# Patient Record
Sex: FEMALE | Race: WHITE | NOT HISPANIC OR LATINO | Employment: FULL TIME | URBAN - METROPOLITAN AREA
[De-identification: names, ages, dates, MRNs, and addresses within clinical notes are randomized per-mention and may not be internally consistent; named-entity substitution may affect disease eponyms.]

---

## 2017-01-06 ENCOUNTER — OFFICE VISIT (OUTPATIENT)
Dept: INTERNAL MEDICINE | Facility: CLINIC | Age: 32
End: 2017-01-06

## 2017-01-06 VITALS
BODY MASS INDEX: 49.79 KG/M2 | HEART RATE: 95 BPM | TEMPERATURE: 98.2 F | OXYGEN SATURATION: 96 % | DIASTOLIC BLOOD PRESSURE: 85 MMHG | HEIGHT: 63 IN | SYSTOLIC BLOOD PRESSURE: 142 MMHG | WEIGHT: 281 LBS

## 2017-01-06 DIAGNOSIS — I10 BENIGN ESSENTIAL HYPERTENSION: ICD-10-CM

## 2017-01-06 DIAGNOSIS — Z79.899 CONTROLLED SUBSTANCE AGREEMENT SIGNED: ICD-10-CM

## 2017-01-06 DIAGNOSIS — K50.00 CROHN'S DISEASE OF SMALL INTESTINE WITHOUT COMPLICATION (HCC): ICD-10-CM

## 2017-01-06 DIAGNOSIS — F51.01 PRIMARY INSOMNIA: ICD-10-CM

## 2017-01-06 DIAGNOSIS — F41.8 DEPRESSION WITH ANXIETY: Primary | ICD-10-CM

## 2017-01-06 DIAGNOSIS — E66.01 MORBID OBESITY, UNSPECIFIED OBESITY TYPE (HCC): ICD-10-CM

## 2017-01-06 DIAGNOSIS — H65.193: ICD-10-CM

## 2017-01-06 PROBLEM — H65.93 BILATERAL NON-SUPPURATIVE OTITIS MEDIA: Status: ACTIVE | Noted: 2017-01-06

## 2017-01-06 PROCEDURE — 99214 OFFICE O/P EST MOD 30 MIN: CPT | Performed by: INTERNAL MEDICINE

## 2017-01-06 RX ORDER — CLARITHROMYCIN 500 MG/1
500 TABLET, COATED ORAL 2 TIMES DAILY
Qty: 16 TABLET | Refills: 0 | Status: SHIPPED | OUTPATIENT
Start: 2017-01-06 | End: 2017-11-15

## 2017-01-06 RX ORDER — ZOLPIDEM TARTRATE 10 MG/1
10 TABLET ORAL NIGHTLY PRN
Qty: 30 TABLET | Refills: 0 | Status: SHIPPED | OUTPATIENT
Start: 2017-01-06 | End: 2017-02-06 | Stop reason: SDUPTHER

## 2017-01-06 RX ORDER — BUPROPION HYDROCHLORIDE 75 MG/1
75 TABLET ORAL 2 TIMES DAILY
Qty: 180 TABLET | Refills: 1 | Status: SHIPPED | OUTPATIENT
Start: 2017-01-06 | End: 2017-02-06

## 2017-01-06 NOTE — MR AVS SNAPSHOT
Liz BEACH Yudi   1/6/2017 10:45 AM   Office Visit    Dept Phone:  [phone]   Encounter #:  [id number]    Provider:  Isauro Maldonado MD   Department:  [facility] INTERNAL MEDICINE                Your Full Care Plan              Today's Medication Changes          These changes are accurate as of: 1/6/17 12:20 PM.  If you have any questions, ask your nurse or doctor.               New Medication(s)Ordered:     clarithromycin 500 MG tablet   Commonly known as:  BIAXIN   Take 1 tablet by mouth 2 (Two) Times a Day.   Started by:  Isauro Maldonado MD       zolpidem 10 MG tablet   Commonly known as:  AMBIEN   Take 1 tablet by mouth At Night As Needed for sleep.   Started by:  Isauro Maldonado MD         Stop taking medication(s)listed here:     amoxicillin-clavulanate 875-125 MG per tablet   Commonly known as:  AUGMENTIN   Stopped by:  Isauro Maldonado MD           Chlorcyclizine-Pseudoephed 25-60 MG tablet   Commonly known as:  STAHIST AD   Stopped by:  Shanika Patel MA                Where to Get Your Medications      These medications were sent to [address] AT [facility] - [phone]  - [phone] [place] [address]     Phone:  [phone]     clarithromycin 500 MG tablet         You can get these medications from any pharmacy     Bring a paper prescription for each of these medications     zolpidem 10 MG tablet                  Your Updated Medication List          This list is accurate as of: 1/6/17 12:20 PM.  Always use your most recent med list.                clarithromycin 500 MG tablet   Commonly known as:  BIAXIN   Take 1 tablet by mouth 2 (Two) Times a Day.       DULoxetine 60 MG capsule   Commonly known as:  CYMBALTA   TAKE ONE CAPSULE BY MOUTH DAILY       lisinopril-hydrochlorothiazide 20-25 MG per tablet   Commonly known as:  PRINZIDE,ZESTORETIC    TAKE ONE TABLET BY MOUTH EVERY MORNING       ondansetron ODT 4 MG disintegrating tablet   Commonly known as:  ZOFRAN-ODT   Take 1 tablet by mouth Every 8 (Eight) Hours As Needed for nausea or vomiting.       SPRINTEC 28 PO       zolpidem 10 MG tablet   Commonly known as:  AMBIEN   Take 1 tablet by mouth At Night As Needed for sleep.               You Were Diagnosed With        Codes Comments    Depression with anxiety    -  Primary ICD-10-CM: F41.8  ICD-9-CM: 300.4     Benign essential hypertension     ICD-10-CM: I10  ICD-9-CM: 401.1     Crohn's disease of small intestine without complication     ICD-10-CM: K50.00  ICD-9-CM: 555.0     Primary insomnia     ICD-10-CM: F51.01  ICD-9-CM: 307.42     Controlled substance agreement signed     ICD-10-CM: Z79.899  ICD-9-CM: V58.69     Morbid obesity, unspecified obesity type     ICD-10-CM: E66.01  ICD-9-CM: 278.01     Subacute nonsuppurative otitis media of both ears     ICD-10-CM: H65.193  ICD-9-CM: 381.00       Instructions     None    Patient Instructions History      Upcoming Appointments     Visit Type Date Time Department    OFFICE VISIT 2017 10:45 AM [facility]  JAYE 1 2524      [employer] Signup     [facility] [employer] allows you to send messages to your doctor, view your test results, renew your prescriptions, schedule appointments, and more. To sign up, go to [employer] and click on the Sign Up Now link in the New User? box. Enter your [employer] Activation Code exactly as it appears below along with the last four digits of your Social Security Number and your Date of Birth () to complete the sign-up process. If you do not sign up before the expiration date, you must request a new code.    [employer] Activation Code: [email or web address]  Expires: 2017 12:20 PM    If you have questions, you can email [email or web address] or call [phone] to talk to our [employer] staff. Remember, [employer] is NOT to be used for urgent needs. For medical  "emergencies, dial 911.               Other Info from Your Visit           Allergies     No Known Allergies      Vital Signs     Blood Pressure Pulse Temperature Height Weight Oxygen Saturation    142/85 (BP Location: Left arm, Patient Position: Sitting, Cuff Size: Adult) 95 98.2 °F (36.8 °C) (Tympanic) 63\" (160 cm) 281 lb (127 kg) 96%    Body Mass Index Smoking Status                49.78 kg/m2 Never Smoker          Problems and Diagnoses Noted     Benign essential hypertension    Middle ear infection    Controlled substance agreement signed    Inflammatory bowel disease (Crohn's disease)    Depression with anxiety    Severe obesity    Difficulty falling or staying asleep        "

## 2017-01-15 NOTE — PROGRESS NOTES
Subjective   Liz Bagley is a 31 y.o. female.   She is here today for depression with anxiety along with hypertension Crohn's disease insomnia control substance agreement signed morbid obesity and otitis media both ears  History of Present Illness   She is here today for depression with anxiety along with hypertension Crohn's disease insomnia control substance cream and signed morbid obesity and otitis media both ears  The following portions of the patient's history were reviewed and updated as appropriate: allergies, current medications, past family history, past medical history, past social history, past surgical history and problem list.    Review of Systems   HENT: Positive for ear pain.    Psychiatric/Behavioral: Positive for decreased concentration, dysphoric mood and sleep disturbance.   All other systems reviewed and are negative.      Objective   Physical Exam   Constitutional: She is oriented to person, place, and time. She appears well-developed and well-nourished. She is cooperative.   HENT:   Head: Normocephalic and atraumatic.   Right Ear: External ear normal. There is drainage. Tympanic membrane is injected.   Left Ear: External ear normal. Tympanic membrane is injected.   Nose: Nose normal.   Mouth/Throat: Oropharynx is clear and moist.   Eyes: Conjunctivae, EOM and lids are normal. Pupils are equal, round, and reactive to light.   Neck: Phonation normal. Neck supple. Carotid bruit is not present.   Cardiovascular: Normal rate, regular rhythm and normal heart sounds.  Exam reveals no gallop and no friction rub.    No murmur heard.  Pulmonary/Chest: Effort normal and breath sounds normal. No respiratory distress.   Abdominal: Soft. Bowel sounds are normal. She exhibits no distension and no mass. There is no hepatosplenomegaly. There is no tenderness. There is no rebound and no guarding. No hernia.   Musculoskeletal: She exhibits no edema.   Neurological: She is alert and oriented to person, place,  and time. Coordination and gait normal.   Skin: Skin is warm and dry.   Psychiatric: Her speech is normal and behavior is normal. Judgment and thought content normal. Her mood appears anxious. She exhibits a depressed mood.   Nursing note and vitals reviewed.      Assessment/Plan   Diagnoses and all orders for this visit:    Depression with anxiety    Benign essential hypertension    Crohn's disease of small intestine without complication    Primary insomnia    Controlled substance agreement signed    Morbid obesity, unspecified obesity type    Subacute nonsuppurative otitis media of both ears    Other orders  -     zolpidem (AMBIEN) 10 MG tablet; Take 1 tablet by mouth At Night As Needed for sleep.  -     clarithromycin (BIAXIN) 500 MG tablet; Take 1 tablet by mouth 2 (Two) Times a Day.  -     buPROPion (WELLBUTRIN) 75 MG tablet; Take 1 tablet by mouth 2 (Two) Times a Day.      Depression with anxiety supportive meds for this and therapy as needed  Hypertension well-controlled on current medication but weight loss would be beneficial  Crohn's disease as per GI  Insomnia supportive meds proper sleep hygiene  Control substance cream and signed today for Ambien  Morbid obesity weight loss with proper diet exercise medication  Otitis media both ears supportive meds for this as well

## 2017-02-06 ENCOUNTER — OFFICE VISIT (OUTPATIENT)
Dept: INTERNAL MEDICINE | Facility: CLINIC | Age: 32
End: 2017-02-06

## 2017-02-06 VITALS
OXYGEN SATURATION: 95 % | WEIGHT: 277 LBS | DIASTOLIC BLOOD PRESSURE: 90 MMHG | SYSTOLIC BLOOD PRESSURE: 138 MMHG | BODY MASS INDEX: 49.08 KG/M2 | HEART RATE: 101 BPM | HEIGHT: 63 IN | TEMPERATURE: 98.5 F

## 2017-02-06 DIAGNOSIS — I10 BENIGN ESSENTIAL HYPERTENSION: ICD-10-CM

## 2017-02-06 DIAGNOSIS — H91.92 HEARING LOSS OF LEFT EAR: ICD-10-CM

## 2017-02-06 DIAGNOSIS — F51.01 PRIMARY INSOMNIA: ICD-10-CM

## 2017-02-06 DIAGNOSIS — J30.1 NON-SEASONAL ALLERGIC RHINITIS DUE TO POLLEN: ICD-10-CM

## 2017-02-06 DIAGNOSIS — F41.8 DEPRESSION WITH ANXIETY: Primary | ICD-10-CM

## 2017-02-06 DIAGNOSIS — E66.01 MORBID OBESITY, UNSPECIFIED OBESITY TYPE (HCC): ICD-10-CM

## 2017-02-06 PROBLEM — J30.89 PERENNIAL ALLERGIC RHINITIS: Status: ACTIVE | Noted: 2017-02-06

## 2017-02-06 PROCEDURE — 99214 OFFICE O/P EST MOD 30 MIN: CPT | Performed by: INTERNAL MEDICINE

## 2017-02-06 RX ORDER — ZOLPIDEM TARTRATE 10 MG/1
10 TABLET ORAL NIGHTLY PRN
Qty: 30 TABLET | Refills: 2 | Status: SHIPPED | OUTPATIENT
Start: 2017-02-06 | End: 2017-07-06 | Stop reason: SDUPTHER

## 2017-02-06 RX ORDER — ZOLPIDEM TARTRATE 10 MG/1
10 TABLET ORAL NIGHTLY PRN
Qty: 30 TABLET | Refills: 2 | Status: SHIPPED | OUTPATIENT
Start: 2017-02-06 | End: 2017-02-06 | Stop reason: SDUPTHER

## 2017-02-06 RX ORDER — BUPROPION HYDROCHLORIDE 150 MG/1
150 TABLET ORAL DAILY
Qty: 90 TABLET | Refills: 1 | Status: SHIPPED | OUTPATIENT
Start: 2017-02-06 | End: 2017-08-02 | Stop reason: SDUPTHER

## 2017-02-14 NOTE — PROGRESS NOTES
Subjective   Liz Bagley is a 31 y.o. female.   She is here today for depression with anxiety which is not well controlled today along with insomnia hypertension obesity hearing loss of left ear and allergic rhinitis as well  History of Present Illness   She is here today for depression with anxiety which is not well controlled today along with insomnia hypertension obesity hearing loss of left ear and allergic rhinitis as well  The following portions of the patient's history were reviewed and updated as appropriate: allergies, current medications, past family history, past medical history, past social history, past surgical history and problem list.    Review of Systems   HENT: Positive for hearing loss (left ear).    Psychiatric/Behavioral: Positive for decreased concentration, dysphoric mood and sleep disturbance. The patient is nervous/anxious.    All other systems reviewed and are negative.      Objective   Physical Exam   Constitutional: She is oriented to person, place, and time. She appears well-developed and well-nourished. She is cooperative.   HENT:   Head: Normocephalic and atraumatic.   Right Ear: Hearing, tympanic membrane, external ear and ear canal normal.   Left Ear: Tympanic membrane, external ear and ear canal normal. Decreased hearing is noted.   Nose: Nose normal.   Mouth/Throat: Oropharynx is clear and moist.   Eyes: Conjunctivae, EOM and lids are normal. Pupils are equal, round, and reactive to light.   Neck: Phonation normal. Neck supple. Carotid bruit is not present.   Cardiovascular: Normal rate, regular rhythm and normal heart sounds.  Exam reveals no gallop and no friction rub.    No murmur heard.  Pulmonary/Chest: Effort normal and breath sounds normal. No respiratory distress.   Abdominal: Soft. Bowel sounds are normal. She exhibits no distension and no mass. There is no hepatosplenomegaly. There is no tenderness. There is no rebound and no guarding. No hernia.   Musculoskeletal: She  exhibits no edema.   Neurological: She is alert and oriented to person, place, and time. Coordination and gait normal.   Skin: Skin is warm and dry.   Psychiatric: Her speech is normal and behavior is normal. Judgment and thought content normal. Her mood appears anxious. She exhibits a depressed mood.   Nursing note and vitals reviewed.      Assessment/Plan   Diagnoses and all orders for this visit:    Depression with anxiety    Primary insomnia    Benign essential hypertension    Morbid obesity, unspecified obesity type    Hearing loss of left ear    Non-seasonal allergic rhinitis due to pollen    Other orders  -     buPROPion XL (WELLBUTRIN XL) 150 MG 24 hr tablet; Take 1 tablet by mouth Daily.  -     Discontinue: zolpidem (AMBIEN) 10 MG tablet; Take 1 tablet by mouth At Night As Needed for sleep.  -     zolpidem (AMBIEN) 10 MG tablet; Take 1 tablet by mouth At Night As Needed for sleep.     she is here today for depression with anxiety for which we will treat with bupropion  Insomnia supportive meds for this as well  Hypertension well-controlled on current medication normally  Hearing loss of left ear referral to ENT  Morbid obesity weight loss with proper diet exercise medication  Allergic rhinitis supportive meds for this as well

## 2017-04-10 ENCOUNTER — OFFICE (OUTPATIENT)
Dept: URBAN - METROPOLITAN AREA OTHER 6 | Facility: OTHER | Age: 32
End: 2017-04-10

## 2017-04-10 VITALS
HEART RATE: 122 BPM | HEIGHT: 63 IN | WEIGHT: 282 LBS | SYSTOLIC BLOOD PRESSURE: 126 MMHG | DIASTOLIC BLOOD PRESSURE: 80 MMHG

## 2017-04-10 DIAGNOSIS — K50.80 CROHN'S DISEASE OF BOTH SMALL AND LARGE INTESTINE WITHOUT CO: ICD-10-CM

## 2017-04-10 PROCEDURE — 99212 OFFICE O/P EST SF 10 MIN: CPT | Performed by: INTERNAL MEDICINE

## 2017-06-05 RX ORDER — DULOXETIN HYDROCHLORIDE 60 MG/1
CAPSULE, DELAYED RELEASE ORAL
Qty: 30 CAPSULE | Refills: 3 | Status: SHIPPED | OUTPATIENT
Start: 2017-06-05 | End: 2017-10-12 | Stop reason: SDUPTHER

## 2017-06-30 RX ORDER — LISINOPRIL AND HYDROCHLOROTHIAZIDE 25; 20 MG/1; MG/1
TABLET ORAL
Qty: 90 TABLET | Refills: 0 | Status: SHIPPED | OUTPATIENT
Start: 2017-06-30 | End: 2017-09-28 | Stop reason: SDUPTHER

## 2017-07-06 RX ORDER — ZOLPIDEM TARTRATE 10 MG/1
TABLET ORAL
Qty: 30 TABLET | Refills: 1 | Status: SHIPPED | OUTPATIENT
Start: 2017-07-06 | End: 2017-11-15 | Stop reason: SDUPTHER

## 2017-07-31 ENCOUNTER — OFFICE (OUTPATIENT)
Dept: URBAN - METROPOLITAN AREA OTHER 6 | Facility: OTHER | Age: 32
End: 2017-07-31

## 2017-07-31 VITALS
WEIGHT: 290 LBS | SYSTOLIC BLOOD PRESSURE: 120 MMHG | HEIGHT: 63 IN | DIASTOLIC BLOOD PRESSURE: 74 MMHG | HEART RATE: 92 BPM

## 2017-07-31 DIAGNOSIS — K50.80 CROHN'S DISEASE OF BOTH SMALL AND LARGE INTESTINE WITHOUT CO: ICD-10-CM

## 2017-07-31 PROCEDURE — 99214 OFFICE O/P EST MOD 30 MIN: CPT | Performed by: INTERNAL MEDICINE

## 2017-08-02 RX ORDER — BUPROPION HYDROCHLORIDE 150 MG/1
TABLET ORAL
Qty: 90 TABLET | Refills: 0 | Status: SHIPPED | OUTPATIENT
Start: 2017-08-02 | End: 2017-11-05 | Stop reason: SDUPTHER

## 2017-08-04 PROBLEM — R51.9 HEADACHE: Status: ACTIVE | Noted: 2017-08-04

## 2017-08-04 PROBLEM — R49.0 HOARSENESS: Status: ACTIVE | Noted: 2017-08-04

## 2017-08-04 PROBLEM — M25.549 HAND JOINT PAIN: Status: ACTIVE | Noted: 2017-08-04

## 2017-08-04 PROBLEM — M00.9: Status: ACTIVE | Noted: 2017-08-04

## 2017-08-04 PROBLEM — F32.A DEPRESSION: Status: ACTIVE | Noted: 2017-08-04

## 2017-08-04 PROBLEM — J32.1 FRONTAL SINUSITIS: Status: ACTIVE | Noted: 2017-08-04

## 2017-08-04 PROBLEM — J32.0 MAXILLARY SINUSITIS: Status: ACTIVE | Noted: 2017-08-04

## 2017-08-04 PROBLEM — M79.603 PAIN OF UPPER EXTREMITY: Status: ACTIVE | Noted: 2017-08-04

## 2017-08-04 PROBLEM — R06.02 SHORTNESS OF BREATH: Status: ACTIVE | Noted: 2017-08-04

## 2017-08-04 PROBLEM — I78.1 NON-NEOPLASTIC NEVUS: Status: ACTIVE | Noted: 2017-08-04

## 2017-08-04 PROBLEM — R53.83 FATIGUE: Status: ACTIVE | Noted: 2017-08-04

## 2017-08-07 RX ORDER — BUPROPION HYDROCHLORIDE 150 MG/1
TABLET ORAL
Qty: 90 TABLET | Refills: 0 | Status: SHIPPED | OUTPATIENT
Start: 2017-08-07 | End: 2017-10-10 | Stop reason: SDUPTHER

## 2017-09-15 ENCOUNTER — TRANSCRIBE ORDERS (OUTPATIENT)
Dept: ADMINISTRATIVE | Facility: HOSPITAL | Age: 32
End: 2017-09-15

## 2017-09-15 DIAGNOSIS — K50.80 CROHN'S DISEASE OF BOTH SMALL AND LARGE INTESTINE WITHOUT COMPLICATION (HCC): Primary | ICD-10-CM

## 2017-09-26 ENCOUNTER — OFFICE VISIT (OUTPATIENT)
Dept: SURGERY | Facility: CLINIC | Age: 32
End: 2017-09-26

## 2017-09-26 VITALS
OXYGEN SATURATION: 99 % | BODY MASS INDEX: 51.31 KG/M2 | TEMPERATURE: 97.9 F | HEART RATE: 108 BPM | SYSTOLIC BLOOD PRESSURE: 128 MMHG | HEIGHT: 63 IN | WEIGHT: 289.6 LBS | DIASTOLIC BLOOD PRESSURE: 90 MMHG

## 2017-09-26 DIAGNOSIS — K52.9 IBD (INFLAMMATORY BOWEL DISEASE): Primary | ICD-10-CM

## 2017-09-26 PROCEDURE — 99244 OFF/OP CNSLTJ NEW/EST MOD 40: CPT | Performed by: COLON & RECTAL SURGERY

## 2017-09-26 RX ORDER — ACETAMINOPHEN 325 MG/1
650 TABLET ORAL EVERY 6 HOURS PRN
COMMUNITY
End: 2019-03-11
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
## 2017-09-28 ENCOUNTER — OFFICE (OUTPATIENT)
Dept: URBAN - METROPOLITAN AREA OTHER 6 | Facility: OTHER | Age: 32
End: 2017-09-28

## 2017-09-28 VITALS
HEIGHT: 63 IN | WEIGHT: 268 LBS | HEART RATE: 108 BPM | SYSTOLIC BLOOD PRESSURE: 120 MMHG | DIASTOLIC BLOOD PRESSURE: 50 MMHG

## 2017-09-28 DIAGNOSIS — R19.7 DIARRHEA, UNSPECIFIED: ICD-10-CM

## 2017-09-28 DIAGNOSIS — K50.80 CROHN'S DISEASE OF BOTH SMALL AND LARGE INTESTINE WITHOUT CO: ICD-10-CM

## 2017-09-28 PROCEDURE — 99213 OFFICE O/P EST LOW 20 MIN: CPT | Performed by: INTERNAL MEDICINE

## 2017-09-28 RX ORDER — SODIUM CHLORIDE 9 MG/ML
250 INJECTION, SOLUTION INTRAVENOUS ONCE
Status: CANCELLED | OUTPATIENT
Start: 2017-11-10

## 2017-09-28 RX ORDER — LISINOPRIL AND HYDROCHLOROTHIAZIDE 25; 20 MG/1; MG/1
TABLET ORAL
Qty: 90 TABLET | Refills: 0 | Status: SHIPPED | OUTPATIENT
Start: 2017-09-28 | End: 2017-11-15 | Stop reason: SDUPTHER

## 2017-09-28 RX ORDER — PREDNISONE 10 MG/1
TABLET ORAL
Qty: 120 | Refills: 1 | Status: COMPLETED
Start: 2017-09-28 | End: 2018-01-24

## 2017-09-29 ENCOUNTER — HOSPITAL ENCOUNTER (OUTPATIENT)
Dept: INFUSION THERAPY | Facility: HOSPITAL | Age: 32
Discharge: HOME | End: 2017-09-29
Attending: INTERNAL MEDICINE

## 2017-10-03 ENCOUNTER — HOSPITAL ENCOUNTER (OUTPATIENT)
Dept: WOUND CARE | Facility: HOSPITAL | Age: 32
Discharge: HOME | End: 2017-10-03

## 2017-10-03 NOTE — NURSING NOTE
CWOCN outpatient visit with patient who has decided to have an ileostomy. Patient requesting information about pouching, ADLs, lifestyle, supplies, stoma care, etc. Discussed all and answered her questions. She is feeling confident that this is the route she wants to go related to being on multiple meds and steroids. We showed her a variety of pouches. Provided our info in the case additional questions come up, as well as a booklet and [facility] information. Will look forward to seeing her for stoma marking and post op.

## 2017-10-10 ENCOUNTER — OFFICE VISIT (OUTPATIENT)
Dept: INTERNAL MEDICINE | Facility: CLINIC | Age: 32
End: 2017-10-10

## 2017-10-10 VITALS
BODY MASS INDEX: 51.91 KG/M2 | SYSTOLIC BLOOD PRESSURE: 117 MMHG | TEMPERATURE: 98.6 F | RESPIRATION RATE: 16 BRPM | HEIGHT: 63 IN | DIASTOLIC BLOOD PRESSURE: 82 MMHG | WEIGHT: 293 LBS | OXYGEN SATURATION: 98 % | HEART RATE: 103 BPM

## 2017-10-10 DIAGNOSIS — F51.01 PRIMARY INSOMNIA: ICD-10-CM

## 2017-10-10 DIAGNOSIS — E55.9 HYPOVITAMINOSIS D: ICD-10-CM

## 2017-10-10 DIAGNOSIS — Z79.52 CURRENT CHRONIC USE OF SYSTEMIC STEROIDS: ICD-10-CM

## 2017-10-10 DIAGNOSIS — I10 BENIGN ESSENTIAL HYPERTENSION: ICD-10-CM

## 2017-10-10 DIAGNOSIS — Z00.00 HEALTH CARE MAINTENANCE: Primary | ICD-10-CM

## 2017-10-10 DIAGNOSIS — J30.89 PERENNIAL ALLERGIC RHINITIS: ICD-10-CM

## 2017-10-10 DIAGNOSIS — E66.01 MORBID OBESITY (HCC): ICD-10-CM

## 2017-10-10 DIAGNOSIS — K51.918 ULCERATIVE COLITIS, CHRONIC, OTHER COMPLICATION (HCC): ICD-10-CM

## 2017-10-10 DIAGNOSIS — F32.89 OTHER DEPRESSION: ICD-10-CM

## 2017-10-10 LAB — 25(OH)D3+25(OH)D2 SERPL-MCNC: 9.2 NG/ML (ref 30–100)

## 2017-10-10 PROCEDURE — 99395 PREV VISIT EST AGE 18-39: CPT | Performed by: INTERNAL MEDICINE

## 2017-10-10 PROCEDURE — 90471 IMMUNIZATION ADMIN: CPT | Performed by: INTERNAL MEDICINE

## 2017-10-10 PROCEDURE — 99214 OFFICE O/P EST MOD 30 MIN: CPT | Performed by: INTERNAL MEDICINE

## 2017-10-10 PROCEDURE — 90630 INFLUENZA VAC INTRADERMAL QUADRIVALENT: CPT | Performed by: INTERNAL MEDICINE

## 2017-10-10 RX ORDER — PREDNISONE 20 MG/1
30 TABLET ORAL DAILY
COMMUNITY
End: 2017-11-15 | Stop reason: SDUPTHER

## 2017-10-10 RX ORDER — ERGOCALCIFEROL 1.25 MG/1
50000 CAPSULE ORAL
Qty: 24 CAPSULE | Refills: 0 | Status: SHIPPED | OUTPATIENT
Start: 2017-10-10 | End: 2018-05-29 | Stop reason: SDUPTHER

## 2017-10-12 RX ORDER — DULOXETIN HYDROCHLORIDE 60 MG/1
CAPSULE, DELAYED RELEASE ORAL
Qty: 30 CAPSULE | Refills: 2 | Status: SHIPPED | OUTPATIENT
Start: 2017-10-12 | End: 2017-11-15 | Stop reason: SDUPTHER

## 2017-10-17 ENCOUNTER — HOSPITAL ENCOUNTER (OUTPATIENT)
Facility: HOSPITAL | Age: 32
Setting detail: SURGERY ADMIT
End: 2017-10-17
Attending: COLON & RECTAL SURGERY | Admitting: COLON & RECTAL SURGERY

## 2017-10-17 ENCOUNTER — OFFICE VISIT (OUTPATIENT)
Dept: SURGERY | Facility: CLINIC | Age: 32
End: 2017-10-17

## 2017-10-17 VITALS
BODY MASS INDEX: 51.91 KG/M2 | DIASTOLIC BLOOD PRESSURE: 80 MMHG | HEART RATE: 110 BPM | SYSTOLIC BLOOD PRESSURE: 122 MMHG | WEIGHT: 293 LBS | TEMPERATURE: 97.8 F | HEIGHT: 63 IN | OXYGEN SATURATION: 98 %

## 2017-10-17 DIAGNOSIS — K52.9 IBD (INFLAMMATORY BOWEL DISEASE): Primary | ICD-10-CM

## 2017-10-17 PROCEDURE — 99213 OFFICE O/P EST LOW 20 MIN: CPT | Performed by: COLON & RECTAL SURGERY

## 2017-10-17 RX ORDER — METRONIDAZOLE 500 MG/1
TABLET ORAL
Qty: 3 TABLET | Refills: 0 | Status: ON HOLD | OUTPATIENT
Start: 2017-10-17 | End: 2017-11-20

## 2017-10-17 RX ORDER — NEOMYCIN SULFATE 500 MG/1
TABLET ORAL
Qty: 6 TABLET | Refills: 0 | Status: ON HOLD | OUTPATIENT
Start: 2017-10-17 | End: 2017-11-20
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
## 2017-10-27 NOTE — PROGRESS NOTES
Subjective   Liz Bagley is a 32 y.o. female.   She is here today for complete physical exam except for gynecological part along with hypertension ulcerative colitis allergic rhinitis depression insomnia and current chronic use of systemic steroids for ulcerative colitis along with morbid obesity and she actually has no new complaints  History of Present Illness   She is here today for complete physical exam except for large part along with hypertension ulcerative colitis for which she is on chronic systemic steroids as well as depression insomnia and current chronic use of systemic steroids along with morbid obesity and she has no new complaints  The following portions of the patient's history were reviewed and updated as appropriate: allergies, current medications, past family history, past medical history, past social history, past surgical history and problem list.    Review of Systems   Psychiatric/Behavioral: Positive for sleep disturbance.   All other systems reviewed and are negative.      Objective   Physical Exam   Constitutional: She is oriented to person, place, and time. Vital signs are normal. She appears well-developed and well-nourished. She is active.   HENT:   Head: Normocephalic and atraumatic.   Right Ear: Hearing, tympanic membrane, external ear and ear canal normal.   Left Ear: Hearing, tympanic membrane, external ear and ear canal normal.   Nose: Nose normal.   Mouth/Throat: Uvula is midline, oropharynx is clear and moist and mucous membranes are normal.   Eyes: Conjunctivae, EOM and lids are normal. Pupils are equal, round, and reactive to light. Right eye exhibits no discharge. Left eye exhibits no discharge.   Neck: Trachea normal, normal range of motion, full passive range of motion without pain and phonation normal. Neck supple. Carotid bruit is not present. No edema present. No thyroid mass and no thyromegaly present.   Cardiovascular: Normal rate, regular rhythm, normal heart sounds,  intact distal pulses and normal pulses.  Exam reveals no gallop and no friction rub.    No murmur heard.  Pulmonary/Chest: Effort normal and breath sounds normal. No respiratory distress. She has no wheezes. She has no rales.   Abdominal: Soft. Normal appearance, normal aorta and bowel sounds are normal. She exhibits no distension, no abdominal bruit and no mass. There is no hepatosplenomegaly. There is no tenderness. There is no rebound, no guarding and no CVA tenderness. No hernia. Hernia confirmed negative in the right inguinal area and confirmed negative in the left inguinal area.   Musculoskeletal: Normal range of motion. She exhibits no edema or tenderness.       Vascular Status -  Her exam exhibits right foot vasculature normal. Her exam exhibits no right foot edema. Her exam exhibits left foot vasculature normal. Her exam exhibits no left foot edema.   Skin Integrity  -  Her right foot skin is intact.     Liz 's left foot skin is intact. .  Lymphadenopathy:     She has no cervical adenopathy.     She has no axillary adenopathy.        Right: No inguinal and no supraclavicular adenopathy present.        Left: No inguinal and no supraclavicular adenopathy present.   Neurological: She is alert and oriented to person, place, and time. She has normal strength. No cranial nerve deficit or sensory deficit. She exhibits normal muscle tone. She displays a negative Romberg sign. Coordination normal.   Skin: Skin is warm, dry and intact. No cyanosis. Nails show no clubbing.   Psychiatric: She has a normal mood and affect. Her speech is normal and behavior is normal. Judgment and thought content normal. Cognition and memory are normal.   Nursing note and vitals reviewed.      Assessment/Plan   Diagnoses and all orders for this visit:    Health care maintenance    Benign essential hypertension    Ulcerative colitis, chronic, other complication    Perennial allergic rhinitis    Other depression    Primary  insomnia    Current chronic use of systemic steroids    Morbid obesity    Hypovitaminosis D  -     Vitamin D 25 Hydroxy    Other orders  -     Flu Vaccine Intradermal Quad 18-64YR  -     vitamin D (ERGOCALCIFEROL) 94890 units capsule capsule; Take 1 capsule by mouth Every 7 (Seven) Days.      Healthcare maintenance fasting labs vaccines pelvic exams on a regular basis  Hypertension well-controlled on current medication which includes Zestoretic and if she does get pregnant and she needs to get off the ACE inhibitor immediately  Ulcerative colitis continue systemic steroids for this  Allergic rhinitis supportive meds for this as well including nasal sprays and antihistamines and Singulair  Depression stable on current medication including Cymbalta  Primary insomnia supportive meds including Ambien  Chronic current use of systemic steroids noted  Morbid obesity weight loss with proper diet exercise medication  Hypovitaminosis D vitamin D levels and we will provide vitamin D 50,000 units weekly

## 2017-11-06 RX ORDER — BUPROPION HYDROCHLORIDE 150 MG/1
TABLET ORAL
Qty: 90 TABLET | Refills: 2 | Status: SHIPPED | OUTPATIENT
Start: 2017-11-06 | End: 2017-11-15 | Stop reason: SDUPTHER

## 2017-11-13 ENCOUNTER — PREP FOR SURGERY (OUTPATIENT)
Dept: OTHER | Facility: HOSPITAL | Age: 32
End: 2017-11-13

## 2017-11-13 DIAGNOSIS — K51.011 ULCERATIVE PANCOLITIS WITH RECTAL BLEEDING (HCC): Primary | ICD-10-CM

## 2017-11-13 RX ORDER — GABAPENTIN 300 MG/1
600 CAPSULE ORAL ONCE
Status: CANCELLED | OUTPATIENT
Start: 2017-11-20 | End: 2017-11-13

## 2017-11-13 RX ORDER — CELECOXIB 200 MG/1
200 CAPSULE ORAL ONCE
Status: CANCELLED | OUTPATIENT
Start: 2017-11-20 | End: 2017-11-13

## 2017-11-13 RX ORDER — ALVIMOPAN 12 MG/1
12 CAPSULE ORAL ONCE
Status: CANCELLED | OUTPATIENT
Start: 2017-11-20 | End: 2017-11-13

## 2017-11-13 RX ORDER — SODIUM CHLORIDE 0.9 % (FLUSH) 0.9 %
1-10 SYRINGE (ML) INJECTION AS NEEDED
Status: CANCELLED | OUTPATIENT
Start: 2017-11-20

## 2017-11-13 RX ORDER — ACETAMINOPHEN 500 MG
1000 TABLET ORAL ONCE
Status: CANCELLED | OUTPATIENT
Start: 2017-11-20 | End: 2017-11-13

## 2017-11-15 ENCOUNTER — APPOINTMENT (OUTPATIENT)
Dept: PREADMISSION TESTING | Facility: HOSPITAL | Age: 32
End: 2017-11-15

## 2017-11-15 ENCOUNTER — HOSPITAL ENCOUNTER (OUTPATIENT)
Dept: WOUND CARE | Facility: HOSPITAL | Age: 32
Discharge: HOME | End: 2017-11-15
Admitting: PHYSICIAN ASSISTANT

## 2017-11-15 VITALS
HEART RATE: 100 BPM | HEIGHT: 63 IN | DIASTOLIC BLOOD PRESSURE: 82 MMHG | BODY MASS INDEX: 51.91 KG/M2 | SYSTOLIC BLOOD PRESSURE: 135 MMHG | WEIGHT: 293 LBS | RESPIRATION RATE: 20 BRPM | TEMPERATURE: 98.5 F | OXYGEN SATURATION: 96 %

## 2017-11-15 LAB
ANION GAP SERPL CALCULATED.3IONS-SCNC: 11.3 MMOL/L
BUN BLD-MCNC: 13 MG/DL (ref 6–20)
BUN/CREAT SERPL: 21 (ref 7–25)
CALCIUM SPEC-SCNC: 9.4 MG/DL (ref 8.6–10.5)
CHLORIDE SERPL-SCNC: 98 MMOL/L (ref 98–107)
CO2 SERPL-SCNC: 27.7 MMOL/L (ref 22–29)
CREAT BLD-MCNC: 0.62 MG/DL (ref 0.57–1)
DEPRECATED RDW RBC AUTO: 42.8 FL (ref 37–54)
ERYTHROCYTE [DISTWIDTH] IN BLOOD BY AUTOMATED COUNT: 15 % (ref 11.7–13)
GFR SERPL CREATININE-BSD FRML MDRD: 110 ML/MIN/1.73
GLUCOSE BLD-MCNC: 101 MG/DL (ref 65–99)
HCG SERPL QL: NEGATIVE
HCT VFR BLD AUTO: 37.4 % (ref 35.6–45.5)
HGB BLD-MCNC: 11.3 G/DL (ref 11.9–15.5)
MCH RBC QN AUTO: 23.7 PG (ref 26.9–32)
MCHC RBC AUTO-ENTMCNC: 30.2 G/DL (ref 32.4–36.3)
MCV RBC AUTO: 78.6 FL (ref 80.5–98.2)
PLATELET # BLD AUTO: 669 10*3/MM3 (ref 140–500)
PMV BLD AUTO: 9.9 FL (ref 6–12)
POTASSIUM BLD-SCNC: 4 MMOL/L (ref 3.5–5.2)
RBC # BLD AUTO: 4.76 10*6/MM3 (ref 3.9–5.2)
SODIUM BLD-SCNC: 137 MMOL/L (ref 136–145)
WBC NRBC COR # BLD: 13.97 10*3/MM3 (ref 4.5–10.7)

## 2017-11-15 PROCEDURE — 84703 CHORIONIC GONADOTROPIN ASSAY: CPT | Performed by: COLON & RECTAL SURGERY

## 2017-11-15 PROCEDURE — 80048 BASIC METABOLIC PNL TOTAL CA: CPT | Performed by: COLON & RECTAL SURGERY

## 2017-11-15 PROCEDURE — 93005 ELECTROCARDIOGRAM TRACING: CPT

## 2017-11-15 PROCEDURE — 36415 COLL VENOUS BLD VENIPUNCTURE: CPT

## 2017-11-15 PROCEDURE — 85027 COMPLETE CBC AUTOMATED: CPT | Performed by: COLON & RECTAL SURGERY

## 2017-11-15 PROCEDURE — 93010 ELECTROCARDIOGRAM REPORT: CPT | Performed by: INTERNAL MEDICINE

## 2017-11-15 RX ORDER — LISINOPRIL AND HYDROCHLOROTHIAZIDE 25; 20 MG/1; MG/1
1 TABLET ORAL NIGHTLY
COMMUNITY
End: 2018-05-21

## 2017-11-15 RX ORDER — BUPROPION HYDROCHLORIDE 150 MG/1
150 TABLET ORAL NIGHTLY
COMMUNITY
End: 2018-07-24 | Stop reason: SDUPTHER

## 2017-11-15 RX ORDER — ZOLPIDEM TARTRATE 10 MG/1
10 TABLET ORAL NIGHTLY PRN
COMMUNITY
End: 2018-01-16 | Stop reason: SDUPTHER

## 2017-11-15 RX ORDER — PREDNISONE 10 MG/1
5 TABLET ORAL DAILY
Status: ON HOLD | COMMUNITY
End: 2017-11-20

## 2017-11-15 RX ORDER — DULOXETIN HYDROCHLORIDE 60 MG/1
60 CAPSULE, DELAYED RELEASE ORAL NIGHTLY
COMMUNITY
End: 2018-01-26 | Stop reason: SDUPTHER

## 2017-11-15 NOTE — DISCHARGE INSTRUCTIONS
Take the following medications the morning of surgery with a small sip of water:    none    General Instructions:  • Do not eat or drink anything after midnight the night before surgery.(pt has Dr Evans's instructions to follow due to prep day before)  • Infants may have breast milk up to four hours before surgery.  • Infants drinking formula may drink formula up to six hours before surgery.   • Patients who avoid smoking, chewing tobacco and alcohol for 4 weeks prior to surgery have a reduced risk of post-operative complications.  Quit smoking as many days before surgery as you can.  • Do not smoke, use chewing tobacco or drink alcohol the day of surgery.   • If applicable bring your C-PAP/ BI-PAP machine.  • Bring any papers given to you in the doctor’s office.  • Wear clean comfortable clothes and socks.  • Do not wear contact lenses or make-up.  Bring a case for your glasses.   • Bring crutches or walker if applicable.  • Remove all piercings.  Leave jewelry and any other valuables at home.  • The Pre-Admission Testing nurse will instruct you to bring medications if unable to obtain an accurate list in Pre-Admission Testing.        If you were given a blood bank ID arm band remember to bring it with you the day of surgery.    Preventing a Surgical Site Infection:  • For 2 to 3 days before surgery, avoid shaving with a razor because the razor can irritate skin and make it easier to develop an infection.  • The night prior to surgery sleep in a clean bed with clean clothing.  Do not allow pets to sleep with you.  • Shower on the morning of surgery using a fresh bar of anti-bacterial soap (such as Dial) and clean washcloth.  Dry with a clean towel and dress in clean clothing.  • Ask your surgeon if you will be receiving antibiotics prior to surgery.  • Make sure you, your family, and all healthcare providers clean their hands with soap and water or an alcohol based hand  before caring for you or your  wound.    Day of surgery:11/20/17  0630  Upon arrival, a Pre-op nurse and Anesthesiologist will review your health history, obtain vital signs, and answer questions you may have.  The only belongings needed at this time will be your home medications and if applicable your C-PAP/BI-PAP machine.  If you are staying overnight your family can leave the rest of your belongings in the car and bring them to your room later.  A Pre-op nurse will start an IV and you may receive medication in preparation for surgery, including something to help you relax.  Your family will be able to see you in the Pre-op area.  While you are in surgery your family should notify the waiting room  if they leave the waiting room area and provide a contact phone number.    Please be aware that surgery does come with discomfort.  We want to make every effort to control your discomfort so please discuss any uncontrolled symptoms with your nurse.   Your doctor will most likely have prescribed pain medications.      If you are going home after surgery you will receive individualized written care instructions before being discharged.  A responsible adult must drive you to and from the hospital on the day of your surgery and stay with you for 24 hours.    If you are staying overnight following surgery, you will be transported to your hospital room following the recovery period.  [facility] has all private rooms.    If you have any questions please call Pre-Admission Testing at [phone].  Deductibles and co-payments are collected on the day of service. Please be prepared to pay the required co-pay, deductible or deposit on the day of service as defined by your plan.

## 2017-11-15 NOTE — NURSING NOTE
11/15/17 1226   Stoma Site Marking   Procedure Marking For ileostomy   Site Marked RLQ: right lower quadrant   Patient Assessment Screen rectus muscle identified;marked within patient's visual field;bony prominences avoided;waistband avoided;creases/scars avoided   How Was Patient Marked? surgical marker;transparent dressing   Stoma Marking Comments Marked patient close to level with umbilicus. Patient has round abdomen. Wears her pants low. Has a fold above umbilicus. Answered her questions about supplies and surgery/post op with ostomy. She verbalized understanding.    Patient Position During Marking sitting;standing;lying

## 2017-11-20 ENCOUNTER — HOSPITAL ENCOUNTER (INPATIENT)
Facility: HOSPITAL | Age: 32
LOS: 5 days | Discharge: HOME HEALTH | End: 2017-11-25
Attending: COLON & RECTAL SURGERY | Admitting: COLON & RECTAL SURGERY

## 2017-11-20 ENCOUNTER — ANESTHESIA (OUTPATIENT)
Dept: PERIOP | Facility: HOSPITAL | Age: 32
End: 2017-11-20

## 2017-11-20 ENCOUNTER — ANESTHESIA EVENT (OUTPATIENT)
Dept: PERIOP | Facility: HOSPITAL | Age: 32
End: 2017-11-20

## 2017-11-20 DIAGNOSIS — K51.011 ULCERATIVE PANCOLITIS WITH RECTAL BLEEDING (HCC): ICD-10-CM

## 2017-11-20 PROCEDURE — P9041 ALBUMIN (HUMAN),5%, 50ML: HCPCS | Performed by: NURSE ANESTHETIST, CERTIFIED REGISTERED

## 2017-11-20 PROCEDURE — 25010000002 DEXAMETHASONE PER 1 MG: Performed by: NURSE ANESTHETIST, CERTIFIED REGISTERED

## 2017-11-20 PROCEDURE — 44212 LAPARO TOTAL PROCTOCOLECTOMY: CPT | Performed by: PHYSICIAN ASSISTANT

## 2017-11-20 PROCEDURE — 25010000002 HYDROMORPHONE PER 4 MG: Performed by: SURGERY

## 2017-11-20 PROCEDURE — 0DTE4ZZ RESECTION OF LARGE INTESTINE, PERCUTANEOUS ENDOSCOPIC APPROACH: ICD-10-PCS | Performed by: COLON & RECTAL SURGERY

## 2017-11-20 PROCEDURE — 0D1B4Z4 BYPASS ILEUM TO CUTANEOUS, PERCUTANEOUS ENDOSCOPIC APPROACH: ICD-10-PCS | Performed by: COLON & RECTAL SURGERY

## 2017-11-20 PROCEDURE — 25010000002 ALBUMIN HUMAN 5% PER 50 ML: Performed by: NURSE ANESTHETIST, CERTIFIED REGISTERED

## 2017-11-20 PROCEDURE — 25010000002 LIDOCAINE PER 10 MG: Performed by: NURSE ANESTHETIST, CERTIFIED REGISTERED

## 2017-11-20 PROCEDURE — 25010000002 ONDANSETRON PER 1 MG: Performed by: ANESTHESIOLOGY

## 2017-11-20 PROCEDURE — 44212 LAPARO TOTAL PROCTOCOLECTOMY: CPT | Performed by: COLON & RECTAL SURGERY

## 2017-11-20 PROCEDURE — 25010000002 MAGNESIUM SULFATE PER 500 MG OF MAGNESIUM: Performed by: NURSE ANESTHETIST, CERTIFIED REGISTERED

## 2017-11-20 PROCEDURE — 25010000002 HYDROMORPHONE PER 4 MG: Performed by: COLON & RECTAL SURGERY

## 2017-11-20 PROCEDURE — 25010000002 MIDAZOLAM PER 1 MG: Performed by: ANESTHESIOLOGY

## 2017-11-20 PROCEDURE — 25010000002 HYDROMORPHONE PER 4 MG: Performed by: NURSE ANESTHETIST, CERTIFIED REGISTERED

## 2017-11-20 PROCEDURE — 25010000002 FENTANYL CITRATE (PF) 100 MCG/2ML SOLUTION: Performed by: NURSE ANESTHETIST, CERTIFIED REGISTERED

## 2017-11-20 PROCEDURE — 88307 TISSUE EXAM BY PATHOLOGIST: CPT | Performed by: COLON & RECTAL SURGERY

## 2017-11-20 PROCEDURE — 25010000002 ERTAPENEM PER 500 MG: Performed by: COLON & RECTAL SURGERY

## 2017-11-20 PROCEDURE — 25010000002 PROPOFOL 10 MG/ML EMULSION: Performed by: NURSE ANESTHETIST, CERTIFIED REGISTERED

## 2017-11-20 PROCEDURE — 0DTP4ZZ RESECTION OF RECTUM, PERCUTANEOUS ENDOSCOPIC APPROACH: ICD-10-PCS | Performed by: COLON & RECTAL SURGERY

## 2017-11-20 RX ORDER — PROMETHAZINE HYDROCHLORIDE 25 MG/1
25 TABLET ORAL ONCE AS NEEDED
Status: DISCONTINUED | OUTPATIENT
Start: 2017-11-20 | End: 2017-11-20 | Stop reason: HOSPADM

## 2017-11-20 RX ORDER — FENTANYL CITRATE 50 UG/ML
INJECTION, SOLUTION INTRAMUSCULAR; INTRAVENOUS AS NEEDED
Status: DISCONTINUED | OUTPATIENT
Start: 2017-11-20 | End: 2017-11-20 | Stop reason: SURG

## 2017-11-20 RX ORDER — PROMETHAZINE HYDROCHLORIDE 25 MG/ML
12.5 INJECTION, SOLUTION INTRAMUSCULAR; INTRAVENOUS ONCE AS NEEDED
Status: DISCONTINUED | OUTPATIENT
Start: 2017-11-20 | End: 2017-11-20 | Stop reason: HOSPADM

## 2017-11-20 RX ORDER — ALBUMIN, HUMAN INJ 5% 5 %
SOLUTION INTRAVENOUS CONTINUOUS PRN
Status: DISCONTINUED | OUTPATIENT
Start: 2017-11-20 | End: 2017-11-20 | Stop reason: SURG

## 2017-11-20 RX ORDER — ACETAMINOPHEN 500 MG
1000 TABLET ORAL EVERY 6 HOURS
Status: DISCONTINUED | OUTPATIENT
Start: 2017-11-20 | End: 2017-11-24

## 2017-11-20 RX ORDER — HYDROMORPHONE HYDROCHLORIDE 1 MG/ML
0.5 INJECTION, SOLUTION INTRAMUSCULAR; INTRAVENOUS; SUBCUTANEOUS
Status: DISCONTINUED | OUTPATIENT
Start: 2017-11-20 | End: 2017-11-25 | Stop reason: HOSPADM

## 2017-11-20 RX ORDER — GABAPENTIN 300 MG/1
600 CAPSULE ORAL 2 TIMES DAILY
Status: COMPLETED | OUTPATIENT
Start: 2017-11-20 | End: 2017-11-23

## 2017-11-20 RX ORDER — PROPOFOL 10 MG/ML
VIAL (ML) INTRAVENOUS AS NEEDED
Status: DISCONTINUED | OUTPATIENT
Start: 2017-11-20 | End: 2017-11-20 | Stop reason: SURG

## 2017-11-20 RX ORDER — CELECOXIB 200 MG/1
200 CAPSULE ORAL EVERY 12 HOURS SCHEDULED
Status: DISCONTINUED | OUTPATIENT
Start: 2017-11-20 | End: 2017-11-25 | Stop reason: HOSPADM

## 2017-11-20 RX ORDER — DEXAMETHASONE SODIUM PHOSPHATE 4 MG/ML
INJECTION, SOLUTION INTRA-ARTICULAR; INTRALESIONAL; INTRAMUSCULAR; INTRAVENOUS; SOFT TISSUE AS NEEDED
Status: DISCONTINUED | OUTPATIENT
Start: 2017-11-20 | End: 2017-11-20 | Stop reason: SURG

## 2017-11-20 RX ORDER — MAGNESIUM HYDROXIDE 1200 MG/15ML
LIQUID ORAL AS NEEDED
Status: DISCONTINUED | OUTPATIENT
Start: 2017-11-20 | End: 2017-11-20 | Stop reason: HOSPADM

## 2017-11-20 RX ORDER — LIDOCAINE HYDROCHLORIDE ANHYDROUS AND DEXTROSE MONOHYDRATE 5; 400 G/100ML; MG/100ML
INJECTION, SOLUTION INTRAVENOUS CONTINUOUS PRN
Status: DISCONTINUED | OUTPATIENT
Start: 2017-11-20 | End: 2017-11-20 | Stop reason: SURG

## 2017-11-20 RX ORDER — MIDAZOLAM HYDROCHLORIDE 1 MG/ML
2 INJECTION INTRAMUSCULAR; INTRAVENOUS
Status: DISCONTINUED | OUTPATIENT
Start: 2017-11-20 | End: 2017-11-20 | Stop reason: HOSPADM

## 2017-11-20 RX ORDER — HYDROMORPHONE HYDROCHLORIDE 1 MG/ML
0.5 INJECTION, SOLUTION INTRAMUSCULAR; INTRAVENOUS; SUBCUTANEOUS
Status: DISCONTINUED | OUTPATIENT
Start: 2017-11-20 | End: 2017-11-20

## 2017-11-20 RX ORDER — PROMETHAZINE HYDROCHLORIDE 25 MG/1
12.5 TABLET ORAL ONCE AS NEEDED
Status: DISCONTINUED | OUTPATIENT
Start: 2017-11-20 | End: 2017-11-20 | Stop reason: HOSPADM

## 2017-11-20 RX ORDER — ENALAPRILAT 2.5 MG/2ML
0.62 INJECTION INTRAVENOUS EVERY 6 HOURS PRN
Status: DISCONTINUED | OUTPATIENT
Start: 2017-11-20 | End: 2017-11-25 | Stop reason: HOSPADM

## 2017-11-20 RX ORDER — GABAPENTIN 300 MG/1
600 CAPSULE ORAL ONCE
Status: COMPLETED | OUTPATIENT
Start: 2017-11-20 | End: 2017-11-20

## 2017-11-20 RX ORDER — ALVIMOPAN 12 MG/1
12 CAPSULE ORAL ONCE
Status: COMPLETED | OUTPATIENT
Start: 2017-11-20 | End: 2017-11-20

## 2017-11-20 RX ORDER — SODIUM CHLORIDE, SODIUM LACTATE, POTASSIUM CHLORIDE, CALCIUM CHLORIDE 600; 310; 30; 20 MG/100ML; MG/100ML; MG/100ML; MG/100ML
INJECTION, SOLUTION INTRAVENOUS CONTINUOUS PRN
Status: DISCONTINUED | OUTPATIENT
Start: 2017-11-20 | End: 2017-11-20 | Stop reason: SURG

## 2017-11-20 RX ORDER — PROMETHAZINE HYDROCHLORIDE 25 MG/1
25 SUPPOSITORY RECTAL ONCE AS NEEDED
Status: DISCONTINUED | OUTPATIENT
Start: 2017-11-20 | End: 2017-11-20 | Stop reason: HOSPADM

## 2017-11-20 RX ORDER — NALOXONE HCL 0.4 MG/ML
0.2 VIAL (ML) INJECTION AS NEEDED
Status: DISCONTINUED | OUTPATIENT
Start: 2017-11-20 | End: 2017-11-20 | Stop reason: HOSPADM

## 2017-11-20 RX ORDER — SODIUM CHLORIDE, SODIUM LACTATE, POTASSIUM CHLORIDE, CALCIUM CHLORIDE 600; 310; 30; 20 MG/100ML; MG/100ML; MG/100ML; MG/100ML
50 INJECTION, SOLUTION INTRAVENOUS CONTINUOUS
Status: DISCONTINUED | OUTPATIENT
Start: 2017-11-20 | End: 2017-11-22

## 2017-11-20 RX ORDER — ALVIMOPAN 12 MG/1
12 CAPSULE ORAL 2 TIMES DAILY
Status: DISCONTINUED | OUTPATIENT
Start: 2017-11-20 | End: 2017-11-24

## 2017-11-20 RX ORDER — KETAMINE HYDROCHLORIDE 50 MG/ML
INJECTION, SOLUTION, CONCENTRATE INTRAMUSCULAR; INTRAVENOUS AS NEEDED
Status: DISCONTINUED | OUTPATIENT
Start: 2017-11-20 | End: 2017-11-20 | Stop reason: SURG

## 2017-11-20 RX ORDER — CELECOXIB 200 MG/1
200 CAPSULE ORAL ONCE
Status: COMPLETED | OUTPATIENT
Start: 2017-11-20 | End: 2017-11-20

## 2017-11-20 RX ORDER — NITROGLYCERIN 0.4 MG/1
0.4 TABLET SUBLINGUAL
Status: DISCONTINUED | OUTPATIENT
Start: 2017-11-20 | End: 2017-11-25 | Stop reason: HOSPADM

## 2017-11-20 RX ORDER — SODIUM CHLORIDE 0.9 % (FLUSH) 0.9 %
1-10 SYRINGE (ML) INJECTION AS NEEDED
Status: DISCONTINUED | OUTPATIENT
Start: 2017-11-20 | End: 2017-11-20 | Stop reason: HOSPADM

## 2017-11-20 RX ORDER — SODIUM CHLORIDE, SODIUM LACTATE, POTASSIUM CHLORIDE, CALCIUM CHLORIDE 600; 310; 30; 20 MG/100ML; MG/100ML; MG/100ML; MG/100ML
9 INJECTION, SOLUTION INTRAVENOUS CONTINUOUS
Status: DISCONTINUED | OUTPATIENT
Start: 2017-11-20 | End: 2017-11-20

## 2017-11-20 RX ORDER — HYDROCODONE BITARTRATE AND ACETAMINOPHEN 7.5; 325 MG/1; MG/1
1 TABLET ORAL ONCE AS NEEDED
Status: DISCONTINUED | OUTPATIENT
Start: 2017-11-20 | End: 2017-11-20 | Stop reason: HOSPADM

## 2017-11-20 RX ORDER — DIPHENHYDRAMINE HYDROCHLORIDE 50 MG/ML
12.5 INJECTION INTRAMUSCULAR; INTRAVENOUS
Status: DISCONTINUED | OUTPATIENT
Start: 2017-11-20 | End: 2017-11-20 | Stop reason: HOSPADM

## 2017-11-20 RX ORDER — LIDOCAINE HYDROCHLORIDE 20 MG/ML
INJECTION, SOLUTION INFILTRATION; PERINEURAL AS NEEDED
Status: DISCONTINUED | OUTPATIENT
Start: 2017-11-20 | End: 2017-11-20 | Stop reason: SURG

## 2017-11-20 RX ORDER — OXYCODONE AND ACETAMINOPHEN 7.5; 325 MG/1; MG/1
1 TABLET ORAL ONCE AS NEEDED
Status: DISCONTINUED | OUTPATIENT
Start: 2017-11-20 | End: 2017-11-20 | Stop reason: HOSPADM

## 2017-11-20 RX ORDER — MIDAZOLAM HYDROCHLORIDE 1 MG/ML
1 INJECTION INTRAMUSCULAR; INTRAVENOUS
Status: DISCONTINUED | OUTPATIENT
Start: 2017-11-20 | End: 2017-11-20 | Stop reason: HOSPADM

## 2017-11-20 RX ORDER — HYDRALAZINE HYDROCHLORIDE 20 MG/ML
5 INJECTION INTRAMUSCULAR; INTRAVENOUS
Status: DISCONTINUED | OUTPATIENT
Start: 2017-11-20 | End: 2017-11-20 | Stop reason: HOSPADM

## 2017-11-20 RX ORDER — ONDANSETRON 2 MG/ML
4 INJECTION INTRAMUSCULAR; INTRAVENOUS EVERY 6 HOURS PRN
Status: DISCONTINUED | OUTPATIENT
Start: 2017-11-20 | End: 2017-11-25 | Stop reason: HOSPADM

## 2017-11-20 RX ORDER — FAMOTIDINE 10 MG/ML
20 INJECTION, SOLUTION INTRAVENOUS EVERY 12 HOURS SCHEDULED
Status: DISCONTINUED | OUTPATIENT
Start: 2017-11-20 | End: 2017-11-24 | Stop reason: CLARIF

## 2017-11-20 RX ORDER — ACETAMINOPHEN 500 MG
1000 TABLET ORAL ONCE
Status: COMPLETED | OUTPATIENT
Start: 2017-11-20 | End: 2017-11-20

## 2017-11-20 RX ORDER — ONDANSETRON 2 MG/ML
INJECTION INTRAMUSCULAR; INTRAVENOUS AS NEEDED
Status: DISCONTINUED | OUTPATIENT
Start: 2017-11-20 | End: 2017-11-20 | Stop reason: SURG

## 2017-11-20 RX ORDER — MAGNESIUM SULFATE HEPTAHYDRATE 500 MG/ML
INJECTION, SOLUTION INTRAMUSCULAR; INTRAVENOUS AS NEEDED
Status: DISCONTINUED | OUTPATIENT
Start: 2017-11-20 | End: 2017-11-20 | Stop reason: SURG

## 2017-11-20 RX ORDER — HYDROMORPHONE HYDROCHLORIDE 1 MG/ML
0.5 INJECTION, SOLUTION INTRAMUSCULAR; INTRAVENOUS; SUBCUTANEOUS
Status: DISCONTINUED | OUTPATIENT
Start: 2017-11-20 | End: 2017-11-20 | Stop reason: HOSPADM

## 2017-11-20 RX ORDER — EPHEDRINE SULFATE 50 MG/ML
5 INJECTION, SOLUTION INTRAVENOUS ONCE AS NEEDED
Status: DISCONTINUED | OUTPATIENT
Start: 2017-11-20 | End: 2017-11-20 | Stop reason: HOSPADM

## 2017-11-20 RX ORDER — ROCURONIUM BROMIDE 10 MG/ML
INJECTION, SOLUTION INTRAVENOUS AS NEEDED
Status: DISCONTINUED | OUTPATIENT
Start: 2017-11-20 | End: 2017-11-20 | Stop reason: SURG

## 2017-11-20 RX ORDER — LABETALOL HYDROCHLORIDE 5 MG/ML
5 INJECTION, SOLUTION INTRAVENOUS
Status: DISCONTINUED | OUTPATIENT
Start: 2017-11-20 | End: 2017-11-20 | Stop reason: HOSPADM

## 2017-11-20 RX ORDER — NALOXONE HCL 0.4 MG/ML
0.4 VIAL (ML) INJECTION
Status: DISCONTINUED | OUTPATIENT
Start: 2017-11-20 | End: 2017-11-25 | Stop reason: HOSPADM

## 2017-11-20 RX ORDER — FAMOTIDINE 10 MG/ML
20 INJECTION, SOLUTION INTRAVENOUS ONCE
Status: COMPLETED | OUTPATIENT
Start: 2017-11-20 | End: 2017-11-20

## 2017-11-20 RX ORDER — FLUMAZENIL 0.1 MG/ML
0.2 INJECTION INTRAVENOUS AS NEEDED
Status: DISCONTINUED | OUTPATIENT
Start: 2017-11-20 | End: 2017-11-20 | Stop reason: HOSPADM

## 2017-11-20 RX ORDER — FENTANYL CITRATE 50 UG/ML
50 INJECTION, SOLUTION INTRAMUSCULAR; INTRAVENOUS
Status: DISCONTINUED | OUTPATIENT
Start: 2017-11-20 | End: 2017-11-20 | Stop reason: HOSPADM

## 2017-11-20 RX ORDER — ONDANSETRON 2 MG/ML
4 INJECTION INTRAMUSCULAR; INTRAVENOUS ONCE AS NEEDED
Status: DISCONTINUED | OUTPATIENT
Start: 2017-11-20 | End: 2017-11-20 | Stop reason: HOSPADM

## 2017-11-20 RX ADMIN — SODIUM CHLORIDE, POTASSIUM CHLORIDE, SODIUM LACTATE AND CALCIUM CHLORIDE 9 ML/HR: 600; 310; 30; 20 INJECTION, SOLUTION INTRAVENOUS at 07:01

## 2017-11-20 RX ADMIN — ONDANSETRON 4 MG: 2 INJECTION INTRAMUSCULAR; INTRAVENOUS at 15:35

## 2017-11-20 RX ADMIN — FENTANYL CITRATE 50 MCG: 50 INJECTION INTRAMUSCULAR; INTRAVENOUS at 16:24

## 2017-11-20 RX ADMIN — ROCURONIUM BROMIDE 20 MG: 10 INJECTION INTRAVENOUS at 10:40

## 2017-11-20 RX ADMIN — ALBUMIN (HUMAN): 0.05 SOLUTION INTRAVENOUS at 14:01

## 2017-11-20 RX ADMIN — FENTANYL CITRATE 100 MCG: 50 INJECTION, SOLUTION INTRAMUSCULAR; INTRAVENOUS at 11:27

## 2017-11-20 RX ADMIN — GABAPENTIN 600 MG: 300 CAPSULE ORAL at 07:09

## 2017-11-20 RX ADMIN — HYDROMORPHONE HYDROCHLORIDE 0.5 MG: 1 INJECTION, SOLUTION INTRAMUSCULAR; INTRAVENOUS; SUBCUTANEOUS at 19:17

## 2017-11-20 RX ADMIN — SUGAMMADEX 250 MG: 100 INJECTION, SOLUTION INTRAVENOUS at 15:36

## 2017-11-20 RX ADMIN — ROCURONIUM BROMIDE 50 MG: 10 INJECTION INTRAVENOUS at 09:19

## 2017-11-20 RX ADMIN — KETAMINE HYDROCHLORIDE 10 MG: 50 INJECTION, SOLUTION INTRAMUSCULAR; INTRAVENOUS at 13:10

## 2017-11-20 RX ADMIN — CELECOXIB 200 MG: 200 CAPSULE ORAL at 20:58

## 2017-11-20 RX ADMIN — KETAMINE HYDROCHLORIDE 10 MG: 50 INJECTION, SOLUTION INTRAMUSCULAR; INTRAVENOUS at 12:14

## 2017-11-20 RX ADMIN — SODIUM CHLORIDE, POTASSIUM CHLORIDE, SODIUM LACTATE AND CALCIUM CHLORIDE 50 ML/HR: 600; 310; 30; 20 INJECTION, SOLUTION INTRAVENOUS at 18:52

## 2017-11-20 RX ADMIN — KETAMINE HYDROCHLORIDE 10 MG: 50 INJECTION, SOLUTION INTRAMUSCULAR; INTRAVENOUS at 14:12

## 2017-11-20 RX ADMIN — LIDOCAINE HYDROCHLORIDE ANHYDROUS AND DEXTROSE MONOHYDRATE 2 MG/MIN: .4; 5 INJECTION, SOLUTION INTRAVENOUS at 09:37

## 2017-11-20 RX ADMIN — HYDROMORPHONE HYDROCHLORIDE 1 MG: 1 INJECTION, SOLUTION INTRAMUSCULAR; INTRAVENOUS; SUBCUTANEOUS at 21:40

## 2017-11-20 RX ADMIN — ROCURONIUM BROMIDE 20 MG: 10 INJECTION INTRAVENOUS at 12:04

## 2017-11-20 RX ADMIN — ROCURONIUM BROMIDE 20 MG: 10 INJECTION INTRAVENOUS at 13:04

## 2017-11-20 RX ADMIN — ROCURONIUM BROMIDE 20 MG: 10 INJECTION INTRAVENOUS at 10:12

## 2017-11-20 RX ADMIN — HYDROMORPHONE HYDROCHLORIDE 0.5 MG: 2 INJECTION INTRAMUSCULAR; INTRAVENOUS; SUBCUTANEOUS at 16:57

## 2017-11-20 RX ADMIN — ROCURONIUM BROMIDE 20 MG: 10 INJECTION INTRAVENOUS at 13:57

## 2017-11-20 RX ADMIN — WATER 1 G: 1 INJECTION INTRAMUSCULAR; INTRAVENOUS; SUBCUTANEOUS at 09:46

## 2017-11-20 RX ADMIN — ACETAMINOPHEN 1000 MG: 500 TABLET ORAL at 07:09

## 2017-11-20 RX ADMIN — FENTANYL CITRATE 50 MCG: 50 INJECTION INTRAMUSCULAR; INTRAVENOUS at 16:45

## 2017-11-20 RX ADMIN — LIDOCAINE HYDROCHLORIDE 100 MG: 20 INJECTION, SOLUTION INFILTRATION; PERINEURAL at 09:19

## 2017-11-20 RX ADMIN — PROPOFOL 200 MG: 10 INJECTION, EMULSION INTRAVENOUS at 09:19

## 2017-11-20 RX ADMIN — MAGNESIUM SULFATE HEPTAHYDRATE 2 G: 500 INJECTION, SOLUTION INTRAMUSCULAR; INTRAVENOUS at 10:08

## 2017-11-20 RX ADMIN — SODIUM CHLORIDE, POTASSIUM CHLORIDE, SODIUM LACTATE AND CALCIUM CHLORIDE: 600; 310; 30; 20 INJECTION, SOLUTION INTRAVENOUS at 12:57

## 2017-11-20 RX ADMIN — ROCURONIUM BROMIDE 10 MG: 10 INJECTION INTRAVENOUS at 11:23

## 2017-11-20 RX ADMIN — FAMOTIDINE 20 MG: 10 INJECTION, SOLUTION INTRAVENOUS at 20:57

## 2017-11-20 RX ADMIN — MIDAZOLAM 2 MG: 1 INJECTION INTRAMUSCULAR; INTRAVENOUS at 07:43

## 2017-11-20 RX ADMIN — ROCURONIUM BROMIDE 10 MG: 10 INJECTION INTRAVENOUS at 14:12

## 2017-11-20 RX ADMIN — KETAMINE HYDROCHLORIDE 10 MG: 50 INJECTION, SOLUTION INTRAMUSCULAR; INTRAVENOUS at 11:10

## 2017-11-20 RX ADMIN — FAMOTIDINE 20 MG: 10 INJECTION, SOLUTION INTRAVENOUS at 07:43

## 2017-11-20 RX ADMIN — SODIUM CHLORIDE, POTASSIUM CHLORIDE, SODIUM LACTATE AND CALCIUM CHLORIDE: 600; 310; 30; 20 INJECTION, SOLUTION INTRAVENOUS at 09:14

## 2017-11-20 RX ADMIN — GABAPENTIN 600 MG: 300 CAPSULE ORAL at 20:57

## 2017-11-20 RX ADMIN — CELECOXIB 200 MG: 200 CAPSULE ORAL at 07:09

## 2017-11-20 RX ADMIN — ROCURONIUM BROMIDE 10 MG: 10 INJECTION INTRAVENOUS at 11:26

## 2017-11-20 RX ADMIN — ALVIMOPAN 12 MG: 12 CAPSULE ORAL at 07:09

## 2017-11-20 RX ADMIN — KETAMINE HYDROCHLORIDE 50 MG: 50 INJECTION, SOLUTION INTRAMUSCULAR; INTRAVENOUS at 09:25

## 2017-11-20 RX ADMIN — DEXAMETHASONE SODIUM PHOSPHATE 8 MG: 4 INJECTION INTRA-ARTICULAR; INTRALESIONAL; INTRAMUSCULAR; INTRAVENOUS; SOFT TISSUE at 10:26

## 2017-11-20 RX ADMIN — ACETAMINOPHEN 1000 MG: 500 TABLET ORAL at 16:36

## 2017-11-20 RX ADMIN — ALBUMIN (HUMAN): 0.05 SOLUTION INTRAVENOUS at 15:05

## 2017-11-20 RX ADMIN — KETAMINE HYDROCHLORIDE 10 MG: 50 INJECTION, SOLUTION INTRAMUSCULAR; INTRAVENOUS at 10:27

## 2017-11-20 NOTE — ANESTHESIA POSTPROCEDURE EVALUATION
Patient: Liz Bagley    Procedure Summary     Date Anesthesia Start Anesthesia Stop Room / Location    11/20/17 0914 1603  [facility] /  [facility]       Procedure Diagnosis Surgeon Provider    LAPAROSCOPIC TOTAL PROCTOCOLECTOMY WITH END ILEOSTOMY (N/A Abdomen) Ulcerative pancolitis with rectal bleeding  (Ulcerative pancolitis with rectal bleeding [K51.011]) MD Pat Valdivia MD          Anesthesia Type: general  Last vitals  BP   147/92 (11/20/17 1750)   Temp   36.4 °C (97.5 °F) (11/20/17 1750)   Pulse   94 (11/20/17 1755)   Resp   20 (11/20/17 1720)     SpO2   93 % (11/20/17 1755)     Post Anesthesia Care and Evaluation    Patient location during evaluation: PACU  Patient participation: complete - patient participated  Level of consciousness: awake and alert  Pain management: adequate  Airway patency: patent  Anesthetic complications: No anesthetic complications    Cardiovascular status: acceptable  Respiratory status: acceptable  Hydration status: acceptable    Comments: --------------------            11/20/17 1755     --------------------   BP:                  Pulse:      94       Resp:                Temp:                SpO2:      93%      --------------------

## 2017-11-20 NOTE — ANESTHESIA PREPROCEDURE EVALUATION
Anesthesia Evaluation     Patient summary reviewed and Nursing notes reviewed   history of anesthetic complications:  NPO Solid Status: > 8 hours  NPO Liquid Status: > 4 hours     Airway   Mallampati: II  TM distance: >3 FB  Neck ROM: full  no difficulty expected  Dental - normal exam     Pulmonary - normal exam   (+) shortness of breath,   Cardiovascular - normal exam    (+) hypertension,       Neuro/Psych  (+) headaches, psychiatric history Anxiety and Depression,    GI/Hepatic/Renal/Endo    (+) obesity, morbid obesity,     Musculoskeletal     Abdominal  - normal exam   Substance History      OB/GYN          Other   (+) arthritis                                   Anesthesia Plan    ASA 3     general     intravenous induction   Anesthetic plan and risks discussed with patient.

## 2017-11-20 NOTE — ANESTHESIA POSTPROCEDURE EVALUATION
"Patient: Liz Bagley    Procedure Summary     Date Anesthesia Start Anesthesia Stop Room / Location    11/20/17 0914 1603  [facility] / [facility]       Procedure Diagnosis Surgeon Provider    LAPAROSCOPIC TOTAL PROCTOCOLECTOMY WITH END ILEOSTOMY (N/A Abdomen) Ulcerative pancolitis with rectal bleeding  (Ulcerative pancolitis with rectal bleeding [K51.011]) MD Pat Valdivia MD          Anesthesia Type: general  Last vitals  BP   155/91 (11/20/17 1620)   Temp   36.7 °C (98.1 °F) (11/20/17 1559)   Pulse   90 (11/20/17 1620)   Resp   20 (11/20/17 1610)     SpO2   95 % (11/20/17 1620)     Post Anesthesia Care and Evaluation    Patient location during evaluation: bedside  Patient participation: complete - patient participated  Level of consciousness: awake  Pain management: adequate  Airway patency: patent  Anesthetic complications: No anesthetic complications    Cardiovascular status: acceptable  Respiratory status: acceptable  Hydration status: acceptable    Comments: /91  Pulse 90  Temp 36.7 °C (98.1 °F) (Oral)   Resp 20  Ht 63\" (160 cm)  Wt 291 lb 14.4 oz (132 kg)  LMP 11/01/2017  SpO2 95%  BMI 51.71 kg/m2        "
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
## 2017-11-20 NOTE — ANESTHESIA PROCEDURE NOTES
Airway  Urgency: elective    Date/Time: 11/20/2017 9:15 AM  Airway not difficult    General Information and Staff    Patient location during procedure: OR  Anesthesiologist: CHELITA TEMPLE  CRNA: KAELYN TINEO    Indications and Patient Condition  Indications for airway management: airway protection    Preoxygenated: yes  Mask difficulty assessment: 1 - vent by mask    Final Airway Details  Final airway type: endotracheal airway      Successful airway: ETT  Cuffed: yes   Successful intubation technique: direct laryngoscopy  Facilitating devices/methods: intubating stylet  Endotracheal tube insertion site: oral  Blade: Souza  Blade size: #2  ETT size: 7.5 mm  Cormack-Lehane Classification: grade I - full view of glottis  Placement verified by: chest auscultation and capnometry   Cuff volume (mL): 6  Measured from: teeth  ETT to teeth (cm): 20  Number of attempts at approach: 1

## 2017-11-20 NOTE — PERIOPERATIVE NURSING NOTE
Dr. Evans at bedside, notified pt unable to drink 20 oz Gatorade r/t nausea, notified of Hgb and WBC.  No new orders noted.

## 2017-11-20 NOTE — OP NOTE
11/20/17    Surgeon: Colin Evans MD    Surgical  Assistant: Marisol Buitrago PA-C     Preoperative diagnosis: Ulcerative pancolitis with rectal bleeding [K51.011]    Post-Op Diagnosis Codes:     * Ulcerative pancolitis with rectal bleeding [K51.011]    Procedure: LAPAROSCOPIC TOTAL PROCTOCOLECTOMY WITH END ILEOSTOMY,    Estimated Blood Loss: 200 mL    Specimens:   Order Name Source Comment Collection Info Order Time   TISSUE PATHOLOGY EXAM Large Intestine, Sigmoid Colon  Collected By: Colin Evans MD 11/20/2017  2:35 PM       INDICATIONS: This is a 32-year-old female who has had a long-time history of ulcerative colitis that has been refractory to medical therapy, has been on steroids and does not want to change to another medication and wishes to have all of her colon and rectum taken out. She is not a candidate for a J-pouch. She understands this.     INFORMED CONSENT: She understands risks, benefits, and alternatives and wishes to proceed with surgery.       DESCRIPTION OF PROCEDURE: The patient was brought into the operating room, SCDs in place, antibiotics having been infused. After general anesthesia was achieved the patient was placed in Yellofin lithotomy. Because of her BMI of 52 we had to take special precautions in strapping her down to the bed in order for her not to shift during surgery. Once this was done she was prepped and draped in sterile fashion. Then 1% lidocaine with epinephrine and 0.25% Marcaine without was used as a local infiltration. First we made a periumbilical incision and carried it down to the fascia. Incised the fascia and used a Veress needle to inflate up to 15 mmHg. We used a 12 Visiport trocar and put it into the abdomen under direct visualization. We then placed two 5s in the right lower quadrant, put the patient in steep Trendelenburg, and started the dissection of the sigmoid colon and down to the rectum. Visualized both ureters at all times. Used the Enseal to take the  mesentery down to the level of the levators on both sides and in the retrorectal space. Got anteriorly around the rectum and at that point isolated out the CELESTINO and IMV and used a white load of the [place] stapler after trading one of the 5 trocars to a 12. This took 1 load and then started the dissection along the white line of Toldt on the left side, carried this around to the splenic flexure, got the omentum off the splenic flexure. This was difficult secondary to her girth and several other trocars were placed, 2 more in the left lower quadrant and 1 subxiphoid. Continued getting the omentum off the transverse colon, identifying the duodenum, and then started taking the mesenteric vessels in between with white loads of the Contour stapler. I then isolated out the right colon. The patient had already had an appendectomy. I took off Gilmar's veil and then got around the terminal ileum and took the terminal ileum off of the colon with a blue load of the stapler. Then started taking the mesentery with white loads of the echelon stapler. Once the colon was completely mobilized I put it down into the pelvis and went below. I brought the legs up and did an intersphincteric proctectomy. At the intersphincteric groove I used the Bovie to get into that space and then used the Metzenbaums to get circumferentially around the distal rectum. Then got into the plane that I dissected from abdominally and was not able to quite get into to see posteriorly. I could feel there were still more attachment to the rectum, so I packed the rectum with lap and then went back above laparoscopically and with a lot of effort was able to get the rest of the rectum dissected out using the Enseal circumferentially around. I got into the same plane as the distal dissection. Once that was done I went below. Of note, I did change gown and gloves when I went from below to above. Then I was able to bring the rest of all the colon out and get the  rest of the adhesions off the posterior vagina and the specimen came out en bloc. I irrigated out the distal end and started closing the levators with 0 Vicryl in interrupted fashion and then closed 2 more deep layers and then skin with a 2-0 Vicryl and a horizontal mattress. I rescrubbed and went above and brought out the ileostomy. I made sure that the mesentery was straight and in a premarked area I made an elliptical incision around the skin, carried it down to the fascia, incised the fascia, and swept the rectus muscle out of the way, and got into the peritoneum. I brought out the terminal ileum. It was nice and viable. At this point I closed the 12 trocar sites with 2-0 Vicryl on a UR-6 and closed skin with 4-0 Monocryl. I Brooked the ileostomy with 3-0 Vicryl. All instrument, lap counts, and needle counts were correct. The patient was stable throughout the entire case. Dermabond was used as dressing. Exparel was used, 15 mL, in the perineum and 5 mL around the ostomy.

## 2017-11-20 NOTE — PLAN OF CARE
Problem: Patient Care Overview (Adult)  Goal: Plan of Care Review  Outcome: Ongoing (interventions implemented as appropriate)    11/20/17 0640   Coping/Psychosocial Response Interventions   Plan Of Care Reviewed With patient   Patient Care Overview   Progress no change       Goal: Adult Individualization and Mutuality  Outcome: Ongoing (interventions implemented as appropriate)    11/20/17 0640   Individualization   Patient Specific Preferences none   Patient Specific Interventions ERAS        Goal: Discharge Needs Assessment  Outcome: Ongoing (interventions implemented as appropriate)    11/20/17 0640   Discharge Needs Assessment   Concerns To Be Addressed no discharge needs identified         Problem: Perioperative Period (Adult)  Goal: Signs and Symptoms of Listed Potential Problems Will be Absent or Manageable (Perioperative Period)  Outcome: Ongoing (interventions implemented as appropriate)    11/20/17 0640   Perioperative Period   Problems Assessed (Perioperative Period) all   Problems Present (Perioperative Period) pain;physiologic stress response

## 2017-11-20 NOTE — BRIEF OP NOTE
COLON RESECTION LOW ANTERIOR LAPAROSCOPIC WITH COLOANAL ANASTAMOSIS AND DIVERTING LOOP ILEOSTOMY  Progress Note    Liz Bagley  11/20/2017    Pre-op Diagnosis:   Ulcerative pancolitis with rectal bleeding [K51.011]       Post-Op Diagnosis Codes:     * Ulcerative pancolitis with rectal bleeding [K51.011]    Procedure/CPT® Codes:      Procedure(s):  LAPAROSCOPIC TOTAL PROCTOCOLECTOMY WITH END ILEOSTOMY    Surgeon(s):  Colin Evans MD    Anesthesia: General    Staff:   Circulator: Marie Daniel RN; Muna Gonzáles RN  Scrub Person: Diane Caba; Katheryn Lopez CSA  Assistant: Marisol Buitrago PA-C    Estimated Blood Loss: 200 mL    Urine Voided: 410 mL    Specimens:                  ID Type Source Tests Collected by Time Destination   A : abdominal colon and rectum Tissue Large Intestine, Sigmoid Colon TISSUE EXAM Colin Evans MD 11/20/2017 1429          Drains:   Naso/Oral/Gastric Tube 11/20/17 0943 [place] sump 18 center mouth (Active)       Ileostomy 11/20/17 1519 (Active)       Urethral Catheter 11/20/17 0941 100% silicone 16 (Active)           Findings:     Complications:      Colin Evans MD     Date: 11/20/2017  Time: 3:47 PM

## 2017-11-21 LAB
ANION GAP SERPL CALCULATED.3IONS-SCNC: 8.6 MMOL/L
APTT PPP: 25.1 SECONDS (ref 22.7–35.4)
BASOPHILS # BLD AUTO: 0.01 10*3/MM3 (ref 0–0.2)
BASOPHILS NFR BLD AUTO: 0.1 % (ref 0–1.5)
BUN BLD-MCNC: 7 MG/DL (ref 6–20)
BUN/CREAT SERPL: 17.5 (ref 7–25)
CALCIUM SPEC-SCNC: 8.8 MG/DL (ref 8.6–10.5)
CHLORIDE SERPL-SCNC: 99 MMOL/L (ref 98–107)
CO2 SERPL-SCNC: 30.4 MMOL/L (ref 22–29)
CREAT BLD-MCNC: 0.4 MG/DL (ref 0.57–1)
DEPRECATED RDW RBC AUTO: 43.1 FL (ref 37–54)
EOSINOPHIL # BLD AUTO: 0 10*3/MM3 (ref 0–0.7)
EOSINOPHIL NFR BLD AUTO: 0 % (ref 0.3–6.2)
ERYTHROCYTE [DISTWIDTH] IN BLOOD BY AUTOMATED COUNT: 14.8 % (ref 11.7–13)
GFR SERPL CREATININE-BSD FRML MDRD: >150 ML/MIN/1.73
GLUCOSE BLD-MCNC: 139 MG/DL (ref 65–99)
HCT VFR BLD AUTO: 30 % (ref 35.6–45.5)
HGB BLD-MCNC: 8.8 G/DL (ref 11.9–15.5)
IMM GRANULOCYTES # BLD: 0.04 10*3/MM3 (ref 0–0.03)
IMM GRANULOCYTES NFR BLD: 0.3 % (ref 0–0.5)
INR PPP: 1.17 (ref 0.9–1.1)
LYMPHOCYTES # BLD AUTO: 1.91 10*3/MM3 (ref 0.9–4.8)
LYMPHOCYTES NFR BLD AUTO: 13.3 % (ref 19.6–45.3)
MAGNESIUM SERPL-MCNC: 2.4 MG/DL (ref 1.6–2.6)
MCH RBC QN AUTO: 23.7 PG (ref 26.9–32)
MCHC RBC AUTO-ENTMCNC: 29.3 G/DL (ref 32.4–36.3)
MCV RBC AUTO: 80.6 FL (ref 80.5–98.2)
MONOCYTES # BLD AUTO: 1.09 10*3/MM3 (ref 0.2–1.2)
MONOCYTES NFR BLD AUTO: 7.6 % (ref 5–12)
NEUTROPHILS # BLD AUTO: 11.35 10*3/MM3 (ref 1.9–8.1)
NEUTROPHILS NFR BLD AUTO: 78.7 % (ref 42.7–76)
PLATELET # BLD AUTO: 525 10*3/MM3 (ref 140–500)
PMV BLD AUTO: 10 FL (ref 6–12)
POTASSIUM BLD-SCNC: 4.3 MMOL/L (ref 3.5–5.2)
PROTHROMBIN TIME: 14.4 SECONDS (ref 11.7–14.2)
RBC # BLD AUTO: 3.72 10*6/MM3 (ref 3.9–5.2)
SODIUM BLD-SCNC: 138 MMOL/L (ref 136–145)
WBC NRBC COR # BLD: 14.4 10*3/MM3 (ref 4.5–10.7)

## 2017-11-21 PROCEDURE — 85730 THROMBOPLASTIN TIME PARTIAL: CPT | Performed by: COLON & RECTAL SURGERY

## 2017-11-21 PROCEDURE — 25010000002 ENOXAPARIN PER 10 MG: Performed by: COLON & RECTAL SURGERY

## 2017-11-21 PROCEDURE — 83735 ASSAY OF MAGNESIUM: CPT | Performed by: COLON & RECTAL SURGERY

## 2017-11-21 PROCEDURE — 80048 BASIC METABOLIC PNL TOTAL CA: CPT | Performed by: COLON & RECTAL SURGERY

## 2017-11-21 PROCEDURE — 85610 PROTHROMBIN TIME: CPT | Performed by: COLON & RECTAL SURGERY

## 2017-11-21 PROCEDURE — 99024 POSTOP FOLLOW-UP VISIT: CPT | Performed by: PHYSICIAN ASSISTANT

## 2017-11-21 PROCEDURE — 25010000002 HYDROMORPHONE PER 4 MG: Performed by: SURGERY

## 2017-11-21 PROCEDURE — 85025 COMPLETE CBC W/AUTO DIFF WBC: CPT | Performed by: COLON & RECTAL SURGERY

## 2017-11-21 RX ORDER — DULOXETIN HYDROCHLORIDE 60 MG/1
60 CAPSULE, DELAYED RELEASE ORAL NIGHTLY
Status: DISCONTINUED | OUTPATIENT
Start: 2017-11-21 | End: 2017-11-25 | Stop reason: HOSPADM

## 2017-11-21 RX ORDER — BUPROPION HYDROCHLORIDE 150 MG/1
150 TABLET ORAL NIGHTLY
Status: DISCONTINUED | OUTPATIENT
Start: 2017-11-21 | End: 2017-11-25 | Stop reason: HOSPADM

## 2017-11-21 RX ORDER — LIDOCAINE 50 MG/G
OINTMENT TOPICAL AS NEEDED
Status: DISCONTINUED | OUTPATIENT
Start: 2017-11-21 | End: 2017-11-25 | Stop reason: HOSPADM

## 2017-11-21 RX ORDER — BUPROPION HYDROCHLORIDE 150 MG/1
150 TABLET ORAL DAILY
Status: DISCONTINUED | OUTPATIENT
Start: 2017-11-21 | End: 2017-11-21

## 2017-11-21 RX ORDER — DULOXETIN HYDROCHLORIDE 60 MG/1
60 CAPSULE, DELAYED RELEASE ORAL DAILY
Status: DISCONTINUED | OUTPATIENT
Start: 2017-11-21 | End: 2017-11-21

## 2017-11-21 RX ADMIN — CELECOXIB 200 MG: 200 CAPSULE ORAL at 20:37

## 2017-11-21 RX ADMIN — ACETAMINOPHEN 1000 MG: 500 TABLET ORAL at 12:15

## 2017-11-21 RX ADMIN — ALVIMOPAN 12 MG: 12 CAPSULE ORAL at 09:02

## 2017-11-21 RX ADMIN — GABAPENTIN 600 MG: 300 CAPSULE ORAL at 18:45

## 2017-11-21 RX ADMIN — FAMOTIDINE 20 MG: 10 INJECTION, SOLUTION INTRAVENOUS at 09:02

## 2017-11-21 RX ADMIN — ACETAMINOPHEN 1000 MG: 500 TABLET ORAL at 00:40

## 2017-11-21 RX ADMIN — SODIUM CHLORIDE, POTASSIUM CHLORIDE, SODIUM LACTATE AND CALCIUM CHLORIDE 50 ML/HR: 600; 310; 30; 20 INJECTION, SOLUTION INTRAVENOUS at 14:32

## 2017-11-21 RX ADMIN — HYDROMORPHONE HYDROCHLORIDE 0.5 MG: 1 INJECTION, SOLUTION INTRAMUSCULAR; INTRAVENOUS; SUBCUTANEOUS at 01:11

## 2017-11-21 RX ADMIN — BUPROPION HYDROCHLORIDE 150 MG: 150 TABLET, EXTENDED RELEASE ORAL at 20:36

## 2017-11-21 RX ADMIN — CELECOXIB 200 MG: 200 CAPSULE ORAL at 09:02

## 2017-11-21 RX ADMIN — ENOXAPARIN SODIUM 40 MG: 40 INJECTION SUBCUTANEOUS at 09:03

## 2017-11-21 RX ADMIN — ACETAMINOPHEN 1000 MG: 500 TABLET ORAL at 18:45

## 2017-11-21 RX ADMIN — FAMOTIDINE 20 MG: 10 INJECTION, SOLUTION INTRAVENOUS at 20:37

## 2017-11-21 RX ADMIN — DULOXETINE HYDROCHLORIDE 60 MG: 60 CAPSULE, DELAYED RELEASE ORAL at 20:36

## 2017-11-21 RX ADMIN — GABAPENTIN 600 MG: 300 CAPSULE ORAL at 09:02

## 2017-11-21 RX ADMIN — BUPROPION HYDROCHLORIDE 150 MG: 150 TABLET, EXTENDED RELEASE ORAL at 03:08

## 2017-11-21 RX ADMIN — ACETAMINOPHEN 1000 MG: 500 TABLET ORAL at 05:29

## 2017-11-21 RX ADMIN — DULOXETINE HYDROCHLORIDE 60 MG: 60 CAPSULE, DELAYED RELEASE ORAL at 03:07

## 2017-11-21 RX ADMIN — ALVIMOPAN 12 MG: 12 CAPSULE ORAL at 18:45

## 2017-11-21 NOTE — PLAN OF CARE
Problem: Patient Care Overview (Adult)  Goal: Plan of Care Review  Outcome: Ongoing (interventions implemented as appropriate)    11/21/17 0520   Coping/Psychosocial Response Interventions   Plan Of Care Reviewed With patient   Patient Care Overview   Progress improving   Outcome Evaluation   Outcome Summary/Follow up Plan VSS. ERAS. No c/o nausea. PRN dilaudid given.. Pt dangled on side of bed and walked 90 feet within room. Pt was able to rest most of the night. Will continue to monitor.        Goal: Adult Individualization and Mutuality  Outcome: Ongoing (interventions implemented as appropriate)  Goal: Discharge Needs Assessment  Outcome: Ongoing (interventions implemented as appropriate)    Problem: Perioperative Period (Adult)  Goal: Signs and Symptoms of Listed Potential Problems Will be Absent or Manageable (Perioperative Period)  Outcome: Ongoing (interventions implemented as appropriate)

## 2017-11-21 NOTE — PROGRESS NOTES
Progress Note    POD1 s/p total proctocolectomy with end ileostomy    S: Small amount dark brown liquid effluent in bag. positive ambulating. Pain controlled on scheduled tylenol, celebrex, gabapentin, + prn IV dilaudid given x2 overnight for breakthrough    No n/v with sips ice chips and water overnight    O:  Temp:  [97.5 °F (36.4 °C)-98.3 °F (36.8 °C)] 97.6 °F (36.4 °C)  Heart Rate:  [] 90  Resp:  [18-20] 18  BP: (117-163)/() 117/74      Intake/Output Summary (Last 24 hours) at 11/21/17 0706  Last data filed at 11/21/17 0600   Gross per 24 hour   Intake             3643 ml   Output             1860 ml   Net             1783 ml   Guevara: 1660  Ileostomy: small amount dark brown liquid effluent    Abd:  soft, non-distended  Lap incisions: clean, dry, intact  Stoma: pink  Perineum: clean, dry, intact      Results from last 7 days  Lab Units 11/21/17  0403   WBC 10*3/mm3 14.40*   HEMOGLOBIN g/dL 8.8*   HEMATOCRIT % 30.0*   PLATELETS 10*3/mm3 525*       Results from last 7 days  Lab Units 11/21/17  0403   SODIUM mmol/L 138   POTASSIUM mmol/L 4.3   CHLORIDE mmol/L 99   CO2 mmol/L 30.4*   BUN mg/dL 7   CREATININE mg/dL 0.40*   GLUCOSE mg/dL 139*   CALCIUM mg/dL 8.8         Results from last 7 days  Lab Units 11/21/17  0403   MAGNESIUM mg/dL 2.4         A/P: 32 y.o. female POD1 s/p total proctocolectomy with end ileostomy    -afebrile, VSS  -clears for breakfast; advance to fulls for lunch if pt tolerates  -continue ERAS scheduled tylenol, celebrex, gabapentin +prn dilaudid  -lidocaine topical for perineum  -keep guevara  -discussed importance of ambulation.  Need roho cushion for sitting up in chair      Marisol Buitrago PA-C. 11/21/2017  7:07 AM

## 2017-11-21 NOTE — PLAN OF CARE
Problem: Patient Care Overview (Adult)  Goal: Plan of Care Review  Outcome: Ongoing (interventions implemented as appropriate)    11/21/17 1827   Coping/Psychosocial Response Interventions   Plan Of Care Reviewed With patient   Patient Care Overview   Progress improving   Outcome Evaluation   Outcome Summary/Follow up Plan VSS. pain managed on scheduled meds. continue to monitor         Problem: Perioperative Period (Adult)  Goal: Signs and Symptoms of Listed Potential Problems Will be Absent or Manageable (Perioperative Period)  Outcome: Ongoing (interventions implemented as appropriate)    Problem: Ileostomy (Adult)  Goal: Signs and Symptoms of Listed Potential Problems Will be Absent or Manageable (Ileostomy)  Outcome: Ongoing (interventions implemented as appropriate)

## 2017-11-21 NOTE — PLAN OF CARE
Problem: Patient Care Overview (Adult)  Goal: Plan of Care Review  Outcome: Ongoing (interventions implemented as appropriate)    11/21/17 1157   Coping/Psychosocial Response Interventions   Plan Of Care Reviewed With patient;father;mother   Patient Care Overview   Progress improving   Outcome Evaluation   Outcome Summary/Follow up Plan Patient responsive to ileostomy teaching. She and her parents watched and asked questions. She has a really great attitude about it all. Replaced appliance using a flat barrier ring. May need a belt or convexity related to small dip at 3oclock.- will continue to reassess pouching. She will need to change it prior to discharge home. Also instructed on crusting. Will continue to teach and review ileostomy care. She gave verbal consent for samples to be sent to the house.         Problem: Ileostomy (Adult)  Goal: Signs and Symptoms of Listed Potential Problems Will be Absent or Manageable (Ileostomy)  Outcome: Ongoing (interventions implemented as appropriate)    11/21/17 1157   Ileostomy   Problems Assessed (Ileostomy) mucocutaneous separation;peristomal skin breakdown;situational response;stomal complications;intestinal obstruction   Problems Present (Ileostomy) none

## 2017-11-22 LAB
ABO GROUP BLD: NORMAL
ALBUMIN SERPL-MCNC: 3 G/DL (ref 3.5–5.2)
ANION GAP SERPL CALCULATED.3IONS-SCNC: 9.3 MMOL/L
BASOPHILS # BLD AUTO: 0.04 10*3/MM3 (ref 0–0.2)
BASOPHILS NFR BLD AUTO: 0.3 % (ref 0–1.5)
BLD GP AB SCN SERPL QL: NEGATIVE
BUN BLD-MCNC: 6 MG/DL (ref 6–20)
BUN/CREAT SERPL: 16.2 (ref 7–25)
CALCIUM SPEC-SCNC: 8.6 MG/DL (ref 8.6–10.5)
CHLORIDE SERPL-SCNC: 102 MMOL/L (ref 98–107)
CO2 SERPL-SCNC: 27.7 MMOL/L (ref 22–29)
CREAT BLD-MCNC: 0.37 MG/DL (ref 0.57–1)
CYTO UR: NORMAL
DEPRECATED RDW RBC AUTO: 43.6 FL (ref 37–54)
EOSINOPHIL # BLD AUTO: 0.14 10*3/MM3 (ref 0–0.7)
EOSINOPHIL NFR BLD AUTO: 1 % (ref 0.3–6.2)
ERYTHROCYTE [DISTWIDTH] IN BLOOD BY AUTOMATED COUNT: 14.9 % (ref 11.7–13)
GFR SERPL CREATININE-BSD FRML MDRD: >150 ML/MIN/1.73
GLUCOSE BLD-MCNC: 103 MG/DL (ref 65–99)
HCT VFR BLD AUTO: 24 % (ref 35.6–45.5)
HGB BLD-MCNC: 7.1 G/DL (ref 11.9–15.5)
IMM GRANULOCYTES # BLD: 0.04 10*3/MM3 (ref 0–0.03)
IMM GRANULOCYTES NFR BLD: 0.3 % (ref 0–0.5)
LAB AP CASE REPORT: NORMAL
LYMPHOCYTES # BLD AUTO: 3.89 10*3/MM3 (ref 0.9–4.8)
LYMPHOCYTES NFR BLD AUTO: 28.2 % (ref 19.6–45.3)
Lab: NORMAL
MAGNESIUM SERPL-MCNC: 2.2 MG/DL (ref 1.6–2.6)
MCH RBC QN AUTO: 23.8 PG (ref 26.9–32)
MCHC RBC AUTO-ENTMCNC: 29.6 G/DL (ref 32.4–36.3)
MCV RBC AUTO: 80.5 FL (ref 80.5–98.2)
MONOCYTES # BLD AUTO: 1.02 10*3/MM3 (ref 0.2–1.2)
MONOCYTES NFR BLD AUTO: 7.4 % (ref 5–12)
NEUTROPHILS # BLD AUTO: 8.67 10*3/MM3 (ref 1.9–8.1)
NEUTROPHILS NFR BLD AUTO: 62.8 % (ref 42.7–76)
PATH REPORT.FINAL DX SPEC: NORMAL
PATH REPORT.GROSS SPEC: NORMAL
PHOSPHATE SERPL-MCNC: 1.1 MG/DL (ref 2.5–4.5)
PLATELET # BLD AUTO: 416 10*3/MM3 (ref 140–500)
PMV BLD AUTO: 10.1 FL (ref 6–12)
POTASSIUM BLD-SCNC: 3 MMOL/L (ref 3.5–5.2)
RBC # BLD AUTO: 2.98 10*6/MM3 (ref 3.9–5.2)
RH BLD: POSITIVE
SODIUM BLD-SCNC: 139 MMOL/L (ref 136–145)
WBC NRBC COR # BLD: 13.8 10*3/MM3 (ref 4.5–10.7)

## 2017-11-22 PROCEDURE — P9016 RBC LEUKOCYTES REDUCED: HCPCS

## 2017-11-22 PROCEDURE — 86923 COMPATIBILITY TEST ELECTRIC: CPT

## 2017-11-22 PROCEDURE — 86901 BLOOD TYPING SEROLOGIC RH(D): CPT

## 2017-11-22 PROCEDURE — 63710000001 DIPHENHYDRAMINE PER 50 MG: Performed by: COLON & RECTAL SURGERY

## 2017-11-22 PROCEDURE — 86850 RBC ANTIBODY SCREEN: CPT | Performed by: COLON & RECTAL SURGERY

## 2017-11-22 PROCEDURE — 86900 BLOOD TYPING SEROLOGIC ABO: CPT

## 2017-11-22 PROCEDURE — 36430 TRANSFUSION BLD/BLD COMPNT: CPT

## 2017-11-22 PROCEDURE — 83735 ASSAY OF MAGNESIUM: CPT | Performed by: PHYSICIAN ASSISTANT

## 2017-11-22 PROCEDURE — 80069 RENAL FUNCTION PANEL: CPT | Performed by: PHYSICIAN ASSISTANT

## 2017-11-22 PROCEDURE — 86901 BLOOD TYPING SEROLOGIC RH(D): CPT | Performed by: COLON & RECTAL SURGERY

## 2017-11-22 PROCEDURE — 85025 COMPLETE CBC W/AUTO DIFF WBC: CPT | Performed by: PHYSICIAN ASSISTANT

## 2017-11-22 PROCEDURE — 86900 BLOOD TYPING SEROLOGIC ABO: CPT | Performed by: COLON & RECTAL SURGERY

## 2017-11-22 RX ORDER — POTASSIUM CHLORIDE 750 MG/1
40 CAPSULE, EXTENDED RELEASE ORAL ONCE
Status: COMPLETED | OUTPATIENT
Start: 2017-11-22 | End: 2017-11-22

## 2017-11-22 RX ORDER — DIPHENHYDRAMINE HCL 25 MG
25 CAPSULE ORAL NIGHTLY PRN
Status: DISCONTINUED | OUTPATIENT
Start: 2017-11-22 | End: 2017-11-25 | Stop reason: HOSPADM

## 2017-11-22 RX ORDER — POTASSIUM CHLORIDE 7.45 MG/ML
10 INJECTION INTRAVENOUS
Status: DISCONTINUED | OUTPATIENT
Start: 2017-11-22 | End: 2017-11-23

## 2017-11-22 RX ADMIN — FAMOTIDINE 20 MG: 10 INJECTION, SOLUTION INTRAVENOUS at 08:35

## 2017-11-22 RX ADMIN — BUPROPION HYDROCHLORIDE 150 MG: 150 TABLET, EXTENDED RELEASE ORAL at 20:24

## 2017-11-22 RX ADMIN — GABAPENTIN 600 MG: 300 CAPSULE ORAL at 18:26

## 2017-11-22 RX ADMIN — FAMOTIDINE 20 MG: 10 INJECTION, SOLUTION INTRAVENOUS at 20:24

## 2017-11-22 RX ADMIN — ACETAMINOPHEN 1000 MG: 500 TABLET ORAL at 18:25

## 2017-11-22 RX ADMIN — ALVIMOPAN 12 MG: 12 CAPSULE ORAL at 18:26

## 2017-11-22 RX ADMIN — ACETAMINOPHEN 1000 MG: 500 TABLET ORAL at 11:32

## 2017-11-22 RX ADMIN — CELECOXIB 200 MG: 200 CAPSULE ORAL at 20:24

## 2017-11-22 RX ADMIN — CELECOXIB 200 MG: 200 CAPSULE ORAL at 08:35

## 2017-11-22 RX ADMIN — DULOXETINE HYDROCHLORIDE 60 MG: 60 CAPSULE, DELAYED RELEASE ORAL at 20:24

## 2017-11-22 RX ADMIN — POTASSIUM CHLORIDE 40 MEQ: 750 CAPSULE, EXTENDED RELEASE ORAL at 08:35

## 2017-11-22 RX ADMIN — LIDOCAINE: 50 OINTMENT TOPICAL at 00:09

## 2017-11-22 RX ADMIN — GABAPENTIN 600 MG: 300 CAPSULE ORAL at 08:35

## 2017-11-22 RX ADMIN — DIPHENHYDRAMINE HYDROCHLORIDE 25 MG: 25 CAPSULE ORAL at 22:43

## 2017-11-22 RX ADMIN — ALVIMOPAN 12 MG: 12 CAPSULE ORAL at 08:35

## 2017-11-22 RX ADMIN — ACETAMINOPHEN 1000 MG: 500 TABLET ORAL at 06:55

## 2017-11-22 RX ADMIN — ACETAMINOPHEN 1000 MG: 500 TABLET ORAL at 00:44

## 2017-11-22 NOTE — PROGRESS NOTES
Discharge Planning Assessment  [facility]     Patient Name: Liz Bagley  MRN: [id number]  Today's Date: 11/22/2017    Admit Date: 11/20/2017          Discharge Needs Assessment       11/22/17 1219    Living Environment    Lives With other (see comments)   Roomate Mor    Living Arrangements house    Home Accessibility no concerns    Type of Financial/Environmental Concern none    Transportation Available car    Living Environment    Provides Primary Care For no one    Quality Of Family Relationships supportive    Able to Return to Prior Living Arrangements yes    Discharge Needs Assessment    Concerns To Be Addressed discharge planning concerns    Readmission Within The Last 30 Days no previous admission in last 30 days    Anticipated Changes Related to Illness other (see comments)    Equipment Currently Used at Home none    Equipment Needed After Discharge colostomy/ostomy supplies            Discharge Plan       11/22/17 1221    Case Management/Social Work Plan    Plan Home with [facility] [place]    Patient/Family In Agreement With Plan unable to assess    Additional Comments Met at bedside with pt who lives with a roommate. Pt has been independent in all care. Discussed with pt need for home health, she would like to use [facility]. Call to [facility] with referral. Per pt her roommate and her Aunt will be available to help at [place]. Her Aunt will be staying with her for awhile. She uses no equipment and has never been to rehabl. Her PCP is Isauro Maldonado and her Rx is currently [facility] but would like changed to [facility] on [address]. Changed in Epic. Demographics verified. Plan at [place] is home with [facility]......[facility]        Discharge Placement     Facility/Agency Request Status Selected? Address Phone Number Fax Number    [facility] [place] Accepted    Yes [address] [address] [phone]                 Demographic Summary       11/22/17 1218    Referral Information    Admission Type inpatient    Arrived From admitted as an inpatient    Referral Source admission list    Reason For Consult discharge planning    Record Reviewed clinical discipline documentation;history and physical;medical record    Primary Care Physician Information    Name Isauro Maldonado            Functional Status       11/22/17 1219    Functional Status Current    Ambulation 0-->independent    Transferring 0-->independent    Toileting 0-->independent    Bathing 0-->independent    Dressing 0-->independent    Eating 0-->independent    Communication 0-->understands/communicates without difficulty    Swallowing (if score 2 or more for any item, consult Rehab Services) 0-->swallows foods/liquids without difficulty    Functional Status Prior    Ambulation 0-->independent    Transferring 0-->independent    Toileting 0-->independent    Bathing 0-->independent    Dressing 0-->independent    Eating 0-->independent    Communication 0-->understands/communicates without difficulty    Swallowing 0-->swallows foods/liquids without difficulty            Psychosocial     None            Abuse/Neglect     None            Legal     None            Substance Abuse     None            Patient Forms     None          Esther Wallace, RN

## 2017-11-22 NOTE — PLAN OF CARE
Problem: Ileostomy (Adult)  Goal: Signs and Symptoms of Listed Potential Problems Will be Absent or Manageable (Ileostomy)  Outcome: Ongoing (interventions implemented as appropriate)  Patient glad to move up on diet and have mashed potatoes!  Denies any nausea.  Pain is manageable but worse when getting up or down.  Green liquid stool in pouch.  Emptied and measured.  Reminded patient and family at [facility] that at home it would be emptied directly into the toilet. Demonstrated cleaning tip of bag and closing.  Denies any questions at this time.  Will continue to follow.

## 2017-11-22 NOTE — PLAN OF CARE
Problem: Patient Care Overview (Adult)  Goal: Plan of Care Review  Outcome: Ongoing (interventions implemented as appropriate)    11/22/17 1424   Coping/Psychosocial Response Interventions   Plan Of Care Reviewed With patient   Patient Care Overview   Progress improving   Outcome Evaluation   Outcome Summary/Follow up Plan 2 units PRBC given today. K+ replaced. guevara d/c-urinating well. VSS. continue to monitor         Problem: Perioperative Period (Adult)  Goal: Signs and Symptoms of Listed Potential Problems Will be Absent or Manageable (Perioperative Period)  Outcome: Ongoing (interventions implemented as appropriate)    Problem: Ileostomy (Adult)  Goal: Signs and Symptoms of Listed Potential Problems Will be Absent or Manageable (Ileostomy)  Outcome: Ongoing (interventions implemented as appropriate)

## 2017-11-22 NOTE — PLAN OF CARE
Problem: Patient Care Overview (Adult)  Goal: Plan of Care Review  Outcome: Ongoing (interventions implemented as appropriate)    11/22/17 0302   Coping/Psychosocial Response Interventions   Plan Of Care Reviewed With patient   Patient Care Overview   Progress improving   Outcome Evaluation   Outcome Summary/Follow up Plan VSS. Scheduled meds controlling pain. Pt c/o just feeling uncomfortable tonight. Lidocaine cream applied to rectum 1x. FC in place. Pt ambulating well. 1L NC at night. Continue to monitor.        Goal: Discharge Needs Assessment  Outcome: Ongoing (interventions implemented as appropriate)    Problem: Perioperative Period (Adult)  Goal: Signs and Symptoms of Listed Potential Problems Will be Absent or Manageable (Perioperative Period)  Outcome: Ongoing (interventions implemented as appropriate)    Problem: Ileostomy (Adult)  Goal: Signs and Symptoms of Listed Potential Problems Will be Absent or Manageable (Ileostomy)  Outcome: Ongoing (interventions implemented as appropriate)

## 2017-11-22 NOTE — PROGRESS NOTES
Progress Note    Pod 2      S: positive flatus,  positive BM. positive ambulating. Pain controled on po + intermittant iv    O:  Temp:  [97.6 °F (36.4 °C)-98.4 °F (36.9 °C)] 98.2 °F (36.8 °C)  Heart Rate:  [] 94  Resp:  [18] 18  BP: (114-127)/(62-71) 121/69      Intake/Output Summary (Last 24 hours) at 11/22/17 0914  Last data filed at 11/22/17 0815   Gross per 24 hour   Intake              494 ml   Output             1580 ml   Net            -1086 ml       Abd:   soft, non distended  Incision: clean, dry  Ost edematous      Results from last 7 days  Lab Units 11/22/17  0357   WBC 10*3/mm3 13.80*   HEMOGLOBIN g/dL 7.1*   HEMATOCRIT % 24.0*   PLATELETS 10*3/mm3 416       Results from last 7 days  Lab Units 11/22/17  0357   SODIUM mmol/L 139   POTASSIUM mmol/L 3.0*   CHLORIDE mmol/L 102   CO2 mmol/L 27.7   BUN mg/dL 6   CREATININE mg/dL 0.37*   GLUCOSE mg/dL 103*   CALCIUM mg/dL 8.6   PHOSPHORUS mg/dL 1.1*         Results from last 7 days  Lab Units 11/22/17  0357   MAGNESIUM mg/dL 2.2         A/P: 32 y.o. female with s/p LAPAROSCOPIC TOTAL PROCTOCOLECTOMY WITH END ILEOSTOMY, * Panel 2 does not exist *    Transfuse 2 unit prbc for abl anemia after surgery  D/c lovenox  Advance diet   Ambulate  D/c guevara

## 2017-11-23 LAB
ABO + RH BLD: NORMAL
ABO + RH BLD: NORMAL
ALBUMIN SERPL-MCNC: 3.2 G/DL (ref 3.5–5.2)
ANION GAP SERPL CALCULATED.3IONS-SCNC: 7.9 MMOL/L
BASOPHILS # BLD AUTO: 0.06 10*3/MM3 (ref 0–0.2)
BASOPHILS NFR BLD AUTO: 0.4 % (ref 0–1.5)
BH BB BLOOD EXPIRATION DATE: NORMAL
BH BB BLOOD EXPIRATION DATE: NORMAL
BH BB BLOOD TYPE BARCODE: 6200
BH BB BLOOD TYPE BARCODE: 6200
BH BB DISPENSE STATUS: NORMAL
BH BB DISPENSE STATUS: NORMAL
BH BB PRODUCT CODE: NORMAL
BH BB PRODUCT CODE: NORMAL
BH BB UNIT NUMBER: NORMAL
BH BB UNIT NUMBER: NORMAL
BUN BLD-MCNC: 8 MG/DL (ref 6–20)
BUN/CREAT SERPL: 17.8 (ref 7–25)
CALCIUM SPEC-SCNC: 8.8 MG/DL (ref 8.6–10.5)
CHLORIDE SERPL-SCNC: 104 MMOL/L (ref 98–107)
CO2 SERPL-SCNC: 29.1 MMOL/L (ref 22–29)
CREAT BLD-MCNC: 0.45 MG/DL (ref 0.57–1)
DEPRECATED RDW RBC AUTO: 45.5 FL (ref 37–54)
EOSINOPHIL # BLD AUTO: 0.54 10*3/MM3 (ref 0–0.7)
EOSINOPHIL NFR BLD AUTO: 3.7 % (ref 0.3–6.2)
ERYTHROCYTE [DISTWIDTH] IN BLOOD BY AUTOMATED COUNT: 15.7 % (ref 11.7–13)
GFR SERPL CREATININE-BSD FRML MDRD: >150 ML/MIN/1.73
GLUCOSE BLD-MCNC: 89 MG/DL (ref 65–99)
HCT VFR BLD AUTO: 27.5 % (ref 35.6–45.5)
HGB BLD-MCNC: 8.3 G/DL (ref 11.9–15.5)
IMM GRANULOCYTES # BLD: 0.04 10*3/MM3 (ref 0–0.03)
IMM GRANULOCYTES NFR BLD: 0.3 % (ref 0–0.5)
LYMPHOCYTES # BLD AUTO: 4.08 10*3/MM3 (ref 0.9–4.8)
LYMPHOCYTES NFR BLD AUTO: 27.7 % (ref 19.6–45.3)
MAGNESIUM SERPL-MCNC: 2.1 MG/DL (ref 1.6–2.6)
MCH RBC QN AUTO: 24.7 PG (ref 26.9–32)
MCHC RBC AUTO-ENTMCNC: 30.2 G/DL (ref 32.4–36.3)
MCV RBC AUTO: 81.8 FL (ref 80.5–98.2)
MONOCYTES # BLD AUTO: 0.79 10*3/MM3 (ref 0.2–1.2)
MONOCYTES NFR BLD AUTO: 5.4 % (ref 5–12)
NEUTROPHILS # BLD AUTO: 9.24 10*3/MM3 (ref 1.9–8.1)
NEUTROPHILS NFR BLD AUTO: 62.5 % (ref 42.7–76)
PHOSPHATE SERPL-MCNC: 2.3 MG/DL (ref 2.5–4.5)
PLATELET # BLD AUTO: 414 10*3/MM3 (ref 140–500)
PMV BLD AUTO: 9.8 FL (ref 6–12)
POTASSIUM BLD-SCNC: 3.6 MMOL/L (ref 3.5–5.2)
RBC # BLD AUTO: 3.36 10*6/MM3 (ref 3.9–5.2)
SODIUM BLD-SCNC: 141 MMOL/L (ref 136–145)
UNIT  ABO: NORMAL
UNIT  ABO: NORMAL
UNIT  RH: NORMAL
UNIT  RH: NORMAL
WBC NRBC COR # BLD: 14.75 10*3/MM3 (ref 4.5–10.7)

## 2017-11-23 PROCEDURE — 80069 RENAL FUNCTION PANEL: CPT | Performed by: COLON & RECTAL SURGERY

## 2017-11-23 PROCEDURE — 85025 COMPLETE CBC W/AUTO DIFF WBC: CPT | Performed by: COLON & RECTAL SURGERY

## 2017-11-23 PROCEDURE — 83735 ASSAY OF MAGNESIUM: CPT | Performed by: COLON & RECTAL SURGERY

## 2017-11-23 RX ORDER — GABAPENTIN 300 MG/1
600 CAPSULE ORAL 2 TIMES DAILY
Status: DISCONTINUED | OUTPATIENT
Start: 2017-11-23 | End: 2017-11-24

## 2017-11-23 RX ADMIN — ACETAMINOPHEN 1000 MG: 500 TABLET ORAL at 17:04

## 2017-11-23 RX ADMIN — POTASSIUM PHOSPHATE, MONOBASIC AND POTASSIUM PHOSPHATE, DIBASIC 30 MMOL: 224; 236 INJECTION, SOLUTION INTRAVENOUS at 11:46

## 2017-11-23 RX ADMIN — CELECOXIB 200 MG: 200 CAPSULE ORAL at 20:14

## 2017-11-23 RX ADMIN — FAMOTIDINE 20 MG: 10 INJECTION, SOLUTION INTRAVENOUS at 20:14

## 2017-11-23 RX ADMIN — ALVIMOPAN 12 MG: 12 CAPSULE ORAL at 08:05

## 2017-11-23 RX ADMIN — ACETAMINOPHEN 1000 MG: 500 TABLET ORAL at 11:46

## 2017-11-23 RX ADMIN — GABAPENTIN 600 MG: 300 CAPSULE ORAL at 08:05

## 2017-11-23 RX ADMIN — FAMOTIDINE 20 MG: 10 INJECTION, SOLUTION INTRAVENOUS at 08:05

## 2017-11-23 RX ADMIN — ACETAMINOPHEN 1000 MG: 500 TABLET ORAL at 06:05

## 2017-11-23 RX ADMIN — CELECOXIB 200 MG: 200 CAPSULE ORAL at 08:05

## 2017-11-23 RX ADMIN — ACETAMINOPHEN 1000 MG: 500 TABLET ORAL at 00:04

## 2017-11-23 RX ADMIN — BUPROPION HYDROCHLORIDE 150 MG: 150 TABLET, EXTENDED RELEASE ORAL at 20:14

## 2017-11-23 RX ADMIN — DULOXETINE HYDROCHLORIDE 60 MG: 60 CAPSULE, DELAYED RELEASE ORAL at 20:14

## 2017-11-23 RX ADMIN — ALVIMOPAN 12 MG: 12 CAPSULE ORAL at 17:05

## 2017-11-23 RX ADMIN — GABAPENTIN 600 MG: 300 CAPSULE ORAL at 17:05

## 2017-11-23 NOTE — PROGRESS NOTES
Progress Note    Pod 3      S: positive flatus,  positive BM. positive ambulating. Pain controled on po    O:  Temp:  [97.5 °F (36.4 °C)-98.9 °F (37.2 °C)] 98.4 °F (36.9 °C)  Heart Rate:  [] 100  Resp:  [16-20] 20  BP: (122-154)/(69-94) 154/94      Intake/Output Summary (Last 24 hours) at 11/23/17 1046  Last data filed at 11/23/17 0613   Gross per 24 hour   Intake              760 ml   Output             1325 ml   Net             -565 ml       Abd:   soft, non distended  Incision: clean, dry    Ostomy edematous    Results from last 7 days  Lab Units 11/23/17  0538   WBC 10*3/mm3 14.75*   HEMOGLOBIN g/dL 8.3*   HEMATOCRIT % 27.5*   PLATELETS 10*3/mm3 414       Results from last 7 days  Lab Units 11/23/17  0538   SODIUM mmol/L 141   POTASSIUM mmol/L 3.6   CHLORIDE mmol/L 104   CO2 mmol/L 29.1*   BUN mg/dL 8   CREATININE mg/dL 0.45*   GLUCOSE mg/dL 89   CALCIUM mg/dL 8.8   PHOSPHORUS mg/dL 2.3*         Results from last 7 days  Lab Units 11/23/17  0538   MAGNESIUM mg/dL 2.1         A/P: 32 y.o. female with s/p LAPAROSCOPIC TOTAL PROCTOCOLECTOMY WITH END ILEOSTOMY, * Panel 2 does not exist *    Hypo phos-Replace phos  Ambulation  Advance diet

## 2017-11-23 NOTE — PLAN OF CARE
Problem: Patient Care Overview (Adult)  Goal: Plan of Care Review  Outcome: Ongoing (interventions implemented as appropriate)    11/23/17 0700   Coping/Psychosocial Response Interventions   Plan Of Care Reviewed With patient   Patient Care Overview   Progress improving   Outcome Evaluation   Outcome Summary/Follow up Plan Pt has minimal pain well controlled with pain regimen. Pt tolerating bariatric stage III diet. Potassium recheck came back WNL and Hgb increased to 8.3. VSS, will continue to monitor.        Goal: Adult Individualization and Mutuality  Outcome: Ongoing (interventions implemented as appropriate)  Goal: Discharge Needs Assessment  Outcome: Ongoing (interventions implemented as appropriate)    Problem: Ileostomy (Adult)  Goal: Signs and Symptoms of Listed Potential Problems Will be Absent or Manageable (Ileostomy)  Outcome: Ongoing (interventions implemented as appropriate)

## 2017-11-23 NOTE — PLAN OF CARE
Problem: Patient Care Overview (Adult)  Goal: Plan of Care Review  Outcome: Ongoing (interventions implemented as appropriate)    11/23/17 1541   Coping/Psychosocial Response Interventions   Plan Of Care Reviewed With patient   Patient Care Overview   Progress improving   Outcome Evaluation   Outcome Summary/Follow up Plan minimial pain controlled with ERAS protocol meds, phosphate replaced, up ambulating, stage 4 diet, hgb stable, vss, will continue to monitor        Goal: Adult Individualization and Mutuality  Outcome: Ongoing (interventions implemented as appropriate)  Goal: Discharge Needs Assessment  Outcome: Ongoing (interventions implemented as appropriate)    Problem: Ileostomy (Adult)  Goal: Signs and Symptoms of Listed Potential Problems Will be Absent or Manageable (Ileostomy)  Outcome: Ongoing (interventions implemented as appropriate)

## 2017-11-24 PROBLEM — K51.011 ULCERATIVE PANCOLITIS WITH RECTAL BLEEDING: Status: ACTIVE | Noted: 2017-11-13

## 2017-11-24 LAB
ALBUMIN SERPL-MCNC: 3.1 G/DL (ref 3.5–5.2)
ANION GAP SERPL CALCULATED.3IONS-SCNC: 13 MMOL/L
BASOPHILS # BLD AUTO: 0.06 10*3/MM3 (ref 0–0.2)
BASOPHILS NFR BLD AUTO: 0.4 % (ref 0–1.5)
BUN BLD-MCNC: 8 MG/DL (ref 6–20)
BUN/CREAT SERPL: 19.5 (ref 7–25)
CALCIUM SPEC-SCNC: 8.8 MG/DL (ref 8.6–10.5)
CHLORIDE SERPL-SCNC: 107 MMOL/L (ref 98–107)
CO2 SERPL-SCNC: 23 MMOL/L (ref 22–29)
CREAT BLD-MCNC: 0.41 MG/DL (ref 0.57–1)
DEPRECATED RDW RBC AUTO: 49.1 FL (ref 37–54)
EOSINOPHIL # BLD AUTO: 0.88 10*3/MM3 (ref 0–0.7)
EOSINOPHIL NFR BLD AUTO: 5.9 % (ref 0.3–6.2)
ERYTHROCYTE [DISTWIDTH] IN BLOOD BY AUTOMATED COUNT: 16.6 % (ref 11.7–13)
GFR SERPL CREATININE-BSD FRML MDRD: >150 ML/MIN/1.73
GLUCOSE BLD-MCNC: 102 MG/DL (ref 65–99)
HCT VFR BLD AUTO: 28 % (ref 35.6–45.5)
HGB BLD-MCNC: 8.3 G/DL (ref 11.9–15.5)
IMM GRANULOCYTES # BLD: 0.04 10*3/MM3 (ref 0–0.03)
IMM GRANULOCYTES NFR BLD: 0.3 % (ref 0–0.5)
LYMPHOCYTES # BLD AUTO: 3.45 10*3/MM3 (ref 0.9–4.8)
LYMPHOCYTES NFR BLD AUTO: 23 % (ref 19.6–45.3)
MAGNESIUM SERPL-MCNC: 1.9 MG/DL (ref 1.6–2.6)
MCH RBC QN AUTO: 24.3 PG (ref 26.9–32)
MCHC RBC AUTO-ENTMCNC: 29.6 G/DL (ref 32.4–36.3)
MCV RBC AUTO: 81.9 FL (ref 80.5–98.2)
MONOCYTES # BLD AUTO: 0.87 10*3/MM3 (ref 0.2–1.2)
MONOCYTES NFR BLD AUTO: 5.8 % (ref 5–12)
NEUTROPHILS # BLD AUTO: 9.7 10*3/MM3 (ref 1.9–8.1)
NEUTROPHILS NFR BLD AUTO: 64.6 % (ref 42.7–76)
NRBC BLD MANUAL-RTO: 0.2 /100 WBC (ref 0–0)
PHOSPHATE SERPL-MCNC: 4.1 MG/DL (ref 2.5–4.5)
PLATELET # BLD AUTO: 482 10*3/MM3 (ref 140–500)
PMV BLD AUTO: 10.4 FL (ref 6–12)
POTASSIUM BLD-SCNC: 3.8 MMOL/L (ref 3.5–5.2)
RBC # BLD AUTO: 3.42 10*6/MM3 (ref 3.9–5.2)
SODIUM BLD-SCNC: 143 MMOL/L (ref 136–145)
WBC NRBC COR # BLD: 15 10*3/MM3 (ref 4.5–10.7)

## 2017-11-24 PROCEDURE — 83735 ASSAY OF MAGNESIUM: CPT | Performed by: COLON & RECTAL SURGERY

## 2017-11-24 PROCEDURE — 80069 RENAL FUNCTION PANEL: CPT | Performed by: COLON & RECTAL SURGERY

## 2017-11-24 PROCEDURE — 85025 COMPLETE CBC W/AUTO DIFF WBC: CPT | Performed by: COLON & RECTAL SURGERY

## 2017-11-24 PROCEDURE — 63710000001 DIPHENHYDRAMINE PER 50 MG: Performed by: COLON & RECTAL SURGERY

## 2017-11-24 RX ORDER — LISINOPRIL 20 MG/1
20 TABLET ORAL NIGHTLY
Status: DISCONTINUED | OUTPATIENT
Start: 2017-11-24 | End: 2017-11-25 | Stop reason: HOSPADM

## 2017-11-24 RX ORDER — LISINOPRIL AND HYDROCHLOROTHIAZIDE 25; 20 MG/1; MG/1
1 TABLET ORAL NIGHTLY
Status: DISCONTINUED | OUTPATIENT
Start: 2017-11-24 | End: 2017-11-24 | Stop reason: CLARIF

## 2017-11-24 RX ORDER — DULOXETIN HYDROCHLORIDE 60 MG/1
60 CAPSULE, DELAYED RELEASE ORAL NIGHTLY
Status: DISCONTINUED | OUTPATIENT
Start: 2017-11-24 | End: 2017-11-24

## 2017-11-24 RX ORDER — FAMOTIDINE 20 MG/1
20 TABLET, FILM COATED ORAL 2 TIMES DAILY
Status: DISCONTINUED | OUTPATIENT
Start: 2017-11-24 | End: 2017-11-25 | Stop reason: HOSPADM

## 2017-11-24 RX ORDER — HYDROCODONE BITARTRATE AND ACETAMINOPHEN 5; 325 MG/1; MG/1
2 TABLET ORAL EVERY 4 HOURS PRN
Status: DISCONTINUED | OUTPATIENT
Start: 2017-11-24 | End: 2017-11-25 | Stop reason: HOSPADM

## 2017-11-24 RX ORDER — BUPROPION HYDROCHLORIDE 150 MG/1
150 TABLET ORAL NIGHTLY
Status: DISCONTINUED | OUTPATIENT
Start: 2017-11-24 | End: 2017-11-24

## 2017-11-24 RX ORDER — HYDROCODONE BITARTRATE AND ACETAMINOPHEN 5; 325 MG/1; MG/1
TABLET ORAL
Qty: 32 TABLET | Refills: 0 | Status: SHIPPED | OUTPATIENT
Start: 2017-11-24 | End: 2018-08-21

## 2017-11-24 RX ORDER — HYDROCHLOROTHIAZIDE 25 MG/1
25 TABLET ORAL NIGHTLY
Status: DISCONTINUED | OUTPATIENT
Start: 2017-11-24 | End: 2017-11-25 | Stop reason: HOSPADM

## 2017-11-24 RX ORDER — ACETAMINOPHEN 325 MG/1
650 TABLET ORAL EVERY 4 HOURS PRN
Status: DISCONTINUED | OUTPATIENT
Start: 2017-11-24 | End: 2017-11-25 | Stop reason: HOSPADM

## 2017-11-24 RX ORDER — HYDROCODONE BITARTRATE AND ACETAMINOPHEN 5; 325 MG/1; MG/1
1 TABLET ORAL EVERY 4 HOURS PRN
Status: DISCONTINUED | OUTPATIENT
Start: 2017-11-24 | End: 2017-11-25 | Stop reason: HOSPADM

## 2017-11-24 RX ADMIN — CELECOXIB 200 MG: 200 CAPSULE ORAL at 09:31

## 2017-11-24 RX ADMIN — LISINOPRIL 20 MG: 20 TABLET ORAL at 21:28

## 2017-11-24 RX ADMIN — DIPHENHYDRAMINE HYDROCHLORIDE 25 MG: 25 CAPSULE ORAL at 00:12

## 2017-11-24 RX ADMIN — FAMOTIDINE 20 MG: 20 TABLET, FILM COATED ORAL at 17:05

## 2017-11-24 RX ADMIN — ACETAMINOPHEN 1000 MG: 500 TABLET ORAL at 06:02

## 2017-11-24 RX ADMIN — FAMOTIDINE 20 MG: 10 INJECTION, SOLUTION INTRAVENOUS at 09:30

## 2017-11-24 RX ADMIN — ACETAMINOPHEN 1000 MG: 500 TABLET ORAL at 00:12

## 2017-11-24 RX ADMIN — BUPROPION HYDROCHLORIDE 150 MG: 150 TABLET, EXTENDED RELEASE ORAL at 21:28

## 2017-11-24 RX ADMIN — DULOXETINE HYDROCHLORIDE 60 MG: 60 CAPSULE, DELAYED RELEASE ORAL at 21:28

## 2017-11-24 RX ADMIN — CELECOXIB 200 MG: 200 CAPSULE ORAL at 21:31

## 2017-11-24 NOTE — PLAN OF CARE
Problem: Patient Care Overview (Adult)  Goal: Plan of Care Review  Outcome: Ongoing (interventions implemented as appropriate)    11/24/17 0500   Coping/Psychosocial Response Interventions   Plan Of Care Reviewed With patient   Patient Care Overview   Progress improving   Outcome Evaluation   Outcome Summary/Follow up Plan Pt tolerating stage IV diet. No acute changes overnight. Plan for wound/ostomy RN to see during the day for further teaching needs. VSS, will continue to monitor.        Goal: Adult Individualization and Mutuality  Outcome: Ongoing (interventions implemented as appropriate)  Goal: Discharge Needs Assessment  Outcome: Ongoing (interventions implemented as appropriate)    Problem: Ileostomy (Adult)  Goal: Signs and Symptoms of Listed Potential Problems Will be Absent or Manageable (Ileostomy)  Outcome: Ongoing (interventions implemented as appropriate)

## 2017-11-24 NOTE — PLAN OF CARE
Problem: Patient Care Overview (Adult)  Goal: Plan of Care Review  Outcome: Ongoing (interventions implemented as appropriate)    11/24/17 1202   Coping/Psychosocial Response Interventions   Plan Of Care Reviewed With patient   Patient Care Overview   Progress improving   Outcome Evaluation   Outcome Summary/Follow up Plan CWOCN assessed patient. MD present. Stoma color is improving and turning bright red. Patient appears to be doing very well and states she feels good except pain at anus. She is comfortable emptying the appliance. She changed it with me and did most of it herself. She will have home health. She can see us outpatient if needed. Sample supplies have been requested. All of her questions answered.         Problem: Ileostomy (Adult)  Goal: Signs and Symptoms of Listed Potential Problems Will be Absent or Manageable (Ileostomy)  Outcome: Ongoing (interventions implemented as appropriate)    11/24/17 0900   Ileostomy   Problems Assessed (Ileostomy) mucocutaneous separation;peristomal skin breakdown;situational response;stomal complications   Problems Present (Ileostomy) none

## 2017-11-24 NOTE — PLAN OF CARE
Problem: Patient Care Overview (Adult)  Goal: Plan of Care Review  Outcome: Ongoing (interventions implemented as appropriate)    11/24/17 8211   Coping/Psychosocial Response Interventions   Plan Of Care Reviewed With patient   Patient Care Overview   Progress improving   Outcome Evaluation   Outcome Summary/Follow up Plan Ambulating well, excited about d/c, open teaching, reviewed BP and MD restarted home meds. DC tomorrow.        Goal: Adult Individualization and Mutuality  Outcome: Ongoing (interventions implemented as appropriate)  Goal: Discharge Needs Assessment  Outcome: Ongoing (interventions implemented as appropriate)    Problem: Ileostomy (Adult)  Goal: Signs and Symptoms of Listed Potential Problems Will be Absent or Manageable (Ileostomy)  Outcome: Ongoing (interventions implemented as appropriate)

## 2017-11-24 NOTE — PROGRESS NOTES
Progress Note    Pod 4      S: positive flatus,  positive BM. positive ambulating. Pain controled on po    O:  Temp:  [97.7 °F (36.5 °C)-99.1 °F (37.3 °C)] 98 °F (36.7 °C)  Heart Rate:  [86-94] 86  Resp:  [18-20] 18  BP: (142-155)/() 155/102      Intake/Output Summary (Last 24 hours) at 11/24/17 0859  Last data filed at 11/24/17 0610   Gross per 24 hour   Intake              420 ml   Output             1300 ml   Net             -880 ml       Abd:   soft, non distended  Incision: clean, dry    Ostomy ppp    Results from last 7 days  Lab Units 11/24/17  0530   WBC 10*3/mm3 15.00*   HEMOGLOBIN g/dL 8.3*   HEMATOCRIT % 28.0*   PLATELETS 10*3/mm3 482       Results from last 7 days  Lab Units 11/24/17  0530   SODIUM mmol/L 143   POTASSIUM mmol/L 3.8   CHLORIDE mmol/L 107   CO2 mmol/L 23.0   BUN mg/dL 8   CREATININE mg/dL 0.41*   GLUCOSE mg/dL 102*   CALCIUM mg/dL 8.8   PHOSPHORUS mg/dL 4.1         Results from last 7 days  Lab Units 11/24/17  0530   MAGNESIUM mg/dL 1.9         A/P: 32 y.o. female with s/p LAPAROSCOPIC TOTAL PROCTOCOLECTOMY WITH END ILEOSTOMY, * Panel 2 does not exist *    Wbc - reactive and 13 preop  hgb stable  Advance diet  HH set up  Restart home meds  Ostomy teaching today  D/c likely tomorrow

## 2017-11-25 VITALS
HEIGHT: 63 IN | SYSTOLIC BLOOD PRESSURE: 144 MMHG | BODY MASS INDEX: 51.71 KG/M2 | WEIGHT: 291.88 LBS | DIASTOLIC BLOOD PRESSURE: 81 MMHG | OXYGEN SATURATION: 90 % | TEMPERATURE: 97.8 F | HEART RATE: 90 BPM | RESPIRATION RATE: 18 BRPM

## 2017-11-25 LAB
BASOPHILS # BLD AUTO: 0.04 10*3/MM3 (ref 0–0.2)
BASOPHILS NFR BLD AUTO: 0.2 % (ref 0–1.5)
DEPRECATED RDW RBC AUTO: 48.8 FL (ref 37–54)
EOSINOPHIL # BLD AUTO: 0.77 10*3/MM3 (ref 0–0.7)
EOSINOPHIL NFR BLD AUTO: 4.8 % (ref 0.3–6.2)
ERYTHROCYTE [DISTWIDTH] IN BLOOD BY AUTOMATED COUNT: 17.1 % (ref 11.7–13)
HCT VFR BLD AUTO: 28 % (ref 35.6–45.5)
HGB BLD-MCNC: 8.7 G/DL (ref 11.9–15.5)
IMM GRANULOCYTES # BLD: 0.05 10*3/MM3 (ref 0–0.03)
IMM GRANULOCYTES NFR BLD: 0.3 % (ref 0–0.5)
LYMPHOCYTES # BLD AUTO: 2.28 10*3/MM3 (ref 0.9–4.8)
LYMPHOCYTES NFR BLD AUTO: 14.2 % (ref 19.6–45.3)
MCH RBC QN AUTO: 25 PG (ref 26.9–32)
MCHC RBC AUTO-ENTMCNC: 31.1 G/DL (ref 32.4–36.3)
MCV RBC AUTO: 80.5 FL (ref 80.5–98.2)
MONOCYTES # BLD AUTO: 0.9 10*3/MM3 (ref 0.2–1.2)
MONOCYTES NFR BLD AUTO: 5.6 % (ref 5–12)
NEUTROPHILS # BLD AUTO: 12.03 10*3/MM3 (ref 1.9–8.1)
NEUTROPHILS NFR BLD AUTO: 74.9 % (ref 42.7–76)
PLATELET # BLD AUTO: 556 10*3/MM3 (ref 140–500)
PMV BLD AUTO: 9.4 FL (ref 6–12)
RBC # BLD AUTO: 3.48 10*6/MM3 (ref 3.9–5.2)
WBC NRBC COR # BLD: 16.07 10*3/MM3 (ref 4.5–10.7)

## 2017-11-25 PROCEDURE — 85025 COMPLETE CBC W/AUTO DIFF WBC: CPT | Performed by: SURGERY

## 2017-11-25 PROCEDURE — 99024 POSTOP FOLLOW-UP VISIT: CPT | Performed by: SURGERY

## 2017-11-25 PROCEDURE — 63710000001 DIPHENHYDRAMINE PER 50 MG: Performed by: COLON & RECTAL SURGERY

## 2017-11-25 RX ADMIN — HYDROCHLOROTHIAZIDE 25 MG: 25 TABLET ORAL at 08:06

## 2017-11-25 RX ADMIN — DIPHENHYDRAMINE HYDROCHLORIDE 25 MG: 25 CAPSULE ORAL at 00:06

## 2017-11-25 RX ADMIN — CELECOXIB 200 MG: 200 CAPSULE ORAL at 08:06

## 2017-11-25 RX ADMIN — FAMOTIDINE 20 MG: 20 TABLET, FILM COATED ORAL at 08:06

## 2017-11-25 NOTE — PLAN OF CARE
Problem: Patient Care Overview (Adult)  Goal: Plan of Care Review  Outcome: Outcome(s) achieved Date Met:  11/25/17 11/25/17 1442   Coping/Psychosocial Response Interventions   Plan Of Care Reviewed With patient   Patient Care Overview   Progress improving   Outcome Evaluation   Outcome Summary/Follow up Plan No c/o pain or soa. Pt up ad annabel. WBC 16.07, pt wants to go home. Pt being discharged home.       Goal: Adult Individualization and Mutuality  Outcome: Outcome(s) achieved Date Met:  11/25/17  Goal: Discharge Needs Assessment  Outcome: Outcome(s) achieved Date Met:  11/25/17    Problem: Ileostomy (Adult)  Goal: Signs and Symptoms of Listed Potential Problems Will be Absent or Manageable (Ileostomy)  Outcome: Outcome(s) achieved Date Met:  11/25/17    Problem: Pain, Acute (Adult)  Goal: Identify Related Risk Factors and Signs and Symptoms  Outcome: Outcome(s) achieved Date Met:  11/25/17  Goal: Acceptable Pain Control/Comfort Level  Outcome: Outcome(s) achieved Date Met:  11/25/17

## 2017-11-25 NOTE — PROGRESS NOTES
POD# 5 s/p LAPAROSCOPIC TOTAL PROCTOCOLECTOMY WITH END ILEOSTOMY    S: No issues, afebrile, tolerating diet    O:   Vitals:    11/24/17 1900 11/24/17 2128 11/25/17 0000 11/25/17 0704   BP: 151/91 160/93 161/95 144/81   BP Location: Right arm  Right arm Right arm   Patient Position: Sitting  Lying Lying   Pulse: 94 101 90    Resp: 18  18 18   Temp: 98.4 °F (36.9 °C)  98.6 °F (37 °C) 97.8 °F (36.6 °C)   TempSrc: Oral  Oral Oral   SpO2:       Weight:       Height:         [id number]  AOx3, NAD  Abdomen soft and not tender, incision c/d/i, Ostomy pink and viable with liquid brown stool.   Perineal incision c/d/i  No edemas    Labs pending    POD# 5 s/p LAPAROSCOPIC TOTAL PROCTOCOLECTOMY WITH END ILEOSTOMY, looks great, labs not done today, wbc 15k.     - CBC today, if trending down can go home  - Loperamide as needed for ostomy output greater than 1l  - Fiber daily    Addendum  WBC 16 K, I discussed with the patient about the need for observation 24 more hors and recheck WBC on am. I do not feel she has an infection going on but cannot explain her WBC. She does not want to stay and prefer to go home and will call back or come back to the ED if having any fevers, chills or any signs of infection.     Pt to be dc home today.       Julio Cantu MD  General, Minimally Invasive and Endoscopic Surgery  [facility]    [address], [address]  [place], [place], [address]  P: [phone]  F: [phone]

## 2017-11-25 NOTE — PROGRESS NOTES
Continued Stay Note  [facility]     Patient Name: Liz Bagley  MRN: [id number]  Today's Date: 11/25/2017    Admit Date: 11/20/2017          Discharge Plan       11/25/17 1329    Case Management/Social Work Plan    Additional Comments Discharge orders noted. Called and s/w Shantel with [facility] informing of discharge home today.............YUNI Phillips, CCP              Discharge Codes     None        Expected Discharge Date and Time     Expected Discharge Date Expected Discharge Time    Nov 25, 2017             Tyrel Evans RN

## 2017-11-25 NOTE — PLAN OF CARE
Problem: Patient Care Overview (Adult)  Goal: Plan of Care Review  Outcome: Ongoing (interventions implemented as appropriate)    11/25/17 0543   Coping/Psychosocial Response Interventions   Plan Of Care Reviewed With patient   Patient Care Overview   Progress improving   Outcome Evaluation   Outcome Summary/Follow up Plan refused hydralazine for night time and would take it this am; ileostomy stoma beefy red, stool dark brown; VSS besides BP is little high, no s/s noted; continue to monitor.       Goal: Adult Individualization and Mutuality  Outcome: Ongoing (interventions implemented as appropriate)  Goal: Discharge Needs Assessment  Outcome: Ongoing (interventions implemented as appropriate)    Problem: Ileostomy (Adult)  Goal: Signs and Symptoms of Listed Potential Problems Will be Absent or Manageable (Ileostomy)  Outcome: Ongoing (interventions implemented as appropriate)    Problem: Pain, Acute (Adult)  Goal: Identify Related Risk Factors and Signs and Symptoms  Outcome: Ongoing (interventions implemented as appropriate)  Goal: Acceptable Pain Control/Comfort Level  Outcome: Ongoing (interventions implemented as appropriate)

## 2017-11-27 NOTE — PROGRESS NOTES
Case Management Discharge Note    Final Note: Pt DC'd home with [facility]    Discharge Placement     Facility/Agency Request Status Selected? Address Phone Number Fax Number    [facility] [place] Accepted    Yes [address] [place] [address] [phone]    [facility] [place] Accepted     [address] [address] [phone]        Other:  (car)    Discharge Codes: 06  Discharged/transferred to home under care of organized home health service organization in anticipation of skilled care

## 2017-11-28 ENCOUNTER — OFFICE VISIT (OUTPATIENT)
Dept: SURGERY | Facility: CLINIC | Age: 32
End: 2017-11-28

## 2017-11-28 VITALS
WEIGHT: 286.6 LBS | OXYGEN SATURATION: 98 % | DIASTOLIC BLOOD PRESSURE: 80 MMHG | SYSTOLIC BLOOD PRESSURE: 110 MMHG | TEMPERATURE: 97.7 F | HEART RATE: 114 BPM | BODY MASS INDEX: 50.78 KG/M2 | HEIGHT: 63 IN

## 2017-11-28 DIAGNOSIS — K52.9 IBD (INFLAMMATORY BOWEL DISEASE): Primary | ICD-10-CM

## 2017-11-28 PROCEDURE — 99024 POSTOP FOLLOW-UP VISIT: CPT | Performed by: COLON & RECTAL SURGERY

## 2017-11-28 RX ORDER — HYDROXYZINE HYDROCHLORIDE 25 MG/1
25 TABLET, FILM COATED ORAL 3 TIMES DAILY PRN
Qty: 30 TABLET | Refills: 0 | Status: SHIPPED | OUTPATIENT
Start: 2017-11-28 | End: 2018-08-21

## 2017-11-28 NOTE — PROGRESS NOTES
"Liz Bagley is a 32 y.o. female in for follow up of IBD (inflammatory bowel disease)  s/p total proctocolectomy with end ileostomy 11/20/2017    Pt states she is feeling good    No issues with ostomy    Output thick    She has not needed to take imodium    Her bottom stings, but not actually painful    Lap sites itchy    /80 (BP Location: Right arm, Patient Position: Sitting)  Pulse 114  Temp 97.7 °F (36.5 °C)  Ht 63\" (160 cm)  Wt 286 lb 9.6 oz (130 kg)  LMP 11/22/2017  SpO2 98%  BMI 50.77 kg/m2  Body mass index is 50.77 kg/(m^2).      PE:  Physical Exam   Constitutional: She appears well-developed. No distress.   HENT:   Head: Normocephalic and atraumatic.   Abdominal:   -s/nt/nd  -lap sites with mild erythema c/w irritation; no appearance infection  -ostomy with thick brown effluent in bag   Genitourinary:   Genitourinary Comments: Perineum: incision clean, intact.  No blood, no purulence.  No erythema; mild edema   Musculoskeletal: Normal range of motion.   Neurological: She is alert.   Psychiatric: Thought content normal.         Assessment:   1. IBD (inflammatory bowel disease)     s/p total proctocolectomy with end ileostomy 11/20/2017    Plan:    -doing well postop  -lap site irritation, likely from adhesive: rx atarax & otc Benadryl cream  -gave samples recticare and calmoseptine for perineum with instructions  -can take imodium 1-2 tabs qhs  -she can drive beginning next week.  No driving while on pain meds  -call or come in if any questions or concerns      RTC 2 weeks      Scribed for Colin Evans MD by Marisol Buitrago PA-C 11/28/2017  This patient was evaluated by me, recommendations made, documentation reviewed, edited, and revised by me, Colin Evans MD        "

## 2017-12-05 ENCOUNTER — OFFICE VISIT (OUTPATIENT)
Dept: SURGERY | Facility: CLINIC | Age: 32
End: 2017-12-05

## 2017-12-05 VITALS
HEART RATE: 116 BPM | TEMPERATURE: 97.8 F | SYSTOLIC BLOOD PRESSURE: 118 MMHG | DIASTOLIC BLOOD PRESSURE: 88 MMHG | OXYGEN SATURATION: 96 % | HEIGHT: 63 IN | WEIGHT: 285.2 LBS | BODY MASS INDEX: 50.53 KG/M2

## 2017-12-05 DIAGNOSIS — K52.9 IBD (INFLAMMATORY BOWEL DISEASE): Primary | ICD-10-CM

## 2017-12-05 DIAGNOSIS — IMO0002 COMPLICATION OF EXTERNAL STOMA OF GASTROINTESTINAL TRACT: ICD-10-CM

## 2017-12-05 PROCEDURE — 99024 POSTOP FOLLOW-UP VISIT: CPT | Performed by: COLON & RECTAL SURGERY

## 2017-12-05 NOTE — PROGRESS NOTES
"Liz Bagley is a 32 y.o. female in for follow up of IBD (inflammatory bowel disease)    Complication of external stoma of gastrointestinal tract  s/p total proctocolectomy with end ileostomy 11/20/2017    Patient states she is having difficulty pouching    No pain    No change in output consistency    /88 (BP Location: Right arm, Patient Position: Sitting)  Pulse 116  Temp 97.8 °F (36.6 °C)  Ht 160 cm (63\")  Wt 129 kg (285 lb 3.2 oz)  LMP 11/22/2017  SpO2 96%  Breastfeeding? No  BMI 50.52 kg/m2  Body mass index is 50.52 kg/(m^2).      PE:  Physical Exam   Constitutional: She appears well-developed. No distress.   HENT:   Head: Normocephalic and atraumatic.   Abdominal:   -s/nt/nd  -ostomy pink, patent, productive. +inferior mucocutaneous separation.    Musculoskeletal: Normal range of motion.   Neurological: She is alert.   Psychiatric: Thought content normal.         Assessment:   1. IBD (inflammatory bowel disease)    2. Complication of external stoma of gastrointestinal tract     s/p total proctocolectomy with end ileostomy 11/20/2017    Plan:    Discussion with patient that mucocutaneous separation of ostomy will heal in.  fibrinous tissue sharply debrided in office today.    To [facility] from office    Call or come in if any pain, bleeding, issues with ostomy function, or other questions or concerning symptoms    RTC 1 week      Scribed for Colin Evans MD by Marisol Buitrago PA-C 12/5/2017  This patient was evaluated by me, recommendations made, documentation reviewed, edited, and revised by me, Colin Evans MD        "

## 2017-12-15 ENCOUNTER — TRANSCRIBE ORDERS (OUTPATIENT)
Dept: WOUND CARE | Facility: HOSPITAL | Age: 32
End: 2017-12-15

## 2017-12-15 ENCOUNTER — OFFICE VISIT (OUTPATIENT)
Dept: SURGERY | Facility: CLINIC | Age: 32
End: 2017-12-15

## 2017-12-15 ENCOUNTER — HOSPITAL ENCOUNTER (OUTPATIENT)
Dept: WOUND CARE | Facility: HOSPITAL | Age: 32
Discharge: HOME | End: 2017-12-15
Attending: COLON & RECTAL SURGERY

## 2017-12-15 VITALS
OXYGEN SATURATION: 97 % | WEIGHT: 283.8 LBS | HEIGHT: 63 IN | TEMPERATURE: 98.1 F | DIASTOLIC BLOOD PRESSURE: 76 MMHG | SYSTOLIC BLOOD PRESSURE: 118 MMHG | HEART RATE: 117 BPM | BODY MASS INDEX: 50.29 KG/M2

## 2017-12-15 DIAGNOSIS — IMO0002 COMPLICATION OF EXTERNAL STOMA OF GASTROINTESTINAL TRACT: ICD-10-CM

## 2017-12-15 DIAGNOSIS — K52.9 IBD (INFLAMMATORY BOWEL DISEASE): Primary | ICD-10-CM

## 2017-12-15 DIAGNOSIS — Z71.89 OSTOMY NURSE CONSULTATION: Primary | ICD-10-CM

## 2017-12-15 PROCEDURE — 99024 POSTOP FOLLOW-UP VISIT: CPT | Performed by: COLON & RECTAL SURGERY

## 2017-12-15 NOTE — NURSING NOTE
[facility] outpatient consult. Patient has had mucocutaneous separation which is healing well. We had recommended Hydrofera blue dressing. At this time, patient is only cutting a thin piece to place around the stoma.   She did say that she had leaked last night and this am. Recommend to add a belt and we also gave her a few convex samples to try for the wafer. Explained use of both. All of her questions were answered. She can come back if needed. I advised that she definitely should return if she continues to leak.   She is seeing Dr Evans this afternoon. One of her lap sites at the umbilicus has opened up.

## 2017-12-15 NOTE — PROGRESS NOTES
"Liz Bagley is a 32 y.o. female in for follow up of IBD (inflammatory bowel disease)    Complication of external stoma of gastrointestinal tract  s/p total proctocolectomy with end ileostomy 11/20/2017    Some incisional pain, and pain when changing position    Ostomy healing in  Saw [facility] earlier today    Emptying 6-7 times per day  Consistency like applesauce    Has not taken any imodium    No f/c    No n/v    /76 (BP Location: Right arm, Patient Position: Sitting)  Pulse 117  Temp 98.1 °F (36.7 °C)  Ht 160 cm (63\")  Wt 129 kg (283 lb 12.8 oz)  LMP 11/22/2017  SpO2 97%  BMI 50.27 kg/m2  Body mass index is 50.27 kg/(m^2).      PE:  Physical Exam   Constitutional: She appears well-developed. No distress.   HENT:   Head: Normocephalic and atraumatic.   Abdominal:   -s/nt/nd  -ostomy pink with green-brown thick effluent in bag.  Mucocutaneous separation healed in  -umbilical lap site with superficial separation; no appearance infection   Musculoskeletal: Normal range of motion.   Neurological: She is alert.   Psychiatric: Thought content normal.         Assessment:   1. IBD (inflammatory bowel disease)    2. Complication of external stoma of gastrointestinal tract     s/p total proctocolectomy with end ileostomy 11/20/2017    Plan:    -doing well post-op  -can cover umbilical lap site with bandaid  -imodium prn loose output  -call or come in if any questions or concerns      RTC 2-3 weeks      Scribed for Colin Evans MD by Marisol Buitrago PA-C 12/15/2017  This patient was evaluated by me, recommendations made, documentation reviewed, edited, and revised by me, Colin Evans MD        "

## 2018-01-03 ENCOUNTER — OFFICE VISIT (OUTPATIENT)
Dept: SURGERY | Facility: CLINIC | Age: 33
End: 2018-01-03

## 2018-01-03 VITALS
DIASTOLIC BLOOD PRESSURE: 86 MMHG | TEMPERATURE: 98 F | BODY MASS INDEX: 51.29 KG/M2 | OXYGEN SATURATION: 95 % | WEIGHT: 289.5 LBS | HEART RATE: 111 BPM | SYSTOLIC BLOOD PRESSURE: 118 MMHG | HEIGHT: 63 IN

## 2018-01-03 DIAGNOSIS — K52.9 IBD (INFLAMMATORY BOWEL DISEASE): Primary | ICD-10-CM

## 2018-01-03 PROCEDURE — 99024 POSTOP FOLLOW-UP VISIT: CPT | Performed by: COLON & RECTAL SURGERY

## 2018-01-03 RX ORDER — NYSTATIN 100000 [USP'U]/G
POWDER TOPICAL
Qty: 45 G | Refills: 1 | Status: SHIPPED | OUTPATIENT
Start: 2018-01-03 | End: 2019-01-03

## 2018-01-03 NOTE — PROGRESS NOTES
"Liz Bagley is a 32 y.o. female in for follow up of IBD (inflammatory bowel disease)  s/p total proctocolectomy with end ileostomy 11/20/2017    Patient states she is doing well    Appetite and energy good    No issues with ostomy function  She is having some leaking  Going to [facility] straight from this appt    Taking imodium as needed, and output has thickened up    She has not gone back to work yet; thinks she will be ready next week    /86 (BP Location: Right arm, Patient Position: Sitting)  Pulse 111  Temp 98 °F (36.7 °C)  Ht 160 cm (63\")  Wt 131 kg (289 lb 8 oz)  SpO2 95%  BMI 51.28 kg/m2  Body mass index is 51.28 kg/(m^2).      PE:  Physical Exam   Constitutional: She appears well-developed. No distress.   HENT:   Head: Normocephalic and atraumatic.   Abdominal:   -s/nt/nd  -R midline ostomy ppp with thick brown effluent.  Mild peristomal skin irritation  -umbilical lap site still open with yellow fibrinous tissue at base   Musculoskeletal: Normal range of motion.   Neurological: She is alert.   Psychiatric: Thought content normal.         Assessment:   1. IBD (inflammatory bowel disease)     s/p total proctocolectomy with end ileostomy 11/20/2017    Plan:    -doing well postop  -rx Nystatin powder for peristomal skin yeast  -umbilical lap site does not appear infected. Continue local wound care  -continue prn imodium for output consistency  -RTW note for next Wednesday  -call or come in if any questions or concerns      RTC 4 weeks      Scribed for Colin Evans MD by Marisol Buitrago PA-C 1/3/2018  This patient was evaluated by me, recommendations made, documentation reviewed, edited, and revised by me, Colin Evans MD        "

## 2018-01-17 RX ORDER — ZOLPIDEM TARTRATE 10 MG/1
TABLET ORAL
Qty: 30 TABLET | Refills: 0 | Status: SHIPPED | OUTPATIENT
Start: 2018-01-17 | End: 2018-01-17 | Stop reason: SDUPTHER

## 2018-01-17 RX ORDER — ZOLPIDEM TARTRATE 10 MG/1
TABLET ORAL
Qty: 30 TABLET | Refills: 0 | Status: SHIPPED | OUTPATIENT
Start: 2018-01-17 | End: 2018-05-01 | Stop reason: SDUPTHER

## 2018-01-17 RX ORDER — LISINOPRIL AND HYDROCHLOROTHIAZIDE 25; 20 MG/1; MG/1
TABLET ORAL
Qty: 90 TABLET | Refills: 0 | Status: SHIPPED | OUTPATIENT
Start: 2018-01-17 | End: 2018-05-01 | Stop reason: SDUPTHER

## 2018-01-24 ENCOUNTER — OFFICE (OUTPATIENT)
Dept: URBAN - METROPOLITAN AREA OTHER 6 | Facility: OTHER | Age: 33
End: 2018-01-24

## 2018-01-24 VITALS
SYSTOLIC BLOOD PRESSURE: 114 MMHG | HEART RATE: 98 BPM | DIASTOLIC BLOOD PRESSURE: 58 MMHG | HEIGHT: 63 IN | WEIGHT: 268 LBS

## 2018-01-24 DIAGNOSIS — K50.90 CROHN'S DISEASE, UNSPECIFIED, WITHOUT COMPLICATIONS: ICD-10-CM

## 2018-01-24 DIAGNOSIS — Z93.2 ILEOSTOMY STATUS: ICD-10-CM

## 2018-01-24 PROCEDURE — 99212 OFFICE O/P EST SF 10 MIN: CPT | Performed by: INTERNAL MEDICINE

## 2018-01-26 RX ORDER — DULOXETIN HYDROCHLORIDE 60 MG/1
CAPSULE, DELAYED RELEASE ORAL
Qty: 30 CAPSULE | Refills: 0 | Status: SHIPPED | OUTPATIENT
Start: 2018-01-26 | End: 2018-02-28 | Stop reason: SDUPTHER

## 2018-01-30 ENCOUNTER — OFFICE VISIT (OUTPATIENT)
Dept: SURGERY | Facility: CLINIC | Age: 33
End: 2018-01-30

## 2018-01-30 VITALS
HEART RATE: 97 BPM | DIASTOLIC BLOOD PRESSURE: 78 MMHG | OXYGEN SATURATION: 98 % | HEIGHT: 63 IN | BODY MASS INDEX: 51.51 KG/M2 | TEMPERATURE: 98.5 F | WEIGHT: 290.7 LBS | SYSTOLIC BLOOD PRESSURE: 118 MMHG

## 2018-01-30 DIAGNOSIS — K52.9 IBD (INFLAMMATORY BOWEL DISEASE): Primary | ICD-10-CM

## 2018-01-30 PROCEDURE — 99024 POSTOP FOLLOW-UP VISIT: CPT | Performed by: COLON & RECTAL SURGERY

## 2018-01-30 NOTE — PROGRESS NOTES
"Liz Bagley is a 32 y.o. female in for follow up of IBD (inflammatory bowel disease)  s/p total proctocolectomy with end ileostomy 11/20/2017    She is doing well with ostomy  Just started using the Marl[place], which she likes    She takes a couple tabs of imodium per day    Output consistency is usually thick    She has started back to work  She feels a little tired    \"I feel great.  Way better than before surgery.\"    She is planning for a cruise to the [place] next year    /78 (BP Location: Right arm, Patient Position: Sitting)  Pulse 97  Temp 98.5 °F (36.9 °C)  Ht 160 cm (63\")  Wt 132 kg (290 lb 11.2 oz)  LMP 01/08/2018  SpO2 98%  Breastfeeding? No  BMI 51.5 kg/m2  Body mass index is 51.5 kg/(m^2).      PE:  Physical Exam   Constitutional: She appears well-developed. No distress.   HENT:   Head: Normocephalic and atraumatic.   Abdominal:   -s/nt/nd  -umbilical lap site healed in  -RUQ ostomy pink with thick brown output in bag   Musculoskeletal: Normal range of motion.   Neurological: She is alert.   Psychiatric: Thought content normal.         Assessment:   1. IBD (inflammatory bowel disease)     s/p total proctocolectomy with end ileostomy 11/20/2017    Plan:    Doing well postop; ostomy functioning properly.    Call or come in if any issues with ostomy      RTC 6 months      Scribed for Colin Evans MD by Marisol Buitrago PA-C 1/30/2018  This patient was evaluated by me, recommendations made, documentation reviewed, edited, and revised by me, Colin Evans MD        "

## 2018-02-28 RX ORDER — DULOXETIN HYDROCHLORIDE 60 MG/1
CAPSULE, DELAYED RELEASE ORAL
Qty: 30 CAPSULE | Refills: 5 | Status: SHIPPED | OUTPATIENT
Start: 2018-02-28 | End: 2018-06-27 | Stop reason: SDUPTHER

## 2018-04-02 ENCOUNTER — OFFICE VISIT (OUTPATIENT)
Dept: INTERNAL MEDICINE | Facility: CLINIC | Age: 33
End: 2018-04-02

## 2018-04-02 VITALS
TEMPERATURE: 97.4 F | BODY MASS INDEX: 51.77 KG/M2 | RESPIRATION RATE: 16 BRPM | HEIGHT: 63 IN | OXYGEN SATURATION: 97 % | DIASTOLIC BLOOD PRESSURE: 80 MMHG | HEART RATE: 72 BPM | SYSTOLIC BLOOD PRESSURE: 120 MMHG | WEIGHT: 292.2 LBS

## 2018-04-02 DIAGNOSIS — K51.919 ULCERATIVE COLITIS WITH COMPLICATION, UNSPECIFIED LOCATION (HCC): Primary | ICD-10-CM

## 2018-04-02 DIAGNOSIS — J45.909 ASTHMA DUE TO ENVIRONMENTAL ALLERGIES: ICD-10-CM

## 2018-04-02 LAB
BASOPHILS # BLD AUTO: 0.07 10*3/MM3 (ref 0–0.2)
BASOPHILS NFR BLD AUTO: 0.5 % (ref 0–1.5)
EOSINOPHIL # BLD AUTO: 0.44 10*3/MM3 (ref 0–0.7)
EOSINOPHIL NFR BLD AUTO: 3.3 % (ref 0.3–6.2)
ERYTHROCYTE [DISTWIDTH] IN BLOOD BY AUTOMATED COUNT: 16.9 % (ref 11.7–13)
HCT VFR BLD AUTO: 35 % (ref 35.6–45.5)
HGB BLD-MCNC: 10.3 G/DL (ref 11.9–15.5)
IMM GRANULOCYTES # BLD: 0.03 10*3/MM3 (ref 0–0.03)
IMM GRANULOCYTES NFR BLD: 0.2 % (ref 0–0.5)
LYMPHOCYTES # BLD AUTO: 3.34 10*3/MM3 (ref 0.9–4.8)
LYMPHOCYTES NFR BLD AUTO: 24.9 % (ref 19.6–45.3)
MCH RBC QN AUTO: 22.7 PG (ref 26.9–32)
MCHC RBC AUTO-ENTMCNC: 29.4 G/DL (ref 32.4–36.3)
MCV RBC AUTO: 77.1 FL (ref 80.5–98.2)
MONOCYTES # BLD AUTO: 0.6 10*3/MM3 (ref 0.2–1.2)
MONOCYTES NFR BLD AUTO: 4.5 % (ref 5–12)
NEUTROPHILS # BLD AUTO: 8.95 10*3/MM3 (ref 1.9–8.1)
NEUTROPHILS NFR BLD AUTO: 66.6 % (ref 42.7–76)
PLATELET # BLD AUTO: 632 10*3/MM3 (ref 140–500)
RBC # BLD AUTO: 4.54 10*6/MM3 (ref 3.9–5.2)
WBC # BLD AUTO: 13.43 10*3/MM3 (ref 4.5–10.7)

## 2018-04-02 PROCEDURE — 99213 OFFICE O/P EST LOW 20 MIN: CPT | Performed by: NURSE PRACTITIONER

## 2018-04-02 NOTE — PROGRESS NOTES
Subjective   Liz Bagley is a 32 y.o. female.     Chief Complaint   Patient presents with   • Earache     bilateral fullness and pain for the pass few weeks         Mrs. Bagley is a known patient to us here today for evaluation of ear pain.  She described her pain as mild to moderate in nature and bilateral.  She reports this started several days ago and has been accompanied by watering of her eyes as well as nasal congestion.  She does not currently take anything for allergies.  She has not taken anything over-the-counter cold medicines.  In addition she also has an ileostomy.  Her most recent labs showed a white count of 14.  She is set to see her surgeon soon for follow-up visit.  Other than this she has no additional concerns today no aggravating or exacerbating symptoms.  No additional pain or discomfort.      Earache    Associated symptoms include rhinorrhea.            The following portions of the patient's history were reviewed and updated as appropriate: allergies, current medications, past social history and problem list.    Review of Systems   Constitutional: Negative.    HENT: Positive for congestion, ear pain and rhinorrhea.    Eyes: Negative.    Respiratory: Negative.    Cardiovascular: Negative.    Gastrointestinal:        Ileostomy    Endocrine: Negative.    Skin: Negative.    Allergic/Immunologic: Positive for environmental allergies.       Objective   Vitals:    04/02/18 1021   BP: 120/80   Pulse: 72   Resp: 16   Temp: 97.4 °F (36.3 °C)   SpO2: 97%     Physical Exam   Constitutional: She is oriented to person, place, and time. She appears well-developed and well-nourished.   HENT:   Head: Normocephalic.   Right Ear: External ear normal.   Left Ear: External ear normal.   Nose: Nose normal.   Mouth/Throat: Oropharynx is clear and moist.   Eyes: Conjunctivae and EOM are normal. Pupils are equal, round, and reactive to light.   Neck: Normal range of motion.   Cardiovascular: Normal rate, regular  rhythm, normal heart sounds and intact distal pulses.    Pulmonary/Chest: Effort normal and breath sounds normal.   Neurological: She is alert and oriented to person, place, and time. She has normal reflexes.   Skin: Skin is warm and dry. Capillary refill takes less than 2 seconds.   Psychiatric: She has a normal mood and affect. Her behavior is normal. Judgment and thought content normal.   Vitals reviewed.      Assessment/Plan   Problem List Items Addressed This Visit     Asthma due to environmental allergies      Other Visit Diagnoses     Ulcerative colitis with complication, unspecified location    -  Primary    Relevant Orders    CBC w AUTO Differential             We'll initiate conservative measures.  Over-the-counter allergy medicine such as Singulair or Claritin.  Ear symptoms likely related to allergies.  No need for antibiosis at this time.  Patient will follow up with us if symptoms persist or worsen.

## 2018-04-07 ENCOUNTER — RESULTS ENCOUNTER (OUTPATIENT)
Dept: INTERNAL MEDICINE | Facility: CLINIC | Age: 33
End: 2018-04-07

## 2018-04-07 DIAGNOSIS — K51.919 ULCERATIVE COLITIS WITH COMPLICATION, UNSPECIFIED LOCATION (HCC): ICD-10-CM

## 2018-05-01 RX ORDER — ZOLPIDEM TARTRATE 10 MG/1
TABLET ORAL
Qty: 30 TABLET | Refills: 0 | Status: SHIPPED | OUTPATIENT
Start: 2018-05-01 | End: 2018-05-23

## 2018-05-01 RX ORDER — LISINOPRIL AND HYDROCHLOROTHIAZIDE 25; 20 MG/1; MG/1
TABLET ORAL
Qty: 90 TABLET | Refills: 0 | Status: SHIPPED | OUTPATIENT
Start: 2018-05-01 | End: 2018-05-21

## 2018-05-21 ENCOUNTER — OFFICE VISIT (OUTPATIENT)
Dept: INTERNAL MEDICINE | Facility: CLINIC | Age: 33
End: 2018-05-21

## 2018-05-21 VITALS
WEIGHT: 289 LBS | HEIGHT: 63 IN | TEMPERATURE: 98.6 F | HEART RATE: 100 BPM | DIASTOLIC BLOOD PRESSURE: 86 MMHG | OXYGEN SATURATION: 96 % | BODY MASS INDEX: 51.21 KG/M2 | SYSTOLIC BLOOD PRESSURE: 129 MMHG | RESPIRATION RATE: 16 BRPM

## 2018-05-21 DIAGNOSIS — R53.82 CHRONIC FATIGUE: Primary | ICD-10-CM

## 2018-05-21 DIAGNOSIS — Z93.2 ILEOSTOMY IN PLACE (HCC): ICD-10-CM

## 2018-05-21 DIAGNOSIS — I10 BENIGN ESSENTIAL HYPERTENSION: ICD-10-CM

## 2018-05-21 DIAGNOSIS — E66.01 MORBID OBESITY (HCC): ICD-10-CM

## 2018-05-21 DIAGNOSIS — E55.9 HYPOVITAMINOSIS D: ICD-10-CM

## 2018-05-21 PROCEDURE — 99214 OFFICE O/P EST MOD 30 MIN: CPT | Performed by: INTERNAL MEDICINE

## 2018-05-21 RX ORDER — DILTIAZEM HYDROCHLORIDE 240 MG/1
240 CAPSULE, COATED, EXTENDED RELEASE ORAL DAILY
Qty: 90 CAPSULE | Refills: 1 | Status: SHIPPED | OUTPATIENT
Start: 2018-05-21 | End: 2018-07-24

## 2018-05-22 LAB
25(OH)D3+25(OH)D2 SERPL-MCNC: 11.3 NG/ML (ref 30–100)
ALBUMIN SERPL-MCNC: 3.9 G/DL (ref 3.5–5.2)
ALBUMIN/GLOB SERPL: 1.1 G/DL
ALP SERPL-CCNC: 85 U/L (ref 39–117)
ALT SERPL-CCNC: 11 U/L (ref 1–33)
APPEARANCE UR: CLEAR
AST SERPL-CCNC: 5 U/L (ref 1–32)
BACTERIA #/AREA URNS HPF: ABNORMAL /HPF
BASOPHILS # BLD AUTO: 0.05 10*3/MM3 (ref 0–0.2)
BASOPHILS NFR BLD AUTO: 0.4 % (ref 0–1.5)
BILIRUB SERPL-MCNC: <0.2 MG/DL (ref 0.1–1.2)
BILIRUB UR QL STRIP: NEGATIVE
BUN SERPL-MCNC: 8 MG/DL (ref 6–20)
BUN/CREAT SERPL: 16 (ref 7–25)
CALCIUM SERPL-MCNC: 9.7 MG/DL (ref 8.6–10.5)
CASTS URNS MICRO: ABNORMAL
CHLORIDE SERPL-SCNC: 101 MMOL/L (ref 98–107)
CO2 SERPL-SCNC: 25.1 MMOL/L (ref 22–29)
COLOR UR: YELLOW
CREAT SERPL-MCNC: 0.5 MG/DL (ref 0.57–1)
EOSINOPHIL # BLD AUTO: 0.24 10*3/MM3 (ref 0–0.7)
EOSINOPHIL NFR BLD AUTO: 1.7 % (ref 0.3–6.2)
EPI CELLS #/AREA URNS HPF: ABNORMAL /HPF
ERYTHROCYTE [DISTWIDTH] IN BLOOD BY AUTOMATED COUNT: 16.6 % (ref 11.7–13)
FOLATE SERPL-MCNC: 11.16 NG/ML (ref 4.78–24.2)
GFR SERPLBLD CREATININE-BSD FMLA CKD-EPI: 141 ML/MIN/1.73
GFR SERPLBLD CREATININE-BSD FMLA CKD-EPI: >150 ML/MIN/1.73
GLOBULIN SER CALC-MCNC: 3.7 GM/DL
GLUCOSE SERPL-MCNC: 88 MG/DL (ref 65–99)
GLUCOSE UR QL: NEGATIVE
HCT VFR BLD AUTO: 36.3 % (ref 35.6–45.5)
HGB BLD-MCNC: 10.8 G/DL (ref 11.9–15.5)
HGB UR QL STRIP: NEGATIVE
IMM GRANULOCYTES # BLD: 0.02 10*3/MM3 (ref 0–0.03)
IMM GRANULOCYTES NFR BLD: 0.1 % (ref 0–0.5)
KETONES UR QL STRIP: NEGATIVE
LEUKOCYTE ESTERASE UR QL STRIP: (no result)
LYMPHOCYTES # BLD AUTO: 3.51 10*3/MM3 (ref 0.9–4.8)
LYMPHOCYTES NFR BLD AUTO: 24.9 % (ref 19.6–45.3)
MCH RBC QN AUTO: 23.6 PG (ref 26.9–32)
MCHC RBC AUTO-ENTMCNC: 29.8 G/DL (ref 32.4–36.3)
MCV RBC AUTO: 79.3 FL (ref 80.5–98.2)
MONOCYTES # BLD AUTO: 0.72 10*3/MM3 (ref 0.2–1.2)
MONOCYTES NFR BLD AUTO: 5.1 % (ref 5–12)
NEUTROPHILS # BLD AUTO: 9.59 10*3/MM3 (ref 1.9–8.1)
NEUTROPHILS NFR BLD AUTO: 67.9 % (ref 42.7–76)
NITRITE UR QL STRIP: NEGATIVE
PH UR STRIP: 6.5 [PH] (ref 5–8)
PLATELET # BLD AUTO: 608 10*3/MM3 (ref 140–500)
POTASSIUM SERPL-SCNC: 4.6 MMOL/L (ref 3.5–5.2)
PROT SERPL-MCNC: 7.6 G/DL (ref 6–8.5)
PROT UR QL STRIP: NEGATIVE
RBC # BLD AUTO: 4.58 10*6/MM3 (ref 3.9–5.2)
RBC #/AREA URNS HPF: ABNORMAL /HPF
SODIUM SERPL-SCNC: 140 MMOL/L (ref 136–145)
SP GR UR: 1.01 (ref 1–1.03)
T4 FREE SERPL-MCNC: 1 NG/DL (ref 0.93–1.7)
TSH SERPL DL<=0.005 MIU/L-ACNC: 1.63 MIU/ML (ref 0.27–4.2)
UROBILINOGEN UR STRIP-MCNC: (no result) MG/DL
VIT B12 SERPL-MCNC: 363 PG/ML (ref 211–946)
WBC # BLD AUTO: 14.11 10*3/MM3 (ref 4.5–10.7)
WBC #/AREA URNS HPF: ABNORMAL /HPF

## 2018-05-23 RX ORDER — ZOLPIDEM TARTRATE 10 MG/1
10 TABLET ORAL NIGHTLY PRN
Qty: 30 TABLET | Refills: 2 | Status: CANCELLED | OUTPATIENT
Start: 2018-05-23

## 2018-05-23 RX ORDER — ZOLPIDEM TARTRATE 10 MG/1
10 TABLET ORAL NIGHTLY PRN
Qty: 30 TABLET | Refills: 2 | Status: SHIPPED | OUTPATIENT
Start: 2018-05-23 | End: 2018-08-28

## 2018-05-29 ENCOUNTER — OFFICE VISIT (OUTPATIENT)
Dept: INTERNAL MEDICINE | Facility: CLINIC | Age: 33
End: 2018-05-29

## 2018-05-29 VITALS
TEMPERATURE: 98.2 F | SYSTOLIC BLOOD PRESSURE: 137 MMHG | WEIGHT: 286.8 LBS | DIASTOLIC BLOOD PRESSURE: 86 MMHG | OXYGEN SATURATION: 95 % | HEART RATE: 89 BPM | HEIGHT: 63 IN | BODY MASS INDEX: 50.82 KG/M2

## 2018-05-29 DIAGNOSIS — Z79.899 CONTROLLED SUBSTANCE AGREEMENT SIGNED: ICD-10-CM

## 2018-05-29 DIAGNOSIS — K90.89 OTHER SPECIFIED INTESTINAL MALABSORPTION: ICD-10-CM

## 2018-05-29 DIAGNOSIS — E66.01 MORBID OBESITY (HCC): ICD-10-CM

## 2018-05-29 DIAGNOSIS — Z93.2 ILEOSTOMY IN PLACE (HCC): ICD-10-CM

## 2018-05-29 DIAGNOSIS — I10 BENIGN ESSENTIAL HYPERTENSION: Primary | ICD-10-CM

## 2018-05-29 DIAGNOSIS — E55.9 HYPOVITAMINOSIS D: ICD-10-CM

## 2018-05-29 DIAGNOSIS — R53.82 CHRONIC FATIGUE: ICD-10-CM

## 2018-05-29 DIAGNOSIS — E53.8 B12 DEFICIENCY: ICD-10-CM

## 2018-05-29 PROBLEM — K90.9 INTESTINAL MALABSORPTION: Status: ACTIVE | Noted: 2018-05-29

## 2018-05-29 PROBLEM — Z79.52 CURRENT CHRONIC USE OF SYSTEMIC STEROIDS: Status: RESOLVED | Noted: 2017-10-10 | Resolved: 2018-05-29

## 2018-05-29 PROCEDURE — 99214 OFFICE O/P EST MOD 30 MIN: CPT | Performed by: INTERNAL MEDICINE

## 2018-05-29 PROCEDURE — 96372 THER/PROPH/DIAG INJ SC/IM: CPT | Performed by: INTERNAL MEDICINE

## 2018-05-29 RX ORDER — ERGOCALCIFEROL 1.25 MG/1
50000 CAPSULE ORAL
Qty: 24 CAPSULE | Refills: 0 | Status: SHIPPED | OUTPATIENT
Start: 2018-05-29 | End: 2018-11-13 | Stop reason: SDUPTHER

## 2018-05-29 RX ORDER — PHENTERMINE HYDROCHLORIDE 30 MG/1
30 CAPSULE ORAL EVERY MORNING
Qty: 30 CAPSULE | Refills: 0 | Status: SHIPPED | OUTPATIENT
Start: 2018-05-29 | End: 2018-06-27 | Stop reason: SDUPTHER

## 2018-05-29 RX ORDER — CYANOCOBALAMIN 1000 UG/ML
1000 INJECTION, SOLUTION INTRAMUSCULAR; SUBCUTANEOUS
Status: DISCONTINUED | OUTPATIENT
Start: 2018-05-29 | End: 2019-01-30

## 2018-05-29 RX ADMIN — CYANOCOBALAMIN 1000 MCG: 1000 INJECTION, SOLUTION INTRAMUSCULAR; SUBCUTANEOUS at 13:09

## 2018-06-04 NOTE — PROGRESS NOTES
Subjective   Liz Bagley is a 32 y.o. female.   She is here today for chronic fatigue hypertension hypovitaminosis D ileostomy in place and morbid obesity and I want to get her heart rate down before we treat her for her obesity  History of Present Illness   She is here today for chronic fatigue which is ongoing about the same hypertension which is well-controlled on current medication hypovitaminosis D which has been stable on supplementation ileostomy in place which is new and stable and morbid obesity which is getting worse since earlier may and about the same as previously  The following portions of the patient's history were reviewed and updated as appropriate: allergies, current medications, past family history, past medical history, past social history, past surgical history and problem list.    Review of Systems   Constitutional: Positive for fatigue.   All other systems reviewed and are negative.      Objective   Physical Exam   Constitutional: She is oriented to person, place, and time. She appears well-developed and well-nourished. She is cooperative.   HENT:   Head: Normocephalic and atraumatic.   Right Ear: Hearing, tympanic membrane, external ear and ear canal normal.   Left Ear: Hearing, tympanic membrane, external ear and ear canal normal.   Nose: Nose normal.   Mouth/Throat: Uvula is midline, oropharynx is clear and moist and mucous membranes are normal.   Eyes: Conjunctivae, EOM and lids are normal. Pupils are equal, round, and reactive to light.   Neck: Phonation normal. Neck supple. Carotid bruit is not present.   Cardiovascular: Normal rate, regular rhythm and normal heart sounds.  Exam reveals no gallop and no friction rub.    No murmur heard.  Pulmonary/Chest: Effort normal and breath sounds normal. No respiratory distress.   Abdominal: Soft. Bowel sounds are normal. She exhibits no distension and no mass. There is no hepatosplenomegaly. There is no tenderness. There is no rebound and no  guarding. No hernia.   Musculoskeletal: She exhibits no edema.   Neurological: She is alert and oriented to person, place, and time. Coordination and gait normal.   Skin: Skin is warm and dry.   Psychiatric: She has a normal mood and affect. Her speech is normal and behavior is normal. Judgment and thought content normal.   Nursing note and vitals reviewed.      Assessment/Plan   Diagnoses and all orders for this visit:    Chronic fatigue  -     CBC & Differential  -     Comprehensive Metabolic Panel  -     T4, Free  -     TSH  -     Urinalysis With Microscopic - Urine, Clean Catch  -     Vitamin D 25 Hydroxy  -     Vitamin B12  -     Folate    Benign essential hypertension    Hypovitaminosis D  -     Vitamin D 25 Hydroxy    Ileostomy in place    Morbid obesity    Other orders  -     diltiaZEM CD (CARDIZEM CD) 240 MG 24 hr capsule; Take 1 capsule by mouth Daily.  -     Microscopic Examination      Chronic fatigue noted and we will check multiple labs based on this and her fatigue is about the same  Hypertension well-controlled on current medication but we want to get her heart rate down for phentermine and we will provide her with Cardizem CD  Ileostomy in place stable at this time  Hypovitaminosis D has been stable and we will check him in D level  Morbid obesity getting worse overall not better and we will work on this once we get her heart rate under control

## 2018-06-14 NOTE — PROGRESS NOTES
Subjective   Liz Bagley is a 32 y.o. female.   She is here to follow-up for hypertension chronic fatigue controlled substance agreement signed today for phentermine hypovitaminosis D ileostomy in place intestinal malabsorption morbid obesity and B12 deficiency  History of Present Illness   She is here to follow-up for hypertension which is well-controlled on current medication chronic fatigue which is ongoing problem but the center was as was before controlled substance agreement signed today for as well as hypovitaminosis D which has been stable on supplementation we will check today and ileostomy in place which is stable and intestinal malabsorption which is noted from her ileostomy and morbid obesity which is getting worse not better and B12 deficiency which has been low as well and we will supplement today  The following portions of the patient's history were reviewed and updated as appropriate: allergies, current medications, past family history, past medical history, past social history, past surgical history and problem list.    Review of Systems   Constitutional: Positive for fatigue.   Gastrointestinal:        Ileostomy in place with intestinal malabsorption   All other systems reviewed and are negative.      Objective   Physical Exam   Constitutional: She is oriented to person, place, and time. She appears well-developed and well-nourished. She is cooperative.   HENT:   Head: Normocephalic and atraumatic.   Right Ear: Hearing, tympanic membrane, external ear and ear canal normal.   Left Ear: Hearing, tympanic membrane, external ear and ear canal normal.   Nose: Nose normal.   Mouth/Throat: Uvula is midline, oropharynx is clear and moist and mucous membranes are normal.   Eyes: Conjunctivae, EOM and lids are normal. Pupils are equal, round, and reactive to light.   Neck: Phonation normal. Neck supple. Carotid bruit is not present.   Cardiovascular: Normal rate, regular rhythm and normal heart sounds.   Exam reveals no gallop and no friction rub.    No murmur heard.  Pulmonary/Chest: Effort normal and breath sounds normal. No respiratory distress.   Abdominal: Soft. Bowel sounds are normal. She exhibits no distension and no mass. There is no hepatosplenomegaly. There is no tenderness. There is no rebound and no guarding. No hernia.   Musculoskeletal: She exhibits no edema.   Neurological: She is alert and oriented to person, place, and time. Coordination and gait normal.   Skin: Skin is warm and dry.   Psychiatric: She has a normal mood and affect. Her speech is normal and behavior is normal. Judgment and thought content normal.   Nursing note and vitals reviewed.      Assessment/Plan   Diagnoses and all orders for this visit:    Benign essential hypertension    Chronic fatigue    Controlled substance agreement signed    Hypovitaminosis D    Ileostomy in place    Other specified intestinal malabsorption    Morbid obesity    B12 deficiency  -     cyanocobalamin injection 1,000 mcg; Inject 1 mL into the shoulder, thigh, or buttocks Every 28 (Twenty-Eight) Days.    Other orders  -     phentermine 30 MG capsule; Take 1 capsule by mouth Every Morning.  -     vitamin D (ERGOCALCIFEROL) 71067 units capsule capsule; Take 1 capsule by mouth Every 7 (Seven) Days.        Hypertension well-controlled on current medication no changes needed but weight loss will be very beneficial and she will come off meds as she loses weight  Chronic fatigue this is noted and we will provide her B12 injection today and check vitamin D level as well as this is not stable  Controlled substance agreement signed today for phentermine for weight loss  Hypovitaminosis D has been stable supplementation and we will check vitamin D level today  Ileostomy in place stable at this time  Intestinal malabsorption noted from her surgery loss of GI absorption and we will check vitamin D level for that as well  Morbid obesity getting worse not better we will  get her on phentermine now  B12 deficiency noted and unstable and we will provide her with B12 injection today

## 2018-06-27 ENCOUNTER — OFFICE VISIT (OUTPATIENT)
Dept: INTERNAL MEDICINE | Facility: CLINIC | Age: 33
End: 2018-06-27

## 2018-06-27 VITALS
BODY MASS INDEX: 49.26 KG/M2 | OXYGEN SATURATION: 97 % | DIASTOLIC BLOOD PRESSURE: 83 MMHG | HEART RATE: 107 BPM | HEIGHT: 63 IN | WEIGHT: 278 LBS | TEMPERATURE: 97.8 F | SYSTOLIC BLOOD PRESSURE: 130 MMHG

## 2018-06-27 DIAGNOSIS — E66.01 MORBID OBESITY (HCC): ICD-10-CM

## 2018-06-27 DIAGNOSIS — I10 BENIGN ESSENTIAL HYPERTENSION: Primary | ICD-10-CM

## 2018-06-27 PROCEDURE — 99212 OFFICE O/P EST SF 10 MIN: CPT | Performed by: INTERNAL MEDICINE

## 2018-06-27 PROCEDURE — 96372 THER/PROPH/DIAG INJ SC/IM: CPT | Performed by: INTERNAL MEDICINE

## 2018-06-27 RX ORDER — METOPROLOL SUCCINATE 50 MG/1
50 TABLET, EXTENDED RELEASE ORAL DAILY
Qty: 90 TABLET | Refills: 1 | Status: SHIPPED | OUTPATIENT
Start: 2018-06-27 | End: 2018-06-27 | Stop reason: SDUPTHER

## 2018-06-27 RX ORDER — PHENTERMINE HYDROCHLORIDE 30 MG/1
30 CAPSULE ORAL EVERY MORNING
Qty: 30 CAPSULE | Refills: 0 | Status: SHIPPED | OUTPATIENT
Start: 2018-06-27 | End: 2018-07-24 | Stop reason: SDUPTHER

## 2018-06-27 RX ORDER — DULOXETIN HYDROCHLORIDE 60 MG/1
CAPSULE, DELAYED RELEASE ORAL
Qty: 180 CAPSULE | Refills: 1 | Status: SHIPPED | OUTPATIENT
Start: 2018-06-27 | End: 2018-06-27 | Stop reason: SDUPTHER

## 2018-06-27 RX ORDER — METOPROLOL SUCCINATE 50 MG/1
50 TABLET, EXTENDED RELEASE ORAL DAILY
Qty: 90 TABLET | Refills: 1 | Status: SHIPPED | OUTPATIENT
Start: 2018-06-27 | End: 2018-11-02

## 2018-06-27 RX ORDER — DULOXETIN HYDROCHLORIDE 60 MG/1
120 CAPSULE, DELAYED RELEASE ORAL
Qty: 180 CAPSULE | Refills: 1 | Status: SHIPPED | OUTPATIENT
Start: 2018-06-27 | End: 2018-07-24 | Stop reason: SDUPTHER

## 2018-06-27 RX ADMIN — CYANOCOBALAMIN 1000 MCG: 1000 INJECTION, SOLUTION INTRAMUSCULAR; SUBCUTANEOUS at 12:26

## 2018-07-08 NOTE — PROGRESS NOTES
Subjective   Liz Bagley is a 32 y.o. female.   She is here today for hypertension which is improving since last visit and morbid obesity which is improving as well  History of Present Illness   She is here for follow-up for hypertension which is improving with weight loss and morbid obesity which she has lost 8 pounds in the last month which is fantastic and phentermine as well as helping with no adverse side effects at this time overall  The following portions of the patient's history were reviewed and updated as appropriate: allergies, current medications, past family history, past medical history, past social history, past surgical history and problem list.    Review of Systems   All other systems reviewed and are negative.      Objective   Physical Exam   Constitutional: She is oriented to person, place, and time. She appears well-developed and well-nourished. She is cooperative.   HENT:   Head: Normocephalic and atraumatic.   Right Ear: Hearing, tympanic membrane, external ear and ear canal normal.   Left Ear: Hearing, tympanic membrane, external ear and ear canal normal.   Nose: Nose normal.   Mouth/Throat: Uvula is midline and mucous membranes are normal.   Eyes: EOM and lids are normal.   Neck: Phonation normal. Neck supple. Carotid bruit is not present.   Cardiovascular: Normal rate, regular rhythm and normal heart sounds.  Exam reveals no gallop and no friction rub.    No murmur heard.  Pulmonary/Chest: Effort normal and breath sounds normal. No respiratory distress.   Abdominal: Soft. Bowel sounds are normal. She exhibits no distension and no mass. There is no hepatosplenomegaly. There is no tenderness. There is no rebound and no guarding. No hernia.   Musculoskeletal: She exhibits no edema.   Neurological: She is alert and oriented to person, place, and time. Coordination and gait normal.   Skin: Skin is warm and dry.   Psychiatric: She has a normal mood and affect. Her speech is normal and behavior  is normal. Judgment and thought content normal.   Nursing note and vitals reviewed.      Assessment/Plan   Diagnoses and all orders for this visit:    Benign essential hypertension    Morbid obesity (CMS/HCC)    Other orders  -     Discontinue: DULoxetine (CYMBALTA) 60 MG capsule; Take 2 capsules at bedtime daily  -     Discontinue: metoprolol succinate XL (TOPROL-XL) 50 MG 24 hr tablet; Take 1 tablet by mouth Daily.  -     phentermine 30 MG capsule; Take 1 capsule by mouth Every Morning.      Hypertension improving with weight loss we will continue current medications  Morbid obesity improving with a weight loss on phentermine and we will continue this as well on same dose

## 2018-07-24 ENCOUNTER — OFFICE VISIT (OUTPATIENT)
Dept: SURGERY | Facility: CLINIC | Age: 33
End: 2018-07-24

## 2018-07-24 ENCOUNTER — CLINICAL SUPPORT (OUTPATIENT)
Dept: INTERNAL MEDICINE | Facility: CLINIC | Age: 33
End: 2018-07-24

## 2018-07-24 VITALS
TEMPERATURE: 97.9 F | SYSTOLIC BLOOD PRESSURE: 130 MMHG | OXYGEN SATURATION: 98 % | RESPIRATION RATE: 19 BRPM | WEIGHT: 263 LBS | HEIGHT: 63 IN | DIASTOLIC BLOOD PRESSURE: 80 MMHG | HEART RATE: 99 BPM | BODY MASS INDEX: 46.6 KG/M2

## 2018-07-24 VITALS
OXYGEN SATURATION: 97 % | BODY MASS INDEX: 48.09 KG/M2 | HEART RATE: 89 BPM | SYSTOLIC BLOOD PRESSURE: 141 MMHG | WEIGHT: 271.4 LBS | DIASTOLIC BLOOD PRESSURE: 95 MMHG | HEIGHT: 63 IN

## 2018-07-24 DIAGNOSIS — D72.829 LEUKOCYTOSIS, UNSPECIFIED TYPE: ICD-10-CM

## 2018-07-24 DIAGNOSIS — K52.9 IBD (INFLAMMATORY BOWEL DISEASE): Primary | ICD-10-CM

## 2018-07-24 PROCEDURE — 96372 THER/PROPH/DIAG INJ SC/IM: CPT | Performed by: INTERNAL MEDICINE

## 2018-07-24 PROCEDURE — 99213 OFFICE O/P EST LOW 20 MIN: CPT | Performed by: COLON & RECTAL SURGERY

## 2018-07-24 RX ORDER — DILTIAZEM HYDROCHLORIDE 360 MG/1
360 CAPSULE, EXTENDED RELEASE ORAL DAILY
Qty: 90 CAPSULE | Refills: 1 | Status: SHIPPED | OUTPATIENT
Start: 2018-07-24 | End: 2018-11-02

## 2018-07-24 RX ORDER — PHENTERMINE HYDROCHLORIDE 30 MG/1
30 CAPSULE ORAL EVERY MORNING
Qty: 30 CAPSULE | Refills: 0 | Status: SHIPPED | OUTPATIENT
Start: 2018-07-24 | End: 2018-08-28

## 2018-07-24 RX ADMIN — CYANOCOBALAMIN 1000 MCG: 1000 INJECTION, SOLUTION INTRAMUSCULAR; SUBCUTANEOUS at 09:58

## 2018-07-24 NOTE — PROGRESS NOTES
"Liz Bagley is a 32 y.o. female in for follow up of IBD (inflammatory bowel disease)    Leukocytosis, unspecified type  s/p laparoscopic total proctocolectomy with end ileostomy 11/20/2017    Pt states she is doing well with ileostomy    Output consistency sometimes runny since starting phentermine per PCP  Other times, consistency is normal    She is emptying 5-6 times per day  She is not taking any imodium or fiber supplements    She has small amount anal leakage occasionally: like mucus    She is concerned about high white count    She is thinking about going to [employer]    /80   Pulse 99   Temp 97.9 °F (36.6 °C)   Resp 19   Ht 160 cm (63\")   Wt 119 kg (263 lb)   SpO2 98%   BMI 46.59 kg/m²   Body mass index is 46.59 kg/m².      PE:  Physical Exam   Constitutional: She appears well-developed. No distress.   HENT:   Head: Normocephalic and atraumatic.   Abdominal:   -s/nt/nd  -R midline stoma pink with applesauce-consistency output in bag   Musculoskeletal: Normal range of motion.   Neurological: She is alert.   Psychiatric: Thought content normal.     Pt with unofficial lab results from a few days ago: notable for WBC 16. Plt 564    Assessment:   1. IBD (inflammatory bowel disease)    2. Leukocytosis, unspecified type     s/p lap total proctocolectomy with end ileostomy 11/20/2017    Plan:    -she has healed well postop.  Doing well with ileostomy  -recommended she take imodium prn loose output  -I recommend fiber therapy and detailed and gave written instructions on how to achieve a high fiber diet.  -discussion with patient regarding labs:referral to hematology for further workup for chronic leukocytosis    Call or come in if the rectal drainage gets worse, or if any questions or concerns    RTC 1 year      Scribed for Colin Evans MD by Marisol Buitrago PA-C 7/24/2018  This patient was evaluated by me, recommendations made, documentation reviewed, edited, and revised by me, Colin SRIVASTAVA. " MD Nathan

## 2018-08-14 ENCOUNTER — APPOINTMENT (OUTPATIENT)
Dept: LAB | Facility: HOSPITAL | Age: 33
End: 2018-08-14

## 2018-08-14 ENCOUNTER — APPOINTMENT (OUTPATIENT)
Dept: ONCOLOGY | Facility: CLINIC | Age: 33
End: 2018-08-14

## 2018-08-21 ENCOUNTER — OFFICE VISIT (OUTPATIENT)
Dept: SURGERY | Facility: CLINIC | Age: 33
End: 2018-08-21

## 2018-08-21 VITALS
BODY MASS INDEX: 47.71 KG/M2 | HEART RATE: 89 BPM | HEIGHT: 63 IN | SYSTOLIC BLOOD PRESSURE: 150 MMHG | TEMPERATURE: 98 F | WEIGHT: 269.3 LBS | OXYGEN SATURATION: 98 % | DIASTOLIC BLOOD PRESSURE: 88 MMHG

## 2018-08-21 DIAGNOSIS — R10.31 RIGHT LOWER QUADRANT ABDOMINAL PAIN: Primary | ICD-10-CM

## 2018-08-21 PROCEDURE — 99212 OFFICE O/P EST SF 10 MIN: CPT | Performed by: COLON & RECTAL SURGERY

## 2018-08-21 RX ORDER — BUPROPION HYDROCHLORIDE 150 MG/1
150 TABLET ORAL DAILY
Qty: 90 TABLET | Refills: 2 | Status: SHIPPED | OUTPATIENT
Start: 2018-08-21 | End: 2019-02-28

## 2018-08-21 NOTE — PROGRESS NOTES
"Liz Bagley is a 32 y.o. female in for follow up of Right lower quadrant abdominal pain  Moving and packing   Having pain above stoma  Not feeling a bulge   Started when starting packing  Worse when in motion    /88 (BP Location: Left arm, Patient Position: Sitting, Cuff Size: Adult)   Pulse 89   Temp 98 °F (36.7 °C) (Oral)   Ht 160 cm (63\")   Wt 122 kg (269 lb 4.8 oz)   SpO2 98%   Breastfeeding? No   BMI 47.70 kg/m²   Body mass index is 47.7 kg/m².      PE:  Physical Exam   Constitutional: She appears well-developed. No distress.   HENT:   Head: Normocephalic and atraumatic.   Abdominal: Soft. She exhibits no distension.   No parastomal hernia  Ostomy rlq ppp    Midline  No hernia palpated   Musculoskeletal: Normal range of motion.   Neurological: She is alert.   Psychiatric: Thought content normal.         Assessment:   1. Right lower quadrant abdominal pain         Plan:    Ct to eval for hernia      "

## 2018-08-23 ENCOUNTER — HOSPITAL ENCOUNTER (OUTPATIENT)
Dept: CT IMAGING | Facility: HOSPITAL | Age: 33
Discharge: HOME | End: 2018-08-23
Attending: COLON & RECTAL SURGERY | Admitting: COLON & RECTAL SURGERY

## 2018-08-23 DIAGNOSIS — R10.31 RIGHT LOWER QUADRANT ABDOMINAL PAIN: ICD-10-CM

## 2018-08-23 LAB — CREAT BLDA-MCNC: 0.5 MG/DL (ref 0.6–1.3)

## 2018-08-23 PROCEDURE — 25010000002 IOPAMIDOL 61 % SOLUTION: Performed by: COLON & RECTAL SURGERY

## 2018-08-23 PROCEDURE — 82565 ASSAY OF CREATININE: CPT

## 2018-08-23 PROCEDURE — 74177 CT ABD & PELVIS W/CONTRAST: CPT

## 2018-08-23 RX ADMIN — IOPAMIDOL 85 ML: 612 INJECTION, SOLUTION INTRAVENOUS at 15:25

## 2018-08-27 NOTE — PROGRESS NOTES
Pt aware, wants to know if there is anything she should do for cyst. Also wants to know if there is anything she can do for muscle strain.

## 2018-08-28 ENCOUNTER — CLINICAL SUPPORT (OUTPATIENT)
Dept: INTERNAL MEDICINE | Facility: CLINIC | Age: 33
End: 2018-08-28

## 2018-08-28 PROCEDURE — 96372 THER/PROPH/DIAG INJ SC/IM: CPT | Performed by: INTERNAL MEDICINE

## 2018-08-28 RX ORDER — ZOLPIDEM TARTRATE 10 MG/1
10 TABLET ORAL NIGHTLY PRN
Qty: 30 TABLET | Refills: 2 | Status: SHIPPED | OUTPATIENT
Start: 2018-08-28 | End: 2018-11-29

## 2018-08-28 RX ADMIN — CYANOCOBALAMIN 1000 MCG: 1000 INJECTION, SOLUTION INTRAMUSCULAR; SUBCUTANEOUS at 11:32

## 2018-09-26 ENCOUNTER — APPOINTMENT (OUTPATIENT)
Dept: LAB | Facility: HOSPITAL | Age: 33
End: 2018-09-26

## 2018-09-26 ENCOUNTER — APPOINTMENT (OUTPATIENT)
Dept: ONCOLOGY | Facility: CLINIC | Age: 33
End: 2018-09-26

## 2018-09-27 RX ORDER — DULOXETIN HYDROCHLORIDE 60 MG/1
60 CAPSULE, DELAYED RELEASE ORAL DAILY
Qty: 30 CAPSULE | Refills: 3 | Status: SHIPPED | OUTPATIENT
Start: 2018-09-27 | End: 2019-02-28

## 2018-09-28 RX ORDER — DULOXETIN HYDROCHLORIDE 60 MG/1
120 CAPSULE, DELAYED RELEASE ORAL
Qty: 180 CAPSULE | Refills: 1 | Status: SHIPPED | OUTPATIENT
Start: 2018-09-28 | End: 2019-04-11 | Stop reason: SDUPTHER

## 2018-11-02 ENCOUNTER — OFFICE VISIT (OUTPATIENT)
Dept: INTERNAL MEDICINE | Facility: CLINIC | Age: 33
End: 2018-11-02

## 2018-11-02 VITALS
DIASTOLIC BLOOD PRESSURE: 82 MMHG | TEMPERATURE: 99.7 F | HEART RATE: 103 BPM | BODY MASS INDEX: 48.37 KG/M2 | HEIGHT: 63 IN | WEIGHT: 273 LBS | SYSTOLIC BLOOD PRESSURE: 121 MMHG | OXYGEN SATURATION: 97 %

## 2018-11-02 DIAGNOSIS — E66.01 MORBID OBESITY (HCC): ICD-10-CM

## 2018-11-02 DIAGNOSIS — I10 BENIGN ESSENTIAL HYPERTENSION: Primary | ICD-10-CM

## 2018-11-02 PROCEDURE — 99213 OFFICE O/P EST LOW 20 MIN: CPT | Performed by: INTERNAL MEDICINE

## 2018-11-02 RX ORDER — LABETALOL 200 MG/1
200 TABLET, FILM COATED ORAL EVERY 12 HOURS SCHEDULED
Qty: 180 TABLET | Refills: 1 | Status: SHIPPED | OUTPATIENT
Start: 2018-11-02 | End: 2019-05-16 | Stop reason: SDUPTHER

## 2018-11-08 ENCOUNTER — APPOINTMENT (OUTPATIENT)
Dept: LAB | Facility: HOSPITAL | Age: 33
End: 2018-11-08

## 2018-11-08 ENCOUNTER — CONSULT (OUTPATIENT)
Dept: ONCOLOGY | Facility: CLINIC | Age: 33
End: 2018-11-08

## 2018-11-08 VITALS
BODY MASS INDEX: 45.68 KG/M2 | SYSTOLIC BLOOD PRESSURE: 118 MMHG | HEART RATE: 82 BPM | TEMPERATURE: 98.4 F | RESPIRATION RATE: 16 BRPM | OXYGEN SATURATION: 95 % | HEIGHT: 65 IN | DIASTOLIC BLOOD PRESSURE: 72 MMHG | WEIGHT: 274.2 LBS

## 2018-11-08 DIAGNOSIS — K90.89 POOR IRON ABSORPTION: ICD-10-CM

## 2018-11-08 DIAGNOSIS — D50.9 IRON DEFICIENCY ANEMIA, UNSPECIFIED IRON DEFICIENCY ANEMIA TYPE: Primary | ICD-10-CM

## 2018-11-08 DIAGNOSIS — D64.9 ANEMIA, UNSPECIFIED TYPE: Primary | ICD-10-CM

## 2018-11-08 DIAGNOSIS — D50.9 IRON DEFICIENCY ANEMIA, UNSPECIFIED IRON DEFICIENCY ANEMIA TYPE: ICD-10-CM

## 2018-11-08 DIAGNOSIS — R53.82 CHRONIC FATIGUE: ICD-10-CM

## 2018-11-08 DIAGNOSIS — D72.829 LEUKOCYTOSIS, UNSPECIFIED TYPE: Primary | ICD-10-CM

## 2018-11-08 DIAGNOSIS — D72.829 LEUKOCYTOSIS, UNSPECIFIED TYPE: ICD-10-CM

## 2018-11-08 LAB
ALBUMIN SERPL-MCNC: 3.9 G/DL (ref 3.5–5.2)
ALBUMIN/GLOB SERPL: 1.1 G/DL (ref 1.1–2.4)
ALP SERPL-CCNC: 87 U/L (ref 38–116)
ALT SERPL W P-5'-P-CCNC: 18 U/L (ref 0–33)
ANION GAP SERPL CALCULATED.3IONS-SCNC: 10.8 MMOL/L
AST SERPL-CCNC: 16 U/L (ref 0–32)
BASOPHILS # BLD AUTO: 0.08 10*3/MM3 (ref 0–0.1)
BASOPHILS NFR BLD AUTO: 0.6 % (ref 0–1.1)
BILIRUB SERPL-MCNC: <0.2 MG/DL (ref 0.1–1.2)
BUN BLD-MCNC: 13 MG/DL (ref 6–20)
BUN/CREAT SERPL: 26 (ref 7.3–30)
CALCIUM SPEC-SCNC: 9 MG/DL (ref 8.5–10.2)
CHLORIDE SERPL-SCNC: 102 MMOL/L (ref 98–107)
CHROMATIN AB SERPL-ACNC: <10 IU/ML (ref 0–14)
CO2 SERPL-SCNC: 25.2 MMOL/L (ref 22–29)
CREAT BLD-MCNC: 0.5 MG/DL (ref 0.6–1.1)
CRP SERPL-MCNC: 1.18 MG/DL (ref 0–0.5)
DEPRECATED RDW RBC AUTO: 41.7 FL (ref 37–49)
EOSINOPHIL # BLD AUTO: 0.58 10*3/MM3 (ref 0–0.36)
EOSINOPHIL NFR BLD AUTO: 4.6 % (ref 1–5)
ERYTHROCYTE [DISTWIDTH] IN BLOOD BY AUTOMATED COUNT: 15.1 % (ref 11.7–14.5)
ERYTHROCYTE [SEDIMENTATION RATE] IN BLOOD: 18 MM/HR (ref 0–20)
FERRITIN SERPL-MCNC: 5.8 NG/ML (ref 11–207)
FOLATE SERPL-MCNC: 10.45 NG/ML (ref 4.78–24.2)
GFR SERPL CREATININE-BSD FRML MDRD: 140 ML/MIN/1.73
GLOBULIN UR ELPH-MCNC: 3.6 GM/DL (ref 1.8–3.5)
GLUCOSE BLD-MCNC: 91 MG/DL (ref 74–124)
HAPTOGLOB SERPL-MCNC: 177 MG/DL (ref 30–200)
HCT VFR BLD AUTO: 32.4 % (ref 34–45)
HGB BLD-MCNC: 10.2 G/DL (ref 11.5–14.9)
IMM GRANULOCYTES # BLD: 0.08 10*3/MM3 (ref 0–0.03)
IMM GRANULOCYTES NFR BLD: 0.6 % (ref 0–0.5)
IRON 24H UR-MRATE: 16 MCG/DL (ref 37–145)
IRON SATN MFR SERPL: 3 % (ref 14–48)
LDH SERPL-CCNC: 138 U/L (ref 99–259)
LYMPHOCYTES # BLD AUTO: 4.23 10*3/MM3 (ref 1–3.5)
LYMPHOCYTES NFR BLD AUTO: 33.4 % (ref 20–49)
MCH RBC QN AUTO: 24.1 PG (ref 27–33)
MCHC RBC AUTO-ENTMCNC: 31.5 G/DL (ref 32–35)
MCV RBC AUTO: 76.4 FL (ref 83–97)
MONOCYTES # BLD AUTO: 0.72 10*3/MM3 (ref 0.25–0.8)
MONOCYTES NFR BLD AUTO: 5.7 % (ref 4–12)
NEUTROPHILS # BLD AUTO: 6.99 10*3/MM3 (ref 1.5–7)
NEUTROPHILS NFR BLD AUTO: 55.1 % (ref 39–75)
NRBC BLD MANUAL-RTO: 0 /100 WBC (ref 0–0)
PLATELET # BLD AUTO: 497 10*3/MM3 (ref 150–375)
PMV BLD AUTO: 10 FL (ref 8.9–12.1)
POTASSIUM BLD-SCNC: 3.9 MMOL/L (ref 3.5–4.7)
PROT SERPL-MCNC: 7.5 G/DL (ref 6.3–8)
RBC # BLD AUTO: 4.24 10*6/MM3 (ref 3.9–5)
SODIUM BLD-SCNC: 138 MMOL/L (ref 134–145)
TIBC SERPL-MCNC: 582 MCG/DL (ref 249–505)
TRANSFERRIN SERPL-MCNC: 416 MG/DL (ref 200–360)
VIT B12 BLD-MCNC: 425 PG/ML (ref 211–946)
WBC NRBC COR # BLD: 12.68 10*3/MM3 (ref 4–10)

## 2018-11-08 PROCEDURE — 83010 ASSAY OF HAPTOGLOBIN QUANT: CPT | Performed by: INTERNAL MEDICINE

## 2018-11-08 PROCEDURE — 36415 COLL VENOUS BLD VENIPUNCTURE: CPT | Performed by: INTERNAL MEDICINE

## 2018-11-08 PROCEDURE — 83615 LACTATE (LD) (LDH) ENZYME: CPT | Performed by: INTERNAL MEDICINE

## 2018-11-08 PROCEDURE — 36416 COLLJ CAPILLARY BLOOD SPEC: CPT | Performed by: INTERNAL MEDICINE

## 2018-11-08 PROCEDURE — 83540 ASSAY OF IRON: CPT | Performed by: INTERNAL MEDICINE

## 2018-11-08 PROCEDURE — 82607 VITAMIN B-12: CPT | Performed by: INTERNAL MEDICINE

## 2018-11-08 PROCEDURE — 82728 ASSAY OF FERRITIN: CPT | Performed by: INTERNAL MEDICINE

## 2018-11-08 PROCEDURE — 85652 RBC SED RATE AUTOMATED: CPT | Performed by: INTERNAL MEDICINE

## 2018-11-08 PROCEDURE — 99244 OFF/OP CNSLTJ NEW/EST MOD 40: CPT | Performed by: INTERNAL MEDICINE

## 2018-11-08 PROCEDURE — 80053 COMPREHEN METABOLIC PANEL: CPT | Performed by: INTERNAL MEDICINE

## 2018-11-08 PROCEDURE — 82746 ASSAY OF FOLIC ACID SERUM: CPT | Performed by: INTERNAL MEDICINE

## 2018-11-08 PROCEDURE — 86431 RHEUMATOID FACTOR QUANT: CPT | Performed by: INTERNAL MEDICINE

## 2018-11-08 PROCEDURE — 86140 C-REACTIVE PROTEIN: CPT | Performed by: INTERNAL MEDICINE

## 2018-11-08 PROCEDURE — 85025 COMPLETE CBC W/AUTO DIFF WBC: CPT | Performed by: INTERNAL MEDICINE

## 2018-11-08 PROCEDURE — 84466 ASSAY OF TRANSFERRIN: CPT | Performed by: INTERNAL MEDICINE

## 2018-11-08 RX ORDER — SODIUM CHLORIDE 9 MG/ML
250 INJECTION, SOLUTION INTRAVENOUS ONCE
Status: CANCELLED | OUTPATIENT
Start: 2018-11-12 | End: 2018-11-12

## 2018-11-08 RX ORDER — METHYLPREDNISOLONE SODIUM SUCCINATE 125 MG/2ML
125 INJECTION, POWDER, LYOPHILIZED, FOR SOLUTION INTRAMUSCULAR; INTRAVENOUS AS NEEDED
Status: CANCELLED | OUTPATIENT
Start: 2018-11-12

## 2018-11-08 RX ORDER — DIPHENHYDRAMINE HYDROCHLORIDE 50 MG/ML
50 INJECTION INTRAMUSCULAR; INTRAVENOUS AS NEEDED
Status: CANCELLED | OUTPATIENT
Start: 2018-11-12
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
## 2018-11-09 LAB
ANA SER QL: NEGATIVE
CENTROMERE B AB SER-ACNC: <0.2 AI (ref 0–0.9)
CHROMATIN AB SERPL-ACNC: <0.2 AI (ref 0–0.9)
DSDNA AB SER-ACNC: 6 IU/ML (ref 0–9)
ENA JO1 AB SER-ACNC: <0.2 AI (ref 0–0.9)
ENA RNP AB SER-ACNC: <0.2 AI (ref 0–0.9)
ENA SCL70 AB SER-ACNC: <0.2 AI (ref 0–0.9)
ENA SM AB SER-ACNC: <0.2 AI (ref 0–0.9)
ENA SS-A AB SER-ACNC: <0.2 AI (ref 0–0.9)
ENA SS-B AB SER-ACNC: <0.2 AI (ref 0–0.9)
Lab: NORMAL

## 2018-11-13 ENCOUNTER — INFUSION (OUTPATIENT)
Dept: ONCOLOGY | Facility: HOSPITAL | Age: 33
End: 2018-11-13

## 2018-11-13 ENCOUNTER — LAB (OUTPATIENT)
Dept: LAB | Facility: HOSPITAL | Age: 33
End: 2018-11-13

## 2018-11-13 ENCOUNTER — OFFICE VISIT (OUTPATIENT)
Dept: ONCOLOGY | Facility: CLINIC | Age: 33
End: 2018-11-13

## 2018-11-13 VITALS
OXYGEN SATURATION: 96 % | RESPIRATION RATE: 18 BRPM | SYSTOLIC BLOOD PRESSURE: 112 MMHG | BODY MASS INDEX: 45.48 KG/M2 | TEMPERATURE: 98.9 F | DIASTOLIC BLOOD PRESSURE: 69 MMHG | WEIGHT: 273 LBS | HEART RATE: 97 BPM

## 2018-11-13 DIAGNOSIS — D50.9 IRON DEFICIENCY ANEMIA, UNSPECIFIED IRON DEFICIENCY ANEMIA TYPE: Primary | ICD-10-CM

## 2018-11-13 DIAGNOSIS — K90.89 POOR IRON ABSORPTION: ICD-10-CM

## 2018-11-13 DIAGNOSIS — D72.829 LEUKOCYTOSIS, UNSPECIFIED TYPE: ICD-10-CM

## 2018-11-13 DIAGNOSIS — D72.829 LEUKOCYTOSIS, UNSPECIFIED TYPE: Primary | ICD-10-CM

## 2018-11-13 DIAGNOSIS — K52.9 IBD (INFLAMMATORY BOWEL DISEASE): ICD-10-CM

## 2018-11-13 LAB
BASOPHILS # BLD AUTO: 0.12 10*3/MM3 (ref 0–0.1)
BASOPHILS NFR BLD AUTO: 0.8 % (ref 0–1.1)
DEPRECATED RDW RBC AUTO: 41.3 FL (ref 37–49)
EOSINOPHIL # BLD AUTO: 0.97 10*3/MM3 (ref 0–0.36)
EOSINOPHIL NFR BLD AUTO: 6.6 % (ref 1–5)
ERYTHROCYTE [DISTWIDTH] IN BLOOD BY AUTOMATED COUNT: 15 % (ref 11.7–14.5)
HCT VFR BLD AUTO: 34.7 % (ref 34–45)
HGB BLD-MCNC: 10.7 G/DL (ref 11.5–14.9)
IMM GRANULOCYTES # BLD: 0.06 10*3/MM3 (ref 0–0.03)
IMM GRANULOCYTES NFR BLD: 0.4 % (ref 0–0.5)
LYMPHOCYTES # BLD AUTO: 4.17 10*3/MM3 (ref 1–3.5)
LYMPHOCYTES NFR BLD AUTO: 28.5 % (ref 20–49)
MCH RBC QN AUTO: 23.6 PG (ref 27–33)
MCHC RBC AUTO-ENTMCNC: 30.8 G/DL (ref 32–35)
MCV RBC AUTO: 76.6 FL (ref 83–97)
MONOCYTES # BLD AUTO: 0.9 10*3/MM3 (ref 0.25–0.8)
MONOCYTES NFR BLD AUTO: 6.1 % (ref 4–12)
NEUTROPHILS # BLD AUTO: 8.42 10*3/MM3 (ref 1.5–7)
NEUTROPHILS NFR BLD AUTO: 57.6 % (ref 39–75)
NRBC BLD MANUAL-RTO: 0 /100 WBC (ref 0–0)
PLATELET # BLD AUTO: 453 10*3/MM3 (ref 150–375)
PMV BLD AUTO: 10.4 FL (ref 8.9–12.1)
RBC # BLD AUTO: 4.53 10*6/MM3 (ref 3.9–5)
WBC NRBC COR # BLD: 14.64 10*3/MM3 (ref 4–10)

## 2018-11-13 PROCEDURE — 96374 THER/PROPH/DIAG INJ IV PUSH: CPT | Performed by: INTERNAL MEDICINE

## 2018-11-13 PROCEDURE — 36415 COLL VENOUS BLD VENIPUNCTURE: CPT | Performed by: INTERNAL MEDICINE

## 2018-11-13 PROCEDURE — 85025 COMPLETE CBC W/AUTO DIFF WBC: CPT | Performed by: INTERNAL MEDICINE

## 2018-11-13 PROCEDURE — 99214 OFFICE O/P EST MOD 30 MIN: CPT | Performed by: INTERNAL MEDICINE

## 2018-11-13 PROCEDURE — 25010000002 FERRIC CARBOXYMALTOSE 750 MG/15ML SOLUTION 15 ML VIAL: Performed by: INTERNAL MEDICINE

## 2018-11-13 RX ORDER — DIPHENHYDRAMINE HYDROCHLORIDE 50 MG/ML
50 INJECTION INTRAMUSCULAR; INTRAVENOUS AS NEEDED
Status: CANCELLED | OUTPATIENT
Start: 2018-11-13

## 2018-11-13 RX ORDER — SODIUM CHLORIDE 9 MG/ML
250 INJECTION, SOLUTION INTRAVENOUS ONCE
Status: COMPLETED | OUTPATIENT
Start: 2018-11-13 | End: 2018-11-13

## 2018-11-13 RX ORDER — METHYLPREDNISOLONE SODIUM SUCCINATE 125 MG/2ML
125 INJECTION, POWDER, LYOPHILIZED, FOR SOLUTION INTRAMUSCULAR; INTRAVENOUS AS NEEDED
Status: CANCELLED | OUTPATIENT
Start: 2018-11-13

## 2018-11-13 RX ORDER — SODIUM CHLORIDE 9 MG/ML
250 INJECTION, SOLUTION INTRAVENOUS ONCE
Status: CANCELLED | OUTPATIENT
Start: 2018-11-13 | End: 2018-11-13

## 2018-11-13 RX ORDER — ERGOCALCIFEROL 1.25 MG/1
50000 CAPSULE ORAL
Qty: 24 CAPSULE | Refills: 0 | Status: SHIPPED | OUTPATIENT
Start: 2018-11-13 | End: 2019-05-13 | Stop reason: SDUPTHER

## 2018-11-13 RX ADMIN — SODIUM CHLORIDE 250 ML: 9 INJECTION, SOLUTION INTRAVENOUS at 08:50

## 2018-11-13 RX ADMIN — FERRIC CARBOXYMALTOSE INJECTION 750 MG: 50 INJECTION, SOLUTION INTRAVENOUS at 08:50
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
## 2018-11-18 NOTE — PROGRESS NOTES
Subjective   Liz Bagley is a 33 y.o. female.   She is here today for hypertension and morbid obesity and her blood pressures well-controlled except for heart rate but her obesity obesity went up not down  History of Present Illness   She is here today for hypertension which is well-controlled heart rate not we will change medication and her morbid obesity got worse not better even though she has an ostomy bag  The following portions of the patient's history were reviewed and updated as appropriate: allergies, current medications, past family history, past medical history, past social history, past surgical history and problem list.    Review of Systems   All other systems reviewed and are negative.      Objective   Physical Exam   Constitutional: She is oriented to person, place, and time. She appears well-developed and well-nourished. She is cooperative.   HENT:   Head: Normocephalic and atraumatic.   Right Ear: Hearing, tympanic membrane, external ear and ear canal normal.   Left Ear: Hearing, tympanic membrane, external ear and ear canal normal.   Nose: Nose normal.   Mouth/Throat: Uvula is midline, oropharynx is clear and moist and mucous membranes are normal.   Eyes: Conjunctivae, EOM and lids are normal. Pupils are equal, round, and reactive to light.   Neck: Phonation normal. Neck supple. Carotid bruit is not present.   Cardiovascular: Regular rhythm and normal heart sounds. Tachycardia present. Exam reveals no gallop and no friction rub.   No murmur heard.  Pulmonary/Chest: Effort normal and breath sounds normal. No respiratory distress.   Abdominal: Soft. Bowel sounds are normal. She exhibits no distension and no mass. There is no hepatosplenomegaly. There is no tenderness. There is no rebound and no guarding. No hernia.   Musculoskeletal: She exhibits no edema.   Neurological: She is alert and oriented to person, place, and time. Coordination and gait normal.   Skin: Skin is warm and dry.    Psychiatric: She has a normal mood and affect. Her speech is normal and behavior is normal. Judgment and thought content normal.   Nursing note and vitals reviewed.      Assessment/Plan   Diagnoses and all orders for this visit:    Benign essential hypertension    Morbid obesity (CMS/HCC)    Other orders  -     labetalol (NORMODYNE) 200 MG tablet; Take 1 tablet by mouth Every 12 (Twelve) Hours.      Hypertension not well-controlled from heart racing point we will try labetalol  Morbid obesity getting worse not better we will no longer provide her phentermine

## 2018-11-20 ENCOUNTER — INFUSION (OUTPATIENT)
Dept: ONCOLOGY | Facility: HOSPITAL | Age: 33
End: 2018-11-20

## 2018-11-20 VITALS
SYSTOLIC BLOOD PRESSURE: 102 MMHG | WEIGHT: 274.9 LBS | HEART RATE: 71 BPM | BODY MASS INDEX: 45.8 KG/M2 | DIASTOLIC BLOOD PRESSURE: 64 MMHG | TEMPERATURE: 98 F

## 2018-11-20 DIAGNOSIS — K90.89 POOR IRON ABSORPTION: ICD-10-CM

## 2018-11-20 DIAGNOSIS — D50.9 IRON DEFICIENCY ANEMIA, UNSPECIFIED IRON DEFICIENCY ANEMIA TYPE: Primary | ICD-10-CM

## 2018-11-20 PROCEDURE — 25010000002 FERRIC CARBOXYMALTOSE 750 MG/15ML SOLUTION 15 ML VIAL: Performed by: INTERNAL MEDICINE

## 2018-11-20 PROCEDURE — 96374 THER/PROPH/DIAG INJ IV PUSH: CPT | Performed by: INTERNAL MEDICINE

## 2018-11-20 RX ORDER — DIPHENHYDRAMINE HYDROCHLORIDE 50 MG/ML
50 INJECTION INTRAMUSCULAR; INTRAVENOUS AS NEEDED
Status: CANCELLED | OUTPATIENT
Start: 2018-11-20

## 2018-11-20 RX ORDER — METHYLPREDNISOLONE SODIUM SUCCINATE 125 MG/2ML
125 INJECTION, POWDER, LYOPHILIZED, FOR SOLUTION INTRAMUSCULAR; INTRAVENOUS AS NEEDED
Status: CANCELLED | OUTPATIENT
Start: 2018-11-20

## 2018-11-20 RX ORDER — SODIUM CHLORIDE 9 MG/ML
250 INJECTION, SOLUTION INTRAVENOUS ONCE
Status: COMPLETED | OUTPATIENT
Start: 2018-11-20 | End: 2018-11-20

## 2018-11-20 RX ORDER — SODIUM CHLORIDE 9 MG/ML
250 INJECTION, SOLUTION INTRAVENOUS ONCE
Status: CANCELLED | OUTPATIENT
Start: 2018-11-20 | End: 2018-11-20

## 2018-11-20 RX ADMIN — SODIUM CHLORIDE 250 ML: 900 INJECTION, SOLUTION INTRAVENOUS at 08:17

## 2018-11-20 RX ADMIN — FERRIC CARBOXYMALTOSE INJECTION 750 MG: 50 INJECTION, SOLUTION INTRAVENOUS at 08:14

## 2018-11-30 RX ORDER — ZOLPIDEM TARTRATE 10 MG/1
10 TABLET ORAL NIGHTLY PRN
Qty: 90 TABLET | Refills: 1 | Status: SHIPPED | OUTPATIENT
Start: 2018-11-30 | End: 2019-05-16 | Stop reason: ALTCHOICE

## 2019-02-21 ENCOUNTER — LAB (OUTPATIENT)
Dept: LAB | Facility: HOSPITAL | Age: 34
End: 2019-02-21

## 2019-02-21 DIAGNOSIS — D50.9 IRON DEFICIENCY ANEMIA, UNSPECIFIED IRON DEFICIENCY ANEMIA TYPE: ICD-10-CM

## 2019-02-21 LAB
BASOPHILS # BLD AUTO: 0.05 10*3/MM3 (ref 0–0.2)
BASOPHILS NFR BLD AUTO: 0.5 % (ref 0–1.5)
DEPRECATED RDW RBC AUTO: 49 FL (ref 37–54)
EOSINOPHIL # BLD AUTO: 0.43 10*3/MM3 (ref 0–0.4)
EOSINOPHIL NFR BLD AUTO: 3.9 % (ref 0.3–6.2)
ERYTHROCYTE [DISTWIDTH] IN BLOOD BY AUTOMATED COUNT: 15.1 % (ref 12.3–15.4)
FERRITIN SERPL-MCNC: 104.6 NG/ML (ref 11–207)
HCT VFR BLD AUTO: 38.1 % (ref 34–46.6)
HGB BLD-MCNC: 12.2 G/DL (ref 12–15.9)
IMM GRANULOCYTES # BLD AUTO: 0.02 10*3/MM3 (ref 0–0.05)
IMM GRANULOCYTES NFR BLD AUTO: 0.2 % (ref 0–0.5)
IRON 24H UR-MRATE: 69 MCG/DL (ref 37–145)
IRON SATN MFR SERPL: 19 % (ref 14–48)
LYMPHOCYTES # BLD AUTO: 3.04 10*3/MM3 (ref 0.7–3.1)
LYMPHOCYTES NFR BLD AUTO: 27.7 % (ref 19.6–45.3)
MCH RBC QN AUTO: 28.8 PG (ref 26.6–33)
MCHC RBC AUTO-ENTMCNC: 32 G/DL (ref 31.5–35.7)
MCV RBC AUTO: 89.9 FL (ref 79–97)
MONOCYTES # BLD AUTO: 0.56 10*3/MM3 (ref 0.1–0.9)
MONOCYTES NFR BLD AUTO: 5.1 % (ref 5–12)
NEUTROPHILS # BLD AUTO: 6.87 10*3/MM3 (ref 1.4–7)
NEUTROPHILS NFR BLD AUTO: 62.6 % (ref 42.7–76)
NRBC BLD AUTO-RTO: 0 /100 WBC (ref 0–0)
PLATELET # BLD AUTO: 425 10*3/MM3 (ref 140–450)
PMV BLD AUTO: 9.8 FL (ref 6–12)
RBC # BLD AUTO: 4.24 10*6/MM3 (ref 3.77–5.28)
TIBC SERPL-MCNC: 361 MCG/DL (ref 249–505)
TRANSFERRIN SERPL-MCNC: 258 MG/DL (ref 200–360)
VIT B12 BLD-MCNC: 404 PG/ML (ref 211–946)
WBC NRBC COR # BLD: 10.97 10*3/MM3 (ref 3.4–10.8)

## 2019-02-21 PROCEDURE — 84466 ASSAY OF TRANSFERRIN: CPT | Performed by: INTERNAL MEDICINE

## 2019-02-21 PROCEDURE — 82728 ASSAY OF FERRITIN: CPT | Performed by: INTERNAL MEDICINE

## 2019-02-21 PROCEDURE — 36415 COLL VENOUS BLD VENIPUNCTURE: CPT | Performed by: INTERNAL MEDICINE

## 2019-02-21 PROCEDURE — 83540 ASSAY OF IRON: CPT | Performed by: INTERNAL MEDICINE

## 2019-02-21 PROCEDURE — 85025 COMPLETE CBC W/AUTO DIFF WBC: CPT | Performed by: INTERNAL MEDICINE

## 2019-02-21 PROCEDURE — 82607 VITAMIN B-12: CPT | Performed by: INTERNAL MEDICINE

## 2019-02-28 ENCOUNTER — OFFICE VISIT (OUTPATIENT)
Dept: INTERNAL MEDICINE | Facility: CLINIC | Age: 34
End: 2019-02-28

## 2019-02-28 VITALS
HEIGHT: 64 IN | RESPIRATION RATE: 17 BRPM | WEIGHT: 265 LBS | OXYGEN SATURATION: 98 % | BODY MASS INDEX: 45.24 KG/M2 | DIASTOLIC BLOOD PRESSURE: 74 MMHG | SYSTOLIC BLOOD PRESSURE: 105 MMHG | TEMPERATURE: 98.5 F | HEART RATE: 71 BPM

## 2019-02-28 DIAGNOSIS — I10 BENIGN ESSENTIAL HYPERTENSION: ICD-10-CM

## 2019-02-28 DIAGNOSIS — G62.9 NEUROPATHY: ICD-10-CM

## 2019-02-28 DIAGNOSIS — F41.8 DEPRESSION WITH ANXIETY: Primary | ICD-10-CM

## 2019-02-28 PROCEDURE — 99213 OFFICE O/P EST LOW 20 MIN: CPT | Performed by: INTERNAL MEDICINE

## 2019-02-28 RX ORDER — BUPROPION HYDROCHLORIDE 300 MG/1
300 TABLET ORAL DAILY
Qty: 90 TABLET | Refills: 1 | Status: SHIPPED | OUTPATIENT
Start: 2019-02-28 | End: 2019-05-16 | Stop reason: ALTCHOICE

## 2019-03-01 LAB
25(OH)D3+25(OH)D2 SERPL-MCNC: 15.5 NG/ML (ref 30–100)
FOLATE SERPL-MCNC: 5.77 NG/ML (ref 4.78–24.2)

## 2019-03-11 ENCOUNTER — OFFICE VISIT (OUTPATIENT)
Dept: ORTHOPEDIC SURGERY | Facility: CLINIC | Age: 34
End: 2019-03-11

## 2019-03-11 VITALS — WEIGHT: 266 LBS | TEMPERATURE: 98.7 F | BODY MASS INDEX: 45.41 KG/M2 | HEIGHT: 64 IN

## 2019-03-11 DIAGNOSIS — M77.51 BURSITIS OF BOTH FEET: ICD-10-CM

## 2019-03-11 DIAGNOSIS — M79.672 FOOT PAIN, BILATERAL: Primary | ICD-10-CM

## 2019-03-11 DIAGNOSIS — M77.42 METATARSALGIA OF BOTH FEET: ICD-10-CM

## 2019-03-11 DIAGNOSIS — M79.671 FOOT PAIN, BILATERAL: Primary | ICD-10-CM

## 2019-03-11 DIAGNOSIS — M77.41 METATARSALGIA OF BOTH FEET: ICD-10-CM

## 2019-03-11 DIAGNOSIS — M77.52 BURSITIS OF BOTH FEET: ICD-10-CM

## 2019-03-11 PROCEDURE — 99204 OFFICE O/P NEW MOD 45 MIN: CPT | Performed by: ORTHOPAEDIC SURGERY

## 2019-03-11 PROCEDURE — 73630 X-RAY EXAM OF FOOT: CPT | Performed by: ORTHOPAEDIC SURGERY
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
## 2019-03-18 NOTE — PROGRESS NOTES
Subjective   Liz Bagley is a 33 y.o. female.   She is here today for depression with anxiety neuropathy and hypertension  History of Present Illness   She is here today for depression with anxiety which is not stable along with neuropathy which is not stable and hypertension which is well controlled  The following portions of the patient's history were reviewed and updated as appropriate: allergies, current medications, past family history, past medical history, past social history, past surgical history and problem list.    Review of Systems   Constitutional: Negative for fatigue.   Genitourinary: Negative for difficulty urinating.   Neurological: Positive for numbness. Negative for weakness.   Psychiatric/Behavioral: Positive for dysphoric mood. The patient is nervous/anxious.    All other systems reviewed and are negative.      Objective   Physical Exam   Constitutional: She is oriented to person, place, and time. She appears well-developed and well-nourished. She is cooperative.   HENT:   Head: Normocephalic and atraumatic.   Right Ear: Hearing, tympanic membrane, external ear and ear canal normal.   Left Ear: Hearing, tympanic membrane, external ear and ear canal normal.   Nose: Nose normal.   Mouth/Throat: Uvula is midline, oropharynx is clear and moist and mucous membranes are normal.   Eyes: Conjunctivae, EOM and lids are normal. Pupils are equal, round, and reactive to light.   Neck: Phonation normal. Neck supple. Carotid bruit is not present.   Cardiovascular: Normal rate, regular rhythm and normal heart sounds. Exam reveals no gallop and no friction rub.   No murmur heard.  Pulmonary/Chest: Effort normal and breath sounds normal. No respiratory distress.   Abdominal: Soft. Bowel sounds are normal. She exhibits no distension and no mass. There is no hepatosplenomegaly. There is no tenderness. There is no rebound and no guarding. No hernia.   Musculoskeletal: She exhibits no edema.   Neurological: She  is alert and oriented to person, place, and time. Coordination and gait normal.   Neuropathy in both her legs and feet   Skin: Skin is warm and dry.   Psychiatric: Her speech is normal and behavior is normal. Judgment and thought content normal. Her mood appears anxious. She exhibits a depressed mood.   Nursing note and vitals reviewed.      Assessment/Plan   Diagnoses and all orders for this visit:    Depression with anxiety    Neuropathy  -     Folate  -     Vitamin D 25 Hydroxy    Benign essential hypertension    Other orders  -     buPROPion XL (WELLBUTRIN XL) 300 MG 24 hr tablet; Take 1 tablet by mouth Daily.        Depression with anxiety not stable we will try bupropion 300 mg daily  Neuropathy not stable we will check folate and vitamin D levels  Hypertension well-controlled on current medication no changes

## 2019-04-08 ENCOUNTER — OFFICE VISIT (OUTPATIENT)
Dept: ORTHOPEDIC SURGERY | Facility: CLINIC | Age: 34
End: 2019-04-08

## 2019-04-08 VITALS — WEIGHT: 270 LBS | HEIGHT: 64 IN | BODY MASS INDEX: 46.1 KG/M2 | TEMPERATURE: 97.6 F

## 2019-04-08 DIAGNOSIS — M77.41 METATARSALGIA OF RIGHT FOOT: Primary | ICD-10-CM

## 2019-04-08 DIAGNOSIS — M79.671 FOOT PAIN, BILATERAL: ICD-10-CM

## 2019-04-08 DIAGNOSIS — M76.71 PERONEAL TENDONITIS OF RIGHT LOWER LEG: ICD-10-CM

## 2019-04-08 DIAGNOSIS — M79.672 FOOT PAIN, BILATERAL: ICD-10-CM

## 2019-04-08 PROCEDURE — 99214 OFFICE O/P EST MOD 30 MIN: CPT | Performed by: ORTHOPAEDIC SURGERY

## 2019-04-08 PROCEDURE — 73630 X-RAY EXAM OF FOOT: CPT | Performed by: ORTHOPAEDIC SURGERY
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
## 2019-04-11 RX ORDER — DULOXETIN HYDROCHLORIDE 60 MG/1
120 CAPSULE, DELAYED RELEASE ORAL
Qty: 180 CAPSULE | Refills: 1 | Status: SHIPPED | OUTPATIENT
Start: 2019-04-11 | End: 2019-10-14 | Stop reason: SDUPTHER

## 2019-04-22 ENCOUNTER — HOSPITAL ENCOUNTER (OUTPATIENT)
Dept: MRI IMAGING | Facility: HOSPITAL | Age: 34
Discharge: HOME | End: 2019-04-22
Admitting: ORTHOPAEDIC SURGERY

## 2019-04-22 DIAGNOSIS — M77.41 METATARSALGIA OF RIGHT FOOT: ICD-10-CM

## 2019-04-22 DIAGNOSIS — M76.71 PERONEAL TENDONITIS OF RIGHT LOWER LEG: ICD-10-CM

## 2019-04-22 PROCEDURE — 73721 MRI JNT OF LWR EXTRE W/O DYE: CPT

## 2019-05-01 ENCOUNTER — OFFICE VISIT (OUTPATIENT)
Dept: ORTHOPEDIC SURGERY | Facility: CLINIC | Age: 34
End: 2019-05-01

## 2019-05-01 VITALS — TEMPERATURE: 98 F | WEIGHT: 270 LBS | BODY MASS INDEX: 46.1 KG/M2 | HEIGHT: 64 IN

## 2019-05-01 DIAGNOSIS — M77.41 METATARSALGIA OF RIGHT FOOT: ICD-10-CM

## 2019-05-01 DIAGNOSIS — M76.71 PERONEAL TENDONITIS OF RIGHT LOWER EXTREMITY: Primary | ICD-10-CM

## 2019-05-01 PROBLEM — M76.811 ANTERIOR TIBIAL TENDONITIS, RIGHT: Status: ACTIVE | Noted: 2019-05-01

## 2019-05-01 PROBLEM — S93.401A SPRAIN OF RIGHT ANKLE: Status: ACTIVE | Noted: 2019-05-01

## 2019-05-01 PROCEDURE — 99213 OFFICE O/P EST LOW 20 MIN: CPT | Performed by: ORTHOPAEDIC SURGERY
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
## 2019-05-06 ENCOUNTER — HOSPITAL ENCOUNTER (EMERGENCY)
Facility: HOSPITAL | Age: 34
Discharge: HOME | End: 2019-05-06
Attending: EMERGENCY MEDICINE | Admitting: EMERGENCY MEDICINE

## 2019-05-06 VITALS
WEIGHT: 260 LBS | SYSTOLIC BLOOD PRESSURE: 123 MMHG | BODY MASS INDEX: 46.07 KG/M2 | OXYGEN SATURATION: 100 % | RESPIRATION RATE: 18 BRPM | HEART RATE: 98 BPM | TEMPERATURE: 99.6 F | DIASTOLIC BLOOD PRESSURE: 66 MMHG | HEIGHT: 63 IN

## 2019-05-06 DIAGNOSIS — A08.0 ROTAVIRUS INFECTION: ICD-10-CM

## 2019-05-06 DIAGNOSIS — A09 DIARRHEA OF INFECTIOUS ORIGIN: Primary | ICD-10-CM

## 2019-05-06 LAB
ADV 40+41 DNA STL QL NAA+NON-PROBE: NOT DETECTED
ALBUMIN SERPL-MCNC: 4 G/DL (ref 3.5–5.2)
ALBUMIN/GLOB SERPL: 1.2 G/DL
ALP SERPL-CCNC: 101 U/L (ref 39–117)
ALT SERPL W P-5'-P-CCNC: 58 U/L (ref 1–33)
ANION GAP SERPL CALCULATED.3IONS-SCNC: 10.1 MMOL/L
AST SERPL-CCNC: 31 U/L (ref 1–32)
ASTRO TYP 1-8 RNA STL QL NAA+NON-PROBE: NOT DETECTED
BACTERIA UR QL AUTO: ABNORMAL /HPF
BASOPHILS # BLD AUTO: 0.03 10*3/MM3 (ref 0–0.2)
BASOPHILS NFR BLD AUTO: 0.3 % (ref 0–1.5)
BILIRUB SERPL-MCNC: 0.2 MG/DL (ref 0.2–1.2)
BILIRUB UR QL STRIP: NEGATIVE
BUN BLD-MCNC: 9 MG/DL (ref 6–20)
BUN/CREAT SERPL: 15.5 (ref 7–25)
C CAYETANENSIS DNA STL QL NAA+NON-PROBE: NOT DETECTED
C DIFF TOX GENS STL QL NAA+PROBE: NEGATIVE
CALCIUM SPEC-SCNC: 9.1 MG/DL (ref 8.6–10.5)
CAMPY SP DNA.DIARRHEA STL QL NAA+PROBE: NOT DETECTED
CHLORIDE SERPL-SCNC: 100 MMOL/L (ref 98–107)
CLARITY UR: CLEAR
CO2 SERPL-SCNC: 21.9 MMOL/L (ref 22–29)
COLOR UR: ABNORMAL
CREAT BLD-MCNC: 0.58 MG/DL (ref 0.57–1)
CRYPTOSP STL CULT: NOT DETECTED
DEPRECATED RDW RBC AUTO: 43.6 FL (ref 37–54)
E COLI DNA SPEC QL NAA+PROBE: NOT DETECTED
E HISTOLYT AG STL-ACNC: NOT DETECTED
EAEC PAA PLAS AGGR+AATA ST NAA+NON-PRB: NOT DETECTED
EC STX1 + STX2 GENES STL NAA+PROBE: NOT DETECTED
EOSINOPHIL # BLD AUTO: 0.12 10*3/MM3 (ref 0–0.4)
EOSINOPHIL NFR BLD AUTO: 1 % (ref 0.3–6.2)
EPEC EAE GENE STL QL NAA+NON-PROBE: NOT DETECTED
ERYTHROCYTE [DISTWIDTH] IN BLOOD BY AUTOMATED COUNT: 13.1 % (ref 12.3–15.4)
ETEC LTA+ST1A+ST1B TOX ST NAA+NON-PROBE: NOT DETECTED
G LAMBLIA DNA SPEC QL NAA+PROBE: NOT DETECTED
GFR SERPL CREATININE-BSD FRML MDRD: 118 ML/MIN/1.73
GLOBULIN UR ELPH-MCNC: 3.4 GM/DL
GLUCOSE BLD-MCNC: 93 MG/DL (ref 65–99)
GLUCOSE UR STRIP-MCNC: NEGATIVE MG/DL
HCG SERPL QL: NEGATIVE
HCT VFR BLD AUTO: 42.5 % (ref 34–46.6)
HGB BLD-MCNC: 13.2 G/DL (ref 12–15.9)
HGB UR QL STRIP.AUTO: NEGATIVE
HOLD SPECIMEN: NORMAL
HOLD SPECIMEN: NORMAL
HYALINE CASTS UR QL AUTO: ABNORMAL /LPF
IMM GRANULOCYTES # BLD AUTO: 0.06 10*3/MM3 (ref 0–0.05)
IMM GRANULOCYTES NFR BLD AUTO: 0.5 % (ref 0–0.5)
KETONES UR QL STRIP: ABNORMAL
LEUKOCYTE ESTERASE UR QL STRIP.AUTO: NEGATIVE
LIPASE SERPL-CCNC: 24 U/L (ref 13–60)
LYMPHOCYTES # BLD AUTO: 1.58 10*3/MM3 (ref 0.7–3.1)
LYMPHOCYTES NFR BLD AUTO: 13.7 % (ref 19.6–45.3)
MCH RBC QN AUTO: 28.4 PG (ref 26.6–33)
MCHC RBC AUTO-ENTMCNC: 31.1 G/DL (ref 31.5–35.7)
MCV RBC AUTO: 91.6 FL (ref 79–97)
MONOCYTES # BLD AUTO: 0.31 10*3/MM3 (ref 0.1–0.9)
MONOCYTES NFR BLD AUTO: 2.7 % (ref 5–12)
NEUTROPHILS # BLD AUTO: 9.47 10*3/MM3 (ref 1.7–7)
NEUTROPHILS NFR BLD AUTO: 81.8 % (ref 42.7–76)
NITRITE UR QL STRIP: NEGATIVE
NOROVIRUS GI+II RNA STL QL NAA+NON-PROBE: NOT DETECTED
NRBC BLD AUTO-RTO: 0 /100 WBC (ref 0–0.2)
P SHIGELLOIDES DNA STL QL NAA+NON-PROBE: NOT DETECTED
PH UR STRIP.AUTO: 6 [PH] (ref 5–8)
PLATELET # BLD AUTO: 402 10*3/MM3 (ref 140–450)
PMV BLD AUTO: 10 FL (ref 6–12)
POTASSIUM BLD-SCNC: 3.7 MMOL/L (ref 3.5–5.2)
PROT SERPL-MCNC: 7.4 G/DL (ref 6–8.5)
PROT UR QL STRIP: ABNORMAL
RBC # BLD AUTO: 4.64 10*6/MM3 (ref 3.77–5.28)
RBC # UR: ABNORMAL /HPF
REF LAB TEST METHOD: ABNORMAL
RV RNA STL NAA+PROBE: DETECTED
SALMONELLA DNA SPEC QL NAA+PROBE: NOT DETECTED
SAPO I+II+IV+V RNA STL QL NAA+NON-PROBE: NOT DETECTED
SHIGELLA SP+EIEC IPAH STL QL NAA+PROBE: NOT DETECTED
SODIUM BLD-SCNC: 132 MMOL/L (ref 136–145)
SP GR UR STRIP: >=1.03 (ref 1–1.03)
SQUAMOUS #/AREA URNS HPF: ABNORMAL /HPF
UROBILINOGEN UR QL STRIP: ABNORMAL
V CHOLERAE DNA SPEC QL NAA+PROBE: NOT DETECTED
VIBRIO DNA SPEC NAA+PROBE: NOT DETECTED
WBC NRBC COR # BLD: 11.57 10*3/MM3 (ref 3.4–10.8)
WBC UR QL AUTO: ABNORMAL /HPF
WHOLE BLOOD HOLD SPECIMEN: NORMAL
WHOLE BLOOD HOLD SPECIMEN: NORMAL
YERSINIA STL CULT: NOT DETECTED

## 2019-05-06 PROCEDURE — 80053 COMPREHEN METABOLIC PANEL: CPT | Performed by: NURSE PRACTITIONER

## 2019-05-06 PROCEDURE — 99284 EMERGENCY DEPT VISIT MOD MDM: CPT

## 2019-05-06 PROCEDURE — 84703 CHORIONIC GONADOTROPIN ASSAY: CPT | Performed by: NURSE PRACTITIONER

## 2019-05-06 PROCEDURE — 85025 COMPLETE CBC W/AUTO DIFF WBC: CPT | Performed by: NURSE PRACTITIONER

## 2019-05-06 PROCEDURE — 87507 IADNA-DNA/RNA PROBE TQ 12-25: CPT | Performed by: NURSE PRACTITIONER

## 2019-05-06 PROCEDURE — 81001 URINALYSIS AUTO W/SCOPE: CPT | Performed by: NURSE PRACTITIONER

## 2019-05-06 PROCEDURE — 83690 ASSAY OF LIPASE: CPT | Performed by: NURSE PRACTITIONER

## 2019-05-06 PROCEDURE — 87493 C DIFF AMPLIFIED PROBE: CPT | Performed by: NURSE PRACTITIONER

## 2019-05-06 RX ORDER — SODIUM CHLORIDE 0.9 % (FLUSH) 0.9 %
10 SYRINGE (ML) INJECTION AS NEEDED
Status: DISCONTINUED | OUTPATIENT
Start: 2019-05-06 | End: 2019-05-06 | Stop reason: HOSPADM

## 2019-05-06 RX ADMIN — SODIUM CHLORIDE 1000 ML: 9 INJECTION, SOLUTION INTRAVENOUS at 17:27

## 2019-05-06 NOTE — ED PROVIDER NOTES
Pt is a 33 y.o. female, Hx of UC with colectomy and ileostomy, who presents to the ED complaining of diarrhea that began this morning. Pt states she has noticed pure liquid draining into her ileostomy bag. Pt also c/o vomiting x2 two days ago but denies hematochezia. Pt states she is mainly concerned for dehydration. Pt reports recent ill contact with her coworker and her daughter.     On exam,  Constitutional: NAD  Cardiovascular: heart is RRR  Pulmonary: lungs CTAB  Abdomen: ileostomy bag to R mid abd that shows watery stool  Musculoskeletal: FROM  Neurological: Awake, alert. FC    Plan: Check lab work      MD ATTESTATION NOTE    The GISSELLE and I have discussed this patient's history, physical exam, and treatment plan.  I have reviewed the documentation and personally had a face to face interaction with the patient. I affirm the documentation and agree with the treatment and plan.  The attached note describes my personal findings.      Documentation assistance provided by cristhian Mahan for Dr. Monte. Information recorded by the scribe was done at my direction and has been verified and validated by me.             Minh Mahan  05/06/19 0304       Charly Monte MD  05/06/19 [id number]
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
## 2019-05-06 NOTE — DISCHARGE INSTRUCTIONS
Off work tomorrow   Good handwashing  Drink plenty of fluids  Return if worse or new concerns   Continue care with your primary care physician and have your blood pressure regularly checked and managed. Normal blood pressure is 120/80.

## 2019-05-07 ENCOUNTER — TELEPHONE (OUTPATIENT)
Dept: SURGERY | Facility: CLINIC | Age: 34
End: 2019-05-07

## 2019-05-07 NOTE — TELEPHONE ENCOUNTER
Spoke with pt, told her per  no lomotil or imodium, virus has to run its course. Told pt to keep up with liquids to avoid dehydration. To go to ER if urine becomes concetrated/dark

## 2019-05-07 NOTE — TELEPHONE ENCOUNTER
Pt called, said she was in ER yesterday. DX: Rotavirus A.  Pt says her stools are still very liquidy and she is emptying ostomy Q hourly. Says she was released and told to increase fluids and rest. Pt concerned about the virus being contagious and becoming dehydrated. Wants to know what you suggest. Pt is not taking Lomotil or imodium.

## 2019-05-13 RX ORDER — ERGOCALCIFEROL 1.25 MG/1
50000 CAPSULE ORAL
Qty: 24 CAPSULE | Refills: 0 | Status: SHIPPED | OUTPATIENT
Start: 2019-05-13 | End: 2019-09-19 | Stop reason: ALTCHOICE

## 2019-05-16 ENCOUNTER — OFFICE VISIT (OUTPATIENT)
Dept: INTERNAL MEDICINE | Facility: CLINIC | Age: 34
End: 2019-05-16

## 2019-05-16 VITALS
HEIGHT: 63 IN | WEIGHT: 268 LBS | DIASTOLIC BLOOD PRESSURE: 68 MMHG | TEMPERATURE: 98.7 F | BODY MASS INDEX: 47.48 KG/M2 | SYSTOLIC BLOOD PRESSURE: 108 MMHG | RESPIRATION RATE: 17 BRPM | HEART RATE: 79 BPM | OXYGEN SATURATION: 97 %

## 2019-05-16 DIAGNOSIS — F41.8 DEPRESSION WITH ANXIETY: ICD-10-CM

## 2019-05-16 DIAGNOSIS — K51.919 ULCERATIVE COLITIS WITH COMPLICATION, UNSPECIFIED LOCATION (HCC): ICD-10-CM

## 2019-05-16 DIAGNOSIS — I10 BENIGN ESSENTIAL HYPERTENSION: ICD-10-CM

## 2019-05-16 DIAGNOSIS — F51.01 PRIMARY INSOMNIA: ICD-10-CM

## 2019-05-16 DIAGNOSIS — Z93.2 ILEOSTOMY IN PLACE (HCC): ICD-10-CM

## 2019-05-16 DIAGNOSIS — Z00.00 HEALTHCARE MAINTENANCE: Primary | ICD-10-CM

## 2019-05-16 DIAGNOSIS — E55.9 HYPOVITAMINOSIS D: ICD-10-CM

## 2019-05-16 PROCEDURE — 99214 OFFICE O/P EST MOD 30 MIN: CPT | Performed by: NURSE PRACTITIONER

## 2019-05-16 RX ORDER — LABETALOL 200 MG/1
200 TABLET, FILM COATED ORAL EVERY 12 HOURS SCHEDULED
Qty: 180 TABLET | Refills: 2 | Status: SHIPPED | OUTPATIENT
Start: 2019-05-16 | End: 2020-02-20 | Stop reason: SDUPTHER

## 2019-05-16 RX ORDER — BUPROPION HYDROCHLORIDE 100 MG/1
100 TABLET ORAL 2 TIMES DAILY
Qty: 60 TABLET | Refills: 2 | Status: SHIPPED | OUTPATIENT
Start: 2019-05-16 | End: 2019-09-06 | Stop reason: SDUPTHER

## 2019-05-16 RX ORDER — TRAZODONE HYDROCHLORIDE 50 MG/1
50 TABLET ORAL NIGHTLY
Qty: 30 TABLET | Refills: 1 | Status: SHIPPED | OUTPATIENT
Start: 2019-05-16 | End: 2019-07-16

## 2019-05-16 NOTE — PROGRESS NOTES
"Subjective   Liz Bagley is a 33 y.o. female.   CC: F/u on HTN, depression, refills     Patient presents for medication refills and follow-up.  This is a 33-year-old female former patient of Dr. Maldonado.    She has a history of hypertension for which she takes labetalol 200 mg every 12 hours.  She reports excellent compliance and toleration with this medication and her blood pressure has been running great at home.  She is 108/68 today.  She reports no headache, visual changes shortness of breath or chest discomfort.    She also has a history of ulcerative colitis, status post ileostomy placement in 2017.  She follows with colorectal surgery, Dr. Elizabeth Evans.  She has not been having any problems with her ileostomy, except for the fact that she is seeing her Wellbutrin XL come out whole.  She recently had a rotavirus infection for which she presented to the ED on 5/6/2019 with profuse diarrheal ileostomy output, nausea and vomiting.  This has completely subsided and she states that she feels great now.    She also has a history of hypovitaminosis D and takes 50,000 units weekly.  She reports that she does forget to take it sometimes.  Last level was checked in February 2019.    She has a history of insomnia and takes Ambien 10 mg nightly.  She states that it \"works to some extent\" but she states that she often still wakes up after about 4 hours of sleep and has trouble getting back to sleep.  She states that she first started taking this when she was on chronic steroids before her ileostomy placement.    She has a history of depression and anxiety and takes Wellbutrin  mg daily.  She reports that she is seeing this, a hole in her ileostomy and wonders whether she has ever had any absorbed.  She reports that for the most part her depression is well controlled, but anxiety does creep up on her from time to time.  She also takes Cymbalta 120 mg daily.  She reports no suicidal or homicidal ideation.    She " "has a history of persistently elevated white blood cell count and iron deficiency for which she is following with Dr. Bass, immunology.  She has an appointment coming up with him next week.    She denies development of any other new issues today.         The following portions of the patient's history were reviewed and updated as appropriate: allergies, current medications, past family history, past medical history, past social history, past surgical history and problem list.    Review of Systems   Constitutional: Negative for activity change, chills, fatigue, fever, unexpected weight gain and unexpected weight loss.   HENT: Negative for congestion, hearing loss, postnasal drip, sinus pressure, sneezing, sore throat and tinnitus.    Eyes: Negative for photophobia, pain and visual disturbance.   Respiratory: Negative for cough, chest tightness, shortness of breath and wheezing.    Cardiovascular: Negative for chest pain, palpitations and leg swelling.   Gastrointestinal: Negative for abdominal distention, abdominal pain, constipation, diarrhea, nausea and vomiting.   Endocrine: Negative for polydipsia, polyphagia and polyuria.   Genitourinary: Negative for dysuria, frequency, hematuria and urgency.   Neurological: Negative for dizziness, weakness, numbness and headache.   Psychiatric/Behavioral: Positive for sleep disturbance. Negative for suicidal ideas, depressed mood and stress. The patient is nervous/anxious.    All other systems reviewed and are negative.      Objective    /68 (BP Location: Left arm, Patient Position: Sitting, Cuff Size: Large Adult)   Pulse 79   Temp 98.7 °F (37.1 °C) (Oral)   Resp 17   Ht 160 cm (63\")   Wt 122 kg (268 lb)   LMP 04/29/2019 (Exact Date)   SpO2 97%   BMI 47.47 kg/m²     Physical Exam   Constitutional: She is oriented to person, place, and time. She appears well-developed and well-nourished. No distress.   HENT:   Head: Normocephalic and atraumatic.   Right Ear: " External ear normal.   Left Ear: External ear normal.   Nose: Nose normal.   Mouth/Throat: Oropharynx is clear and moist.   Eyes: Conjunctivae and EOM are normal. Pupils are equal, round, and reactive to light. Right eye exhibits no discharge. Left eye exhibits no discharge.   Neck: Normal range of motion. Neck supple. No JVD present. No tracheal deviation present. No thyromegaly present.   Cardiovascular: Normal rate, regular rhythm, normal heart sounds and intact distal pulses. Exam reveals no gallop and no friction rub.   No murmur heard.  No peripheral edema   Pulmonary/Chest: Effort normal and breath sounds normal. No stridor. No respiratory distress. She has no wheezes. She has no rales. She exhibits no tenderness.   Lungs are CTA bilaterally   Abdominal: Soft. Bowel sounds are normal. She exhibits no distension and no mass. There is no tenderness. There is no rebound and no guarding. No hernia.   Bowel sounds active x4 quadrants.  No pain to light or deep palpation x4 quadrants.  Ileostomy in place, right upper quadrant.   Musculoskeletal: Normal range of motion.   Lymphadenopathy:     She has no cervical adenopathy.   Neurological: She is alert and oriented to person, place, and time.   Skin: Skin is warm and dry. Capillary refill takes less than 2 seconds. She is not diaphoretic.   Psychiatric: She has a normal mood and affect. Her behavior is normal. Judgment and thought content normal.   Nursing note and vitals reviewed.    Current outpatient and discharge medications have been reconciled for the patient.  Reviewed by: ELEN Hayes      Assessment/Plan   Liz was seen today for med refill.    Diagnoses and all orders for this visit:    Healthcare maintenance    Depression with anxiety  -     buPROPion (WELLBUTRIN) 100 MG tablet; Take 1 tablet by mouth 2 (Two) Times a Day.    Benign essential hypertension  -     labetalol (NORMODYNE) 200 MG tablet; Take 1 tablet by mouth Every 12 (Twelve) Hours.  -      Lipid panel; Future  -     Comprehensive metabolic panel; Future  -     Vitamin D 25 Hydroxy; Future    Primary insomnia  -     traZODone (DESYREL) 50 MG tablet; Take 1 tablet by mouth Every Night.    Hypovitaminosis D  -     Vitamin D 25 Hydroxy; Future    Ileostomy in place (CMS/[facility])    Ulcerative colitis with complication, unspecified location (CMS/[facility])      -Healthcare maintenance: She is up-to-date on all vaccinations and health maintenance.  She is going to see her OB/GYN, Dr. Alaniz coming up soon for routine women's exam.    -Depression with anxiety: Her Wellbutrin XL is not absorbing and is coming out whole in her ileostomy.  We will change her to an immediate release, Wellbutrin 100 mg twice daily.  Continue the Cymbalta 120 mg daily.  We will follow-up in 1 month on this change.    -Insomnia: The Ambien is not really working for her.  We will try trazodone instead, 50 mg nightly.  We will follow-up on the initiation of this in 1 month.    -Ulcerative colitis, ileostomy in place: Continue to follow routinely with Dr. Elizabeth Evans, colorectal surgery.  She has recovered from recent rotavirus infection.    -Hypovitaminosis D: Continue 50,000 units weekly.  We will check vitamin D level.    -Hypertension: Continue labetalol 200 mg every 12 hours.  Blood pressures very well controlled today at 108/68.  Continue daily home monitoring.    -We will check a lipid panel and CMP.    -Follow-up in 1 month on the change from Wellbutrin XL to immediate release.  Also follow-up in 1 month on the change from Ambien to trazodone.  Will titrate up if needed at this time.  Follow-up PRN in the meantime.

## 2019-05-17 ENCOUNTER — RESULTS ENCOUNTER (OUTPATIENT)
Dept: INTERNAL MEDICINE | Facility: CLINIC | Age: 34
End: 2019-05-17

## 2019-05-17 DIAGNOSIS — I10 BENIGN ESSENTIAL HYPERTENSION: ICD-10-CM

## 2019-05-17 DIAGNOSIS — E55.9 HYPOVITAMINOSIS D: ICD-10-CM

## 2019-05-20 ENCOUNTER — OFFICE VISIT (OUTPATIENT)
Dept: SPORTS MEDICINE | Facility: CLINIC | Age: 34
End: 2019-05-20

## 2019-05-20 VITALS
OXYGEN SATURATION: 99 % | DIASTOLIC BLOOD PRESSURE: 68 MMHG | SYSTOLIC BLOOD PRESSURE: 110 MMHG | BODY MASS INDEX: 48.02 KG/M2 | HEART RATE: 68 BPM | HEIGHT: 63 IN | WEIGHT: 271 LBS

## 2019-05-20 DIAGNOSIS — M76.71 PERONEAL TENDONITIS OF RIGHT LOWER LEG: Primary | ICD-10-CM

## 2019-05-20 PROCEDURE — 99244 OFF/OP CNSLTJ NEW/EST MOD 40: CPT | Performed by: FAMILY MEDICINE

## 2019-05-20 RX ORDER — NITROGLYCERIN 0.1MG/HR
1 PATCH, TRANSDERMAL 24 HOURS TRANSDERMAL DAILY
Qty: 30 EACH | Refills: 0 | Status: SHIPPED | OUTPATIENT
Start: 2019-05-20 | End: 2019-09-18

## 2019-05-20 NOTE — PROGRESS NOTES
"Liz is a 33 y.o. year old female consultation for a problem new to this examiner.    Chief Complaint   Patient presents with   • Foot Pain     Right - x October - [facility] - Prev Xray & MRI        History of Present Illness  Liz is here today for pain located: right ankle  Onset: October, no specific injury  Quality: burning, sharp and shooting  Severity: moderate  Timing: constant  Worsened by: activity and weight bearing  Alleviated by: nothing  Associated factors: nothing  Progression over time: gradually worsening    Referred by Dr. Wilson for further eval/tx options. No relief with HEP.  She states that unfortunately despite all the treatment that she has had, she really has had no significant pain relief along the way.  She has not been able to tolerate home exercise regimen.  Has not been able to schedule physical therapy.    I have reviewed the patient's medical, family, and social history in detail and updated the computerized patient record.    Review of Systems   Constitutional: Negative for fever.   Musculoskeletal:        Per HPI   Skin: Negative for wound.   Neurological: Negative for numbness.   All other systems reviewed and are negative.      /68   Pulse 68   Ht 160 cm (62.99\")   Wt 123 kg (271 lb)   LMP 04/29/2019 (Exact Date)   SpO2 99%   BMI 48.02 kg/m²      Physical Exam    Vital signs reviewed.   General: No acute distress.  Eyes: conjunctiva clear; pupils equally round and reactive  ENT: external ears and nose atraumatic; oropharynx clear  CV: no peripheral edema, 2+ distal pulses  Resp: normal respiratory effort, no use of accessory muscles  Skin: no rashes or wounds; normal turgor  Psych: mood and affect appropriate; recent and remote memory intact  Neuro: sensation to light touch intact    MSK Exam:  Ortho Exam  Right foot and ankle: Normal appearance.  There is tenderness to palpation along the peroneal tendons particularly distal to the lateral malleolus.  I was able to " reproduce tenderness along the peroneus longus tendon on the plantar aspect of the lateral midfoot.  Normal range of motion without pain.  Normal strength without pain.  No ligamentous laxity.    I reviewed the images and report of her recent MRI.  This shows some mild tendinosis in the peroneal is longus as well as some mild synovitis.    Bedside ultrasound shows some synovitis particularly around the peroneus longus tendon sheath distal to the lateral malleolus.  I do not appreciate significant tendinopathy, although the more distal aspects of the tendon are difficult to visualize as they wrap around the plantar aspect of the foot.    Diagnoses and all orders for this visit:    Peroneal tendonitis of right lower leg  -     nitroglycerin (NITRODUR) 0.1 MG/HR patch; Place 1 patch on the skin as directed by provider Daily.  -     Ambulatory Referral to Physical Therapy Evaluate and treat      I discussed treatment considerations with the patient in detail.  Essentially I think that she has a root cause of peroneal tendinopathy with secondary synovitis pain.  We are going to try to get her better pain control with a topical compound so that she can tolerate physical therapy.  We will add nitroglycerin patch one fourth patch overnight every day to encourage tendon healing.  We will follow-up in about 1 month.  If she is not making progress with that, we may need to consider prolotherapy or PRP injection.  We discussed caution regarding corticosteroid injections, that is an option but we try to avoid it if possible and act cautiously with injecting around these tendons.    EMR Dragon/Transcription disclaimer:    Much of this encounter note is an electronic transcription/translation of spoken language to printed text.  The electronic translation of spoken language may permit erroneous, or at times, nonsensical words or phrases to be inadvertently transcribed.  Although I have reviewed the note for such errors some may  still exist.

## 2019-05-20 NOTE — PATIENT INSTRUCTIONS
Instructions for use of nitroglycerin patch for chronic tendinopathy:  Cut patch into 4 equal parts (fourths). Apply one fourth of the patch to the tendon site for approximately 12 hours daily (typically most convenient to wear overnight). Ok to use a band aid or tape to hold patch in place. Remove and wash skin at end of 12 hours. Wash hands after handling patch.

## 2019-05-28 ENCOUNTER — APPOINTMENT (OUTPATIENT)
Dept: LAB | Facility: HOSPITAL | Age: 34
End: 2019-05-28

## 2019-05-28 ENCOUNTER — APPOINTMENT (OUTPATIENT)
Dept: ONCOLOGY | Facility: CLINIC | Age: 34
End: 2019-05-28

## 2019-06-11 ENCOUNTER — HOSPITAL ENCOUNTER (OUTPATIENT)
Dept: PHYSICAL THERAPY | Facility: HOSPITAL | Age: 34
Setting detail: THERAPIES SERIES
Discharge: HOME | End: 2019-06-11

## 2019-06-11 DIAGNOSIS — R26.2 DIFFICULTY WALKING: ICD-10-CM

## 2019-06-11 DIAGNOSIS — M79.671 RIGHT FOOT PAIN: Primary | ICD-10-CM

## 2019-06-11 PROCEDURE — 97110 THERAPEUTIC EXERCISES: CPT

## 2019-06-11 PROCEDURE — 97162 PT EVAL MOD COMPLEX 30 MIN: CPT
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
## 2019-06-17 ENCOUNTER — HOSPITAL ENCOUNTER (OUTPATIENT)
Dept: PHYSICAL THERAPY | Facility: HOSPITAL | Age: 34
Setting detail: THERAPIES SERIES
Discharge: HOME | End: 2019-06-17

## 2019-06-17 DIAGNOSIS — M79.671 RIGHT FOOT PAIN: Primary | ICD-10-CM

## 2019-06-17 DIAGNOSIS — R26.2 DIFFICULTY WALKING: ICD-10-CM

## 2019-06-17 PROCEDURE — 97035 APP MDLTY 1+ULTRASOUND EA 15: CPT

## 2019-06-17 PROCEDURE — 97110 THERAPEUTIC EXERCISES: CPT
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
## 2019-06-20 ENCOUNTER — HOSPITAL ENCOUNTER (OUTPATIENT)
Dept: PHYSICAL THERAPY | Facility: HOSPITAL | Age: 34
Setting detail: THERAPIES SERIES
Discharge: HOME | End: 2019-06-20

## 2019-06-20 DIAGNOSIS — M79.671 RIGHT FOOT PAIN: Primary | ICD-10-CM

## 2019-06-20 DIAGNOSIS — R26.2 DIFFICULTY WALKING: ICD-10-CM

## 2019-06-20 PROCEDURE — 97110 THERAPEUTIC EXERCISES: CPT

## 2019-06-20 PROCEDURE — 97035 APP MDLTY 1+ULTRASOUND EA 15: CPT
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
## 2019-06-24 ENCOUNTER — HOSPITAL ENCOUNTER (OUTPATIENT)
Dept: PHYSICAL THERAPY | Facility: HOSPITAL | Age: 34
Setting detail: THERAPIES SERIES
Discharge: HOME | End: 2019-06-24

## 2019-06-24 DIAGNOSIS — R26.2 DIFFICULTY WALKING: ICD-10-CM

## 2019-06-24 DIAGNOSIS — M79.671 RIGHT FOOT PAIN: Primary | ICD-10-CM

## 2019-06-24 PROCEDURE — 97110 THERAPEUTIC EXERCISES: CPT

## 2019-06-24 PROCEDURE — 97140 MANUAL THERAPY 1/> REGIONS: CPT
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
## 2019-06-28 ENCOUNTER — HOSPITAL ENCOUNTER (OUTPATIENT)
Dept: PHYSICAL THERAPY | Facility: HOSPITAL | Age: 34
Setting detail: THERAPIES SERIES
Discharge: HOME | End: 2019-06-28

## 2019-06-28 ENCOUNTER — OFFICE VISIT (OUTPATIENT)
Dept: SPORTS MEDICINE | Facility: CLINIC | Age: 34
End: 2019-06-28

## 2019-06-28 VITALS
DIASTOLIC BLOOD PRESSURE: 80 MMHG | SYSTOLIC BLOOD PRESSURE: 115 MMHG | OXYGEN SATURATION: 99 % | BODY MASS INDEX: 29.77 KG/M2 | WEIGHT: 168 LBS | HEART RATE: 69 BPM | HEIGHT: 63 IN

## 2019-06-28 DIAGNOSIS — M79.671 RIGHT FOOT PAIN: Primary | ICD-10-CM

## 2019-06-28 DIAGNOSIS — M76.71 PERONEAL TENDONITIS OF RIGHT LOWER LEG: Primary | ICD-10-CM

## 2019-06-28 DIAGNOSIS — R26.2 DIFFICULTY WALKING: ICD-10-CM

## 2019-06-28 PROCEDURE — 97530 THERAPEUTIC ACTIVITIES: CPT | Performed by: PHYSICAL THERAPIST

## 2019-06-28 PROCEDURE — 97140 MANUAL THERAPY 1/> REGIONS: CPT | Performed by: PHYSICAL THERAPIST

## 2019-06-28 PROCEDURE — 99213 OFFICE O/P EST LOW 20 MIN: CPT | Performed by: FAMILY MEDICINE

## 2019-06-28 RX ORDER — MELOXICAM 15 MG/1
TABLET ORAL
COMMUNITY
Start: 2019-05-21 | End: 2019-09-18
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
## 2019-06-28 NOTE — PROGRESS NOTES
"Liz is a 33 y.o. year old female follow up on a problem familiar to this examiner.     Chief Complaint   Patient presents with   • Follow-up     4 week f/u  right foot  pt stats pain and mobilite is the same        History of Present Illness  HPI   Here to f/u on R foot peroneal tendonitis/tendinopathy. Unfortunately not chagned much with PT, continued mild sharp pain worse with standing all day at work.     I have reviewed the patient's medical, family, and social history in detail and updated the computerized patient record.    Review of Systems   Constitutional: Negative.        /80 (BP Location: Left arm, Patient Position: Sitting, Cuff Size: Adult)   Pulse 69   Ht 160 cm (62.99\")   Wt 76.2 kg (168 lb)   SpO2 99%   BMI 29.77 kg/m²      Physical Exam    Vital signs reviewed.   General: No acute distress.  Eyes: conjunctiva clear; pupils equally round and reactive  ENT: external ears and nose atraumatic; oropharynx clear  CV: no peripheral edema, 2+ distal pulses  Resp: normal respiratory effort, no use of accessory muscles  Skin: no rashes or wounds; normal turgor  Psych: mood and affect appropriate; recent and remote memory intact  Neuro: sensation to light touch intact    MSK Exam:  Ortho Exam  R foot: Normal appearance. TTP peroneal insertion at 5th MT and along the longus to the plantar surface. Normal ROM and strength without pain.     MSKUS shows mild synovitis around longus.       Diagnoses and all orders for this visit:    Peroneal tendonitis of right lower leg    Other orders  -     meloxicam (MOBIC) 15 MG tablet      Will have her use a fracture boot for 4 weeks to try to allow the overuse/inflammatory component to improve and hope her PT will become more effective with that. If she does not respond then PRP is probably our next best step.     EMR Dragon/Transcription disclaimer:    Much of this encounter note is an electronic transcription/translation of spoken language to printed text.  " The electronic translation of spoken language may permit erroneous, or at times, nonsensical words or phrases to be inadvertently transcribed.  Although I have reviewed the note for such errors some may still exist.

## 2019-07-01 ENCOUNTER — APPOINTMENT (OUTPATIENT)
Dept: PHYSICAL THERAPY | Facility: HOSPITAL | Age: 34
End: 2019-07-01

## 2019-07-03 ENCOUNTER — DOCUMENTATION (OUTPATIENT)
Dept: PHYSICAL THERAPY | Facility: HOSPITAL | Age: 34
End: 2019-07-03

## 2019-07-03 ENCOUNTER — APPOINTMENT (OUTPATIENT)
Dept: PHYSICAL THERAPY | Facility: HOSPITAL | Age: 34
End: 2019-07-03

## 2019-07-03 DIAGNOSIS — R26.2 DIFFICULTY WALKING: ICD-10-CM

## 2019-07-03 DIAGNOSIS — M79.671 RIGHT FOOT PAIN: Primary | ICD-10-CM

## 2019-07-03 NOTE — THERAPY DISCHARGE NOTE
Outpatient Physical Therapy Discharge Summary         Patient Name: Liz Bagley  : 1985  MRN: [id number]    Today's Date: 7/3/2019    Visit Dx:    ICD-10-CM ICD-9-CM   1. Right foot pain M79.671 729.5   2. Difficulty walking R26.2 719.7       PT OP Goals     Row Name 19 1000          PT Short Term Goals    STG 1  Pt will report pain rated 5/10 at worst at the end of the work day in order to demonstrate ability to return to normalized ADLs and functional activities.  -CA     STG 1 Progress  Not Met  -CA     STG 2  Pt will be independent with initial HEP for symptom management.  -CA     STG 2 Progress  Met  -CA        Long Term Goals    LTG 1  Pt will be independent and compliant with advanced HEP for long term management of symptoms and prevention of future occurrence.  -CA     LTG 1 Progress  Not Met  -CA     LTG 2  Pt will reduce level of perceived disability as measured by the LEFS  to 90% in order to improve QOL.  -CA     LTG 2 Progress  Not Met  -CA     LTG 3  Pt will demonstrate improved R ankle eversion AROM to 15 deg in order to demonstrates equal ROM B.   -CA     LTG 3 Progress  Met  -CA     LTG 4  Pt will be able to return to modified work out routine in order to demonstrate return to PLOF.   -CA     LTG 4 Progress  Not Met  -CA       User Key  (r) = Recorded By, (t) = Taken By, (c) = Cosigned By    Initials Name Provider Type    Carissa Milan, PT Physical Therapist          OP PT Discharge Summary  Date of Discharge: 19  Reason for Discharge: Unable to pay/insurance denied care  Outcomes Achieved: Patient able to partially acheive established goals  Discharge Destination: Home with home program  Discharge Instructions/Additional Comments: Ms. Bagley was seen for 4 skilled PT visit since initial eval. During which time, pt saw minimal progress in pain. Pt was frustrated with lack of progress, and per MD note in chart plan was to place pt in walking boot and continue with  skilled PT to address pain. Pt arrived to session today and determined that she could no longer afford her co-pay, thus requesting to be DC'd from skilled PT services at this time.       Time Calculation:                    Carissa Beckwith, PT  7/3/2019

## 2019-07-08 ENCOUNTER — APPOINTMENT (OUTPATIENT)
Dept: PHYSICAL THERAPY | Facility: HOSPITAL | Age: 34
End: 2019-07-08

## 2019-07-09 ENCOUNTER — OFFICE VISIT (OUTPATIENT)
Dept: ONCOLOGY | Facility: CLINIC | Age: 34
End: 2019-07-09

## 2019-07-09 ENCOUNTER — LAB (OUTPATIENT)
Dept: LAB | Facility: HOSPITAL | Age: 34
End: 2019-07-09

## 2019-07-09 VITALS
HEIGHT: 65 IN | HEART RATE: 72 BPM | WEIGHT: 273 LBS | RESPIRATION RATE: 16 BRPM | DIASTOLIC BLOOD PRESSURE: 82 MMHG | SYSTOLIC BLOOD PRESSURE: 126 MMHG | OXYGEN SATURATION: 95 % | TEMPERATURE: 98.4 F | BODY MASS INDEX: 45.48 KG/M2

## 2019-07-09 DIAGNOSIS — D50.9 IRON DEFICIENCY ANEMIA, UNSPECIFIED IRON DEFICIENCY ANEMIA TYPE: ICD-10-CM

## 2019-07-09 DIAGNOSIS — D72.829 LEUKOCYTOSIS, UNSPECIFIED TYPE: ICD-10-CM

## 2019-07-09 DIAGNOSIS — D50.9 IRON DEFICIENCY ANEMIA, UNSPECIFIED IRON DEFICIENCY ANEMIA TYPE: Primary | ICD-10-CM

## 2019-07-09 LAB
BASOPHILS # BLD AUTO: 0.07 10*3/MM3 (ref 0–0.2)
BASOPHILS NFR BLD AUTO: 0.6 % (ref 0–1.5)
DEPRECATED RDW RBC AUTO: 41.8 FL (ref 37–54)
EOSINOPHIL # BLD AUTO: 0.4 10*3/MM3 (ref 0–0.4)
EOSINOPHIL NFR BLD AUTO: 3.2 % (ref 0.3–6.2)
ERYTHROCYTE [DISTWIDTH] IN BLOOD BY AUTOMATED COUNT: 13 % (ref 12.3–15.4)
FERRITIN SERPL-MCNC: 60.2 NG/ML (ref 11–207)
HCT VFR BLD AUTO: 38.2 % (ref 34–46.6)
HGB BLD-MCNC: 12.3 G/DL (ref 12–15.9)
IMM GRANULOCYTES # BLD AUTO: 0.03 10*3/MM3 (ref 0–0.05)
IMM GRANULOCYTES NFR BLD AUTO: 0.2 % (ref 0–0.5)
IRON 24H UR-MRATE: 50 MCG/DL (ref 37–145)
IRON SATN MFR SERPL: 12 % (ref 14–48)
LYMPHOCYTES # BLD AUTO: 3.8 10*3/MM3 (ref 0.7–3.1)
LYMPHOCYTES NFR BLD AUTO: 30 % (ref 19.6–45.3)
MCH RBC QN AUTO: 28.3 PG (ref 26.6–33)
MCHC RBC AUTO-ENTMCNC: 32.2 G/DL (ref 31.5–35.7)
MCV RBC AUTO: 88 FL (ref 79–97)
MONOCYTES # BLD AUTO: 0.56 10*3/MM3 (ref 0.1–0.9)
MONOCYTES NFR BLD AUTO: 4.4 % (ref 5–12)
NEUTROPHILS # BLD AUTO: 7.8 10*3/MM3 (ref 1.7–7)
NEUTROPHILS NFR BLD AUTO: 61.6 % (ref 42.7–76)
NRBC BLD AUTO-RTO: 0 /100 WBC (ref 0–0.2)
PLATELET # BLD AUTO: 443 10*3/MM3 (ref 140–450)
PMV BLD AUTO: 9.4 FL (ref 6–12)
RBC # BLD AUTO: 4.34 10*6/MM3 (ref 3.77–5.28)
TIBC SERPL-MCNC: 434 MCG/DL (ref 249–505)
TRANSFERRIN SERPL-MCNC: 310 MG/DL (ref 200–360)
WBC NRBC COR # BLD: 12.66 10*3/MM3 (ref 3.4–10.8)

## 2019-07-09 PROCEDURE — 82728 ASSAY OF FERRITIN: CPT | Performed by: INTERNAL MEDICINE

## 2019-07-09 PROCEDURE — 85025 COMPLETE CBC W/AUTO DIFF WBC: CPT | Performed by: INTERNAL MEDICINE

## 2019-07-09 PROCEDURE — 83540 ASSAY OF IRON: CPT | Performed by: INTERNAL MEDICINE

## 2019-07-09 PROCEDURE — 36415 COLL VENOUS BLD VENIPUNCTURE: CPT | Performed by: INTERNAL MEDICINE

## 2019-07-09 PROCEDURE — 84466 ASSAY OF TRANSFERRIN: CPT | Performed by: INTERNAL MEDICINE

## 2019-07-09 PROCEDURE — 99214 OFFICE O/P EST MOD 30 MIN: CPT | Performed by: INTERNAL MEDICINE
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
## 2019-07-10 ENCOUNTER — APPOINTMENT (OUTPATIENT)
Dept: PHYSICAL THERAPY | Facility: HOSPITAL | Age: 34
End: 2019-07-10

## 2019-07-15 ENCOUNTER — APPOINTMENT (OUTPATIENT)
Dept: PHYSICAL THERAPY | Facility: HOSPITAL | Age: 34
End: 2019-07-15

## 2019-07-16 DIAGNOSIS — F51.01 PRIMARY INSOMNIA: ICD-10-CM

## 2019-07-16 RX ORDER — PROCHLORPERAZINE MALEATE 5 MG/1
5 TABLET ORAL ONCE
Status: CANCELLED
Start: 2019-07-24 | End: 2019-07-24

## 2019-07-16 RX ORDER — SODIUM CHLORIDE 9 MG/ML
250 INJECTION, SOLUTION INTRAVENOUS ONCE
Status: CANCELLED | OUTPATIENT
Start: 2019-07-17

## 2019-07-16 RX ORDER — PROCHLORPERAZINE MALEATE 5 MG/1
5 TABLET ORAL ONCE
Status: CANCELLED
Start: 2019-07-17 | End: 2019-07-17

## 2019-07-16 RX ORDER — SODIUM CHLORIDE 9 MG/ML
250 INJECTION, SOLUTION INTRAVENOUS ONCE
Status: CANCELLED | OUTPATIENT
Start: 2019-07-24

## 2019-07-17 ENCOUNTER — APPOINTMENT (OUTPATIENT)
Dept: PHYSICAL THERAPY | Facility: HOSPITAL | Age: 34
End: 2019-07-17

## 2019-07-17 ENCOUNTER — INFUSION (OUTPATIENT)
Dept: ONCOLOGY | Facility: HOSPITAL | Age: 34
End: 2019-07-17

## 2019-07-17 VITALS
HEART RATE: 68 BPM | BODY MASS INDEX: 46.12 KG/M2 | DIASTOLIC BLOOD PRESSURE: 69 MMHG | OXYGEN SATURATION: 97 % | RESPIRATION RATE: 14 BRPM | TEMPERATURE: 98.1 F | SYSTOLIC BLOOD PRESSURE: 106 MMHG | WEIGHT: 276.8 LBS

## 2019-07-17 DIAGNOSIS — D50.9 IRON DEFICIENCY ANEMIA, UNSPECIFIED IRON DEFICIENCY ANEMIA TYPE: Primary | ICD-10-CM

## 2019-07-17 DIAGNOSIS — K90.89 POOR IRON ABSORPTION: ICD-10-CM

## 2019-07-17 PROCEDURE — 96374 THER/PROPH/DIAG INJ IV PUSH: CPT | Performed by: INTERNAL MEDICINE

## 2019-07-17 PROCEDURE — 25010000002 FERRIC CARBOXYMALTOSE 750 MG/15ML SOLUTION 15 ML VIAL: Performed by: NURSE PRACTITIONER

## 2019-07-17 PROCEDURE — 63710000001 PROCHLORPERAZINE MALEATE PER 5 MG: Performed by: NURSE PRACTITIONER

## 2019-07-17 RX ORDER — SODIUM CHLORIDE 9 MG/ML
250 INJECTION, SOLUTION INTRAVENOUS ONCE
Status: COMPLETED | OUTPATIENT
Start: 2019-07-17 | End: 2019-07-17

## 2019-07-17 RX ORDER — PROCHLORPERAZINE MALEATE 5 MG/1
5 TABLET ORAL ONCE
Status: COMPLETED | OUTPATIENT
Start: 2019-07-17 | End: 2019-07-17

## 2019-07-17 RX ADMIN — FERRIC CARBOXYMALTOSE INJECTION 750 MG: 50 INJECTION, SOLUTION INTRAVENOUS at 11:28

## 2019-07-17 RX ADMIN — SODIUM CHLORIDE 250 ML: 900 INJECTION, SOLUTION INTRAVENOUS at 11:28

## 2019-07-17 RX ADMIN — PROCHLORPERAZINE MALEATE 5 MG: 5 TABLET ORAL at 11:27

## 2019-07-18 RX ORDER — TRAZODONE HYDROCHLORIDE 50 MG/1
50 TABLET ORAL NIGHTLY
Qty: 30 TABLET | Refills: 1 | Status: SHIPPED | OUTPATIENT
Start: 2019-07-18 | End: 2019-09-18 | Stop reason: SDUPTHER

## 2019-07-22 ENCOUNTER — OFFICE VISIT (OUTPATIENT)
Dept: SPORTS MEDICINE | Facility: CLINIC | Age: 34
End: 2019-07-22

## 2019-07-22 VITALS
OXYGEN SATURATION: 95 % | BODY MASS INDEX: 45.98 KG/M2 | WEIGHT: 276 LBS | HEART RATE: 76 BPM | DIASTOLIC BLOOD PRESSURE: 82 MMHG | HEIGHT: 65 IN | SYSTOLIC BLOOD PRESSURE: 126 MMHG

## 2019-07-22 DIAGNOSIS — M76.71 PERONEAL TENDONITIS OF RIGHT LOWER LEG: Primary | ICD-10-CM

## 2019-07-22 DIAGNOSIS — M72.2 PLANTAR FASCIITIS, LEFT: ICD-10-CM

## 2019-07-22 PROCEDURE — 20550 NJX 1 TENDON SHEATH/LIGAMENT: CPT | Performed by: FAMILY MEDICINE

## 2019-07-22 PROCEDURE — 99214 OFFICE O/P EST MOD 30 MIN: CPT | Performed by: FAMILY MEDICINE

## 2019-07-22 RX ORDER — TRIAMCINOLONE ACETONIDE 40 MG/ML
20 INJECTION, SUSPENSION INTRA-ARTICULAR; INTRAMUSCULAR ONCE
Status: COMPLETED | OUTPATIENT
Start: 2019-07-22 | End: 2019-07-31

## 2019-07-22 NOTE — PROGRESS NOTES
"Liz is a 33 y.o. year old female follow up on a problem familiar to this examiner.     Chief Complaint   Patient presents with   • RT foot pain F/U       History of Present Illness  HPI   Here to follow-up on right foot pain.  Unfortunately she did not tolerate the fracture boot and has returned to regular shoes.  She was unable to continue physical therapy.  She feels like the pain is overall not improving.  Continues to have sharp pain in the lateral aspect of the midfoot, worse with any activity.    In addition to this, she has a new complaint of pain in the left heel which has gradually been worsening for the past month or 2.  Sharp, radiates to the arch, worse with increased weightbearing.    I have reviewed the patient's medical, family, and social history in detail and updated the computerized patient record.    Review of Systems   Constitutional: Negative.    Skin: Negative.    Neurological: Negative.        /82   Pulse 76   Ht 165 cm (64.96\")   Wt 125 kg (276 lb)   SpO2 95%   BMI 45.98 kg/m²      Physical Exam    Vital signs reviewed.   General: No acute distress.  Eyes: conjunctiva clear; pupils equally round and reactive  ENT: external ears and nose atraumatic; oropharynx clear  CV: no peripheral edema, 2+ distal pulses  Resp: normal respiratory effort, no use of accessory muscles  Skin: no rashes or wounds; normal turgor  Psych: mood and affect appropriate; recent and remote memory intact  Neuro: sensation to light touch intact    MSK Exam:  Ortho Exam  Right foot: Normal appearance.  There is tenderness to palpation just proximal to the base of the fifth metatarsal, slightly extending to the plantar surface of the foot.  Normal range of motion.  Normal strength.  There is pain with inversion/supination stretch.    Left foot: Normal appearance.  There is tenderness palpation on the plantar fascial origin of the calcaneus.  No ligamentous laxity.  Negative cranial squeeze.    Procedure note: " Ultrasound-guided peroneal tendon sheath injection  We discussed indications for the procedure as well as risks, benefits, and alternatives.  Verbal consent was verified. Procedure was performed by physician.  The distal aspect of the peroneous  brevis tendons was identified by ultrasound examination.  Injection site marked and prepped for sterile technique.  Under ultrasound guidance a mixture of 1 mL 2% lidocaine and 0.5 mL Kenalog was injected with a 27-gauge needle to the soft tissues overlying the distal Allyssa us brevis tendon.  I could not reliably visualize the peroneus longus tendon as it courses to the plantar aspect of the foot, but I did try to advance the needle near the peroneus longus and administer some of the injectate there as well.  Procedure is tolerated well without sign of complication.    Diagnoses and all orders for this visit:    Peroneal tendonitis of right lower leg  -     triamcinolone acetonide (KENALOG-40) injection 20 mg    Plantar fasciitis, left    Discussed treatment options again with the patient.  We again reviewed her MRI which shows peroneal tendinosis more proximally at the level of the ankle; I suspect this ongoing pain distally is either an associated inflammatory pain or evidence of tendinosis not visible by imaging.  Given her level of pain and upcoming travel, I agreed to try a low-dose steroid injection which was tolerated well as documented above.  We did discuss the pros and cons of steroid injections for such injuries, explaining that she will hopefully get short-term pain relief but may not have long-term improvement and in some cases steroid injections can make tendon injuries worsen over time.    Discussed the nature of plantar fasciitis and home exercise strategies for the left foot including stretching, rolling on a frozen water bottle, intrinsic foot muscle strengthening, use of a night splint, etc.      EMR Dragon/Transcription disclaimer:    Much of this  encounter note is an electronic transcription/translation of spoken language to printed text.  The electronic translation of spoken language may permit erroneous, or at times, nonsensical words or phrases to be inadvertently transcribed.  Although I have reviewed the note for such errors some may still exist.

## 2019-07-24 ENCOUNTER — INFUSION (OUTPATIENT)
Dept: ONCOLOGY | Facility: HOSPITAL | Age: 34
End: 2019-07-24

## 2019-07-24 VITALS
WEIGHT: 271.8 LBS | RESPIRATION RATE: 16 BRPM | TEMPERATURE: 98.6 F | BODY MASS INDEX: 45.28 KG/M2 | OXYGEN SATURATION: 96 % | SYSTOLIC BLOOD PRESSURE: 111 MMHG | DIASTOLIC BLOOD PRESSURE: 70 MMHG | HEART RATE: 75 BPM

## 2019-07-24 DIAGNOSIS — K90.89 POOR IRON ABSORPTION: ICD-10-CM

## 2019-07-24 DIAGNOSIS — D50.9 IRON DEFICIENCY ANEMIA, UNSPECIFIED IRON DEFICIENCY ANEMIA TYPE: Primary | ICD-10-CM

## 2019-07-24 PROCEDURE — 63710000001 PROCHLORPERAZINE MALEATE PER 5 MG: Performed by: NURSE PRACTITIONER

## 2019-07-24 PROCEDURE — 96374 THER/PROPH/DIAG INJ IV PUSH: CPT | Performed by: INTERNAL MEDICINE

## 2019-07-24 PROCEDURE — 25010000002 FERRIC CARBOXYMALTOSE 750 MG/15ML SOLUTION 15 ML VIAL: Performed by: NURSE PRACTITIONER

## 2019-07-24 RX ORDER — SODIUM CHLORIDE 9 MG/ML
250 INJECTION, SOLUTION INTRAVENOUS ONCE
Status: COMPLETED | OUTPATIENT
Start: 2019-07-24 | End: 2019-07-24

## 2019-07-24 RX ORDER — PROCHLORPERAZINE MALEATE 5 MG/1
5 TABLET ORAL ONCE
Status: COMPLETED | OUTPATIENT
Start: 2019-07-24 | End: 2019-07-24

## 2019-07-24 RX ADMIN — FERRIC CARBOXYMALTOSE INJECTION 750 MG: 50 INJECTION, SOLUTION INTRAVENOUS at 11:08

## 2019-07-24 RX ADMIN — PROCHLORPERAZINE MALEATE 5 MG: 5 TABLET ORAL at 11:01

## 2019-07-24 RX ADMIN — SODIUM CHLORIDE 250 ML: 9 INJECTION, SOLUTION INTRAVENOUS at 11:01

## 2019-07-31 RX ADMIN — TRIAMCINOLONE ACETONIDE 20 MG: 40 INJECTION, SUSPENSION INTRA-ARTICULAR; INTRAMUSCULAR at 08:43

## 2019-09-05 DIAGNOSIS — F41.8 DEPRESSION WITH ANXIETY: ICD-10-CM

## 2019-09-05 RX ORDER — BUPROPION HYDROCHLORIDE 100 MG/1
100 TABLET ORAL 2 TIMES DAILY
Qty: 60 TABLET | Refills: 2 | OUTPATIENT
Start: 2019-09-05

## 2019-09-06 DIAGNOSIS — F41.8 DEPRESSION WITH ANXIETY: ICD-10-CM

## 2019-09-06 RX ORDER — BUPROPION HYDROCHLORIDE 100 MG/1
100 TABLET ORAL 2 TIMES DAILY
Qty: 60 TABLET | Refills: 2 | Status: SHIPPED | OUTPATIENT
Start: 2019-09-06 | End: 2020-01-02

## 2019-09-18 ENCOUNTER — OFFICE VISIT (OUTPATIENT)
Dept: INTERNAL MEDICINE | Facility: CLINIC | Age: 34
End: 2019-09-18

## 2019-09-18 VITALS
WEIGHT: 281 LBS | SYSTOLIC BLOOD PRESSURE: 125 MMHG | OXYGEN SATURATION: 96 % | HEIGHT: 65 IN | HEART RATE: 81 BPM | BODY MASS INDEX: 46.82 KG/M2 | RESPIRATION RATE: 16 BRPM | TEMPERATURE: 98.7 F | DIASTOLIC BLOOD PRESSURE: 85 MMHG

## 2019-09-18 DIAGNOSIS — I10 BENIGN ESSENTIAL HYPERTENSION: ICD-10-CM

## 2019-09-18 DIAGNOSIS — F41.8 DEPRESSION WITH ANXIETY: ICD-10-CM

## 2019-09-18 DIAGNOSIS — K51.918 ULCERATIVE COLITIS, CHRONIC, OTHER COMPLICATION (HCC): ICD-10-CM

## 2019-09-18 DIAGNOSIS — F51.01 PRIMARY INSOMNIA: ICD-10-CM

## 2019-09-18 DIAGNOSIS — E55.9 HYPOVITAMINOSIS D: Primary | ICD-10-CM

## 2019-09-18 PROCEDURE — 99214 OFFICE O/P EST MOD 30 MIN: CPT | Performed by: NURSE PRACTITIONER

## 2019-09-18 RX ORDER — TRAZODONE HYDROCHLORIDE 50 MG/1
50 TABLET ORAL NIGHTLY
Qty: 90 TABLET | Refills: 2 | Status: SHIPPED | OUTPATIENT
Start: 2019-09-18 | End: 2020-06-29 | Stop reason: SDUPTHER

## 2019-09-18 NOTE — PROGRESS NOTES
Subjective   Liz Bagley is a 33 y.o. female.     CC: Establish care, hypertension, vitamin D deficiency, depression and anxiety    Patient presents to transfer care and for six-month follow-up.  This is a 33-year-old female former patient of Dr. Maldonado.    She has a history of hypertension.  Well controlled today at 125/85.  She takes labetalol 200 mg every 12 hours.  She reports excellent compliance and toleration with this medication.    Her vitamin D deficiency she takes vitamin D 50,000 units weekly.  She reports that she does miss doses of this sometimes.    She has a history of ulcerative colitis.  She has followed with Dr. Elizabeth Evans, colorectal surgery for this.  She does have an ileostomy.    She has a history of depression and anxiety.  She takes Cymbalta 60 mg daily and Wellbutrin 100 mg twice daily.  She reports excellent compliance and toleration of these medications.  She denies SI or HI and states that these medications are working very well for her.    She has a history of insomnia.  At her previous visit she stated that Ambien was not working well for her and we transitioned her to trazodone 50 mg nightly.  She states that this is working very well for her and she is getting a full night sleep consistently every night.    She has a gynecologist, Dr. Alaniz, but states that she is overdue for her Pap smear and needs to make a follow-up appointment with GYN.    She follows with Dr. Bass, hematology for iron deficiency anemia.  She has upcoming labs on 10/2/2019 with hematology.    She denies development of any other new issues today.         The following portions of the patient's history were reviewed and updated as appropriate: allergies, current medications, past family history, past medical history, past social history, past surgical history and problem list.    Review of Systems   Constitutional: Negative for activity change, chills, fatigue, fever, unexpected weight gain and  "unexpected weight loss.   HENT: Negative for congestion, hearing loss, postnasal drip, sinus pressure, sneezing, sore throat, swollen glands and tinnitus.    Eyes: Negative for photophobia, pain and visual disturbance.   Respiratory: Negative for cough, chest tightness, shortness of breath and wheezing.    Cardiovascular: Negative for chest pain, palpitations and leg swelling.   Gastrointestinal: Negative for abdominal distention, abdominal pain, constipation, diarrhea, nausea and vomiting.   Endocrine: Negative for polydipsia, polyphagia and polyuria.   Genitourinary: Negative for dysuria, frequency, hematuria and urgency.   Neurological: Negative for dizziness, weakness, numbness and headache.   All other systems reviewed and are negative.      Objective    /85 (BP Location: Left arm, Patient Position: Sitting, Cuff Size: Adult)   Pulse 81   Temp 98.7 °F (37.1 °C) (Oral)   Resp 16   Ht 165 cm (64.96\")   Wt 127 kg (281 lb)   SpO2 96%   BMI 46.82 kg/m²     Physical Exam   Constitutional: She is oriented to person, place, and time. She appears well-developed and well-nourished. No distress.   HENT:   Head: Normocephalic and atraumatic.   Right Ear: External ear normal.   Left Ear: External ear normal.   Nose: Nose normal.   Mouth/Throat: Oropharynx is clear and moist.   Eyes: EOM are normal. Pupils are equal, round, and reactive to light.   Neck: Normal range of motion. Neck supple. No JVD present. No tracheal deviation present. No thyromegaly present.   No carotid bruits auscultated.   Cardiovascular: Normal rate, regular rhythm, normal heart sounds and intact distal pulses. Exam reveals no gallop and no friction rub.   No murmur heard.  Posterior tib and pedal pulses 2+ and equal bilaterally.  No peripheral edema.   Pulmonary/Chest: Effort normal and breath sounds normal. No stridor. No respiratory distress. She has no wheezes. She has no rales. She exhibits no tenderness.   Lungs are CTA bilaterally "   Abdominal: Soft. Bowel sounds are normal. She exhibits no distension. There is no tenderness.   Musculoskeletal: Normal range of motion.   Lymphadenopathy:     She has no cervical adenopathy.   Neurological: She is alert and oriented to person, place, and time.   Skin: Skin is warm and dry. Capillary refill takes less than 2 seconds. She is not diaphoretic.   Psychiatric: She has a normal mood and affect. Her behavior is normal. Judgment and thought content normal.   Nursing note and vitals reviewed.    Current outpatient and discharge medications have been reconciled for the patient.  Reviewed by: ELEN Hayes      Assessment/Plan   Liz was seen today for follow-up.    Diagnoses and all orders for this visit:    Hypovitaminosis D  -     Vitamin D 25 Hydroxy    Primary insomnia  -     traZODone (DESYREL) 50 MG tablet; Take 1 tablet by mouth Every Night.    Benign essential hypertension  -     Comprehensive metabolic panel  -     Lipid panel    Ulcerative colitis, chronic, other complication (CMS/HCC)    Depression with anxiety      -Hypovitaminosis D: We will check a vitamin D level today.  Continue vitamin D 50,000 units weekly we will adjust if needed based on labs.    Hypertension: Well controlled today at 125/85, continue labetalol.    Ulcerative colitis: Follows with Dr. Elizabeth Evans for maintenance of her ileostomy.  No new issues at this time.    Depression and anxiety: Stable with Wellbutrin and Cymbalta, continue current therapy.    Insomnia: This is stable with the trazodone 50 mg nightly, continue current therapy.    I have asked her to make a follow-up appointment with her gynecologist.  She is due for her Pap smear.    She is following with Dr. Bass for VAMSHI and will get labs on 10-19.  We will get a CMP, lipid panel and vitamin D level today.    We will contact patient with results of her labs and any further recommendations.  Follow-up PRN and in 6 months for routine health maintenance and  follow-up on chronic conditions.

## 2019-09-19 ENCOUNTER — TELEPHONE (OUTPATIENT)
Dept: INTERNAL MEDICINE | Facility: CLINIC | Age: 34
End: 2019-09-19

## 2019-09-19 LAB
25(OH)D3+25(OH)D2 SERPL-MCNC: 11.8 NG/ML (ref 30–100)
ALBUMIN SERPL-MCNC: 4.4 G/DL (ref 3.5–5.2)
ALBUMIN/GLOB SERPL: 1.6 G/DL
ALP SERPL-CCNC: 89 U/L (ref 39–117)
ALT SERPL-CCNC: 18 U/L (ref 1–33)
AST SERPL-CCNC: 13 U/L (ref 1–32)
BILIRUB SERPL-MCNC: 0.2 MG/DL (ref 0.2–1.2)
BUN SERPL-MCNC: 10 MG/DL (ref 6–20)
BUN/CREAT SERPL: 18.9 (ref 7–25)
CALCIUM SERPL-MCNC: 9.3 MG/DL (ref 8.6–10.5)
CHLORIDE SERPL-SCNC: 100 MMOL/L (ref 98–107)
CHOLEST SERPL-MCNC: 151 MG/DL (ref 0–200)
CO2 SERPL-SCNC: 24.2 MMOL/L (ref 22–29)
CREAT SERPL-MCNC: 0.53 MG/DL (ref 0.57–1)
GLOBULIN SER CALC-MCNC: 2.8 GM/DL
GLUCOSE SERPL-MCNC: 95 MG/DL (ref 65–99)
HDLC SERPL-MCNC: 67 MG/DL (ref 40–60)
LDLC SERPL CALC-MCNC: 73 MG/DL (ref 0–100)
POTASSIUM SERPL-SCNC: 4.6 MMOL/L (ref 3.5–5.2)
PROT SERPL-MCNC: 7.2 G/DL (ref 6–8.5)
SODIUM SERPL-SCNC: 138 MMOL/L (ref 136–145)
TRIGL SERPL-MCNC: 53 MG/DL (ref 0–150)
VLDLC SERPL CALC-MCNC: 10.6 MG/DL

## 2019-09-19 RX ORDER — ERGOCALCIFEROL 1.25 MG/1
50000 CAPSULE ORAL
Qty: 24 CAPSULE | Refills: 0 | Status: SHIPPED | OUTPATIENT
Start: 2019-09-19 | End: 2020-02-04 | Stop reason: SDUPTHER

## 2019-09-19 RX ORDER — MELATONIN 10 MG
1 TABLET, SUBLINGUAL SUBLINGUAL WEEKLY
Qty: 12 TABLET | Refills: 2 | Status: SHIPPED | OUTPATIENT
Start: 2019-09-19 | End: 2019-09-19 | Stop reason: ALTCHOICE

## 2019-09-19 NOTE — PROGRESS NOTES
Please notify patient that her metabolic panel looks stable and cholesterol looks perfect.  Vitamin D is very low at 11.8.  Please have her consistently get back on her vitamin D 10,000 units weekly.  Please let me know if she needs a refill of this.

## 2019-09-19 NOTE — TELEPHONE ENCOUNTER
----- Message from ELEN Hayes sent at 9/19/2019  7:22 AM EDT -----  Please notify patient that her metabolic panel looks stable and cholesterol looks perfect.  Vitamin D is very low at 11.8.  Please have her consistently get back on her vitamin D 10,000 units weekly.  Please let me know if she needs a refill of this.

## 2019-10-02 ENCOUNTER — LAB (OUTPATIENT)
Dept: LAB | Facility: HOSPITAL | Age: 34
End: 2019-10-02

## 2019-10-02 ENCOUNTER — CLINICAL SUPPORT (OUTPATIENT)
Dept: ONCOLOGY | Facility: HOSPITAL | Age: 34
End: 2019-10-02

## 2019-10-02 VITALS
OXYGEN SATURATION: 95 % | SYSTOLIC BLOOD PRESSURE: 109 MMHG | DIASTOLIC BLOOD PRESSURE: 75 MMHG | HEART RATE: 67 BPM | TEMPERATURE: 98.7 F

## 2019-10-02 DIAGNOSIS — D50.9 IRON DEFICIENCY ANEMIA, UNSPECIFIED IRON DEFICIENCY ANEMIA TYPE: ICD-10-CM

## 2019-10-02 LAB
ALBUMIN SERPL-MCNC: 4 G/DL (ref 3.5–5.2)
ALBUMIN/GLOB SERPL: 1.3 G/DL (ref 1.1–2.4)
ALP SERPL-CCNC: 70 U/L (ref 38–116)
ALT SERPL W P-5'-P-CCNC: 19 U/L (ref 0–33)
ANION GAP SERPL CALCULATED.3IONS-SCNC: 11.6 MMOL/L (ref 5–15)
AST SERPL-CCNC: 12 U/L (ref 0–32)
BASOPHILS # BLD AUTO: 0.08 10*3/MM3 (ref 0–0.2)
BASOPHILS NFR BLD AUTO: 0.8 % (ref 0–1.5)
BILIRUB SERPL-MCNC: 0.2 MG/DL (ref 0.2–1.2)
BUN BLD-MCNC: 14 MG/DL (ref 6–20)
BUN/CREAT SERPL: 26.9 (ref 7.3–30)
CALCIUM SPEC-SCNC: 9.3 MG/DL (ref 8.5–10.2)
CHLORIDE SERPL-SCNC: 102 MMOL/L (ref 98–107)
CO2 SERPL-SCNC: 26.4 MMOL/L (ref 22–29)
CREAT BLD-MCNC: 0.52 MG/DL (ref 0.6–1.1)
DEPRECATED RDW RBC AUTO: 49.1 FL (ref 37–54)
EOSINOPHIL # BLD AUTO: 0.42 10*3/MM3 (ref 0–0.4)
EOSINOPHIL NFR BLD AUTO: 4.1 % (ref 0.3–6.2)
ERYTHROCYTE [DISTWIDTH] IN BLOOD BY AUTOMATED COUNT: 14.4 % (ref 12.3–15.4)
FERRITIN SERPL-MCNC: 240.9 NG/ML (ref 11–207)
GFR SERPL CREATININE-BSD FRML MDRD: 134 ML/MIN/1.73
GLOBULIN UR ELPH-MCNC: 3.2 GM/DL (ref 1.8–3.5)
GLUCOSE BLD-MCNC: 94 MG/DL (ref 74–124)
HCT VFR BLD AUTO: 40.3 % (ref 34–46.6)
HGB BLD-MCNC: 12.9 G/DL (ref 12–15.9)
IMM GRANULOCYTES # BLD AUTO: 0.03 10*3/MM3 (ref 0–0.05)
IMM GRANULOCYTES NFR BLD AUTO: 0.3 % (ref 0–0.5)
IRON 24H UR-MRATE: 69 MCG/DL (ref 37–145)
IRON SATN MFR SERPL: 18 % (ref 14–48)
LYMPHOCYTES # BLD AUTO: 2.55 10*3/MM3 (ref 0.7–3.1)
LYMPHOCYTES NFR BLD AUTO: 24.8 % (ref 19.6–45.3)
MCH RBC QN AUTO: 30 PG (ref 26.6–33)
MCHC RBC AUTO-ENTMCNC: 32 G/DL (ref 31.5–35.7)
MCV RBC AUTO: 93.7 FL (ref 79–97)
MONOCYTES # BLD AUTO: 0.52 10*3/MM3 (ref 0.1–0.9)
MONOCYTES NFR BLD AUTO: 5.1 % (ref 5–12)
NEUTROPHILS # BLD AUTO: 6.68 10*3/MM3 (ref 1.7–7)
NEUTROPHILS NFR BLD AUTO: 64.9 % (ref 42.7–76)
NRBC BLD AUTO-RTO: 0 /100 WBC (ref 0–0.2)
PLATELET # BLD AUTO: 392 10*3/MM3 (ref 140–450)
PMV BLD AUTO: 9.5 FL (ref 6–12)
POTASSIUM BLD-SCNC: 4.1 MMOL/L (ref 3.5–4.7)
PROT SERPL-MCNC: 7.2 G/DL (ref 6.3–8)
RBC # BLD AUTO: 4.3 10*6/MM3 (ref 3.77–5.28)
SODIUM BLD-SCNC: 140 MMOL/L (ref 134–145)
TIBC SERPL-MCNC: 389 MCG/DL (ref 249–505)
TRANSFERRIN SERPL-MCNC: 278 MG/DL (ref 200–360)
WBC NRBC COR # BLD: 10.28 10*3/MM3 (ref 3.4–10.8)

## 2019-10-02 PROCEDURE — 80053 COMPREHEN METABOLIC PANEL: CPT | Performed by: INTERNAL MEDICINE

## 2019-10-02 PROCEDURE — 83540 ASSAY OF IRON: CPT | Performed by: INTERNAL MEDICINE

## 2019-10-02 PROCEDURE — 85025 COMPLETE CBC W/AUTO DIFF WBC: CPT | Performed by: INTERNAL MEDICINE

## 2019-10-02 PROCEDURE — 36415 COLL VENOUS BLD VENIPUNCTURE: CPT | Performed by: INTERNAL MEDICINE

## 2019-10-02 PROCEDURE — 84466 ASSAY OF TRANSFERRIN: CPT | Performed by: INTERNAL MEDICINE

## 2019-10-02 PROCEDURE — 82728 ASSAY OF FERRITIN: CPT | Performed by: INTERNAL MEDICINE

## 2019-10-02 NOTE — PROGRESS NOTES
Pt is here for lab with RN review.  CBC reviewed with pt, counts are stable for this pt at this time. Pt has no complaints and VSS. Pt will review Iron and Ferritin results in MyChart.   Copy of labs given to pt and f/u appt reviewed. Pt is instructed to call the office with any concerns or new symptoms prior to next visit. Pt vu.   Lab Results   Component Value Date    WBC 10.28 10/02/2019    HGB 12.9 10/02/2019    HCT 40.3 10/02/2019    MCV 93.7 10/02/2019     10/02/2019

## 2019-10-14 RX ORDER — DULOXETIN HYDROCHLORIDE 60 MG/1
120 CAPSULE, DELAYED RELEASE ORAL
Qty: 180 CAPSULE | Refills: 1 | Status: SHIPPED | OUTPATIENT
Start: 2019-10-14 | End: 2020-04-09 | Stop reason: SDUPTHER

## 2020-01-02 DIAGNOSIS — F41.8 DEPRESSION WITH ANXIETY: ICD-10-CM

## 2020-01-03 RX ORDER — BUPROPION HYDROCHLORIDE 100 MG/1
100 TABLET ORAL 2 TIMES DAILY
Qty: 60 TABLET | Refills: 2 | Status: SHIPPED | OUTPATIENT
Start: 2020-01-03 | End: 2020-04-01 | Stop reason: SDUPTHER

## 2020-01-31 ENCOUNTER — RESULTS ENCOUNTER (OUTPATIENT)
Dept: INTERNAL MEDICINE | Facility: CLINIC | Age: 35
End: 2020-01-31

## 2020-01-31 ENCOUNTER — OFFICE VISIT (OUTPATIENT)
Dept: INTERNAL MEDICINE | Facility: CLINIC | Age: 35
End: 2020-01-31

## 2020-01-31 VITALS
TEMPERATURE: 97.6 F | HEART RATE: 84 BPM | OXYGEN SATURATION: 97 % | DIASTOLIC BLOOD PRESSURE: 83 MMHG | BODY MASS INDEX: 48.82 KG/M2 | HEIGHT: 65 IN | SYSTOLIC BLOOD PRESSURE: 123 MMHG | WEIGHT: 293 LBS | RESPIRATION RATE: 16 BRPM

## 2020-01-31 DIAGNOSIS — R51.9 CHRONIC NONINTRACTABLE HEADACHE, UNSPECIFIED HEADACHE TYPE: ICD-10-CM

## 2020-01-31 DIAGNOSIS — R20.2 NUMBNESS AND TINGLING: Primary | ICD-10-CM

## 2020-01-31 DIAGNOSIS — R20.2 NUMBNESS AND TINGLING: ICD-10-CM

## 2020-01-31 DIAGNOSIS — G89.29 CHRONIC NONINTRACTABLE HEADACHE, UNSPECIFIED HEADACHE TYPE: ICD-10-CM

## 2020-01-31 DIAGNOSIS — R20.0 NUMBNESS AND TINGLING: ICD-10-CM

## 2020-01-31 DIAGNOSIS — R20.0 NUMBNESS AND TINGLING: Primary | ICD-10-CM

## 2020-01-31 PROBLEM — J32.1 FRONTAL SINUSITIS: Status: RESOLVED | Noted: 2017-08-04 | Resolved: 2020-01-31

## 2020-01-31 PROBLEM — J32.0 MAXILLARY SINUSITIS: Status: RESOLVED | Noted: 2017-08-04 | Resolved: 2020-01-31

## 2020-01-31 PROBLEM — R49.0 HOARSENESS: Status: RESOLVED | Noted: 2017-08-04 | Resolved: 2020-01-31

## 2020-01-31 PROCEDURE — 99214 OFFICE O/P EST MOD 30 MIN: CPT | Performed by: NURSE PRACTITIONER

## 2020-01-31 NOTE — PROGRESS NOTES
"Subjective   Liz Bagley is a 34 y.o. female.   CC: Numbness/tingling of bilateral hands and forearms, headaches      Patient presents for evaluation of bilateral tingling hands/numbness.  This is a 34-year-old female.  This has been a problem off and on, but has worsened over the past 3 months.  She endorses numbness and tingling of the bilateral hands and at times the forearms, worse in the right than left.  No known mechanism of injury.  She does have some posterior neck pain at times with no known mechanism of injury.  She has chronic headaches, usually occipital.  These come and go but have been \"a bit more frequent lately.\"  She denies any visual changes, shortness of breath or chest discomfort.  She states that she was seen at the [facility] in the past as well as by multiple neurologists for \"borderline Chiari malformation, excess spinal fluid.\"  She denies fever or chills.  She denies development of any other new issues today.       The following portions of the patient's history were reviewed and updated as appropriate: allergies, current medications, past family history, past medical history, past social history, past surgical history and problem list.    Review of Systems   Constitutional: Negative for activity change, chills, fatigue, fever, unexpected weight gain and unexpected weight loss.   HENT: Negative for congestion, hearing loss, postnasal drip, sinus pressure, sneezing, sore throat, swollen glands and tinnitus.    Eyes: Negative for photophobia, pain and visual disturbance.   Respiratory: Negative for cough, chest tightness, shortness of breath and wheezing.    Cardiovascular: Negative for chest pain, palpitations and leg swelling.   Gastrointestinal: Negative for abdominal distention, abdominal pain, constipation, diarrhea, nausea and vomiting.   Endocrine: Negative for polydipsia, polyphagia and polyuria.   Genitourinary: Negative for dysuria, frequency, hematuria and urgency. " "  Neurological: Positive for numbness (  With tingling, bilateral forearms and hands, right worse than left) and headache. Negative for dizziness and weakness.   All other systems reviewed and are negative.      Objective    /83 (BP Location: Left arm, Patient Position: Sitting, Cuff Size: Adult)   Pulse 84   Temp 97.6 °F (36.4 °C) (Oral)   Resp 16   Ht 165 cm (64.96\")   Wt 133 kg (294 lb)   SpO2 97%   BMI 48.98 kg/m²     Physical Exam   Constitutional: She is oriented to person, place, and time. She appears well-developed and well-nourished. No distress.   HENT:   Head: Normocephalic and atraumatic.   Eyes: Pupils are equal, round, and reactive to light. EOM are normal.   Neck: Normal range of motion. Neck supple. No JVD present. No tracheal deviation present. No thyromegaly present.   No carotid bruits auscultated   Cardiovascular: Normal rate, regular rhythm, normal heart sounds and intact distal pulses. Exam reveals no gallop and no friction rub.   No murmur heard.  No peripheral pitting edema.  Posterior tib pulses 2+ and equal bilaterally.   Pulmonary/Chest: Effort normal and breath sounds normal.   Lungs are CTA bilaterally   Abdominal: Soft. Bowel sounds are normal. She exhibits no distension. There is no tenderness.   Musculoskeletal: Normal range of motion.   Lymphadenopathy:     She has no cervical adenopathy.   Neurological: She is alert and oriented to person, place, and time. She displays normal reflexes. No cranial nerve deficit or sensory deficit. She exhibits normal muscle tone. Coordination normal.   , pushes, pulls equal bilaterally.  PERRLA.  No focal neurologic deficit.   Skin: Skin is warm and dry. Capillary refill takes less than 2 seconds. She is not diaphoretic.   Psychiatric: She has a normal mood and affect. Her behavior is normal. Judgment and thought content normal.   Nursing note and vitals reviewed.    Current outpatient and discharge medications have been reconciled " "for the patient.  Reviewed by: ELEN Hayes      Assessment/Plan   Liz was seen today for tingling and numbness.    Diagnoses and all orders for this visit:    Numbness and tingling  -     T4, Free; Future  -     TSH; Future  -     Vitamin D 25 Hydroxy; Future  -     CBC & Differential; Future  -     Comprehensive metabolic panel; Future  -     Vitamin B12; Future  -     Ferritin; Future  -     Iron Profile; Future  -     Folate; Future  -     Ambulatory Referral to Neurology    Chronic nonintractable headache, unspecified headache type  -     T4, Free; Future  -     TSH; Future  -     Vitamin D 25 Hydroxy; Future  -     CBC & Differential; Future  -     Comprehensive metabolic panel; Future  -     Vitamin B12; Future  -     Ferritin; Future  -     Iron Profile; Future  -     Folate; Future  -     Ambulatory Referral to Neurology      -Due to the longevity of her symptoms and the fact that they have worsened over the past 3 months, I believe a neurology referral is needed.  She endorses a history of \"borderline Chiari malformation and excess spinal fluid.\"  We will get her to Dr. Fatemeh Palm for further evaluation and treatment.    -We will get labs today to rule out a secondary cause of the tingling in her hands including iron profile, folate, ferritin, B12, CMP, CBC, vitamin D, TSH and free T4.    -She does follow-up with Dr. Bass, hematology for iron deficiency anemia and she plans to make a follow-up appointment soon as she missed her lab appointment last week.    -We will contact patient with the results of her labs and any further recommendations.  Follow-up PRN and I will see her back at her neck scheduled appointment in March 2020.         "

## 2020-02-03 LAB
25(OH)D3 SERPL-MCNC: 14.1 NG/ML (ref 30–100)
ALBUMIN SERPL-MCNC: 4.2 G/DL (ref 3.5–5.2)
ALBUMIN/GLOB SERPL: 1.3 G/DL
ALP SERPL-CCNC: 73 U/L (ref 39–117)
ALT SERPL W P-5'-P-CCNC: 15 U/L (ref 1–33)
ANION GAP SERPL CALCULATED.3IONS-SCNC: 14.1 MMOL/L (ref 5–15)
AST SERPL-CCNC: 16 U/L (ref 1–32)
BASOPHILS # BLD AUTO: 0.09 10*3/MM3 (ref 0–0.2)
BASOPHILS NFR BLD AUTO: 0.6 % (ref 0–1.5)
BILIRUB SERPL-MCNC: <0.2 MG/DL (ref 0.2–1.2)
BUN BLD-MCNC: 14 MG/DL (ref 6–20)
BUN/CREAT SERPL: 25.5 (ref 7–25)
CALCIUM SPEC-SCNC: 9.4 MG/DL (ref 8.6–10.5)
CHLORIDE SERPL-SCNC: 102 MMOL/L (ref 98–107)
CO2 SERPL-SCNC: 22.9 MMOL/L (ref 22–29)
CREAT BLD-MCNC: 0.55 MG/DL (ref 0.57–1)
DEPRECATED RDW RBC AUTO: 38.1 FL (ref 37–54)
EOSINOPHIL # BLD AUTO: 0.42 10*3/MM3 (ref 0–0.4)
EOSINOPHIL NFR BLD AUTO: 2.7 % (ref 0.3–6.2)
ERYTHROCYTE [DISTWIDTH] IN BLOOD BY AUTOMATED COUNT: 11.9 % (ref 12.3–15.4)
FERRITIN SERPL-MCNC: 197 NG/ML (ref 13–150)
FOLATE SERPL-MCNC: 7.87 NG/ML (ref 4.78–24.2)
GFR SERPL CREATININE-BSD FRML MDRD: 125 ML/MIN/1.73
GLOBULIN UR ELPH-MCNC: 3.2 GM/DL
GLUCOSE BLD-MCNC: 127 MG/DL (ref 65–99)
HCT VFR BLD AUTO: 41 % (ref 34–46.6)
HGB BLD-MCNC: 13.3 G/DL (ref 12–15.9)
IMM GRANULOCYTES # BLD AUTO: 0.05 10*3/MM3 (ref 0–0.05)
IMM GRANULOCYTES NFR BLD AUTO: 0.3 % (ref 0–0.5)
IRON 24H UR-MRATE: 41 MCG/DL (ref 37–145)
IRON SATN MFR SERPL: 9 % (ref 20–50)
LYMPHOCYTES # BLD AUTO: 3.83 10*3/MM3 (ref 0.7–3.1)
LYMPHOCYTES NFR BLD AUTO: 24.4 % (ref 19.6–45.3)
MCH RBC QN AUTO: 28.6 PG (ref 26.6–33)
MCHC RBC AUTO-ENTMCNC: 32.4 G/DL (ref 31.5–35.7)
MCV RBC AUTO: 88.2 FL (ref 79–97)
MONOCYTES # BLD AUTO: 0.66 10*3/MM3 (ref 0.1–0.9)
MONOCYTES NFR BLD AUTO: 4.2 % (ref 5–12)
NEUTROPHILS # BLD AUTO: 10.62 10*3/MM3 (ref 1.7–7)
NEUTROPHILS NFR BLD AUTO: 67.8 % (ref 42.7–76)
NRBC BLD AUTO-RTO: 0 /100 WBC (ref 0–0.2)
PLATELET # BLD AUTO: 522 10*3/MM3 (ref 140–450)
PMV BLD AUTO: 9.7 FL (ref 6–12)
POTASSIUM BLD-SCNC: 3.9 MMOL/L (ref 3.5–5.2)
PROT SERPL-MCNC: 7.4 G/DL (ref 6–8.5)
RBC # BLD AUTO: 4.65 10*6/MM3 (ref 3.77–5.28)
SODIUM BLD-SCNC: 139 MMOL/L (ref 136–145)
T4 FREE SERPL-MCNC: 0.93 NG/DL (ref 0.93–1.7)
TIBC SERPL-MCNC: 447 MCG/DL (ref 298–536)
TRANSFERRIN SERPL-MCNC: 300 MG/DL (ref 200–360)
TSH SERPL DL<=0.05 MIU/L-ACNC: 1.55 UIU/ML (ref 0.27–4.2)
VIT B12 BLD-MCNC: 374 PG/ML (ref 211–946)
WBC NRBC COR # BLD: 15.67 10*3/MM3 (ref 3.4–10.8)

## 2020-02-03 PROCEDURE — 82746 ASSAY OF FOLIC ACID SERUM: CPT | Performed by: NURSE PRACTITIONER

## 2020-02-03 PROCEDURE — 80053 COMPREHEN METABOLIC PANEL: CPT | Performed by: NURSE PRACTITIONER

## 2020-02-03 PROCEDURE — 84439 ASSAY OF FREE THYROXINE: CPT | Performed by: NURSE PRACTITIONER

## 2020-02-03 PROCEDURE — 84443 ASSAY THYROID STIM HORMONE: CPT | Performed by: NURSE PRACTITIONER

## 2020-02-03 PROCEDURE — 85025 COMPLETE CBC W/AUTO DIFF WBC: CPT | Performed by: NURSE PRACTITIONER

## 2020-02-03 PROCEDURE — 36415 COLL VENOUS BLD VENIPUNCTURE: CPT | Performed by: NURSE PRACTITIONER

## 2020-02-03 PROCEDURE — 82306 VITAMIN D 25 HYDROXY: CPT | Performed by: NURSE PRACTITIONER

## 2020-02-03 PROCEDURE — 84466 ASSAY OF TRANSFERRIN: CPT | Performed by: NURSE PRACTITIONER

## 2020-02-03 PROCEDURE — 82607 VITAMIN B-12: CPT | Performed by: NURSE PRACTITIONER

## 2020-02-03 PROCEDURE — 83540 ASSAY OF IRON: CPT | Performed by: NURSE PRACTITIONER

## 2020-02-03 PROCEDURE — 82728 ASSAY OF FERRITIN: CPT | Performed by: NURSE PRACTITIONER

## 2020-02-04 ENCOUNTER — TELEPHONE (OUTPATIENT)
Dept: INTERNAL MEDICINE | Facility: CLINIC | Age: 35
End: 2020-02-04

## 2020-02-04 DIAGNOSIS — D72.829 LEUKOCYTOSIS, UNSPECIFIED TYPE: Primary | ICD-10-CM

## 2020-02-04 DIAGNOSIS — R79.89 LOW VITAMIN D LEVEL: ICD-10-CM

## 2020-02-04 RX ORDER — ERGOCALCIFEROL 1.25 MG/1
50000 CAPSULE ORAL
Qty: 24 CAPSULE | Refills: 0 | Status: SHIPPED | OUTPATIENT
Start: 2020-02-04 | End: 2022-02-11

## 2020-02-04 NOTE — PROGRESS NOTES
Please notify patient that her vitamin D is still very low at 14.1.  Please reorder her vitamin D 50,000 units weekly x8 weeks and order a repeat vitamin D level for 8 weeks.  Vitamin B12, folate, thyroid labs are normal. Her white blood cell count is high and I would like to recheck in two weeks, please order CBC as well as UA with culture if indicated. Iron saturation low a bit low and ferritin is high. I would like her to make a follow up with hematology soon. Please let me know of any questions or concerns.

## 2020-02-04 NOTE — TELEPHONE ENCOUNTER
----- Message from ELEN Hayes sent at 2/4/2020  8:03 AM EST -----  Please notify patient that her vitamin D is still very low at 14.1.  Please reorder her vitamin D 50,000 units weekly x8 weeks and order a repeat vitamin D level for 8 weeks.  Vitamin B12, folate, thyroid labs are normal. Her white blood cell count i  s high and I would like to recheck in two weeks, please order CBC as well as UA with culture if indicated. Iron saturation low a bit low and ferritin is high. I would like her to make a follow up with hematology soon. Please let me know of any questi  ons or concerns.

## 2020-02-12 ENCOUNTER — TELEPHONE (OUTPATIENT)
Dept: INTERNAL MEDICINE | Facility: CLINIC | Age: 35
End: 2020-02-12

## 2020-02-12 NOTE — TELEPHONE ENCOUNTER
Called LVM stating for Pt to come and sign release of information paper so I could retrieve records from previous Neurologist, Dr. Adolfo Hi.     Pt has not returned call yet.    Paper is up front of office waiting to be signed and faxed

## 2020-02-18 ENCOUNTER — RESULTS ENCOUNTER (OUTPATIENT)
Dept: INTERNAL MEDICINE | Facility: CLINIC | Age: 35
End: 2020-02-18

## 2020-02-18 DIAGNOSIS — D72.829 LEUKOCYTOSIS, UNSPECIFIED TYPE: ICD-10-CM

## 2020-02-18 DIAGNOSIS — R79.89 LOW VITAMIN D LEVEL: ICD-10-CM

## 2020-02-20 DIAGNOSIS — I10 BENIGN ESSENTIAL HYPERTENSION: ICD-10-CM

## 2020-02-20 RX ORDER — LABETALOL 200 MG/1
200 TABLET, FILM COATED ORAL EVERY 12 HOURS SCHEDULED
Qty: 180 TABLET | Refills: 2 | Status: SHIPPED | OUTPATIENT
Start: 2020-02-20 | End: 2021-01-18 | Stop reason: SDUPTHER

## 2020-03-16 ENCOUNTER — TELEPHONE (OUTPATIENT)
Dept: INTERNAL MEDICINE | Facility: CLINIC | Age: 35
End: 2020-03-16

## 2020-03-16 RX ORDER — CEFDINIR 300 MG/1
300 CAPSULE ORAL 2 TIMES DAILY
Qty: 14 CAPSULE | Refills: 0 | Status: SHIPPED | OUTPATIENT
Start: 2020-03-16 | End: 2020-03-23

## 2020-04-01 DIAGNOSIS — F41.8 DEPRESSION WITH ANXIETY: ICD-10-CM

## 2020-04-01 RX ORDER — BUPROPION HYDROCHLORIDE 100 MG/1
100 TABLET ORAL 2 TIMES DAILY
Qty: 60 TABLET | Refills: 2 | Status: SHIPPED | OUTPATIENT
Start: 2020-04-01 | End: 2020-08-17 | Stop reason: SDUPTHER

## 2020-04-09 RX ORDER — DULOXETIN HYDROCHLORIDE 60 MG/1
120 CAPSULE, DELAYED RELEASE ORAL
Qty: 180 CAPSULE | Refills: 1 | Status: CANCELLED | OUTPATIENT
Start: 2020-04-09 | End: 2020-10-06

## 2020-04-22 RX ORDER — DULOXETIN HYDROCHLORIDE 60 MG/1
120 CAPSULE, DELAYED RELEASE ORAL
Qty: 180 CAPSULE | Refills: 1 | Status: SHIPPED | OUTPATIENT
Start: 2020-04-22 | End: 2020-10-16 | Stop reason: SDUPTHER

## 2020-05-01 ENCOUNTER — APPOINTMENT (OUTPATIENT)
Dept: PREADMISSION TESTING | Facility: HOSPITAL | Age: 35
End: 2020-05-01

## 2020-05-01 VITALS
BODY MASS INDEX: 51.91 KG/M2 | WEIGHT: 293 LBS | HEART RATE: 81 BPM | TEMPERATURE: 98.2 F | DIASTOLIC BLOOD PRESSURE: 83 MMHG | SYSTOLIC BLOOD PRESSURE: 133 MMHG | HEIGHT: 63 IN | RESPIRATION RATE: 18 BRPM | OXYGEN SATURATION: 95 %

## 2020-05-01 LAB
ANION GAP SERPL CALCULATED.3IONS-SCNC: 11.4 MMOL/L (ref 5–15)
BASOPHILS # BLD AUTO: 0.08 10*3/MM3 (ref 0–0.2)
BASOPHILS NFR BLD AUTO: 0.7 % (ref 0–1.5)
BUN BLD-MCNC: 10 MG/DL (ref 6–20)
BUN/CREAT SERPL: 18.9 (ref 7–25)
CALCIUM SPEC-SCNC: 9.3 MG/DL (ref 8.6–10.5)
CHLORIDE SERPL-SCNC: 101 MMOL/L (ref 98–107)
CO2 SERPL-SCNC: 26.6 MMOL/L (ref 22–29)
CREAT BLD-MCNC: 0.53 MG/DL (ref 0.57–1)
DEPRECATED RDW RBC AUTO: 41.3 FL (ref 37–54)
EOSINOPHIL # BLD AUTO: 0.21 10*3/MM3 (ref 0–0.4)
EOSINOPHIL NFR BLD AUTO: 1.8 % (ref 0.3–6.2)
ERYTHROCYTE [DISTWIDTH] IN BLOOD BY AUTOMATED COUNT: 12.6 % (ref 12.3–15.4)
GFR SERPL CREATININE-BSD FRML MDRD: 131 ML/MIN/1.73
GLUCOSE BLD-MCNC: 89 MG/DL (ref 65–99)
HCG SERPL QL: NEGATIVE
HCT VFR BLD AUTO: 38.8 % (ref 34–46.6)
HGB BLD-MCNC: 12.6 G/DL (ref 12–15.9)
IMM GRANULOCYTES # BLD AUTO: 0.02 10*3/MM3 (ref 0–0.05)
IMM GRANULOCYTES NFR BLD AUTO: 0.2 % (ref 0–0.5)
LYMPHOCYTES # BLD AUTO: 3.3 10*3/MM3 (ref 0.7–3.1)
LYMPHOCYTES NFR BLD AUTO: 27.9 % (ref 19.6–45.3)
MCH RBC QN AUTO: 28.8 PG (ref 26.6–33)
MCHC RBC AUTO-ENTMCNC: 32.5 G/DL (ref 31.5–35.7)
MCV RBC AUTO: 88.6 FL (ref 79–97)
MONOCYTES # BLD AUTO: 0.64 10*3/MM3 (ref 0.1–0.9)
MONOCYTES NFR BLD AUTO: 5.4 % (ref 5–12)
NEUTROPHILS # BLD AUTO: 7.57 10*3/MM3 (ref 1.7–7)
NEUTROPHILS NFR BLD AUTO: 64 % (ref 42.7–76)
NRBC BLD AUTO-RTO: 0 /100 WBC (ref 0–0.2)
PLATELET # BLD AUTO: 470 10*3/MM3 (ref 140–450)
PMV BLD AUTO: 9.3 FL (ref 6–12)
POTASSIUM BLD-SCNC: 4.4 MMOL/L (ref 3.5–5.2)
RBC # BLD AUTO: 4.38 10*6/MM3 (ref 3.77–5.28)
SODIUM BLD-SCNC: 139 MMOL/L (ref 136–145)
WBC NRBC COR # BLD: 11.82 10*3/MM3 (ref 3.4–10.8)

## 2020-05-01 PROCEDURE — 36415 COLL VENOUS BLD VENIPUNCTURE: CPT

## 2020-05-01 PROCEDURE — 84703 CHORIONIC GONADOTROPIN ASSAY: CPT | Performed by: OBSTETRICS & GYNECOLOGY

## 2020-05-01 PROCEDURE — 85025 COMPLETE CBC W/AUTO DIFF WBC: CPT | Performed by: OBSTETRICS & GYNECOLOGY

## 2020-05-01 PROCEDURE — 93005 ELECTROCARDIOGRAM TRACING: CPT

## 2020-05-01 PROCEDURE — 93010 ELECTROCARDIOGRAM REPORT: CPT | Performed by: INTERNAL MEDICINE

## 2020-05-01 PROCEDURE — 80048 BASIC METABOLIC PNL TOTAL CA: CPT | Performed by: OBSTETRICS & GYNECOLOGY

## 2020-05-01 NOTE — DISCHARGE INSTRUCTIONS
ARRIVE DAY OF SURGERY AT 11:30 AM TO MAIN SURGERY      Take the following medications the morning of surgery:    WELLBUTRIN, LABETALOL    General Instructions:  • Do not eat solid food after midnight the night before surgery.  • You may drink clear liquids day of surgery but must stop at least one hour before your hospital arrival time.  • It is beneficial for you to have a clear drink that contains carbohydrates the day of surgery.  We suggest a 12 to 20 ounce bottle of Gatorade or Powerade for non-diabetic patients or a 12 to 20 ounce bottle of G2 or Powerade Zero for diabetic patients. (Pediatric patients, are not advised to drink a 12 to 20 ounce carbohydrate drink)    Clear liquids are liquids you can see through.  Nothing red in color.     Plain water                               Sports drinks  Sodas                                   Gelatin (Jell-O)  Fruit juices without pulp such as white grape juice and apple juice  Popsicles that contain no fruit or yogurt  Tea or coffee (no cream or milk added)  Gatorade / Powerade  G2 / Powerade Zero    • Infants may have breast milk up to four hours before surgery.  • Infants drinking formula may drink formula up to six hours before surgery.   • Patients who avoid smoking, chewing tobacco and alcohol for 4 weeks prior to surgery have a reduced risk of post-operative complications.  Quit smoking as many days before surgery as you can.  • Do not smoke, use chewing tobacco or drink alcohol the day of surgery.   • If applicable bring your C-PAP/ BI-PAP machine.  • Bring any papers given to you in the doctor’s office.  • Wear clean comfortable clothes.  • Do not wear contact lenses, false eyelashes or make-up.  Bring a case for your glasses.   • Bring crutches or walker if applicable.  • Remove all piercings.  Leave jewelry and any other valuables at home.  • Hair extensions with metal clips must be removed prior to surgery.  • The Pre-Admission Testing nurse will instruct  you to bring medications if unable to obtain an accurate list in Pre-Admission Testing.            Preventing a Surgical Site Infection:  • For 2 to 3 days before surgery, avoid shaving with a razor because the razor can irritate skin and make it easier to develop an infection.    • Any areas of open skin can increase the risk of a post-operative wound infection by allowing bacteria to enter and travel throughout the body.  Notify your surgeon if you have any skin wounds / rashes even if it is not near the expected surgical site.  The area will need assessed to determine if surgery should be delayed until it is healed.  • The night prior to surgery shower using a fresh bar of anti-bacterial soap (such as Dial) and clean washcloth.  Sleep in a clean bed with clean clothing.  Do not allow pets to sleep with you.  • Shower on the morning of surgery using a fresh bar of anti-bacterial soap (such as Dial) and clean washcloth.  Dry with a clean towel and dress in clean clothing.  • Ask your surgeon if you will be receiving antibiotics prior to surgery.  • Make sure you, your family, and all healthcare providers clean their hands with soap and water or an alcohol based hand  before caring for you or your wound.    Day of surgery:  Your arrival time is approximately two hours before your scheduled surgery time.  Upon arrival, a Pre-op nurse and Anesthesiologist will review your health history, obtain vital signs, and answer questions you may have.  The only belongings needed at this time will be a list of your home medications and if applicable your C-PAP/BI-PAP machine.  If you are staying overnight your family can leave the rest of your belongings in the car and bring them to your room later.  A Pre-op nurse will start an IV and you may receive medication in preparation for surgery, including something to help you relax.  Your family will be able to see you in the Pre-op area.  Two visitors at a time will be  allowed in the Pre-op room.  While you are in surgery your family should notify the waiting room  if they leave the waiting room area and provide a contact phone number.    Please be aware that surgery does come with discomfort.  We want to make every effort to control your discomfort so please discuss any uncontrolled symptoms with your nurse.   Your doctor will most likely have prescribed pain medications.      If you are going home after surgery you will receive individualized written care instructions before being discharged.  A responsible adult must drive you to and from the hospital on the day of your surgery and stay with you for 24 hours.    If you are staying overnight following surgery, you will be transported to your hospital room following the recovery period.  [facility] has all private rooms.    If you have any questions please call Pre-Admission Testing at [phone].  Deductibles and co-payments are collected on the day of service. Please be prepared to pay the required co-pay, deductible or deposit on the day of service as defined by your plan.    Avoiding Covid-19 Exposure Prior to Surgery  It is extra important for you to take actions to reduce your risk of getting sick with the disease.  • Stay home.  If you must go out wear a face cover or mask.   • Wash your hands often with soap and water for at least 20 seconds especially after you have been in a public place, or after blowing your nose, coughing, or sneezing.  • If soap and water are not readily available, use a hand  that contains at least 60% alcohol. Cover all surfaces of your hands and rub them together until they feel dry.  • Avoid touching your eyes, nose, and mouth with unwashed hands.  • Take everyday precautions to keep space between yourself and others (stay 6 feet away, which is about two arm lengths).  Remember that some people without symptoms may be able to spread virus.  • Keep away from  people who are sick.  • Clean and disinfect frequently touched services daily. This includes tables, doorknobs, light switches, countertops, handles, desks, phones, keyboards, toilets, faucets, and sinks.  • If surfaces are dirty, clean them. Use detergent or soap and water prior to disinfection.  Then, use a household disinfectant.  • Do not travel prior to surgery.  • Call your healthcare professional if you have concerns about COVID-19.  Have a plan for if you get sick  • Call you physician immediately if you begin to feel sick.   • Do not arrive for your surgery ill.  Your procedure will need to be rescheduled to another time.  You will need to call your physician before the day of surgery to avoid any unnecessary exposure to hospital staff as well as other patients.

## 2020-05-04 ENCOUNTER — ANESTHESIA EVENT (OUTPATIENT)
Dept: PERIOP | Facility: HOSPITAL | Age: 35
End: 2020-05-04

## 2020-05-04 ENCOUNTER — HOSPITAL ENCOUNTER (OUTPATIENT)
Facility: HOSPITAL | Age: 35
Setting detail: HOSPITAL OUTPATIENT SURGERY
Discharge: HOME | End: 2020-05-04
Attending: OBSTETRICS & GYNECOLOGY | Admitting: ANESTHESIOLOGY

## 2020-05-04 ENCOUNTER — ANESTHESIA (OUTPATIENT)
Dept: PERIOP | Facility: HOSPITAL | Age: 35
End: 2020-05-04

## 2020-05-04 VITALS
HEART RATE: 75 BPM | RESPIRATION RATE: 16 BRPM | DIASTOLIC BLOOD PRESSURE: 76 MMHG | BODY MASS INDEX: 51.91 KG/M2 | TEMPERATURE: 98.3 F | WEIGHT: 293 LBS | HEIGHT: 63 IN | OXYGEN SATURATION: 92 % | SYSTOLIC BLOOD PRESSURE: 131 MMHG

## 2020-05-04 DIAGNOSIS — N92.0 MENORRHAGIA: ICD-10-CM

## 2020-05-04 PROCEDURE — 88305 TISSUE EXAM BY PATHOLOGIST: CPT | Performed by: OBSTETRICS & GYNECOLOGY

## 2020-05-04 PROCEDURE — 25010000002 HYDROMORPHONE PER 4 MG: Performed by: NURSE ANESTHETIST, CERTIFIED REGISTERED

## 2020-05-04 PROCEDURE — 25010000002 DEXAMETHASONE PER 1 MG: Performed by: NURSE ANESTHETIST, CERTIFIED REGISTERED

## 2020-05-04 PROCEDURE — 25010000003 CEFAZOLIN IN DEXTROSE 2-4 GM/100ML-% SOLUTION: Performed by: OBSTETRICS & GYNECOLOGY

## 2020-05-04 PROCEDURE — 25010000002 FENTANYL CITRATE (PF) 100 MCG/2ML SOLUTION: Performed by: NURSE ANESTHETIST, CERTIFIED REGISTERED

## 2020-05-04 PROCEDURE — 25010000002 ROPIVACAINE PER 1 MG: Performed by: OBSTETRICS & GYNECOLOGY

## 2020-05-04 PROCEDURE — 25010000002 ONDANSETRON PER 1 MG: Performed by: NURSE ANESTHETIST, CERTIFIED REGISTERED

## 2020-05-04 PROCEDURE — 25010000002 PROPOFOL 10 MG/ML EMULSION: Performed by: NURSE ANESTHETIST, CERTIFIED REGISTERED

## 2020-05-04 PROCEDURE — 25010000002 SUCCINYLCHOLINE PER 20 MG: Performed by: NURSE ANESTHETIST, CERTIFIED REGISTERED

## 2020-05-04 RX ORDER — SODIUM CHLORIDE, SODIUM LACTATE, POTASSIUM CHLORIDE, CALCIUM CHLORIDE 600; 310; 30; 20 MG/100ML; MG/100ML; MG/100ML; MG/100ML
9 INJECTION, SOLUTION INTRAVENOUS CONTINUOUS
Status: DISCONTINUED | OUTPATIENT
Start: 2020-05-04 | End: 2020-05-04 | Stop reason: HOSPADM

## 2020-05-04 RX ORDER — HYDROCODONE BITARTRATE AND ACETAMINOPHEN 7.5; 325 MG/1; MG/1
1 TABLET ORAL ONCE AS NEEDED
Status: DISCONTINUED | OUTPATIENT
Start: 2020-05-04 | End: 2020-05-04 | Stop reason: HOSPADM

## 2020-05-04 RX ORDER — DIPHENHYDRAMINE HYDROCHLORIDE 50 MG/ML
12.5 INJECTION INTRAMUSCULAR; INTRAVENOUS
Status: DISCONTINUED | OUTPATIENT
Start: 2020-05-04 | End: 2020-05-04 | Stop reason: HOSPADM

## 2020-05-04 RX ORDER — FLUMAZENIL 0.1 MG/ML
0.2 INJECTION INTRAVENOUS AS NEEDED
Status: DISCONTINUED | OUTPATIENT
Start: 2020-05-04 | End: 2020-05-04 | Stop reason: HOSPADM

## 2020-05-04 RX ORDER — ROPIVACAINE HYDROCHLORIDE 5 MG/ML
INJECTION, SOLUTION EPIDURAL; INFILTRATION; PERINEURAL AS NEEDED
Status: DISCONTINUED | OUTPATIENT
Start: 2020-05-04 | End: 2020-05-04 | Stop reason: HOSPADM

## 2020-05-04 RX ORDER — FENTANYL CITRATE 50 UG/ML
50 INJECTION, SOLUTION INTRAMUSCULAR; INTRAVENOUS
Status: DISCONTINUED | OUTPATIENT
Start: 2020-05-04 | End: 2020-05-04 | Stop reason: HOSPADM

## 2020-05-04 RX ORDER — PROMETHAZINE HYDROCHLORIDE 25 MG/ML
6.25 INJECTION, SOLUTION INTRAMUSCULAR; INTRAVENOUS
Status: DISCONTINUED | OUTPATIENT
Start: 2020-05-04 | End: 2020-05-04 | Stop reason: HOSPADM

## 2020-05-04 RX ORDER — SUCCINYLCHOLINE CHLORIDE 20 MG/ML
INJECTION INTRAMUSCULAR; INTRAVENOUS AS NEEDED
Status: DISCONTINUED | OUTPATIENT
Start: 2020-05-04 | End: 2020-05-04 | Stop reason: SURG

## 2020-05-04 RX ORDER — HYDRALAZINE HYDROCHLORIDE 20 MG/ML
5 INJECTION INTRAMUSCULAR; INTRAVENOUS
Status: DISCONTINUED | OUTPATIENT
Start: 2020-05-04 | End: 2020-05-04 | Stop reason: HOSPADM

## 2020-05-04 RX ORDER — SODIUM CHLORIDE 0.9 % (FLUSH) 0.9 %
3 SYRINGE (ML) INJECTION EVERY 12 HOURS SCHEDULED
Status: DISCONTINUED | OUTPATIENT
Start: 2020-05-04 | End: 2020-05-04 | Stop reason: HOSPADM

## 2020-05-04 RX ORDER — NALOXONE HCL 0.4 MG/ML
0.2 VIAL (ML) INJECTION AS NEEDED
Status: DISCONTINUED | OUTPATIENT
Start: 2020-05-04 | End: 2020-05-04 | Stop reason: HOSPADM

## 2020-05-04 RX ORDER — LIDOCAINE HYDROCHLORIDE 20 MG/ML
INJECTION, SOLUTION INFILTRATION; PERINEURAL AS NEEDED
Status: DISCONTINUED | OUTPATIENT
Start: 2020-05-04 | End: 2020-05-04 | Stop reason: SURG

## 2020-05-04 RX ORDER — DIPHENHYDRAMINE HCL 25 MG
25 CAPSULE ORAL
Status: DISCONTINUED | OUTPATIENT
Start: 2020-05-04 | End: 2020-05-04 | Stop reason: HOSPADM

## 2020-05-04 RX ORDER — MIDAZOLAM HYDROCHLORIDE 1 MG/ML
2 INJECTION INTRAMUSCULAR; INTRAVENOUS
Status: DISCONTINUED | OUTPATIENT
Start: 2020-05-04 | End: 2020-05-04 | Stop reason: HOSPADM

## 2020-05-04 RX ORDER — LABETALOL HYDROCHLORIDE 5 MG/ML
5 INJECTION, SOLUTION INTRAVENOUS
Status: DISCONTINUED | OUTPATIENT
Start: 2020-05-04 | End: 2020-05-04 | Stop reason: HOSPADM

## 2020-05-04 RX ORDER — MIDAZOLAM HYDROCHLORIDE 1 MG/ML
1 INJECTION INTRAMUSCULAR; INTRAVENOUS
Status: DISCONTINUED | OUTPATIENT
Start: 2020-05-04 | End: 2020-05-04 | Stop reason: HOSPADM

## 2020-05-04 RX ORDER — ROCURONIUM BROMIDE 10 MG/ML
INJECTION, SOLUTION INTRAVENOUS AS NEEDED
Status: DISCONTINUED | OUTPATIENT
Start: 2020-05-04 | End: 2020-05-04 | Stop reason: SURG

## 2020-05-04 RX ORDER — DEXAMETHASONE SODIUM PHOSPHATE 10 MG/ML
INJECTION INTRAMUSCULAR; INTRAVENOUS AS NEEDED
Status: DISCONTINUED | OUTPATIENT
Start: 2020-05-04 | End: 2020-05-04 | Stop reason: SURG

## 2020-05-04 RX ORDER — PROMETHAZINE HYDROCHLORIDE 25 MG/1
25 TABLET ORAL ONCE AS NEEDED
Status: DISCONTINUED | OUTPATIENT
Start: 2020-05-04 | End: 2020-05-04 | Stop reason: HOSPADM

## 2020-05-04 RX ORDER — FAMOTIDINE 10 MG/ML
20 INJECTION, SOLUTION INTRAVENOUS ONCE
Status: COMPLETED | OUTPATIENT
Start: 2020-05-04 | End: 2020-05-04

## 2020-05-04 RX ORDER — OXYCODONE AND ACETAMINOPHEN 7.5; 325 MG/1; MG/1
1 TABLET ORAL ONCE AS NEEDED
Status: DISCONTINUED | OUTPATIENT
Start: 2020-05-04 | End: 2020-05-04 | Stop reason: HOSPADM

## 2020-05-04 RX ORDER — PROPOFOL 10 MG/ML
VIAL (ML) INTRAVENOUS AS NEEDED
Status: DISCONTINUED | OUTPATIENT
Start: 2020-05-04 | End: 2020-05-04 | Stop reason: SURG

## 2020-05-04 RX ORDER — ACETAMINOPHEN 325 MG/1
650 TABLET ORAL ONCE AS NEEDED
Status: DISCONTINUED | OUTPATIENT
Start: 2020-05-04 | End: 2020-05-04 | Stop reason: HOSPADM

## 2020-05-04 RX ORDER — HYDROCODONE BITARTRATE AND ACETAMINOPHEN 5; 325 MG/1; MG/1
1 TABLET ORAL ONCE AS NEEDED
Status: DISCONTINUED | OUTPATIENT
Start: 2020-05-04 | End: 2020-05-04 | Stop reason: HOSPADM

## 2020-05-04 RX ORDER — HYDROMORPHONE HYDROCHLORIDE 1 MG/ML
0.5 INJECTION, SOLUTION INTRAMUSCULAR; INTRAVENOUS; SUBCUTANEOUS
Status: DISCONTINUED | OUTPATIENT
Start: 2020-05-04 | End: 2020-05-04 | Stop reason: HOSPADM

## 2020-05-04 RX ORDER — ONDANSETRON 2 MG/ML
4 INJECTION INTRAMUSCULAR; INTRAVENOUS ONCE AS NEEDED
Status: DISCONTINUED | OUTPATIENT
Start: 2020-05-04 | End: 2020-05-04 | Stop reason: HOSPADM

## 2020-05-04 RX ORDER — SODIUM CHLORIDE 0.9 % (FLUSH) 0.9 %
3-10 SYRINGE (ML) INJECTION AS NEEDED
Status: DISCONTINUED | OUTPATIENT
Start: 2020-05-04 | End: 2020-05-04 | Stop reason: HOSPADM

## 2020-05-04 RX ORDER — SODIUM CHLORIDE 9 MG/ML
INJECTION, SOLUTION INTRAVENOUS AS NEEDED
Status: DISCONTINUED | OUTPATIENT
Start: 2020-05-04 | End: 2020-05-04 | Stop reason: HOSPADM

## 2020-05-04 RX ORDER — CEFAZOLIN SODIUM 2 G/100ML
2 INJECTION, SOLUTION INTRAVENOUS
Status: COMPLETED | OUTPATIENT
Start: 2020-05-04 | End: 2020-05-04

## 2020-05-04 RX ORDER — PROMETHAZINE HYDROCHLORIDE 25 MG/1
25 SUPPOSITORY RECTAL ONCE AS NEEDED
Status: DISCONTINUED | OUTPATIENT
Start: 2020-05-04 | End: 2020-05-04 | Stop reason: HOSPADM

## 2020-05-04 RX ORDER — FENTANYL CITRATE 50 UG/ML
INJECTION, SOLUTION INTRAMUSCULAR; INTRAVENOUS AS NEEDED
Status: DISCONTINUED | OUTPATIENT
Start: 2020-05-04 | End: 2020-05-04 | Stop reason: SURG

## 2020-05-04 RX ORDER — ONDANSETRON 2 MG/ML
INJECTION INTRAMUSCULAR; INTRAVENOUS AS NEEDED
Status: DISCONTINUED | OUTPATIENT
Start: 2020-05-04 | End: 2020-05-04 | Stop reason: SURG

## 2020-05-04 RX ORDER — PROMETHAZINE HYDROCHLORIDE 25 MG/ML
12.5 INJECTION, SOLUTION INTRAMUSCULAR; INTRAVENOUS ONCE AS NEEDED
Status: DISCONTINUED | OUTPATIENT
Start: 2020-05-04 | End: 2020-05-04 | Stop reason: HOSPADM

## 2020-05-04 RX ORDER — EPHEDRINE SULFATE 50 MG/ML
5 INJECTION, SOLUTION INTRAVENOUS ONCE AS NEEDED
Status: DISCONTINUED | OUTPATIENT
Start: 2020-05-04 | End: 2020-05-04 | Stop reason: HOSPADM

## 2020-05-04 RX ORDER — LIDOCAINE HYDROCHLORIDE 10 MG/ML
0.5 INJECTION, SOLUTION EPIDURAL; INFILTRATION; INTRACAUDAL; PERINEURAL ONCE AS NEEDED
Status: DISCONTINUED | OUTPATIENT
Start: 2020-05-04 | End: 2020-05-04 | Stop reason: HOSPADM

## 2020-05-04 RX ADMIN — ROCURONIUM BROMIDE 20 MG: 10 INJECTION, SOLUTION INTRAVENOUS at 13:30

## 2020-05-04 RX ADMIN — LIDOCAINE HYDROCHLORIDE 100 MG: 20 INJECTION, SOLUTION INFILTRATION; PERINEURAL at 13:07

## 2020-05-04 RX ADMIN — DEXAMETHASONE SODIUM PHOSPHATE 8 MG: 10 INJECTION INTRAMUSCULAR; INTRAVENOUS at 13:19

## 2020-05-04 RX ADMIN — HYDROMORPHONE HYDROCHLORIDE 0.5 MG: 1 INJECTION, SOLUTION INTRAMUSCULAR; INTRAVENOUS; SUBCUTANEOUS at 14:43

## 2020-05-04 RX ADMIN — FAMOTIDINE 20 MG: 10 INJECTION, SOLUTION INTRAVENOUS at 12:23

## 2020-05-04 RX ADMIN — CEFAZOLIN SODIUM 2 G: 2 INJECTION, SOLUTION INTRAVENOUS at 13:16

## 2020-05-04 RX ADMIN — FENTANYL CITRATE 100 MCG: 50 INJECTION INTRAMUSCULAR; INTRAVENOUS at 13:07

## 2020-05-04 RX ADMIN — SUGAMMADEX 200 MG: 100 INJECTION, SOLUTION INTRAVENOUS at 13:52

## 2020-05-04 RX ADMIN — SUCCINYLCHOLINE CHLORIDE 180 MG: 20 INJECTION, SOLUTION INTRAMUSCULAR; INTRAVENOUS; PARENTERAL at 13:07

## 2020-05-04 RX ADMIN — FENTANYL CITRATE 50 MCG: 50 INJECTION, SOLUTION INTRAMUSCULAR; INTRAVENOUS at 14:33

## 2020-05-04 RX ADMIN — PROPOFOL 200 MG: 10 INJECTION, EMULSION INTRAVENOUS at 13:07

## 2020-05-04 RX ADMIN — SODIUM CHLORIDE, POTASSIUM CHLORIDE, SODIUM LACTATE AND CALCIUM CHLORIDE 9 ML/HR: 600; 310; 30; 20 INJECTION, SOLUTION INTRAVENOUS at 12:23

## 2020-05-04 RX ADMIN — ONDANSETRON HYDROCHLORIDE 4 MG: 2 SOLUTION INTRAMUSCULAR; INTRAVENOUS at 13:39

## 2020-05-04 NOTE — OP NOTE
PREOPERATIVE DIAGNOSES:  1. Menorrhagia.   2. Anemia.   3. Pelvic pain with dysmenorrhea.     POSTOPERATIVE DIAGNOSES:  1. Menorrhagia.   2. Anemia.   3. Pelvic pain with dysmenorrhea.     PROCEDURES PERFORMED:   1. Dilatation and curettage.   2. Hysteroscopy with NovaSure ablation of the endometrium.      SURGEON: Hellen Alaniz MD     ANESTHESIA: General.     PROCEDURE: Under adequate anesthesia, the patient was placed in the dorsal lithotomy position, prepped and draped in usual sterile fashion. Examination revealed no masses. The weighted speculum was placed in the vagina. The anterior lip of the cervix was grasped with a single-tooth tenaculum. The cervix was progressively dilated. The hysteroscope was inserted and no lesions could be identified.     With a length of 5 and a width of 4.2, the treatment time was carried out for just over 1 minute at a power of 116 carter. The instruments were removed. The hysteroscope was reinserted. All areas of the uterine lining showed excellent global ablation. The paracervical block was placed, approximately 5 mL of Naropin at both the 4 and 8 o'clock positions of the cervix. The patient tolerated the procedure well. Estimated blood loss was minimal. She was brought to the recovery room in stable condition.

## 2020-05-04 NOTE — ANESTHESIA PROCEDURE NOTES
Airway  Urgency: elective    Date/Time: 5/4/2020 1:10 PM  Airway not difficult    General Information and Staff    Patient location during procedure: OR  Anesthesiologist: Wilder Hanley MD  CRNA: Janelle Wallace CRNA    Indications and Patient Condition  Indications for airway management: airway protection    Preoxygenated: yes  Mask difficulty assessment: 0 - not attempted    Final Airway Details  Final airway type: endotracheal airway      Successful airway: ETT  Cuffed: yes   Successful intubation technique: direct laryngoscopy  Facilitating devices/methods: intubating stylet  Endotracheal tube insertion site: oral  Blade: Souza  Blade size: 2  ETT size (mm): 7.0  Cormack-Lehane Classification: grade I - full view of glottis  Placement verified by: chest auscultation and capnometry   Measured from: lips  ETT/EBT  to lips (cm): 20  Number of attempts at approach: 1  Assessment: lips, teeth, and gum same as pre-op and atraumatic intubation    Additional Comments  Atraumatic, MOP to cuff, BSBE, no change to dentition, secured with tape

## 2020-05-04 NOTE — H&P
PREOPERATIVE DIAGNOSES:   1. Menorrhagia.   2. Pelvic pain with dysmenorrhea.     HISTORY OF PRESENT ILLNESS: The patient is a 34-year-old with a history of pelvic pain that has been consistent for one year. She has significant dysmenorrhea and this has been occurring to the point that she would like to have treatment. Her previous evaluations included an ultrasound.     ALLERGIES: No known drug allergies.     PAST MEDICAL HISTORY:   1. Ankle fracture.   2. Hypertension.   3. Crohn's disease.   4. Fracture of the left arm.     SOCIAL HISTORY: Essentially negative.     MEDICATIONS:   1. Cymbalta 20 mg.   2. Trazodone 50 mg.   3. Labetalol 200 mg daily.   4. Vitamin D.   5. Wellbutrin  mg q.12 h.     FAMILY HISTORY: Hypertension, stroke, diabetes, and heart disease.     PAST SURGICAL HISTORY:   1. Ileostomy in 2017 secondary to Crohn's disease.   2. Orthopedic surgery.   3. Appendectomy.     Her Pap smear has been updated.     PHYSICAL EXAM:   GENERAL: She is a well-developed, well-nourished white female, in no acute distress.   HEENT: Within normal limits.   LUNGS: Clear.   HEART: Regular rate and rhythm. No murmurs.   BREASTS: No masses.   ABDOMEN: Soft, nontender. Ileostomy in place.   PELVIC: Shows a normal cervix.     ASSESSMENT/PLAN: Patient with abnormal uterine bleeding, menorrhagia, anemia, and pelvic pain. She has continued to have heavy painful periods on the Minnie birth control pill. She has a history of anemia and has had to have iron infusions. At this point, we will proceed with endometrial ablation using NovaSure. Hopefully this will help both with dysmenorrhea and heavy bleeding. We will plan on dilatation and curettage, hysteroscopy with NovaSure ablation.

## 2020-05-04 NOTE — ADDENDUM NOTE
Addendum  created 05/04/20 1447 by Breanne Tesfaye MD    Attestation recorded in Intraprocedure, Intraprocedure Attestations filed

## 2020-05-04 NOTE — ANESTHESIA PREPROCEDURE EVALUATION
Anesthesia Evaluation     Patient summary reviewed and Nursing notes reviewed   NPO Solid Status: > 8 hours  NPO Liquid Status: > 2 hours           Airway   Mallampati: II  TM distance: >3 FB  Neck ROM: full  no difficulty expected  Dental - normal exam     Pulmonary - normal exam   (+) asthma,shortness of breath,   (-) COPD, not a smoker, lung cancer  Cardiovascular - normal exam  Exercise tolerance: good (4-7 METS)    ECG reviewed  Rhythm: regular  Rate: normal    (+) hypertension well controlled less than 2 medications,   (-) valvular problems/murmurs, past MI, CAD, dysrhythmias, angina, CHF, cardiac stents, CABG, pericardial effusion      Neuro/Psych  (+) headaches, psychiatric history Anxiety and Depression,     (-) seizures, TIA, CVA  GI/Hepatic/Renal/Endo    (+) morbid obesity, GI bleeding resolved,   (-) hiatal hernia, GERD, PUD, hepatitis, liver disease, no renal disease, diabetes, no thyroid disorder    ROS Comment: Crohn's disease and IBS.    Musculoskeletal     Abdominal  - normal exam   Substance History - negative use     OB/GYN negative ob/gyn ROS         Other   arthritis,                      Anesthesia Plan    ASA 3     general     intravenous induction     Anesthetic plan, all risks, benefits, and alternatives have been provided, discussed and informed consent has been obtained with: patient.    Plan discussed with CRNA and attending.

## 2020-05-05 LAB
CYTO UR: NORMAL
LAB AP CASE REPORT: NORMAL
PATH REPORT.FINAL DX SPEC: NORMAL
PATH REPORT.GROSS SPEC: NORMAL

## 2020-06-29 DIAGNOSIS — F51.01 PRIMARY INSOMNIA: ICD-10-CM

## 2020-06-29 RX ORDER — TRAZODONE HYDROCHLORIDE 50 MG/1
50 TABLET ORAL NIGHTLY
Qty: 90 TABLET | Refills: 1 | Status: SHIPPED | OUTPATIENT
Start: 2020-06-29 | End: 2020-12-28 | Stop reason: SDUPTHER

## 2020-07-09 ENCOUNTER — OFFICE VISIT (OUTPATIENT)
Dept: INTERNAL MEDICINE | Facility: CLINIC | Age: 35
End: 2020-07-09

## 2020-07-09 VITALS
OXYGEN SATURATION: 94 % | WEIGHT: 293 LBS | BODY MASS INDEX: 51.91 KG/M2 | RESPIRATION RATE: 16 BRPM | DIASTOLIC BLOOD PRESSURE: 84 MMHG | SYSTOLIC BLOOD PRESSURE: 126 MMHG | TEMPERATURE: 99.1 F | HEIGHT: 63 IN | HEART RATE: 79 BPM

## 2020-07-09 DIAGNOSIS — K51.918 ULCERATIVE COLITIS, CHRONIC, OTHER COMPLICATION (HCC): ICD-10-CM

## 2020-07-09 DIAGNOSIS — I10 BENIGN ESSENTIAL HYPERTENSION: ICD-10-CM

## 2020-07-09 DIAGNOSIS — N92.1 MENORRHAGIA WITH IRREGULAR CYCLE: ICD-10-CM

## 2020-07-09 DIAGNOSIS — F51.01 PRIMARY INSOMNIA: ICD-10-CM

## 2020-07-09 DIAGNOSIS — F41.8 DEPRESSION WITH ANXIETY: ICD-10-CM

## 2020-07-09 DIAGNOSIS — Z00.00 ANNUAL PHYSICAL EXAM: Primary | ICD-10-CM

## 2020-07-09 DIAGNOSIS — E55.9 HYPOVITAMINOSIS D: ICD-10-CM

## 2020-07-09 DIAGNOSIS — Z00.00 HEALTHCARE MAINTENANCE: ICD-10-CM

## 2020-07-09 DIAGNOSIS — Z93.2 ILEOSTOMY IN PLACE (HCC): ICD-10-CM

## 2020-07-09 PROCEDURE — 99395 PREV VISIT EST AGE 18-39: CPT | Performed by: NURSE PRACTITIONER

## 2020-07-09 NOTE — PROGRESS NOTES
"Subjective   Liz Bagley is a 34 y.o. female.   CC: Annual physical, vit D deficiency, depression with anxiety     Patient presents for annual physical. This is a 34 YOF.     She has UC and ileostomy in place. Reports no ileostomy dysfunction.     She has HTN and takes labetalol 200 mg q12h. Reports good compliance with this medication and BP well controlled today at 126/84.     Has vitamin D deficiency and takes 50,000 units weekly of vitamin D. Reports that she often forgets to take this medication.     She has depression anxiety well controlled with Cymbalta 120 mg daily and Wellbutrin 100 mg twice daily.  No SI or HI.    She has insomnia and takes trazodone 50 mg nightly.  This works well for her.    On 5/4/2020 she had a D&C with ablation with Dr. Alaniz, OB/GYN.  Reports no abnormal bleeding since this procedure.    Drinks alcohol socially, 2-3 times monthly.  Never smoker.  Reports that she is trying to get back into her exercise regimen.  She has been trying to eat \"a little healthier.\"    She denies development of any other new issues today.       The following portions of the patient's history were reviewed and updated as appropriate: allergies, current medications, past family history, past medical history, past social history, past surgical history and problem list.    Review of Systems   Constitutional: Negative for activity change, chills, fatigue, fever, unexpected weight gain and unexpected weight loss.   HENT: Negative for congestion, hearing loss, postnasal drip, sinus pressure, sneezing, sore throat, swollen glands and tinnitus.    Eyes: Negative for photophobia, pain and visual disturbance.   Respiratory: Negative for cough, chest tightness, shortness of breath and wheezing.    Cardiovascular: Negative for chest pain, palpitations and leg swelling.   Gastrointestinal: Negative for abdominal distention, abdominal pain, constipation, diarrhea, nausea and vomiting.   Endocrine: Negative for " "polydipsia, polyphagia and polyuria.   Genitourinary: Negative for dysuria, frequency, hematuria and urgency.   Neurological: Negative for dizziness, weakness, numbness and headache.   All other systems reviewed and are negative.      Objective    /84 (BP Location: Left arm, Patient Position: Sitting, Cuff Size: Adult)   Pulse 79   Temp 99.1 °F (37.3 °C) (Oral)   Resp 16   Ht 160 cm (63\")   Wt 136 kg (300 lb)   SpO2 94%   BMI 53.14 kg/m²     Physical Exam   Constitutional: She is oriented to person, place, and time. She appears well-developed and well-nourished. No distress.   HENT:   Head: Normocephalic and atraumatic.   Eyes: Pupils are equal, round, and reactive to light. EOM are normal.   Neck: Normal range of motion. Neck supple. No JVD present. No tracheal deviation present. No thyromegaly present.   No carotid bruits auscultated   Cardiovascular: Normal rate, regular rhythm, normal heart sounds and intact distal pulses. Exam reveals no gallop and no friction rub.   No murmur heard.  No peripheral edema. Posterior tib pulses 2+ and equal bilaterally.   Pulmonary/Chest: Effort normal and breath sounds normal. No stridor. No respiratory distress. She has no wheezes. She has no rales. She exhibits no tenderness.   Lungs CTA bilaterally.   Abdominal: Soft. Bowel sounds are normal. She exhibits no distension. There is no tenderness.   Bowel sounds active x 4 quad. Ileostomy in place.   Musculoskeletal: Normal range of motion.   Lymphadenopathy:     She has no cervical adenopathy.   Neurological: She is alert and oriented to person, place, and time.   Skin: Skin is warm and dry. Capillary refill takes less than 2 seconds. She is not diaphoretic.   Psychiatric: She has a normal mood and affect. Her behavior is normal. Judgment and thought content normal.   Nursing note and vitals reviewed.    Current outpatient and discharge medications have been reconciled for the patient.  Reviewed by: Pema Espinal, " APRN      Assessment/Plan   Liz was seen today for annual exam.    Diagnoses and all orders for this visit:    Annual physical exam    Healthcare maintenance  -     Hepatitis C antibody    Benign essential hypertension  -     CBC No Differential  -     Comprehensive metabolic panel  -     Lipid panel  -     TSH  -     T4, Free    Depression with anxiety    Hypovitaminosis D  -     Vitamin D 25 hydroxy    Ulcerative colitis, chronic, other complication (CMS/HCC)    Ileostomy in place (CMS/HCC)    Primary insomnia    Menorrhagia with irregular cycle      -Annual physical exam performed.  Provided guidance regarding health maintenance criteria including diet and exercise recommendations.  One-time hep C screening today.    -Hypertension: Stable with labetalol 200 mg every 12 hours, continue current therapy and routine home monitoring for goal of less than 130/80.    -Depression with anxiety: Stable with Cymbalta and Wellbutrin, continue current therapy.    -Vitamin D deficiency: Continue 50,000 units weekly.  Reports that she sometimes misses doses and reinforced the importance of consistent dosing.  We will check a vitamin D level today and adjust therapy if needed based on labs.    -UC: Stable at this time, ileostomy in place.    -Insomnia: Stable with trazodone, continue current therapy.    -No more issues with menorrhagia since May 2020, her D&C with ablation.  Following with Dr. Alaniz, OB/GYN routinely.    -We will contact patient with her lab results and any further recommendations.  Follow-up PRN and I will see her back in 6 months for routine health maintenance/follow-up on chronic conditions.

## 2020-07-13 ENCOUNTER — OFFICE VISIT (OUTPATIENT)
Dept: SPORTS MEDICINE | Facility: CLINIC | Age: 35
End: 2020-07-13

## 2020-07-13 VITALS
OXYGEN SATURATION: 97 % | HEART RATE: 86 BPM | HEIGHT: 63 IN | WEIGHT: 293 LBS | DIASTOLIC BLOOD PRESSURE: 74 MMHG | BODY MASS INDEX: 51.91 KG/M2 | SYSTOLIC BLOOD PRESSURE: 124 MMHG

## 2020-07-13 DIAGNOSIS — M72.2 PLANTAR FASCIITIS, LEFT: ICD-10-CM

## 2020-07-13 DIAGNOSIS — M76.71 PERONEAL TENDONITIS OF RIGHT LOWER LEG: Primary | ICD-10-CM

## 2020-07-13 PROCEDURE — 99214 OFFICE O/P EST MOD 30 MIN: CPT | Performed by: FAMILY MEDICINE

## 2020-07-13 RX ORDER — DICLOFENAC SODIUM 75 MG/1
75 TABLET, DELAYED RELEASE ORAL 2 TIMES DAILY
Qty: 60 TABLET | Refills: 1 | Status: SHIPPED | OUTPATIENT
Start: 2020-07-13 | End: 2021-06-29

## 2020-07-13 NOTE — PROGRESS NOTES
"Liz is a 34 y.o. year old female follow up on a problem familiar to this examiner.     Chief Complaint   Patient presents with   • bilateral foot pain     chronic       History of Present Illness  HPI   Here today recurrent pain in bilateral feet.  I last saw her for this about a year ago.  Currently she has had increasing sharp pain in the right lateral foot and ankle for the past 2 to 3 months.  She is tried ice, stretching without any relief.  Fracture boot made her symptoms worse.  She is getting some relief from NSAIDs.    Left side has more pain on the plantar aspect of the heel and leg toward the ankle.      I have reviewed the patient's medical, family, and social history in detail and updated the computerized patient record.    Review of Systems   Constitutional: Negative.    Neurological: Negative.        /74   Pulse 86   Ht 160 cm (62.99\")   Wt 134 kg (296 lb)   SpO2 97%   BMI 52.45 kg/m²      Physical Exam    Vital signs reviewed.   General: No acute distress.  Eyes: conjunctiva clear; pupils equally round and reactive  ENT: external ears and nose atraumatic  CV: no peripheral edema, 2+ distal pulses  Resp: normal respiratory effort, no use of accessory muscles  Skin: no rashes or wounds; normal turgor  Psych: mood and affect appropriate; recent and remote memory intact  Neuro: sensation to light touch intact    MSK Exam:  Ortho Exam  Right ankle: Tenderness to palpation along the peroneal tendons.  Normal range of motion but there is pain with inversion stretch, pain with active eversion.  Strength appears intact.  Left foot and ankle: Tenderness palpation on plantar fascial origin.  Otherwise benign appearing exam.  Normal range of motion, normal strength, no ligamentous laxity.  There is pain with plantar fascial stretch.      Diagnoses and all orders for this visit:    Peroneal tendonitis of right lower leg    Plantar fasciitis, left    Other orders  -     diclofenac (VOLTAREN) 75 MG EC " tablet; Take 1 tablet by mouth 2 (Two) Times a Day.      Discussed treatment options.  Will use prescription diclofenac by mouth, I discussed modifying her home therapy plan, night splint, etc.  We will recheck in a few weeks.  Discussed that repeat steroid injections carry definite risk of permanent tendon damage.  She wants to consider this for pain control purposes.  She may want to consider PRP instead for long-term success.  We will arrange tentative peroneal tendon sheath injection  EMR Dragon/Transcription disclaimer:    Much of this encounter note is an electronic transcription/translation of spoken language to printed text.  The electronic translation of spoken language may permit erroneous, or at times, nonsensical words or phrases to be inadvertently transcribed.  Although I have reviewed the note for such errors some may still exist.

## 2020-07-28 ENCOUNTER — OFFICE VISIT (OUTPATIENT)
Dept: SPORTS MEDICINE | Facility: CLINIC | Age: 35
End: 2020-07-28

## 2020-07-28 VITALS
SYSTOLIC BLOOD PRESSURE: 124 MMHG | BODY MASS INDEX: 51.91 KG/M2 | DIASTOLIC BLOOD PRESSURE: 80 MMHG | HEART RATE: 76 BPM | HEIGHT: 63 IN | WEIGHT: 293 LBS | OXYGEN SATURATION: 97 %

## 2020-07-28 DIAGNOSIS — M76.71 PERONEAL TENDONITIS OF RIGHT LOWER LEG: Primary | ICD-10-CM

## 2020-07-28 PROCEDURE — 20550 NJX 1 TENDON SHEATH/LIGAMENT: CPT | Performed by: FAMILY MEDICINE

## 2020-07-28 RX ORDER — TRIAMCINOLONE ACETONIDE 40 MG/ML
20 INJECTION, SUSPENSION INTRA-ARTICULAR; INTRAMUSCULAR ONCE
Status: COMPLETED | OUTPATIENT
Start: 2020-07-28 | End: 2020-07-28

## 2020-07-28 RX ADMIN — TRIAMCINOLONE ACETONIDE 20 MG: 40 INJECTION, SUSPENSION INTRA-ARTICULAR; INTRAMUSCULAR at 10:16

## 2020-07-28 NOTE — PROGRESS NOTES
"Liz is a 34 y.o. year old female     Chief Complaint   Patient presents with   • RT foot injection       History of Present Illness  HPI   R foot pain unchanged. Requesting injection as previously discussed.     I have reviewed the patient's medical, family, and social history in detail and updated the computerized patient record.    Review of Systems    /80   Pulse 76   Ht 160 cm (62.99\")   Wt 135 kg (297 lb)   SpO2 97%   BMI 52.62 kg/m²      Physical Exam    Vital signs reviewed.   General: No acute distress.  Eyes: conjunctiva clear; pupils equally round and reactive  ENT: external ears and nose atraumatic  CV: no peripheral edema, 2+ distal pulses  Resp: normal respiratory effort, no use of accessory muscles  Skin: no rashes or wounds; normal turgor  Psych: mood and affect appropriate; recent and remote memory intact  Neuro: sensation to light touch intact    MSK Exam:  Ortho Exam    Procedure note: Ultrasound-guided peroneal tendon sheath injection  We discussed indications for the procedure as well as risks, benefits, and alternatives.  Verbal consent was verified. Procedure was performed by physician.  The peroneal tendons were identified by ultrasound examination.  Injection site marked and prepped for sterile technique.  Under ultrasound guidance a mixture of 1.5 mL 1% lidocaine and 0.5 mL Kenalog was injected with a 30-gauge needle to the tendon sheath. Procedure was tolerated well without sign of complication.    Diagnoses and all orders for this visit:    Peroneal tendonitis of right lower leg  -     triamcinolone acetonide (KENALOG-40) injection 20 mg          EMR Dragon/Transcription disclaimer:    Much of this encounter note is an electronic transcription/translation of spoken language to printed text.  The electronic translation of spoken language may permit erroneous, or at times, nonsensical words or phrases to be inadvertently transcribed.  Although I have reviewed the note for such " errors some may still exist.

## 2020-08-17 DIAGNOSIS — F41.8 DEPRESSION WITH ANXIETY: ICD-10-CM

## 2020-08-17 RX ORDER — BUPROPION HYDROCHLORIDE 100 MG/1
100 TABLET ORAL 2 TIMES DAILY
Qty: 60 TABLET | Refills: 2 | Status: SHIPPED | OUTPATIENT
Start: 2020-08-17 | End: 2020-12-15 | Stop reason: SDUPTHER

## 2020-10-06 ENCOUNTER — HOSPITAL ENCOUNTER (EMERGENCY)
Facility: HOSPITAL | Age: 35
Discharge: HOME | End: 2020-10-07
Attending: EMERGENCY MEDICINE | Admitting: EMERGENCY MEDICINE

## 2020-10-06 DIAGNOSIS — S61.012A LACERATION OF LEFT THUMB WITHOUT FOREIGN BODY WITHOUT DAMAGE TO NAIL, INITIAL ENCOUNTER: Primary | ICD-10-CM

## 2020-10-06 PROCEDURE — 99282 EMERGENCY DEPT VISIT SF MDM: CPT

## 2020-10-07 ENCOUNTER — EPISODE CHANGES (OUTPATIENT)
Dept: CASE MANAGEMENT | Facility: OTHER | Age: 35
End: 2020-10-07

## 2020-10-07 VITALS
RESPIRATION RATE: 16 BRPM | BODY MASS INDEX: 58 KG/M2 | SYSTOLIC BLOOD PRESSURE: 165 MMHG | HEART RATE: 85 BPM | DIASTOLIC BLOOD PRESSURE: 95 MMHG | TEMPERATURE: 98.5 F | HEIGHT: 60 IN | OXYGEN SATURATION: 95 %

## 2020-10-07 PROCEDURE — 25010000003 LIDOCAINE 1 % SOLUTION: Performed by: PHYSICIAN ASSISTANT

## 2020-10-07 RX ORDER — LIDOCAINE HYDROCHLORIDE 10 MG/ML
10 INJECTION, SOLUTION INFILTRATION; PERINEURAL ONCE
Status: COMPLETED | OUTPATIENT
Start: 2020-10-07 | End: 2020-10-07

## 2020-10-07 RX ADMIN — LIDOCAINE HYDROCHLORIDE 10 ML: 10 INJECTION, SOLUTION INFILTRATION; PERINEURAL at 01:08

## 2020-10-07 NOTE — ED NOTES
Pt presents to ED w/cc finger lac.  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  Pt masked at triage  Triage completed with appropriate PPE     Yenfier Puri, RN  10/06/20 8054

## 2020-10-07 NOTE — ED PROVIDER NOTES
EMERGENCY DEPARTMENT ENCOUNTER    Room Number:  22/22  Date of encounter:  10/7/2020  PCP: Pema Espinal APRN  Historian: Patient      I used full protective equipment while examining this patient.  This includes face mask, gloves and protective eyewear.  I washed my hands before entering the room and immediately upon leaving the room      HPI:  Chief Complaint: Laceration  A complete HPI/ROS/PMH/PSH/SH/FH are unobtainable due to: Nothing    Context: Liz Bagley is a 35 y.o. female who presents to the ED c/o laceration to distal left thumb just prior to arrival.  Patient states that she was at work, when she accidentally cut herself with a clean scalpel.  Patient is right-handed.  She denies any previous left thumb injuries.  She denies any numbness or tingling to her left thumb.  Tetanus shot is up-to-date.    Review of Medical Records  I reviewed patient's internal medicine visit from 1/31/2020.  Patient seen for numbness and tingling of her hands.    PAST MEDICAL HISTORY  Active Ambulatory Problems     Diagnosis Date Noted   • Benign essential hypertension 05/01/2016   • Ulcerative colitis, chronic, other complication (CMS/[facility]) 05/01/2016   • Depression with anxiety 01/06/2017   • Primary insomnia 01/06/2017   • Controlled substance agreement signed 01/06/2017   • Morbid obesity (CMS/[facility]) 01/06/2017   • Bilateral non-suppurative otitis media 01/06/2017   • Hearing loss of left ear 02/06/2017   • Perennial allergic rhinitis 02/06/2017   • Pain of upper extremity 08/04/2017   • Depression 08/04/2017   • Shortness of breath 08/04/2017   • Chronic fatigue 08/04/2017   • Hand joint pain 08/04/2017   • Non-neoplastic nevus 08/04/2017   • Headache 08/04/2017   • Infective arthritis of joint of hand (CMS/[facility]) 08/04/2017   • Health care maintenance 10/10/2017   • Hypovitaminosis D 10/10/2017   • IBD (inflammatory bowel disease) 10/17/2017   • Ulcerative pancolitis with rectal bleeding (CMS/[facility]) 11/13/2017   • Asthma  due to environmental allergies 04/02/2018   • Chronic fatigue 05/21/2018   • Ileostomy in place (CMS/HCC) 05/21/2018   • Intestinal malabsorption 05/29/2018   • Anemia 11/08/2018   • Leukocytosis 11/08/2018   • Iron deficiency anemia 11/08/2018   • Poor iron absorption 11/08/2018   • Neuropathy 02/28/2019   • Bursitis of both feet 03/11/2019   • Metatarsalgia of right foot 03/11/2019   • Peroneal tendonitis of right lower leg 04/08/2019   • Sprain of right ankle 05/01/2019   • Peroneal tendonitis of right lower extremity 05/01/2019   • Anterior tibial tendonitis, right 05/01/2019     Resolved Ambulatory Problems     Diagnosis Date Noted   • Frontal sinusitis 08/04/2017   • Hoarseness 08/04/2017   • Maxillary sinusitis 08/04/2017   • Current chronic use of systemic steroids 10/10/2017     Past Medical History:   Diagnosis Date   • Abnormal uterine bleeding    • Allergic rhinitis    • Arm pain, left    • Arthritis    • Crohn's disease (CMS/[facility])    • Eczema    • H/O transfusion of packed red blood cells    • Headache syndrome 06/25/2017   • Hypertension    • IBS (irritable bowel syndrome)    • Ileostomy present (CMS/[facility])    • Insomnia    • Migraine    • Non-neoplastic nevus of skin    • Pancolitis (CMS/[facility])    • Recurrent sinus infections    • Spinal headache 06/28/2015   • Tattoos    • Ulcerative colitis (CMS/[facility])          PAST SURGICAL HISTORY  Past Surgical History:   Procedure Laterality Date   • ANKLE SURGERY Left 2011    w/ internal devices, DR. AGUAYO   • APPENDECTOMY N/A 06/02/2014     AT [facility]   • BLOOD PATCH N/A 06/28/2015    EPIDURAL BLOOD PATCH, DR.DONAL ARMSTRONG AT [facility]   • COLON RESECTION N/A 11/20/2017    Procedure: LAPAROSCOPIC TOTAL PROCTOCOLECTOMY WITH END ILEOSTOMY;  Surgeon: Colin Evans MD;  [facility];  Service:    • COLON RESECTION      illeostomy   • COLONOSCOPY N/A 11/11/2016    Procedure: COLONOSCOPY TO CECUM AND TERMINAL ILEUM WITH BIOPSIES;  Surgeon: Yao Uribe MD;   Location: [facility] ENDOSCOPY;  Service:    • COLONOSCOPY N/A 07/2012    DR. MILLER JOSEPH IN [place]   • D&C HYSTEROSCOPY ENDOMETRIAL ABLATION N/A 5/4/2020    Procedure: DILATATION AND CURETTAGE HYSTEROSCOPY NOVASURE ENDOMETRIAL ABLATION;  Surgeon: Hellen Alaniz MD;  Location: [facility] MAIN OR;  Service: Gynecology;  Laterality: N/A;   • EPIDURAL BLOOD PATCH N/A 07/02/2015    DR. MILLER LOPEZ AT [facility]   • FINGER SURGERY Right 12/18/2015    MIDDLE FINGER, EXCISION OF FOREIGN BODYX3, DR,TSU-MIN MARV   • FOREARM SURGERY Left 2000    w/ internal device,   • LUMBAR PUNCTURE N/A 06/25/2015    [facility]    • NOSE SURGERY Bilateral 02/10/2017    NASAL SCOPE, BILATERAL EDEMA, MIDLINE SEPTUM, NO POLYPS, DR. GHADA LEGGETT   • TONSILLECTOMY Bilateral 2000         FAMILY HISTORY  Family History   Problem Relation Age of Onset   • Heart disease Mother    • Hypertension Mother    • Depression Mother    • Diabetes Mother    • Hypertension Father    • Diabetes Father    • Skin cancer Father    • Hypertension Brother    • Heart disease Maternal Grandmother    • Heart attack Maternal Grandmother    • Hypertension Brother    • Malig Hyperthermia Neg Hx          SOCIAL HISTORY  Social History     Socioeconomic History   • Marital status: Single     Spouse name: Not on file   • Number of children: 0   • Years of education: College   • Highest education level: Not on file   Occupational History   • Occupation:      Employer: [employer]   Tobacco Use   • Smoking status: Never Smoker   • Smokeless tobacco: Never Used   Substance and Sexual Activity   • Alcohol use: Yes     Frequency: 2-4 times a month     Comment: 1-2 monthly   • Drug use: No   • Sexual activity: Defer         ALLERGIES  Patient has no known allergies.        REVIEW OF SYSTEMS  All systems reviewed and negative except for those discussed in HPI.       PHYSICAL EXAM    I have reviewed the triage vital signs and nursing notes.    ED Triage Vitals  [10/06/20 2256]   Temp Heart Rate Resp BP SpO2   98.5 °F (36.9 °C) (!) 121 16 -- 95 %      Temp src Heart Rate Source Patient Position BP Location FiO2 (%)   Tympanic Monitor -- -- --       Physical Exam  GENERAL: Alert, oriented, not distressed  HENT: nares patent, head atraumatic  EYES: no scleral icterus, EOMI  CV: regular rhythm, regular rate, no murmur  RESPIRATORY: normal effort, CTA  MUSCULOSKELETAL: Laceration to left distal finger.  No nailbed involvement.  Neurovascular intact distally.  Full range of motion.  No tendon damage.  NEURO: alert, moves all extremities, follows commands  SKIN: warm, dry      PROCEDURES    Laceration Repair    Date/Time: 10/7/2020 1:28 AM  Performed by: Damon Cedeno PA  Authorized by: Tristian Morris MD     Consent:     Consent obtained:  Verbal    Consent given by:  Patient  Anesthesia (see MAR for exact dosages):     Anesthesia method:  Nerve block    Block needle gauge:  27 G    Block anesthetic:  Lidocaine 1% w/o epi    Block outcome:  Anesthesia achieved  Laceration details:     Location:  Finger    Finger location:  L thumb    Length (cm):  1.8  Repair type:     Repair type:  Simple  Pre-procedure details:     Preparation:  Patient was prepped and draped in usual sterile fashion  Exploration:     Hemostasis achieved with:  Tourniquet    Wound exploration: wound explored through full range of motion and entire depth of wound probed and visualized      Wound extent: no foreign bodies/material noted, no nerve damage noted and no tendon damage noted    Treatment:     Area cleansed with:  Hibiclens    Amount of cleaning:  Standard    Irrigation solution:  Sterile saline  Skin repair:     Repair method:  Sutures    Suture size:  5-0    Suture material:  Nylon    Suture technique:  Simple interrupted    Number of sutures:  3  Approximation:     Approximation:  Close  Post-procedure details:     Dressing:  Antibiotic ointment    Patient tolerance of procedure:  Tolerated well,  no immediate complications          MEDICATIONS GIVEN IN ER    Medications   lidocaine (XYLOCAINE) 1 % injection 10 mL (10 mL Injection Given by Other 10/7/20 0108)         PROGRESS, DATA ANALYSIS, CONSULTS, AND MEDICAL DECISION MAKING    All labs have been independently reviewed by me.  All radiology studies have been reviewed by me and discussed with radiologist dictating the report.   EKG's independently viewed and interpreted by me.  Discussion below represents my analysis of pertinent findings related to patient's condition, differential diagnosis, treatment plan and final disposition.    I have discussed case with Dr. Morris, emergency room physician.  He has performed his own bedside examination and agrees with treatment plan.    ED Course as of Oct 07 0128   Wed Oct 07, 2020   0014 Patient presents with laceration to left thumb.  Neurovascular intact.  No tendon injury.  Plan for wound closure.    [EE]      ED Course User Index  [EE] Damon Cedeno PA       AS OF 01:28 EDT VITALS:    BP - 165/95  HR - 85  TEMP - 98.5 °F (36.9 °C) (Tympanic)  O2 SATS - 95%        DIAGNOSIS  Final diagnoses:   Laceration of left thumb without foreign body without damage to nail, initial encounter         DISPOSITION  Discharged           Damon Cedeno PA  10/07/20 0256

## 2020-10-07 NOTE — ED PROVIDER NOTES
Pt presents to the ED c/o  laceration to her left nondominant thumb.  Patient states that, while at work, patient was trying to open a unused scalpel.  The scalpel slipped and accidentally cut the palmar aspect of her left thumb tip.  She denies any other injury.  She states her last tetanus shot was in 2018.     On exam,   Heart is regular rate and rhythm without any murmurs.  Her lungs are clear to auscultation bilaterally.  She has an approximate 1.5 cm laceration on the palmar aspect of her left fingertip.  It does not extend into the nail.     Plan: I agree with plan of laceration repair and discharge.    Patient was placed in face mask in first look. Patient was wearing facemask when I entered the room and throughout our encounter. I wore full protective equipment throughout this patient encounter including a face mask, eye shield and gloves. Hand hygiene was performed before donning protective equipment and after removal when leaving the room.       Attestation:  The GISSELLE and I have discussed this patient's history, physical exam, and treatment plan.  I have reviewed the documentation and personally had a face to face interaction with the patient. I affirm the documentation and agree with the treatment and plan.  The attached note describes my personal findings.       Dragon disclaimer:   Much of this encounter note is an electronic transcription/translation of spoken language to printed text.  The electronic translation of spoken language may permit erroneous, or at times, nonsensical words or phrases to be inadvertently transcribed.  Although I have reviewed the note for such areas, some may still exist.     Tristian Morris MD  10/07/20 0028

## 2020-10-07 NOTE — ED NOTES
I wore full protective equipment throughout this patient encounter including a face mask, eye shield and gloves. Hand hygiene/washing of hands was performed before donning protective equipment and after removal when leaving the room.       Inge Shepard RN  10/07/20 0131

## 2020-10-15 ENCOUNTER — EPISODE CHANGES (OUTPATIENT)
Dept: CASE MANAGEMENT | Facility: OTHER | Age: 35
End: 2020-10-15

## 2020-10-16 RX ORDER — DULOXETIN HYDROCHLORIDE 60 MG/1
120 CAPSULE, DELAYED RELEASE ORAL
Qty: 180 CAPSULE | Refills: 1 | Status: CANCELLED | OUTPATIENT
Start: 2020-10-16 | End: 2021-04-14

## 2020-10-19 ENCOUNTER — EPISODE CHANGES (OUTPATIENT)
Dept: CASE MANAGEMENT | Facility: OTHER | Age: 35
End: 2020-10-19

## 2020-10-21 ENCOUNTER — EPISODE CHANGES (OUTPATIENT)
Dept: CASE MANAGEMENT | Facility: OTHER | Age: 35
End: 2020-10-21

## 2020-10-29 ENCOUNTER — EPISODE CHANGES (OUTPATIENT)
Dept: CASE MANAGEMENT | Facility: OTHER | Age: 35
End: 2020-10-29

## 2020-11-02 RX ORDER — DULOXETIN HYDROCHLORIDE 60 MG/1
120 CAPSULE, DELAYED RELEASE ORAL
Qty: 180 CAPSULE | Refills: 1 | Status: SHIPPED | OUTPATIENT
Start: 2020-11-02 | End: 2021-05-02 | Stop reason: SDUPTHER

## 2020-11-11 ENCOUNTER — TELEPHONE (OUTPATIENT)
Dept: INTERNAL MEDICINE | Facility: CLINIC | Age: 35
End: 2020-11-11

## 2020-11-11 NOTE — TELEPHONE ENCOUNTER
PATIENT STATED SINUS INFECTION AND SHE STATED THAT SHE THINK THAT IT WILL GO INTO BRONCHITIS. SHE HAS HAD IT SINCE 11/06/2020. THE PATIENT WOULD LIKE TO KNOW IF SOMETHING CAN BE PRESCRIBED FOR THIS. SHE VERBALIZED THAT SHE DOES NOT WANT TO COME IN DUE TO COVID EPIDEMIC.    PLEASE ADVISE.    CALLBACK NUMBER: [phone]

## 2020-11-12 NOTE — TELEPHONE ENCOUNTER
Called pt and go no answer, left msg that she would either need a telephone visit and Pema is booked today so it would have to be tomorrow or she could go to the closest urgent care center to be seen and treated.

## 2020-12-09 ENCOUNTER — HOSPITAL ENCOUNTER (EMERGENCY)
Facility: HOSPITAL | Age: 35
Discharge: HOME | End: 2020-12-09
Attending: EMERGENCY MEDICINE | Admitting: EMERGENCY MEDICINE

## 2020-12-09 ENCOUNTER — APPOINTMENT (OUTPATIENT)
Dept: GENERAL RADIOLOGY | Facility: HOSPITAL | Age: 35
End: 2020-12-09

## 2020-12-09 VITALS
HEART RATE: 88 BPM | OXYGEN SATURATION: 98 % | SYSTOLIC BLOOD PRESSURE: 147 MMHG | RESPIRATION RATE: 18 BRPM | TEMPERATURE: 97.8 F | HEIGHT: 63 IN | BODY MASS INDEX: 52.61 KG/M2 | DIASTOLIC BLOOD PRESSURE: 95 MMHG

## 2020-12-09 DIAGNOSIS — R11.2 NON-INTRACTABLE VOMITING WITH NAUSEA, UNSPECIFIED VOMITING TYPE: Primary | ICD-10-CM

## 2020-12-09 DIAGNOSIS — U07.1 COVID-19: ICD-10-CM

## 2020-12-09 DIAGNOSIS — R51.9 NONINTRACTABLE EPISODIC HEADACHE, UNSPECIFIED HEADACHE TYPE: ICD-10-CM

## 2020-12-09 LAB
ALBUMIN SERPL-MCNC: 4.5 G/DL (ref 3.5–5.2)
ALBUMIN/GLOB SERPL: 1.2 G/DL
ALP SERPL-CCNC: 87 U/L (ref 39–117)
ALT SERPL W P-5'-P-CCNC: 19 U/L (ref 1–33)
ANION GAP SERPL CALCULATED.3IONS-SCNC: 8.2 MMOL/L (ref 5–15)
AST SERPL-CCNC: 15 U/L (ref 1–32)
BACTERIA UR QL AUTO: ABNORMAL /HPF
BASOPHILS # BLD AUTO: 0.05 10*3/MM3 (ref 0–0.2)
BASOPHILS NFR BLD AUTO: 0.7 % (ref 0–1.5)
BILIRUB SERPL-MCNC: 0.2 MG/DL (ref 0–1.2)
BILIRUB UR QL STRIP: NEGATIVE
BUN SERPL-MCNC: 7 MG/DL (ref 6–20)
BUN/CREAT SERPL: 13.7 (ref 7–25)
CALCIUM SPEC-SCNC: 9.5 MG/DL (ref 8.6–10.5)
CHLORIDE SERPL-SCNC: 98 MMOL/L (ref 98–107)
CLARITY UR: CLEAR
CO2 SERPL-SCNC: 27.8 MMOL/L (ref 22–29)
COLOR UR: YELLOW
CREAT SERPL-MCNC: 0.51 MG/DL (ref 0.57–1)
DEPRECATED RDW RBC AUTO: 39.4 FL (ref 37–54)
EOSINOPHIL # BLD AUTO: 0.02 10*3/MM3 (ref 0–0.4)
EOSINOPHIL NFR BLD AUTO: 0.3 % (ref 0.3–6.2)
ERYTHROCYTE [DISTWIDTH] IN BLOOD BY AUTOMATED COUNT: 12.9 % (ref 12.3–15.4)
GFR SERPL CREATININE-BSD FRML MDRD: 136 ML/MIN/1.73
GLOBULIN UR ELPH-MCNC: 3.7 GM/DL
GLUCOSE SERPL-MCNC: 126 MG/DL (ref 65–99)
GLUCOSE UR STRIP-MCNC: NEGATIVE MG/DL
HCG SERPL QL: NEGATIVE
HCT VFR BLD AUTO: 42.7 % (ref 34–46.6)
HGB BLD-MCNC: 14.1 G/DL (ref 12–15.9)
HGB UR QL STRIP.AUTO: NEGATIVE
HOLD SPECIMEN: NORMAL
HYALINE CASTS UR QL AUTO: ABNORMAL /LPF
IMM GRANULOCYTES # BLD AUTO: 0.03 10*3/MM3 (ref 0–0.05)
IMM GRANULOCYTES NFR BLD AUTO: 0.4 % (ref 0–0.5)
KETONES UR QL STRIP: ABNORMAL
LEUKOCYTE ESTERASE UR QL STRIP.AUTO: NEGATIVE
LIPASE SERPL-CCNC: 27 U/L (ref 13–60)
LYMPHOCYTES # BLD AUTO: 1.16 10*3/MM3 (ref 0.7–3.1)
LYMPHOCYTES NFR BLD AUTO: 15.1 % (ref 19.6–45.3)
MCH RBC QN AUTO: 27.8 PG (ref 26.6–33)
MCHC RBC AUTO-ENTMCNC: 33 G/DL (ref 31.5–35.7)
MCV RBC AUTO: 84.1 FL (ref 79–97)
MONOCYTES # BLD AUTO: 0.35 10*3/MM3 (ref 0.1–0.9)
MONOCYTES NFR BLD AUTO: 4.6 % (ref 5–12)
NEUTROPHILS NFR BLD AUTO: 6.05 10*3/MM3 (ref 1.7–7)
NEUTROPHILS NFR BLD AUTO: 78.9 % (ref 42.7–76)
NITRITE UR QL STRIP: NEGATIVE
NRBC BLD AUTO-RTO: 0 /100 WBC (ref 0–0.2)
PH UR STRIP.AUTO: 8.5 [PH] (ref 5–8)
PLATELET # BLD AUTO: 490 10*3/MM3 (ref 140–450)
PMV BLD AUTO: 9.7 FL (ref 6–12)
POTASSIUM SERPL-SCNC: 4 MMOL/L (ref 3.5–5.2)
PROT SERPL-MCNC: 8.2 G/DL (ref 6–8.5)
PROT UR QL STRIP: ABNORMAL
RBC # BLD AUTO: 5.08 10*6/MM3 (ref 3.77–5.28)
RBC # UR: ABNORMAL /HPF
REF LAB TEST METHOD: ABNORMAL
SODIUM SERPL-SCNC: 134 MMOL/L (ref 136–145)
SP GR UR STRIP: 1.02 (ref 1–1.03)
SQUAMOUS #/AREA URNS HPF: ABNORMAL /HPF
UROBILINOGEN UR QL STRIP: ABNORMAL
WBC # BLD AUTO: 7.66 10*3/MM3 (ref 3.4–10.8)
WBC UR QL AUTO: ABNORMAL /HPF
WHOLE BLOOD HOLD SPECIMEN: NORMAL
WHOLE BLOOD HOLD SPECIMEN: NORMAL

## 2020-12-09 PROCEDURE — 25010000002 MORPHINE PER 10 MG: Performed by: EMERGENCY MEDICINE

## 2020-12-09 PROCEDURE — 25010000002 KETOROLAC TROMETHAMINE PER 15 MG: Performed by: PHYSICIAN ASSISTANT

## 2020-12-09 PROCEDURE — 99283 EMERGENCY DEPT VISIT LOW MDM: CPT

## 2020-12-09 PROCEDURE — 96374 THER/PROPH/DIAG INJ IV PUSH: CPT

## 2020-12-09 PROCEDURE — 84703 CHORIONIC GONADOTROPIN ASSAY: CPT

## 2020-12-09 PROCEDURE — 96375 TX/PRO/DX INJ NEW DRUG ADDON: CPT

## 2020-12-09 PROCEDURE — 81001 URINALYSIS AUTO W/SCOPE: CPT | Performed by: EMERGENCY MEDICINE

## 2020-12-09 PROCEDURE — 83690 ASSAY OF LIPASE: CPT

## 2020-12-09 PROCEDURE — 36415 COLL VENOUS BLD VENIPUNCTURE: CPT

## 2020-12-09 PROCEDURE — 80053 COMPREHEN METABOLIC PANEL: CPT

## 2020-12-09 PROCEDURE — 25010000002 ONDANSETRON PER 1 MG: Performed by: EMERGENCY MEDICINE

## 2020-12-09 PROCEDURE — 71045 X-RAY EXAM CHEST 1 VIEW: CPT

## 2020-12-09 PROCEDURE — 85025 COMPLETE CBC W/AUTO DIFF WBC: CPT

## 2020-12-09 RX ORDER — ONDANSETRON 4 MG/1
4-8 TABLET, ORALLY DISINTEGRATING ORAL EVERY 8 HOURS PRN
Qty: 10 TABLET | Refills: 0 | Status: SHIPPED | OUTPATIENT
Start: 2020-12-09 | End: 2021-06-29

## 2020-12-09 RX ORDER — MORPHINE SULFATE 2 MG/ML
4 INJECTION, SOLUTION INTRAMUSCULAR; INTRAVENOUS ONCE
Status: COMPLETED | OUTPATIENT
Start: 2020-12-09 | End: 2020-12-09

## 2020-12-09 RX ORDER — KETOROLAC TROMETHAMINE 30 MG/ML
30 INJECTION, SOLUTION INTRAMUSCULAR; INTRAVENOUS ONCE
Status: COMPLETED | OUTPATIENT
Start: 2020-12-09 | End: 2020-12-09

## 2020-12-09 RX ORDER — ONDANSETRON 2 MG/ML
4 INJECTION INTRAMUSCULAR; INTRAVENOUS ONCE
Status: COMPLETED | OUTPATIENT
Start: 2020-12-09 | End: 2020-12-09

## 2020-12-09 RX ORDER — SODIUM CHLORIDE 0.9 % (FLUSH) 0.9 %
10 SYRINGE (ML) INJECTION AS NEEDED
Status: DISCONTINUED | OUTPATIENT
Start: 2020-12-09 | End: 2020-12-09 | Stop reason: HOSPADM

## 2020-12-09 RX ADMIN — KETOROLAC TROMETHAMINE 30 MG: 30 INJECTION, SOLUTION INTRAMUSCULAR at 05:25

## 2020-12-09 RX ADMIN — MORPHINE SULFATE 4 MG: 2 INJECTION, SOLUTION INTRAMUSCULAR; INTRAVENOUS at 04:05

## 2020-12-09 RX ADMIN — SODIUM CHLORIDE 1000 ML: 9 INJECTION, SOLUTION INTRAVENOUS at 04:06

## 2020-12-09 RX ADMIN — ONDANSETRON 4 MG: 2 INJECTION INTRAMUSCULAR; INTRAVENOUS at 04:05

## 2020-12-09 NOTE — ED NOTES
Pt verbalizes the understanding of dc instruction. Pt left in Eulalia Michael, RN  12/09/20 0512

## 2020-12-09 NOTE — ED PROVIDER NOTES
The GISSELLE and I have discussed this patients history, physical exam, and treatment plan. I have reviewed the documentation and personally had a face to face interaction with the patient. I affirm the documentation and agree with the treatment and plan.  The following note describes my personal findings    This patient is a 35-year-old female presenting to the emergency room today with a headache as well as nausea and vomiting today.  The patient was diagnosed as COVID-19 positive earlier today as well.    Exam: This patient is resting comfortably and in no distress, without gross neurological deficit.  He is afebrile with stable vital signs and nontoxic in appearance.  Neck is nontender and supple.  Cardiovascular exam shows a regular rate and rhythm without murmur.    Plan: Laboratory results as well as a chest x-ray will be obtained.  We will treat the patient's headache as well as nausea and vomiting here in the ED.  We will reassess following.      The patient was wearing a facemask upon entrance into the room and remained in such throughout their visit.  I was wearing PPE including a facemask, eye protection, as well as gloves at any point entering the room and throughout the visit     Joseph Souza MD  12/09/20 0637

## 2020-12-09 NOTE — ED NOTES
Pt presents to ED via POV from home w/cc vomiting and headache x 1 day  Pt states she received + COVID Dx today, has been experiencing vomiting, headache and hot/cold flashes.  Pt denies dizziness, cp, soa.  Pt ambulated to triage without incident.    Pt masked at triage  Triage completed with appropriate PPE     Yenifer Puri, RN  12/09/20 0124

## 2020-12-09 NOTE — ED NOTES
Pt c/o n/v x 2100 last night and a bad headache. Pt dx w/ covid today denies any resp. Problems      Eulalia Qiu RN  12/09/20 2967

## 2020-12-09 NOTE — ED PROVIDER NOTES
EMERGENCY DEPARTMENT ENCOUNTER    Room Number:  01/01  Date seen:  12/9/2020  Time seen: 04:16 EST  PCP: Pema Espinal APRN  Historian: patient      HPI:  Chief Complaint: vomiting, headache, covid positive    A complete HPI/ROS/PMH/PSH/SH/FH are unobtainable due to: none    Context: Liz Bagley is a 35 y.o. female who presents to the ED for evaluation of severe nausea with vomiting that occurred this evening and was constant severe and nothing seemed to make it better.  It was made worse with any p.o. intake.  She states she was diagnosed with COVID-19 today, has had a cough and a mild sore throat, denies any fever chills chest pain shortness of breath abdominal pain loss of smell or taste.  She has an ileostomy due to history of ulcerative colitis, states she is having normal output.  She also reports a moderate headache denies any vision changes neck stiffness numbness weakness or other neurologic symptoms.        PAST MEDICAL HISTORY  Active Ambulatory Problems     Diagnosis Date Noted   • Benign essential hypertension 05/01/2016   • Ulcerative colitis, chronic, other complication (CMS/[facility]) 05/01/2016   • Depression with anxiety 01/06/2017   • Primary insomnia 01/06/2017   • Controlled substance agreement signed 01/06/2017   • Morbid obesity (CMS/[facility]) 01/06/2017   • Bilateral non-suppurative otitis media 01/06/2017   • Hearing loss of left ear 02/06/2017   • Perennial allergic rhinitis 02/06/2017   • Pain of upper extremity 08/04/2017   • Depression 08/04/2017   • Shortness of breath 08/04/2017   • Chronic fatigue 08/04/2017   • Hand joint pain 08/04/2017   • Non-neoplastic nevus 08/04/2017   • Headache 08/04/2017   • Infective arthritis of joint of hand (CMS/[facility]) 08/04/2017   • Health care maintenance 10/10/2017   • Hypovitaminosis D 10/10/2017   • IBD (inflammatory bowel disease) 10/17/2017   • Ulcerative pancolitis with rectal bleeding (CMS/[facility]) 11/13/2017   • Asthma due to environmental allergies  04/02/2018   • Chronic fatigue 05/21/2018   • Ileostomy in place (CMS/[facility]) 05/21/2018   • Intestinal malabsorption 05/29/2018   • Anemia 11/08/2018   • Leukocytosis 11/08/2018   • Iron deficiency anemia 11/08/2018   • Poor iron absorption 11/08/2018   • Neuropathy 02/28/2019   • Bursitis of both feet 03/11/2019   • Metatarsalgia of right foot 03/11/2019   • Peroneal tendonitis of right lower leg 04/08/2019   • Sprain of right ankle 05/01/2019   • Peroneal tendonitis of right lower extremity 05/01/2019   • Anterior tibial tendonitis, right 05/01/2019     Resolved Ambulatory Problems     Diagnosis Date Noted   • Frontal sinusitis 08/04/2017   • Hoarseness 08/04/2017   • Maxillary sinusitis 08/04/2017   • Current chronic use of systemic steroids 10/10/2017     Past Medical History:   Diagnosis Date   • Abnormal uterine bleeding    • Allergic rhinitis    • Arm pain, left    • Arthritis    • Crohn's disease (CMS/[facility])    • Eczema    • H/O transfusion of packed red blood cells    • Headache syndrome 06/25/2017   • Hypertension    • IBS (irritable bowel syndrome)    • Ileostomy present (CMS/[facility])    • Insomnia    • Migraine    • Non-neoplastic nevus of skin    • Pancolitis (CMS/[facility])    • Recurrent sinus infections    • Spinal headache 06/28/2015   • Tattoos    • Ulcerative colitis (CMS/[facility])          PAST SURGICAL HISTORY  Past Surgical History:   Procedure Laterality Date   • ANKLE SURGERY Left 2011    w/ internal devices, DR. AGUAYO   • APPENDECTOMY N/A 06/02/2014     AT [facility]   • BLOOD PATCH N/A 06/28/2015    EPIDURAL BLOOD PATCH, DR.DONAL ARMSTRONG AT [facility]   • COLON RESECTION N/A 11/20/2017    Procedure: LAPAROSCOPIC TOTAL PROCTOCOLECTOMY WITH END ILEOSTOMY;  Surgeon: Colin Evans MD;  Location: [facility] MAIN OR;  Service:    • COLON RESECTION      illeostomy   • COLONOSCOPY N/A 11/11/2016    Procedure: COLONOSCOPY TO CECUM AND TERMINAL ILEUM WITH BIOPSIES;  Surgeon: Yao Uribe MD;  Location: [facility] ENDOSCOPY;   Service:    • COLONOSCOPY N/A 07/2012    DR. MILLER JOSEPH IN [place]   • D&C HYSTEROSCOPY ENDOMETRIAL ABLATION N/A 5/4/2020    Procedure: DILATATION AND CURETTAGE HYSTEROSCOPY NOVASURE ENDOMETRIAL ABLATION;  Surgeon: Hellen Alaniz MD;  Location: [facility] MAIN OR;  Service: Gynecology;  Laterality: N/A;   • EPIDURAL BLOOD PATCH N/A 07/02/2015    DR. MILLER LOPEZ AT [facility]   • FINGER SURGERY Right 12/18/2015    MIDDLE FINGER, EXCISION OF FOREIGN BODYX3, DR,TSU-MIN MARV   • FOREARM SURGERY Left 2000    w/ internal device,   • LUMBAR PUNCTURE N/A 06/25/2015    [facility]    • NOSE SURGERY Bilateral 02/10/2017    NASAL SCOPE, BILATERAL EDEMA, MIDLINE SEPTUM, NO POLYPS, DR. GHADA LEGGETT   • TONSILLECTOMY Bilateral 2000         FAMILY HISTORY  Family History   Problem Relation Age of Onset   • Heart disease Mother    • Hypertension Mother    • Depression Mother    • Diabetes Mother    • Hypertension Father    • Diabetes Father    • Skin cancer Father    • Hypertension Brother    • Heart disease Maternal Grandmother    • Heart attack Maternal Grandmother    • Hypertension Brother    • Malig Hyperthermia Neg Hx          SOCIAL HISTORY  Social History     Socioeconomic History   • Marital status: Single     Spouse name: Not on file   • Number of children: 0   • Years of education: College   • Highest education level: Not on file   Occupational History   • Occupation:      Employer: [employer]   Tobacco Use   • Smoking status: Never Smoker   • Smokeless tobacco: Never Used   Substance and Sexual Activity   • Alcohol use: Yes     Frequency: 2-4 times a month     Comment: 1-2 monthly   • Drug use: No   • Sexual activity: Defer         ALLERGIES  Patient has no known allergies.        REVIEW OF SYSTEMS  Review of Systems     All systems reviewed and negative except for those discussed in HPI.       PHYSICAL EXAM  ED Triage Vitals [12/09/20 0124]   Temp Heart Rate Resp BP SpO2   97.8 °F (36.6 °C) 95 16  -- 98 %      Temp src Heart Rate Source Patient Position BP Location FiO2 (%)   Tympanic Monitor -- -- --         GENERAL: not distressed, nontoxic  HENT: atraumatic normocephalic  EYES: no scleral icterus PERRLA extraocular movements intact  CV: regular rhythm, regular rate  RESPIRATORY: normal effort CTA B  ABDOMEN: soft, nontender nondistended normal bowel sounds no guarding rigidity, ostomy in the right abdomen  MUSCULOSKELETAL: no deformity  NEURO: alert, moves all extremities, follows commands, no meningismus  SKIN: warm, dry    Vital signs and nursing notes reviewed.          LAB RESULTS  Recent Results (from the past 24 hour(s))   Comprehensive Metabolic Panel    Collection Time: 12/09/20  2:50 AM    Specimen: Blood   Result Value Ref Range    Glucose 126 (H) 65 - 99 mg/dL    BUN 7 6 - 20 mg/dL    Creatinine 0.51 (L) 0.57 - 1.00 mg/dL    Sodium 134 (L) 136 - 145 mmol/L    Potassium 4.0 3.5 - 5.2 mmol/L    Chloride 98 98 - 107 mmol/L    CO2 27.8 22.0 - 29.0 mmol/L    Calcium 9.5 8.6 - 10.5 mg/dL    Total Protein 8.2 6.0 - 8.5 g/dL    Albumin 4.50 3.50 - 5.20 g/dL    ALT (SGPT) 19 1 - 33 U/L    AST (SGOT) 15 1 - 32 U/L    Alkaline Phosphatase 87 39 - 117 U/L    Total Bilirubin 0.2 0.0 - 1.2 mg/dL    eGFR Non African Amer 136 >60 mL/min/1.73    Globulin 3.7 gm/dL    A/G Ratio 1.2 g/dL    BUN/Creatinine Ratio 13.7 7.0 - 25.0    Anion Gap 8.2 5.0 - 15.0 mmol/L   Lipase    Collection Time: 12/09/20  2:50 AM    Specimen: Blood   Result Value Ref Range    Lipase 27 13 - 60 U/L   hCG, Serum, Qualitative    Collection Time: 12/09/20  2:50 AM    Specimen: Blood   Result Value Ref Range    HCG Qualitative Negative Negative   Light Blue Top    Collection Time: 12/09/20  2:50 AM   Result Value Ref Range    Extra Tube hold for add-on    Green Top (Gel)    Collection Time: 12/09/20  2:50 AM   Result Value Ref Range    Extra Tube Hold for add-ons.    Lavender Top    Collection Time: 12/09/20  2:50 AM   Result Value Ref Range     Extra Tube hold for add-on    CBC Auto Differential    Collection Time: 12/09/20  2:50 AM    Specimen: Blood   Result Value Ref Range    WBC 7.66 3.40 - 10.80 10*3/mm3    RBC 5.08 3.77 - 5.28 10*6/mm3    Hemoglobin 14.1 12.0 - 15.9 g/dL    Hematocrit 42.7 34.0 - 46.6 %    MCV 84.1 79.0 - 97.0 fL    MCH 27.8 26.6 - 33.0 pg    MCHC 33.0 31.5 - 35.7 g/dL    RDW 12.9 12.3 - 15.4 %    RDW-SD 39.4 37.0 - 54.0 fl    MPV 9.7 6.0 - 12.0 fL    Platelets 490 (H) 140 - 450 10*3/mm3    Neutrophil % 78.9 (H) 42.7 - 76.0 %    Lymphocyte % 15.1 (L) 19.6 - 45.3 %    Monocyte % 4.6 (L) 5.0 - 12.0 %    Eosinophil % 0.3 0.3 - 6.2 %    Basophil % 0.7 0.0 - 1.5 %    Immature Grans % 0.4 0.0 - 0.5 %    Neutrophils, Absolute 6.05 1.70 - 7.00 10*3/mm3    Lymphocytes, Absolute 1.16 0.70 - 3.10 10*3/mm3    Monocytes, Absolute 0.35 0.10 - 0.90 10*3/mm3    Eosinophils, Absolute 0.02 0.00 - 0.40 10*3/mm3    Basophils, Absolute 0.05 0.00 - 0.20 10*3/mm3    Immature Grans, Absolute 0.03 0.00 - 0.05 10*3/mm3    nRBC 0.0 0.0 - 0.2 /100 WBC   Urinalysis With Microscopic If Indicated (No Culture) - Urine, Clean Catch    Collection Time: 12/09/20  3:23 AM    Specimen: Urine, Clean Catch   Result Value Ref Range    Color, UA Yellow Yellow, Straw    Appearance, UA Clear Clear    pH, UA 8.5 (H) 5.0 - 8.0    Specific Gravity, UA 1.023 1.005 - 1.030    Glucose, UA Negative Negative    Ketones, UA Trace (A) Negative    Bilirubin, UA Negative Negative    Blood, UA Negative Negative    Protein, UA 30 mg/dL (1+) (A) Negative    Leuk Esterase, UA Negative Negative    Nitrite, UA Negative Negative    Urobilinogen, UA 0.2 E.U./dL 0.2 - 1.0 E.U./dL   Urinalysis, Microscopic Only - Urine, Clean Catch    Collection Time: 12/09/20  3:23 AM    Specimen: Urine, Clean Catch   Result Value Ref Range    RBC, UA 3-5 (A) None Seen, 0-2 /HPF    WBC, UA 0-2 None Seen, 0-2 /HPF    Bacteria, UA None Seen None Seen /HPF    Squamous Epithelial Cells, UA 0-2 None Seen, 0-2 /HPF     Hyaline Casts, UA 0-2 None Seen /LPF    Methodology Automated Microscopy        Ordered the above labs and independently reviewed the results.        RADIOLOGY  XR Chest 1 View   Final Result    No acute cardiopulmonary disease.        This report was finalized on 12/9/2020 4:24 AM by Dr. Brando Combs M.D.              I ordered the above noted radiological studies. Reviewed by me and discussed with radiologist.  See dictation for official radiology interpretation.    PROCEDURES  Procedures        MEDICATIONS GIVEN IN ER  Medications   sodium chloride 0.9 % flush 10 mL (has no administration in time range)   sodium chloride 0.9 % bolus 1,000 mL (1,000 mL Intravenous New Bag 12/9/20 0406)   ondansetron (ZOFRAN) injection 4 mg (4 mg Intravenous Given 12/9/20 0405)   morphine injection 4 mg (4 mg Intravenous Given 12/9/20 0405)   ketorolac (TORADOL) injection 30 mg (30 mg Intravenous Given 12/9/20 0525)             PROGRESS AND CONSULTS    Differential diagnosis includes but is not limited to:  - hepatobiliary pathology such as cholecystitis, cholangitis, and symptomatic cholelithiasis  - Pancreatitis  - Dyspepsia  - Small bowel obstruction  - Appendicitis  - Diverticulitis  - UTI including pyelonephritis  - Ureteral stone  - Zoster  - Colitis, including infectious and ischemic  - Atypical ACS      ED Course as of Dec 09 0542   Wed Dec 09, 2020   0522 I reassessed the patient, she is feeling improved, still has a headache.  Toradol ordered.  Labs are unremarkable including no evidence in the labs or urine of dehydration, electrolytes unremarkable, no elevation of the white blood cell count and no anemia.  No lab evidence of pancreatitis and urinalysis unremarkable for infection.  Abdomen is benign.    [KA]   0536 Bacteria, UA: None Seen [KA]   0536 WBC: 7.66 [KA]   0542 Lipase: 27 [KA]   0542 HCG Qualitative: Negative [KA]   0542 WBC: 7.66 [KA]   0542 Hemoglobin: 14.1 [KA]   0542 BUN: 7 [KA]      ED Course User  Index  [KA] Karon Paul PA        Reviewed pt's history and workup with Dr. Souza.  After a bedside evaluation; they agree with the plan of care      Patient was placed in face mask in first look. Patient was wearing facemask each time I entered the room and throughout our encounter. I wore protective equipment throughout this patient encounter including a face mask, eye shield, gown and gloves. Hand hygiene was performed before donning protective equipment and after removal when leaving the room.        DIAGNOSIS  Final diagnoses:   Non-intractable vomiting with nausea, unspecified vomiting type   COVID-19   Nonintractable episodic headache, unspecified headache type               Latest Documented Vital Signs:  As of 05:42 EST  BP-   HR- 95 Temp- 97.8 °F (36.6 °C) (Tympanic) O2 sat- 98%       Karon Paul PA  12/09/20 0542

## 2020-12-09 NOTE — DISCHARGE INSTRUCTIONS
Continue to quarantine for 2 weeks from symptom onset or as instructed by employee health.  You can take the medications as prescribed.  [place] diet and regress slowly as tolerated.  Return to the emergency department for labored breathing, abdominal pain, intractable vomiting, as needed.

## 2020-12-15 DIAGNOSIS — F41.8 DEPRESSION WITH ANXIETY: ICD-10-CM

## 2020-12-16 RX ORDER — BUPROPION HYDROCHLORIDE 100 MG/1
100 TABLET ORAL 2 TIMES DAILY
Qty: 60 TABLET | Refills: 0 | Status: SHIPPED | OUTPATIENT
Start: 2020-12-16 | End: 2021-01-18 | Stop reason: SDUPTHER

## 2020-12-28 DIAGNOSIS — F51.01 PRIMARY INSOMNIA: ICD-10-CM

## 2020-12-28 RX ORDER — TRAZODONE HYDROCHLORIDE 50 MG/1
50 TABLET ORAL NIGHTLY
Qty: 90 TABLET | Refills: 1 | Status: SHIPPED | OUTPATIENT
Start: 2020-12-28 | End: 2021-06-22 | Stop reason: SDUPTHER

## 2021-01-01 ENCOUNTER — IMMUNIZATION (OUTPATIENT)
Dept: VACCINE CLINIC | Facility: HOSPITAL | Age: 36
End: 2021-01-01

## 2021-01-01 PROCEDURE — 0001A: CPT | Performed by: INTERNAL MEDICINE

## 2021-01-01 PROCEDURE — 91300 HC SARSCOV02 VAC 30MCG/0.3ML IM: CPT | Performed by: INTERNAL MEDICINE

## 2021-01-08 ENCOUNTER — DOCUMENTATION (OUTPATIENT)
Dept: SURGERY | Facility: CLINIC | Age: 36
End: 2021-01-08

## 2021-01-08 NOTE — PROGRESS NOTES
01/11/2021, office visit recall letter mailed to pt along with last office visit note from 07/24/2018.    1 year recall past due.    Thank you.

## 2021-01-18 DIAGNOSIS — F41.8 DEPRESSION WITH ANXIETY: ICD-10-CM

## 2021-01-18 DIAGNOSIS — I10 BENIGN ESSENTIAL HYPERTENSION: ICD-10-CM

## 2021-01-18 RX ORDER — BUPROPION HYDROCHLORIDE 100 MG/1
100 TABLET ORAL 2 TIMES DAILY
Qty: 180 TABLET | Refills: 1 | Status: SHIPPED | OUTPATIENT
Start: 2021-01-18 | End: 2021-09-07 | Stop reason: SDUPTHER

## 2021-01-18 RX ORDER — LABETALOL 200 MG/1
200 TABLET, FILM COATED ORAL EVERY 12 HOURS SCHEDULED
Qty: 180 TABLET | Refills: 2 | Status: SHIPPED | OUTPATIENT
Start: 2021-01-18 | End: 2022-01-04 | Stop reason: SDUPTHER

## 2021-01-22 ENCOUNTER — IMMUNIZATION (OUTPATIENT)
Dept: VACCINE CLINIC | Facility: HOSPITAL | Age: 36
End: 2021-01-22

## 2021-01-22 PROCEDURE — 91300 HC SARSCOV02 VAC 30MCG/0.3ML IM: CPT | Performed by: INTERNAL MEDICINE

## 2021-01-22 PROCEDURE — 0002A: CPT | Performed by: INTERNAL MEDICINE

## 2021-05-03 RX ORDER — DULOXETIN HYDROCHLORIDE 60 MG/1
120 CAPSULE, DELAYED RELEASE ORAL
Qty: 180 CAPSULE | Refills: 1 | Status: SHIPPED | OUTPATIENT
Start: 2021-05-03 | End: 2021-11-02 | Stop reason: SDUPTHER

## 2021-06-22 DIAGNOSIS — F51.01 PRIMARY INSOMNIA: ICD-10-CM

## 2021-06-22 RX ORDER — TRAZODONE HYDROCHLORIDE 50 MG/1
50 TABLET ORAL NIGHTLY
Qty: 90 TABLET | Refills: 1 | Status: SHIPPED | OUTPATIENT
Start: 2021-06-22 | End: 2022-06-07

## 2021-06-29 ENCOUNTER — OFFICE VISIT (OUTPATIENT)
Dept: NEUROLOGY | Facility: CLINIC | Age: 36
End: 2021-06-29

## 2021-06-29 VITALS
HEIGHT: 63 IN | OXYGEN SATURATION: 97 % | DIASTOLIC BLOOD PRESSURE: 68 MMHG | WEIGHT: 293 LBS | SYSTOLIC BLOOD PRESSURE: 142 MMHG | HEART RATE: 87 BPM | BODY MASS INDEX: 51.91 KG/M2

## 2021-06-29 DIAGNOSIS — G43.019 INTRACTABLE MIGRAINE WITHOUT AURA AND WITHOUT STATUS MIGRAINOSUS: Primary | ICD-10-CM

## 2021-06-29 PROCEDURE — 99214 OFFICE O/P EST MOD 30 MIN: CPT | Performed by: NURSE PRACTITIONER

## 2021-06-29 RX ORDER — TOPIRAMATE 25 MG/1
TABLET ORAL
Qty: 60 TABLET | Refills: 2 | Status: SHIPPED | OUTPATIENT
Start: 2021-06-29 | End: 2021-08-17 | Stop reason: SDUPTHER

## 2021-06-29 NOTE — PROGRESS NOTES
"Subjective:     Patient ID: Liz Bagley is a 35 y.o. female presenting for evaluation for headaches.  The patient states she has a history of migraine headaches and has had them for several years.  He has been evaluated at [facility] for these headaches and when she moved to [place] says she saw Dr. Hi.  She says she has not had an MRI since she was treated at [facility] around 2008.  She has never been on a preventative medication.  She states she has tried sumatriptan and but did not notice much improvement in her headaches when she took it.  Are located over the back of the head as well as biparietal.  The pain is described as throbbing.  She denies any aura prior to the onset of the headache.  She has photophobia and nausea with the headaches.  She does report bilateral tinnitus that occurs at least once a week.  She denies any vision changes.  She says she had a lumbar puncture around 2015 due to the headaches and that after \"removing some pressure the headaches improved\".     She has a history of Crohn's disease and says she had surgery for this several years ago and is not on any medications at this time.  She takes Wellbutrin and Cymbalta for a history of anxiety and depression.  She has a history of hypertension and is taking labetalol.  She also has difficulty sleeping and has been taking trazodone 50 mg nightly for the past year.  She denies any change in her headaches when she started the trazodone.  Thyroid labs were checked less than 1 year ago and were normal.    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    History of Present Illness  The following portions of the patient's history were reviewed and updated as appropriate: allergies, current medications, past family history, past medical history, past social history, past surgical history and problem list.    Review of Systems   Constitutional: Negative for chills, fatigue and fever.   HENT: Positive for tinnitus. Negative for hearing loss and trouble " swallowing.    Eyes: Positive for photophobia. Negative for pain and visual disturbance.   Respiratory: Negative for cough, shortness of breath and wheezing.    Cardiovascular: Negative for chest pain, palpitations and leg swelling.   Gastrointestinal: Positive for nausea. Negative for diarrhea and vomiting.   Endocrine: Negative for cold intolerance, heat intolerance and polydipsia.   Genitourinary: Negative for decreased urine volume, difficulty urinating and urgency.   Musculoskeletal: Positive for neck pain and neck stiffness. Negative for back pain.   Skin: Negative for color change, rash and wound.   Allergic/Immunologic: Negative for environmental allergies, food allergies and immunocompromised state.   Neurological: Positive for headaches. Negative for dizziness, tremors, seizures, syncope, facial asymmetry, speech difficulty, weakness, light-headedness and numbness.   Hematological: Negative for adenopathy. Does not bruise/bleed easily.   Psychiatric/Behavioral: Positive for sleep disturbance. Negative for confusion and decreased concentration. The patient is nervous/anxious.         Objective:    Neurologic Exam  General: Well nourished, well developed, and in no acute distress.  HEENT: Normocephalic/atraumatic. Mucous membranes moist. Sclerae anicteric.   Heart: Regular rate and rhythm. No murmurs, rubs or gallops.  Lungs: Clear to auscultation bilaterally.  Skin: No notable rashes or lesions on the visible surfaces.   Extremities: No clubbing, cyanosis or significant edema.   Psychiatric: Pleasant, cooperative, and appropriate.   Neurologic Exam:  Mental Status:  Alert and oriented. Speech is fluent. Comprehension is intact.   Cranial Nerves II-XII: Pupils equal, round, reactive to light. Extraocular movements are full and conjugate in all directions. Pursuit movements do not provoke any apparent dizziness or discomfort.  No nystagmus noted.  Hearing is intact to voice. Facial strength and sensation are  preserved and symmetric. Tongue and palate midline. Voice non-hoarse, non-dysarthric.   Motor: Normal bulk and tone of bilateral upper and lower extremities. Strength is 5/5 in all 4 extremities both proximally and distally. There are no abnormal or involuntary movements noted.  Sensation: Intact to light touch throughout. Romberg was negative with no significant sway.   Coordination: Fully intact. Finger-to-nose performed accurately bilaterally.  Reflexes: The deep tendon reflexes are 2+/4 in bilateral biceps, brachioradialis, triceps, patella, and Achilles.  No pathologic reflexes were noted.  Gait: Normal. No ataxia or apraxia.         Physical Exam    Assessment/Plan:     Diagnoses and all orders for this visit:    1. Intractable migraine without aura and without status migrainosus (Primary)  -     MRI Brain With & Without Contrast; Future    Other orders  -     topiramate (TOPAMAX) 25 MG tablet; Take one tablet by mouth nightly for 2 weeks, then increase to 2 tablets nightly.  Dispense: 60 tablet; Refill: 2       Patient presents today with continued headaches.  She states she has a constant dull headache but gets a migraine at least once a week.  It has been several years since she had an MRI of her brain so I ordered an MRI of her and I am going to obtain records from her ophthalmologist to make sure there is not mention of optic disc edema.  I will start her on topiramate 25 mg nightly.  She will increase to 50 mg nightly after 2 weeks.  Risks, benefits, and side effects of the medication were discussed.  She will call with any problems.  If no improvement at all in about 4 weeks she is going to call and we can increase the dose of topiramate.  She will follow-up in 8 weeks.  We will contact her in the meantime with MRI results.

## 2021-07-06 ENCOUNTER — OFFICE VISIT (OUTPATIENT)
Dept: INTERNAL MEDICINE | Facility: CLINIC | Age: 36
End: 2021-07-06

## 2021-07-06 VITALS
SYSTOLIC BLOOD PRESSURE: 107 MMHG | RESPIRATION RATE: 19 BRPM | HEIGHT: 63 IN | HEART RATE: 74 BPM | TEMPERATURE: 97.9 F | OXYGEN SATURATION: 97 % | DIASTOLIC BLOOD PRESSURE: 75 MMHG | WEIGHT: 293 LBS | BODY MASS INDEX: 51.91 KG/M2

## 2021-07-06 DIAGNOSIS — F32.A ANXIETY AND DEPRESSION: Primary | ICD-10-CM

## 2021-07-06 DIAGNOSIS — F51.01 PRIMARY INSOMNIA: ICD-10-CM

## 2021-07-06 DIAGNOSIS — I10 BENIGN ESSENTIAL HYPERTENSION: ICD-10-CM

## 2021-07-06 DIAGNOSIS — F41.9 ANXIETY AND DEPRESSION: Primary | ICD-10-CM

## 2021-07-06 PROCEDURE — 99214 OFFICE O/P EST MOD 30 MIN: CPT | Performed by: NURSE PRACTITIONER

## 2021-07-06 RX ORDER — HYDROXYZINE HYDROCHLORIDE 25 MG/1
12.5-25 TABLET, FILM COATED ORAL EVERY 8 HOURS PRN
Qty: 30 TABLET | Refills: 1 | Status: SHIPPED | OUTPATIENT
Start: 2021-07-06 | End: 2022-11-01

## 2021-07-06 NOTE — ASSESSMENT & PLAN NOTE
With addition of Topamax for migraines nightly, full 50 mg dose of Trazodone has left her too sleepy in the mornings, but not taking at all leaves sleepless. Cut tab in half for total of 25 mg trazodone qhs.

## 2021-07-06 NOTE — ASSESSMENT & PLAN NOTE
Depression and anxiety are worsening slightly. Discussed her medications. Continue with new Rx for Topamax as prescribed by neurology for migraines, this may help with her underlying depression/anxiey as well. Add hydroxyzine 12.5 to 25 mg q8h PRN for anxiety/panic. Continue weekly meetings with therapist. Stress reduction is discussed. Continue Cymbalta and Wellbutrin. We will follow-up on anxiety/depression in three weeks at her CPE and adjust tx if needed.

## 2021-07-06 NOTE — PROGRESS NOTES
"Chief Complaint  Follow-up (Pt presents here today for a follow up.), Hypertension, and Medication Problem    Subjective          Liz Bagley presents to [facility]  Pt presents for recheck of depression and anxiety. This is a 35 YOF, med hx includes UC with ileostomy, HTN, vitamin D deficiency. She notes that over the past several months depression and anxiety have worsened and \"feel like they aren't responding as well to my meds anymore.\" Currently taking Cymbalta 120 mg nightly, Wellbutrin 100 mg BID with good compliance. New Rx for Topamax per neurology one week ago, due to titrate up to 50 mg nightly next week. She has a lot of stress from working two jobs and doesn't have much free time. Working with a therapist weekly. No SI or HI reported. Denies development of any other new issues today.      Objective   Vital Signs:   /75 (BP Location: Right arm, Patient Position: Sitting, Cuff Size: Large Adult)   Pulse 74   Temp 97.9 °F (36.6 °C)   Resp 19   Ht 160 cm (63\")   Wt 133 kg (293 lb)   SpO2 97%   BMI 51.90 kg/m²     Physical Exam  Vitals and nursing note reviewed.   Constitutional:       General: She is not in acute distress.     Appearance: Normal appearance. She is well-developed. She is obese. She is not ill-appearing, toxic-appearing or diaphoretic.   HENT:      Head: Normocephalic and atraumatic.      Right Ear: External ear normal.      Left Ear: External ear normal.   Eyes:      Pupils: Pupils are equal, round, and reactive to light.   Neck:      Vascular: No carotid bruit.   Cardiovascular:      Rate and Rhythm: Normal rate and regular rhythm.      Pulses: Normal pulses.      Heart sounds: Normal heart sounds.   Pulmonary:      Effort: Pulmonary effort is normal.      Breath sounds: Normal breath sounds.   Abdominal:      Palpations: Abdomen is soft.   Musculoskeletal:         General: Normal range of motion.      Cervical back: Normal range of motion and " neck supple. No rigidity or tenderness.   Lymphadenopathy:      Cervical: No cervical adenopathy.   Skin:     General: Skin is warm and dry.      Capillary Refill: Capillary refill takes less than 2 seconds.   Neurological:      General: No focal deficit present.      Mental Status: She is alert and oriented to person, place, and time. Mental status is at baseline.   Psychiatric:         Mood and Affect: Mood normal.         Behavior: Behavior normal.         Thought Content: Thought content normal.         Judgment: Judgment normal.        Result Review :   The following data was reviewed by: ELEN York on 07/06/2021:  Common labs    Common Labsle 12/9/20 12/9/20    0250 0250   Glucose  126 (A)   BUN  7   Creatinine  0.51 (A)   eGFR Non African Am  136   Sodium  134 (A)   Potassium  4.0   Chloride  98   Calcium  9.5   Albumin  4.50   Total Bilirubin  0.2   Alkaline Phosphatase  87   AST (SGOT)  15   ALT (SGPT)  19   WBC 7.66    Hemoglobin 14.1    Hematocrit 42.7    Platelets 490 (A)    (A) Abnormal value            Current outpatient and discharge medications have been reconciled for the patient.  Reviewed by: ELEN York           Assessment and Plan    Diagnoses and all orders for this visit:    1. Anxiety and depression (Primary)  Assessment & Plan:  Depression and anxiety are worsening slightly. Discussed her medications. Continue with new Rx for Topamax as prescribed by neurology for migraines, this may help with her underlying depression/anxiey as well. Add hydroxyzine 12.5 to 25 mg q8h PRN for anxiety/panic. Continue weekly meetings with therapist. Stress reduction is discussed. Continue Cymbalta and Wellbutrin. We will follow-up on anxiety/depression in three weeks at her CPE and adjust tx if needed.    Orders:  -     hydrOXYzine (ATARAX) 25 MG tablet; Take 0.5-1 tablets by mouth Every 8 (Eight) Hours As Needed for Anxiety.  Dispense: 30 tablet; Refill: 1    2. Benign essential  hypertension  Assessment & Plan:  Stable in office today-I have asked her to take daily BP at home, goal <130/80 reinforced. DASH diet. Stress/anxiety likely contributing to fluctuations. Continue labetalol. We will go over home BP readings at upcoming CPE in three weeks and adjust tx if needed.    Orders:  -     Comprehensive metabolic panel; Future  -     CBC No Differential; Future  -     Lipid panel; Future  -     TSH Rfx On Abnormal To Free T4; Future    3. Primary insomnia  Assessment & Plan:  With addition of Topamax for migraines nightly, full 50 mg dose of Trazodone has left her too sleepy in the mornings, but not taking at all leaves sleepless. Cut tab in half for total of 25 mg trazodone qhs.       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     Follow up PRN in the meantime.      Follow Up   Return in about 3 weeks (around 7/27/2021) for Annual physical.  Patient was given instructions and counseling regarding her condition or for health maintenance advice. Please see specific information pulled into the AVS if appropriate.

## 2021-07-06 NOTE — ASSESSMENT & PLAN NOTE
Stable in office today-I have asked her to take daily BP at home, goal <130/80 reinforced. DASH diet. Stress/anxiety likely contributing to fluctuations. Continue labetalol. We will go over home BP readings at upcoming CPE in three weeks and adjust tx if needed.

## 2021-08-03 ENCOUNTER — HOSPITAL ENCOUNTER (OUTPATIENT)
Dept: MRI IMAGING | Facility: HOSPITAL | Age: 36
Discharge: HOME | End: 2021-08-03
Admitting: NURSE PRACTITIONER

## 2021-08-03 DIAGNOSIS — G43.019 INTRACTABLE MIGRAINE WITHOUT AURA AND WITHOUT STATUS MIGRAINOSUS: ICD-10-CM

## 2021-08-03 LAB — CREAT BLDA-MCNC: 0.6 MG/DL (ref 0.6–1.3)

## 2021-08-03 PROCEDURE — A9577 INJ MULTIHANCE: HCPCS | Performed by: NURSE PRACTITIONER

## 2021-08-03 PROCEDURE — 0 GADOBENATE DIMEGLUMINE 529 MG/ML SOLUTION: Performed by: NURSE PRACTITIONER

## 2021-08-03 PROCEDURE — 82565 ASSAY OF CREATININE: CPT

## 2021-08-03 PROCEDURE — 70553 MRI BRAIN STEM W/O & W/DYE: CPT

## 2021-08-03 RX ADMIN — GADOBENATE DIMEGLUMINE 20 ML: 529 INJECTION, SOLUTION INTRAVENOUS at 12:53

## 2021-08-17 ENCOUNTER — OFFICE VISIT (OUTPATIENT)
Dept: NEUROLOGY | Facility: CLINIC | Age: 36
End: 2021-08-17

## 2021-08-17 VITALS
SYSTOLIC BLOOD PRESSURE: 116 MMHG | DIASTOLIC BLOOD PRESSURE: 78 MMHG | OXYGEN SATURATION: 98 % | HEART RATE: 67 BPM | WEIGHT: 293 LBS | HEIGHT: 63 IN | BODY MASS INDEX: 51.91 KG/M2

## 2021-08-17 DIAGNOSIS — G43.019 INTRACTABLE MIGRAINE WITHOUT AURA AND WITHOUT STATUS MIGRAINOSUS: Primary | ICD-10-CM

## 2021-08-17 DIAGNOSIS — E66.01 CLASS 3 SEVERE OBESITY DUE TO EXCESS CALORIES IN ADULT, UNSPECIFIED BMI, UNSPECIFIED WHETHER SERIOUS COMORBIDITY PRESENT (HCC): Primary | ICD-10-CM

## 2021-08-17 DIAGNOSIS — G43.019 INTRACTABLE MIGRAINE WITHOUT AURA AND WITHOUT STATUS MIGRAINOSUS: ICD-10-CM

## 2021-08-17 PROCEDURE — 99213 OFFICE O/P EST LOW 20 MIN: CPT | Performed by: NURSE PRACTITIONER

## 2021-08-17 RX ORDER — TOPIRAMATE 25 MG/1
50 TABLET ORAL 2 TIMES DAILY
Qty: 120 TABLET | Refills: 2 | Status: SHIPPED | OUTPATIENT
Start: 2021-08-17 | End: 2021-11-16 | Stop reason: SDUPTHER

## 2021-08-17 NOTE — PROGRESS NOTES
Subjective:     Patient ID: Liz Bagley is a 35 y.o. female presenting for follow up for migraine headaches. I saw her on 6/29/21 initially. I ordered an MRI of her brain. MRI was performed on 8/3/21 but has not been read as radiology is waiting for previous MRI images from [facility] for comparison. I started the patient on topiramate. Current dose is 50 mg nightly. She is tolerating this well but denies much change in her headaches. She has been evaluated by ophthalmology with a normal eye exam. I wanted to rule out optic disc edema due to her morbid obesity and risk for idiopathic intracranial hypertension.     History of Present Illness  The following portions of the patient's history were reviewed and updated as appropriate: allergies, current medications, past family history, past medical history, past social history, past surgical history and problem list.    Review of Systems   Constitutional: Negative for activity change, appetite change and unexpected weight change.   HENT: Negative for facial swelling, trouble swallowing and voice change.    Eyes: Negative for photophobia, pain and visual disturbance.   Respiratory: Negative for chest tightness, shortness of breath and wheezing.    Cardiovascular: Negative for chest pain, palpitations and leg swelling.   Gastrointestinal: Negative for abdominal pain, nausea and vomiting.   Endocrine: Negative for cold intolerance and heat intolerance.   Musculoskeletal: Negative for arthralgias, back pain, gait problem, joint swelling, myalgias, neck pain and neck stiffness.   Neurological: Negative for dizziness, tremors, seizures, syncope, facial asymmetry, speech difficulty, weakness, light-headedness, numbness and headaches.   Hematological: Does not bruise/bleed easily.   Psychiatric/Behavioral: Negative for agitation, behavioral problems, confusion, decreased concentration, dysphoric mood, hallucinations, self-injury, sleep disturbance and suicidal ideas. The  patient is not nervous/anxious and is not hyperactive.         Objective:    Neurologic Exam  General: Morbidly obese white female in no acute distress.  HEENT: Normocephalic/atraumatic. Mucous membranes moist. Sclerae anicteric.   Heart: Regular rate and rhythm. No murmurs, rubs or gallops.  Lungs: Clear to auscultation bilaterally.  Skin: No notable rashes or lesions on the visible surfaces.   Extremities: No clubbing, cyanosis or significant edema.   Psychiatric: Pleasant, cooperative, and appropriate.   Neurologic Exam:  Mental Status:  Alert and oriented. Speech is fluent. Comprehension is intact.   Cranial Nerves II-XII: Pupils equal, round, reactive to light. Extraocular movements are full and conjugate in all directions. Pursuit movements do not provoke any apparent dizziness or discomfort.  No nystagmus noted.  Hearing is intact to voice. Facial strength and sensation are preserved and symmetric. Tongue and palate midline. Voice non-hoarse, non-dysarthric.   Motor: Normal bulk and tone of bilateral upper and lower extremities. Strength is 5/5 in all 4 extremities both proximally and distally. There are no abnormal or involuntary movements noted.  Sensation: Intact to light touch throughout. Romberg was negative with no significant sway.   Coordination: Fully intact. Finger-to-nose performed accurately bilaterally.  Reflexes: The deep tendon reflexes are 2+/4 in bilateral biceps, brachioradialis, triceps, patella, and Achilles.  No pathologic reflexes were noted.  Gait: Normal. No ataxia or apraxia.         Physical Exam    Assessment/Plan:     Diagnoses and all orders for this visit:    1. Intractable migraine without aura and without status migrainosus (Primary)    Other orders  -     topiramate (TOPAMAX) 25 MG tablet; Take 2 tablets by mouth 2 (Two) Times a Day for 14 days.  Dispense: 120 tablet; Refill: 2         She is tolerating the topiramate fairly well but denies much change in her headaches. Will  go ahead and increase to 50 mg twice daily. Side effects discussed again. She will call with any problems. If no improvement after 4 weeks she will let me know. Talked to radiology and they are waiting for images from [facility] before reading her MRI done here on August 3. Will call her with these results hopefully within the next week or two. She will f/u in the office in 12 weeks.

## 2021-08-17 NOTE — H&P (VIEW-ONLY)
Subjective:     Patient ID: Liz Bagley is a 35 y.o. female presenting for follow up for migraine headaches. I saw her on 6/29/21 initially. I ordered an MRI of her brain. MRI was performed on 8/3/21 but has not been read as radiology is waiting for previous MRI images from [facility] for comparison. I started the patient on topiramate. Current dose is 50 mg nightly. She is tolerating this well but denies much change in her headaches. She has been evaluated by ophthalmology with a normal eye exam. I wanted to rule out optic disc edema due to her morbid obesity and risk for idiopathic intracranial hypertension.     History of Present Illness  The following portions of the patient's history were reviewed and updated as appropriate: allergies, current medications, past family history, past medical history, past social history, past surgical history and problem list.    Review of Systems   Constitutional: Negative for activity change, appetite change and unexpected weight change.   HENT: Negative for facial swelling, trouble swallowing and voice change.    Eyes: Negative for photophobia, pain and visual disturbance.   Respiratory: Negative for chest tightness, shortness of breath and wheezing.    Cardiovascular: Negative for chest pain, palpitations and leg swelling.   Gastrointestinal: Negative for abdominal pain, nausea and vomiting.   Endocrine: Negative for cold intolerance and heat intolerance.   Musculoskeletal: Negative for arthralgias, back pain, gait problem, joint swelling, myalgias, neck pain and neck stiffness.   Neurological: Negative for dizziness, tremors, seizures, syncope, facial asymmetry, speech difficulty, weakness, light-headedness, numbness and headaches.   Hematological: Does not bruise/bleed easily.   Psychiatric/Behavioral: Negative for agitation, behavioral problems, confusion, decreased concentration, dysphoric mood, hallucinations, self-injury, sleep disturbance and suicidal ideas. The  patient is not nervous/anxious and is not hyperactive.         Objective:    Neurologic Exam  General: Morbidly obese white female in no acute distress.  HEENT: Normocephalic/atraumatic. Mucous membranes moist. Sclerae anicteric.   Heart: Regular rate and rhythm. No murmurs, rubs or gallops.  Lungs: Clear to auscultation bilaterally.  Skin: No notable rashes or lesions on the visible surfaces.   Extremities: No clubbing, cyanosis or significant edema.   Psychiatric: Pleasant, cooperative, and appropriate.   Neurologic Exam:  Mental Status:  Alert and oriented. Speech is fluent. Comprehension is intact.   Cranial Nerves II-XII: Pupils equal, round, reactive to light. Extraocular movements are full and conjugate in all directions. Pursuit movements do not provoke any apparent dizziness or discomfort.  No nystagmus noted.  Hearing is intact to voice. Facial strength and sensation are preserved and symmetric. Tongue and palate midline. Voice non-hoarse, non-dysarthric.   Motor: Normal bulk and tone of bilateral upper and lower extremities. Strength is 5/5 in all 4 extremities both proximally and distally. There are no abnormal or involuntary movements noted.  Sensation: Intact to light touch throughout. Romberg was negative with no significant sway.   Coordination: Fully intact. Finger-to-nose performed accurately bilaterally.  Reflexes: The deep tendon reflexes are 2+/4 in bilateral biceps, brachioradialis, triceps, patella, and Achilles.  No pathologic reflexes were noted.  Gait: Normal. No ataxia or apraxia.         Physical Exam    Assessment/Plan:     Diagnoses and all orders for this visit:    1. Intractable migraine without aura and without status migrainosus (Primary)    Other orders  -     topiramate (TOPAMAX) 25 MG tablet; Take 2 tablets by mouth 2 (Two) Times a Day for 14 days.  Dispense: 120 tablet; Refill: 2         She is tolerating the topiramate fairly well but denies much change in her headaches. Will  go ahead and increase to 50 mg twice daily. Side effects discussed again. She will call with any problems. If no improvement after 4 weeks she will let me know. Talked to radiology and they are waiting for images from [facility] before reading her MRI done here on August 3. Will call her with these results hopefully within the next week or two. She will f/u in the office in 12 weeks.

## 2021-08-18 ENCOUNTER — HOSPITAL ENCOUNTER (OUTPATIENT)
Dept: GENERAL RADIOLOGY | Facility: HOSPITAL | Age: 36
Discharge: HOME | End: 2021-08-18
Admitting: NURSE PRACTITIONER

## 2021-08-18 ENCOUNTER — TELEPHONE (OUTPATIENT)
Dept: NEUROLOGY | Facility: CLINIC | Age: 36
End: 2021-08-18

## 2021-08-18 VITALS
TEMPERATURE: 98.4 F | HEART RATE: 66 BPM | DIASTOLIC BLOOD PRESSURE: 73 MMHG | HEIGHT: 63 IN | OXYGEN SATURATION: 98 % | BODY MASS INDEX: 51.91 KG/M2 | RESPIRATION RATE: 16 BRPM | WEIGHT: 293 LBS | SYSTOLIC BLOOD PRESSURE: 114 MMHG

## 2021-08-18 DIAGNOSIS — G43.019 INTRACTABLE MIGRAINE WITHOUT AURA AND WITHOUT STATUS MIGRAINOSUS: ICD-10-CM

## 2021-08-18 DIAGNOSIS — E66.01 CLASS 3 SEVERE OBESITY DUE TO EXCESS CALORIES IN ADULT, UNSPECIFIED BMI, UNSPECIFIED WHETHER SERIOUS COMORBIDITY PRESENT (HCC): ICD-10-CM

## 2021-08-18 LAB
APPEARANCE CSF: CLEAR
APPEARANCE CSF: CLEAR
COLOR CSF: COLORLESS
COLOR CSF: COLORLESS
COLOR SPUN CSF: COLORLESS
COLOR SPUN CSF: COLORLESS
GLUCOSE CSF-MCNC: 61 MG/DL (ref 40–70)
METHOD: ABNORMAL
METHOD: ABNORMAL
NUC CELL # CSF MANUAL: 1 /MM3 (ref 0–5)
NUC CELL # CSF MANUAL: 1 /MM3 (ref 0–5)
PROT CSF-MCNC: 16 MG/DL (ref 15–45)
RBC # CSF MANUAL: 1 /MM3 (ref 0–0)
RBC # CSF MANUAL: 6 /MM3 (ref 0–0)
TUBE # CSF: 1
TUBE # CSF: 4

## 2021-08-18 PROCEDURE — 25010000003 LIDOCAINE 1 % SOLUTION: Performed by: RADIOLOGY

## 2021-08-18 PROCEDURE — 84157 ASSAY OF PROTEIN OTHER: CPT | Performed by: NURSE PRACTITIONER

## 2021-08-18 PROCEDURE — 82945 GLUCOSE OTHER FLUID: CPT | Performed by: NURSE PRACTITIONER

## 2021-08-18 PROCEDURE — 89050 BODY FLUID CELL COUNT: CPT | Performed by: NURSE PRACTITIONER

## 2021-08-18 RX ORDER — LIDOCAINE HYDROCHLORIDE 10 MG/ML
10 INJECTION, SOLUTION INFILTRATION; PERINEURAL ONCE
Status: DISCONTINUED | OUTPATIENT
Start: 2021-08-18 | End: 2021-08-18

## 2021-08-18 RX ORDER — LIDOCAINE HYDROCHLORIDE 10 MG/ML
10 INJECTION, SOLUTION INFILTRATION; PERINEURAL ONCE
Status: COMPLETED | OUTPATIENT
Start: 2021-08-18 | End: 2021-08-18

## 2021-08-18 RX ADMIN — LIDOCAINE HYDROCHLORIDE 1 ML: 10 INJECTION, SOLUTION INFILTRATION; PERINEURAL at 15:18

## 2021-08-18 NOTE — NURSING NOTE
Patient arrived in Radiology triage for Lumbar Puncture.    Protective goggles and mask in place with all patient interactions today

## 2021-08-18 NOTE — TELEPHONE ENCOUNTER
Spoke with patient regarding LP that was put in. Patient states that she wouldn't be able to have it done, until October. She signed a new contract at work, which last for 6 weeks picking up extra hours.     I called Zackery Coleman and made her aware of this. Zackery states that this isn't something that should wait until October.     Called patient back, and explained the recommendation that was given per Zackery Coleman. Patient verbalized understanding, and is calling work to see if she can change scheduled. Patient is going to call me back to follow up.

## 2021-08-18 NOTE — DISCHARGE INSTRUCTIONS
EDUCATION /DISCHARGE INSTRUCTION   A lumbar puncture involves insertion of a sterile needle into the spinal canal by the physician   This procedure is performed for several reasons: to detect increased intracranial pressure, the presence of blood in cerebrospinal fluid (CFS), to obtain CSF specimens for laboratory studies, to administer drugs and to relieve pressure by removing CSF.    You will lie on your stomach with a pillow under your stomach.  This opens the vertebral space to allow access to the spinal needle.  The physician will sterilize the area using antiseptic solution and numb the area where the spinal needle will be placed.  If CSF is being removed for specimen, you may be asked to push or beardown to assist with the flow of the CSF. When the procedure is complete, a band aid will be placed over the injection site and you will be taken to the recovery area.    POTENTIAL RISKS OF A LUMBAR PUNCTURE INCLUDE BUT ARE NOT LIMITED TO:  *  Headache    *  Swelling at puncture site  *  Bleeding into the spinal canal *  Temporary difficulty urinating  *  Leakage of CSF   *  Fever  *  Pain caused by nerve irritation    BENEFIT OF PROCEDURE:  This is the only way to obtain spinal fluid or relieve pressure due to increased CSF.  There is no alternative.  THIS EDUCATION INFORMATION WAS REVIEWED PRIOR TO PROCEDURE AND CONSENT. Patient initials__________________Time_________________    FOLLOWING A LUMBAR PUNCTURE:  *  Drink plenty of liquids -  Caffeine is recommended   *  Lie down flat for the next 8 hours.  If you get a headache, lie down flat for 24 hours, continue to force fluids and call your doctor if  the headache persists.  *  Go straight home.  DO NOT DRIVE    CALL YOUR DOCTOR IF YOU HAVE:  *  A severe headache that will not go away  *  Redness, swelling or drainage from the puncture site  *  Neck stiffness, numbness or weakness  *  Any new or severe symptoms    Following the procedure, you should continue to  take all of your medications as directed by your primary physician unless otherwise instructed.  There have been no changes to the medications you provided us (with the following exceptions if applicable):      YOU ARE THE MOST IMPORTANT FACTOR IN YOUR RECOVERY.  IF YOU HAVE QUESTIONS OR CONCERNS PLEASE CALL THE RADIOLOGY NURSES AT [phone] 7am-10pm

## 2021-09-07 DIAGNOSIS — F41.8 DEPRESSION WITH ANXIETY: ICD-10-CM

## 2021-09-07 RX ORDER — BUPROPION HYDROCHLORIDE 100 MG/1
100 TABLET ORAL 2 TIMES DAILY
Qty: 180 TABLET | Refills: 1 | Status: SHIPPED | OUTPATIENT
Start: 2021-09-07 | End: 2022-06-07

## 2021-09-14 LAB
ALBUMIN SERPL-MCNC: 4.5 G/DL (ref 3.5–5.2)
ALBUMIN/GLOB SERPL: 1.3 G/DL
ALP SERPL-CCNC: 79 U/L (ref 39–117)
ALT SERPL W P-5'-P-CCNC: 20 U/L (ref 1–33)
ANION GAP SERPL CALCULATED.3IONS-SCNC: 11.2 MMOL/L (ref 5–15)
AST SERPL-CCNC: 14 U/L (ref 1–32)
BILIRUB SERPL-MCNC: 0.2 MG/DL (ref 0–1.2)
BUN SERPL-MCNC: 12 MG/DL (ref 6–20)
BUN/CREAT SERPL: 20.7 (ref 7–25)
CALCIUM SPEC-SCNC: 9.7 MG/DL (ref 8.6–10.5)
CHLORIDE SERPL-SCNC: 105 MMOL/L (ref 98–107)
CHOLEST SERPL-MCNC: 144 MG/DL (ref 0–200)
CO2 SERPL-SCNC: 21.8 MMOL/L (ref 22–29)
CREAT SERPL-MCNC: 0.58 MG/DL (ref 0.57–1)
DEPRECATED RDW RBC AUTO: 40.1 FL (ref 37–54)
ERYTHROCYTE [DISTWIDTH] IN BLOOD BY AUTOMATED COUNT: 13 % (ref 12.3–15.4)
GFR SERPL CREATININE-BSD FRML MDRD: 117 ML/MIN/1.73
GLOBULIN UR ELPH-MCNC: 3.5 GM/DL
GLUCOSE SERPL-MCNC: 104 MG/DL (ref 65–99)
HCT VFR BLD AUTO: 37.8 % (ref 34–46.6)
HDLC SERPL-MCNC: 47 MG/DL (ref 40–60)
HGB BLD-MCNC: 12.5 G/DL (ref 12–15.9)
LDLC SERPL CALC-MCNC: 82 MG/DL (ref 0–100)
LDLC/HDLC SERPL: 1.74 {RATIO}
MCH RBC QN AUTO: 28 PG (ref 26.6–33)
MCHC RBC AUTO-ENTMCNC: 33.1 G/DL (ref 31.5–35.7)
MCV RBC AUTO: 84.8 FL (ref 79–97)
PLATELET # BLD AUTO: 496 10*3/MM3 (ref 140–450)
PMV BLD AUTO: 9.8 FL (ref 6–12)
POTASSIUM SERPL-SCNC: 4 MMOL/L (ref 3.5–5.2)
PROT SERPL-MCNC: 8 G/DL (ref 6–8.5)
RBC # BLD AUTO: 4.46 10*6/MM3 (ref 3.77–5.28)
SODIUM SERPL-SCNC: 138 MMOL/L (ref 136–145)
TRIGL SERPL-MCNC: 75 MG/DL (ref 0–150)
TSH SERPL DL<=0.05 MIU/L-ACNC: 1.01 UIU/ML (ref 0.27–4.2)
VLDLC SERPL-MCNC: 15 MG/DL (ref 5–40)
WBC # BLD AUTO: 14.2 10*3/MM3 (ref 3.4–10.8)

## 2021-09-14 PROCEDURE — 84443 ASSAY THYROID STIM HORMONE: CPT | Performed by: NURSE PRACTITIONER

## 2021-09-14 PROCEDURE — 85027 COMPLETE CBC AUTOMATED: CPT | Performed by: NURSE PRACTITIONER

## 2021-09-14 PROCEDURE — 36415 COLL VENOUS BLD VENIPUNCTURE: CPT | Performed by: NURSE PRACTITIONER

## 2021-09-14 PROCEDURE — 80061 LIPID PANEL: CPT | Performed by: NURSE PRACTITIONER

## 2021-09-14 PROCEDURE — 80053 COMPREHEN METABOLIC PANEL: CPT | Performed by: NURSE PRACTITIONER

## 2021-09-20 ENCOUNTER — TELEPHONE (OUTPATIENT)
Dept: INTERNAL MEDICINE | Facility: CLINIC | Age: 36
End: 2021-09-20

## 2021-09-20 NOTE — TELEPHONE ENCOUNTER
----- Message from ELEN York sent at 9/20/2021  9:06 AM EDT -----  Please call patient, thyroid numbers are stable.  Metabolic panel is stable including kidney function, liver enzymes and electrolytes.  Cholesterol panel looks good.  Her blood count is showing elevated white blood cells.  This has been a chronic issue looking over her last several labs.  She is due for a physical with me, can bring her back within the next 3 weeks to 1 month for a physical and I will recheck her white blood cells at that time as well.  No need to fast for that appointment.

## 2021-10-08 ENCOUNTER — APPOINTMENT (OUTPATIENT)
Dept: VACCINE CLINIC | Facility: HOSPITAL | Age: 36
End: 2021-10-08

## 2021-10-19 ENCOUNTER — IMMUNIZATION (OUTPATIENT)
Dept: VACCINE CLINIC | Facility: HOSPITAL | Age: 36
End: 2021-10-19

## 2021-10-19 PROCEDURE — 0004A ADM SARSCOV2 30MCG/0.3ML BOOSTER: CPT | Performed by: INTERNAL MEDICINE

## 2021-10-19 PROCEDURE — 91300 HC SARSCOV02 VAC 30MCG/0.3ML IM: CPT | Performed by: INTERNAL MEDICINE

## 2021-11-02 RX ORDER — DULOXETIN HYDROCHLORIDE 60 MG/1
120 CAPSULE, DELAYED RELEASE ORAL
Qty: 180 CAPSULE | Refills: 1 | Status: SHIPPED | OUTPATIENT
Start: 2021-11-02 | End: 2022-02-11

## 2021-11-16 ENCOUNTER — OFFICE VISIT (OUTPATIENT)
Dept: NEUROLOGY | Facility: CLINIC | Age: 36
End: 2021-11-16

## 2021-11-16 VITALS
SYSTOLIC BLOOD PRESSURE: 124 MMHG | HEIGHT: 63 IN | BODY MASS INDEX: 51.17 KG/M2 | WEIGHT: 288.8 LBS | DIASTOLIC BLOOD PRESSURE: 76 MMHG | OXYGEN SATURATION: 97 % | HEART RATE: 66 BPM

## 2021-11-16 DIAGNOSIS — G43.019 INTRACTABLE MIGRAINE WITHOUT AURA AND WITHOUT STATUS MIGRAINOSUS: Primary | ICD-10-CM

## 2021-11-16 PROCEDURE — 99213 OFFICE O/P EST LOW 20 MIN: CPT | Performed by: NURSE PRACTITIONER

## 2021-11-16 RX ORDER — TOPIRAMATE 50 MG/1
50 TABLET, FILM COATED ORAL 2 TIMES DAILY
Qty: 60 TABLET | Refills: 5 | Status: SHIPPED | OUTPATIENT
Start: 2021-11-16 | End: 2022-06-07 | Stop reason: SDUPTHER

## 2021-11-16 NOTE — PROGRESS NOTES
Subjective:     Patient ID: Liz Bagley is a 36 y.o. female presenting for follow-up for migraine headaches.  I had some concern for idiopathic intracranial hypertension after her last visit and order lumbar puncture.  Opening pressure was 170.  She has continued taking topiramate 50 mg twice a day.  She does feel that this is worked to improve her headaches.  Her headaches have been slightly worse lately but she attributes this to tapering off of Cymbalta.  She has been on Cymbalta for several years but no longer feels it is helping her depression the plan is for her to start Trintellix instead.    History of Present Illness  The following portions of the patient's history were reviewed and updated as appropriate: allergies, current medications, past family history, past medical history, past social history, past surgical history and problem list.    Review of Systems   Constitutional: Negative for chills, fatigue and fever.   Eyes: Negative for pain, redness and visual disturbance.   Respiratory: Negative for cough, shortness of breath and wheezing.    Cardiovascular: Negative for chest pain, palpitations and leg swelling.   Gastrointestinal: Negative for diarrhea, nausea and vomiting.   Endocrine: Negative for cold intolerance, heat intolerance and polydipsia.   Genitourinary: Negative for decreased urine volume, difficulty urinating and urgency.   Musculoskeletal: Negative for back pain, myalgias and neck stiffness.   Skin: Negative for color change, rash and wound.   Allergic/Immunologic: Negative for environmental allergies, food allergies and immunocompromised state.   Neurological: Positive for headaches. Negative for dizziness, tremors, seizures, syncope, facial asymmetry, speech difficulty, weakness, light-headedness and numbness.   Hematological: Negative for adenopathy. Does not bruise/bleed easily.   Psychiatric/Behavioral: Negative for confusion and sleep disturbance. The patient is not  nervous/anxious.         Objective:    Neurologic Exam  General: Well nourished, well developed, and in no acute distress.  HEENT: Normocephalic/atraumatic. Mucous membranes moist. Sclerae anicteric.   Heart: Regular rate and rhythm. No murmurs, rubs or gallops.  Lungs: Clear to auscultation bilaterally.  Skin: No notable rashes or lesions on the visible surfaces.   Extremities: No clubbing, cyanosis or significant edema.   Psychiatric: Pleasant, cooperative, and appropriate.   Neurologic Exam:  Mental Status:  Alert and oriented. Speech is fluent. Comprehension is intact.   Cranial Nerves II-XII: Pupils equal, round, reactive to light. Extraocular movements are full and conjugate in all directions. Pursuit movements do not provoke any apparent dizziness or discomfort.  No nystagmus noted.  Hearing is intact to voice. Facial strength and sensation are preserved and symmetric. Tongue and palate midline. Voice non-hoarse, non-dysarthric.   Motor: Normal bulk and tone of bilateral upper and lower extremities. Strength is 5/5 in all 4 extremities both proximally and distally. There are no abnormal or involuntary movements noted.  Sensation: Intact to light touch throughout. Romberg was negative with no significant sway.   Coordination: Fully intact. Finger-to-nose performed accurately bilaterally.  Reflexes: The deep tendon reflexes are 2+/4 in bilateral biceps, brachioradialis, triceps, patella, and Achilles.  No pathologic reflexes were noted.  Gait: Normal. No ataxia or apraxia.         Physical Exam    Assessment/Plan:     Diagnoses and all orders for this visit:    1. Intractable migraine without aura and without status migrainosus (Primary)    Other orders  -     topiramate (TOPAMAX) 50 MG tablet; Take 1 tablet by mouth 2 (Two) Times a Day for 14 days.  Dispense: 60 tablet; Refill: 5        Overall she feels she has been doing well on the 50 mg twice a day of the topiramate is working well for her.  Her headaches  have been a little worse lately but she attributes this to tapering off of Cymbalta which she has been on for several years.  The plan is for her to start Trintellix instead.  She is going to follow-up in 8 weeks if her headaches have not improved with this change and we can make adjustments to her topiramate or try an additional medication.  If she is doing better she can cancel the appointment and follow-up in 6 months.

## 2022-01-04 DIAGNOSIS — I10 BENIGN ESSENTIAL HYPERTENSION: ICD-10-CM

## 2022-01-05 RX ORDER — LABETALOL 200 MG/1
200 TABLET, FILM COATED ORAL EVERY 12 HOURS SCHEDULED
Qty: 180 TABLET | Refills: 2 | Status: SHIPPED | OUTPATIENT
Start: 2022-01-05 | End: 2022-11-28 | Stop reason: SDUPTHER

## 2022-02-11 ENCOUNTER — OFFICE VISIT (OUTPATIENT)
Dept: SURGERY | Facility: CLINIC | Age: 37
End: 2022-02-11

## 2022-02-11 VITALS
SYSTOLIC BLOOD PRESSURE: 136 MMHG | OXYGEN SATURATION: 98 % | TEMPERATURE: 98 F | DIASTOLIC BLOOD PRESSURE: 80 MMHG | WEIGHT: 293 LBS | HEIGHT: 63 IN | HEART RATE: 77 BPM | BODY MASS INDEX: 51.91 KG/M2

## 2022-02-11 DIAGNOSIS — K52.9 INFLAMMATORY BOWEL DISEASE: ICD-10-CM

## 2022-02-11 DIAGNOSIS — Z93.2 ILEOSTOMY IN PLACE: Primary | ICD-10-CM

## 2022-02-11 PROBLEM — R06.02 SHORTNESS OF BREATH: Status: RESOLVED | Noted: 2017-08-04 | Resolved: 2022-02-11

## 2022-02-11 PROCEDURE — 99202 OFFICE O/P NEW SF 15 MIN: CPT | Performed by: COLON & RECTAL SURGERY

## 2022-04-07 ENCOUNTER — TELEPHONE (OUTPATIENT)
Dept: NEUROLOGY | Facility: CLINIC | Age: 37
End: 2022-04-07

## 2022-06-07 ENCOUNTER — OFFICE VISIT (OUTPATIENT)
Dept: NEUROLOGY | Facility: CLINIC | Age: 37
End: 2022-06-07

## 2022-06-07 VITALS
SYSTOLIC BLOOD PRESSURE: 122 MMHG | BODY MASS INDEX: 51.91 KG/M2 | HEART RATE: 72 BPM | HEIGHT: 63 IN | DIASTOLIC BLOOD PRESSURE: 74 MMHG | WEIGHT: 293 LBS | OXYGEN SATURATION: 98 %

## 2022-06-07 DIAGNOSIS — G43.019 INTRACTABLE MIGRAINE WITHOUT AURA AND WITHOUT STATUS MIGRAINOSUS: Primary | ICD-10-CM

## 2022-06-07 PROCEDURE — 99213 OFFICE O/P EST LOW 20 MIN: CPT | Performed by: NURSE PRACTITIONER

## 2022-06-07 RX ORDER — TOPIRAMATE 50 MG/1
TABLET, FILM COATED ORAL
Qty: 90 TABLET | Refills: 11 | Status: SHIPPED | OUTPATIENT
Start: 2022-06-07 | End: 2022-11-01

## 2022-06-07 NOTE — PROGRESS NOTES
Subjective:     Patient ID: Liz Bagley is a 36 y.o. female presenting for follow-up for migraine headaches.  I saw the patient in November 2021.  She had recently started topiramate 50 mg twice a day at the time and was doing very well.  She had stopped her Cymbalta so her migraines had worsened due to this.  She was starting Trintellix.  She is taking Trintellix 20 mg daily at this time and says it is working very well for her.  Her migraines have worsened however over the last 3 months.  She does not feel that the topiramate is working any longer for her.  She is having daily migraines.  She denies any side effects from the topiramate other than changing the taste of carbonated beverages.    History of Present Illness  The following portions of the patient's history were reviewed and updated as appropriate: allergies, current medications, past family history, past medical history, past social history, past surgical history and problem list.    Review of Systems   Eyes: Negative for photophobia, redness and visual disturbance.   Gastrointestinal: Positive for nausea (sometimes). Negative for diarrhea and vomiting.   Musculoskeletal: Positive for back pain, neck pain and neck stiffness.   Neurological: Positive for headaches. Negative for dizziness, tremors, seizures, syncope, facial asymmetry, speech difficulty, weakness, light-headedness and numbness.      I have reviewed and confirmed the accuracy of the patient's history: Chief complaint, HPI, ROS, Subjective and Past Family Social History as entered by the MA. Zackery Virginiajuan f Neri, APRN 06/07/22       Objective:    Neurologic Exam  General: Well nourished, well developed, and in no acute distress.  HEENT: Normocephalic/atraumatic. Mucous membranes moist. Sclerae anicteric.   Heart: Regular rate and rhythm. No murmurs, rubs or gallops.  Lungs: Clear to auscultation bilaterally.  Skin: No notable rashes or lesions on the visible surfaces.   Extremities: No  clubbing, cyanosis or significant edema.   Psychiatric: Pleasant, cooperative, and appropriate.   Neurologic Exam:  Mental Status:  Alert and oriented. Speech is fluent. Comprehension is intact.   Cranial Nerves II-XII: Pupils equal, round, reactive to light. Extraocular movements are full and conjugate in all directions. Pursuit movements do not provoke any apparent dizziness or discomfort.  No nystagmus noted.  Hearing is intact to voice. Facial strength and sensation are preserved and symmetric. Tongue and palate midline. Voice non-hoarse, non-dysarthric.   Motor: Normal bulk and tone of bilateral upper and lower extremities. Strength is 5/5 in all 4 extremities both proximally and distally. There are no abnormal or involuntary movements noted.  Sensation: Intact to light touch throughout. Romberg was negative with no significant sway.   Coordination: Fully intact. Finger-to-nose performed accurately bilaterally.  Reflexes: The deep tendon reflexes are 2+/4 in bilateral biceps, brachioradialis, triceps, patella, and Achilles.  No pathologic reflexes were noted.  Gait: Normal. No ataxia or apraxia.         Physical Exam    Assessment/Plan:     Diagnoses and all orders for this visit:    1. Intractable migraine without aura and without status migrainosus (Primary)    Other orders  -     topiramate (TOPAMAX) 50 MG tablet; Take 3 tablets by mouth daily.  Dispense: 90 tablet; Refill: 11        Her migraines have worsened again.  She is now having daily migraines.  I am going to have her increase the topiramate to 50 mg in the morning and 100 mg at bedtime.  Side effects were reviewed.  She also can do 100 mg in the morning and 50 at bedtime if this works better for her.  If this does not improve her migraines after a few weeks she will call.  We can try different medication at that time.  If she is doing well she can follow-up in 6 months, otherwise I will see her back sooner.

## 2022-09-13 ENCOUNTER — OFFICE VISIT (OUTPATIENT)
Dept: NEUROLOGY | Facility: CLINIC | Age: 37
End: 2022-09-13

## 2022-09-13 VITALS
HEIGHT: 63 IN | BODY MASS INDEX: 51.91 KG/M2 | SYSTOLIC BLOOD PRESSURE: 126 MMHG | HEART RATE: 74 BPM | DIASTOLIC BLOOD PRESSURE: 74 MMHG | WEIGHT: 293 LBS | OXYGEN SATURATION: 98 %

## 2022-09-13 DIAGNOSIS — G43.019 INTRACTABLE MIGRAINE WITHOUT AURA AND WITHOUT STATUS MIGRAINOSUS: Primary | ICD-10-CM

## 2022-09-13 PROCEDURE — 99213 OFFICE O/P EST LOW 20 MIN: CPT | Performed by: NURSE PRACTITIONER

## 2022-09-13 RX ORDER — GALCANEZUMAB 120 MG/ML
120 INJECTION, SOLUTION SUBCUTANEOUS
Qty: 1 ML | Refills: 5 | Status: SHIPPED | OUTPATIENT
Start: 2022-09-13

## 2022-09-13 NOTE — PROGRESS NOTES
Subjective:     Patient ID: Liz Bagley is a 36 y.o. female presenting for follow-up for migraine headaches.  I saw the patient about 3 months ago and at that time she reported a worsening in her migraine headaches.  I had her increase her topiramate to 50 mg 3 tablets daily.  This increase did not seem to improve her headaches.  She returns today stating that she is having almost daily headaches along with at least 4 migraines per month.    She does have an ileostomy.    History of Present Illness  The following portions of the patient's history were reviewed and updated as appropriate: allergies, current medications, past family history, past medical history, past social history, past surgical history and problem list.    Review of Systems   Eyes: Positive for visual disturbance (sometimes). Negative for photophobia and redness.   Gastrointestinal: Negative for diarrhea, nausea and vomiting.   Endocrine: Negative for cold intolerance, heat intolerance and polydipsia.   Genitourinary: Negative for decreased urine volume, difficulty urinating and urgency.   Musculoskeletal: Positive for neck pain and neck stiffness. Negative for back pain.   Skin: Negative for color change, rash and wound.   Allergic/Immunologic: Negative for environmental allergies, food allergies and immunocompromised state.   Neurological: Positive for headaches. Negative for dizziness, tremors, seizures, syncope, facial asymmetry, speech difficulty, weakness, light-headedness and numbness.   Hematological: Negative for adenopathy. Does not bruise/bleed easily.      I have reviewed and confirmed the accuracy of the patient's history: Chief complaint, HPI, ROS, Subjective and Past Family Social History as entered by the MA/LPN/RN. Zackery Neri, APRN 09/13/22       Objective:    Neurologic Exam  General: Well nourished, well developed, and in no acute distress.  HEENT: Normocephalic/atraumatic. Mucous membranes moist. Sclerae anicteric.    Heart: Regular rate and rhythm. No murmurs, rubs or gallops.  Lungs: Clear to auscultation bilaterally.  Skin: No notable rashes or lesions on the visible surfaces.   Extremities: No clubbing, cyanosis or significant edema.   Psychiatric: Pleasant, cooperative, and appropriate.   Neurologic Exam:  Mental Status:  Alert and oriented. Speech is fluent. Comprehension is intact.   Cranial Nerves II-XII: Pupils equal, round, reactive to light. Extraocular movements are full and conjugate in all directions. Pursuit movements do not provoke any apparent dizziness or discomfort.  No nystagmus noted.  Hearing is intact to voice. Facial strength and sensation are preserved and symmetric. Tongue and palate midline. Voice non-hoarse, non-dysarthric.   Motor: Normal bulk and tone of bilateral upper and lower extremities. Strength is 5/5 in all 4 extremities both proximally and distally. There are no abnormal or involuntary movements noted.  Sensation: Intact to light touch throughout. Romberg was negative with no significant sway.   Coordination: Fully intact. Finger-to-nose performed accurately bilaterally.  Reflexes: The deep tendon reflexes are 2+/4 in bilateral biceps, brachioradialis, triceps, patella, and Achilles.  No pathologic reflexes were noted.  Gait: Normal. No ataxia or apraxia.         Physical Exam    Assessment/Plan:     Diagnoses and all orders for this visit:    1. Intractable migraine without aura and without status migrainosus (Primary)    Other orders  -     galcanezumab-gnlm (Emgality) 120 MG/ML auto-injector pen; Inject 1 mL under the skin into the appropriate area as directed Every 30 (Thirty) Days.  Dispense: 1 mL; Refill: 5        She continues to have daily headaches along with at least 4 migraines per month.  The increased in dose of the topiramate did not seem to improve her headaches at all.  She is going to taper off of the topiramate.  Instructions to do so were given to her today.  I am going  to have her start Emgality 240 mg loading dose today and then 120 mg every 30 days thereafter.  Instructions were given.  Side effects were reviewed.  She will continue this for the next 3 months and then follow-up.  If this is not helpful she will call.  Samples of Nurtec and Ubrelvy were given as well.  If either of these work well for her she will call for prescription.    Of note, the patient does have an ileostomy and therefore the injection hopefully will work better for her than an oral option.

## 2022-09-15 ENCOUNTER — TELEPHONE (OUTPATIENT)
Dept: NEUROLOGY | Facility: CLINIC | Age: 37
End: 2022-09-15

## 2022-10-06 ENCOUNTER — IMMUNIZATION (OUTPATIENT)
Dept: VACCINE CLINIC | Facility: HOSPITAL | Age: 37
End: 2022-10-06

## 2022-10-06 DIAGNOSIS — Z23 NEED FOR VACCINATION: Primary | ICD-10-CM

## 2022-10-06 PROCEDURE — 0124A: CPT | Performed by: INTERNAL MEDICINE

## 2022-10-06 PROCEDURE — 91312 HC SARSCOV2 VAC 30MCG/0.3ML IM BIVALENT BOOSTER 12 YRS AND OLDER: CPT | Performed by: INTERNAL MEDICINE

## 2022-11-01 ENCOUNTER — OFFICE VISIT (OUTPATIENT)
Dept: INTERNAL MEDICINE | Facility: CLINIC | Age: 37
End: 2022-11-01

## 2022-11-01 VITALS
DIASTOLIC BLOOD PRESSURE: 90 MMHG | HEART RATE: 76 BPM | HEIGHT: 63 IN | OXYGEN SATURATION: 96 % | BODY MASS INDEX: 51.91 KG/M2 | TEMPERATURE: 98 F | WEIGHT: 293 LBS | SYSTOLIC BLOOD PRESSURE: 148 MMHG

## 2022-11-01 DIAGNOSIS — R23.2 FACIAL FLUSHING: ICD-10-CM

## 2022-11-01 DIAGNOSIS — I10 BENIGN ESSENTIAL HYPERTENSION: ICD-10-CM

## 2022-11-01 LAB
ALBUMIN SERPL-MCNC: 4 G/DL (ref 3.5–5.2)
ALBUMIN/GLOB SERPL: 1.2 G/DL
ALP SERPL-CCNC: 82 U/L (ref 39–117)
ALT SERPL W P-5'-P-CCNC: 13 U/L (ref 1–33)
ANION GAP SERPL CALCULATED.3IONS-SCNC: 11 MMOL/L (ref 5–15)
AST SERPL-CCNC: 11 U/L (ref 1–32)
BILIRUB SERPL-MCNC: <0.2 MG/DL (ref 0–1.2)
BUN SERPL-MCNC: 11 MG/DL (ref 6–20)
BUN/CREAT SERPL: 18.6 (ref 7–25)
CALCIUM SPEC-SCNC: 9.4 MG/DL (ref 8.6–10.5)
CHLORIDE SERPL-SCNC: 101 MMOL/L (ref 98–107)
CO2 SERPL-SCNC: 27 MMOL/L (ref 22–29)
CREAT SERPL-MCNC: 0.59 MG/DL (ref 0.57–1)
DEPRECATED RDW RBC AUTO: 39.5 FL (ref 37–54)
EGFRCR SERPLBLD CKD-EPI 2021: 117.7 ML/MIN/1.73
ERYTHROCYTE [DISTWIDTH] IN BLOOD BY AUTOMATED COUNT: 12.9 % (ref 12.3–15.4)
ESTRADIOL SERPL HS-MCNC: 167 PG/ML
FSH SERPL-ACNC: 7.84 MIU/ML
GLOBULIN UR ELPH-MCNC: 3.3 GM/DL
GLUCOSE SERPL-MCNC: 145 MG/DL (ref 65–99)
HCT VFR BLD AUTO: 38.4 % (ref 34–46.6)
HGB BLD-MCNC: 12.1 G/DL (ref 12–15.9)
LH SERPL-ACNC: 20.8 MIU/ML
MCH RBC QN AUTO: 26.5 PG (ref 26.6–33)
MCHC RBC AUTO-ENTMCNC: 31.5 G/DL (ref 31.5–35.7)
MCV RBC AUTO: 84 FL (ref 79–97)
PLATELET # BLD AUTO: 511 10*3/MM3 (ref 140–450)
PMV BLD AUTO: 9.4 FL (ref 6–12)
POTASSIUM SERPL-SCNC: 4.1 MMOL/L (ref 3.5–5.2)
PROT SERPL-MCNC: 7.3 G/DL (ref 6–8.5)
RBC # BLD AUTO: 4.57 10*6/MM3 (ref 3.77–5.28)
SODIUM SERPL-SCNC: 139 MMOL/L (ref 136–145)
TSH SERPL DL<=0.05 MIU/L-ACNC: 1.99 UIU/ML (ref 0.27–4.2)
WBC NRBC COR # BLD: 13.67 10*3/MM3 (ref 3.4–10.8)

## 2022-11-01 PROCEDURE — 82670 ASSAY OF TOTAL ESTRADIOL: CPT | Performed by: NURSE PRACTITIONER

## 2022-11-01 PROCEDURE — 83001 ASSAY OF GONADOTROPIN (FSH): CPT | Performed by: NURSE PRACTITIONER

## 2022-11-01 PROCEDURE — 36415 COLL VENOUS BLD VENIPUNCTURE: CPT | Performed by: NURSE PRACTITIONER

## 2022-11-01 PROCEDURE — 80050 GENERAL HEALTH PANEL: CPT | Performed by: NURSE PRACTITIONER

## 2022-11-01 PROCEDURE — 83002 ASSAY OF GONADOTROPIN (LH): CPT | Performed by: NURSE PRACTITIONER

## 2022-11-01 PROCEDURE — 99213 OFFICE O/P EST LOW 20 MIN: CPT | Performed by: NURSE PRACTITIONER

## 2022-11-01 NOTE — PROGRESS NOTES
"Chief Complaint  Facial Burn (Pt has been experiencing facial redness/burning for a week )    Subjective        Liz Bagley presents to [facility]  History of Present Illness  Patient presents for evaluation of facial flushing.  This is a 37-year-old female.  This has been occurring for 1 week intermittently.  It occurs for a few hours then subsides.  It is not elicited by any particular activity.  It happens both at rest and with exertion.  No concurrent symptoms including trouble swallowing, headaches, vision changes, cough or respiratory symptoms.  No fever or chills.  Denies any known ill contacts.  Denies any recent medication changes other than the addition of Rexulti by her psychiatrist but this was started about 1 month ago.  Denies any itching or pain.  Denies development of other new issues today.      Objective   Vital Signs:  /90 (BP Location: Left arm, Patient Position: Sitting, Cuff Size: Large Adult)   Pulse 76   Temp 98 °F (36.7 °C) (Infrared)   Ht 160 cm (63\")   Wt (!) 141 kg (311 lb)   SpO2 96%   BMI 55.09 kg/m²   Estimated body mass index is 55.09 kg/m² as calculated from the following:    Height as of this encounter: 160 cm (63\").    Weight as of this encounter: 141 kg (311 lb).          Physical Exam  Vitals and nursing note reviewed.   Constitutional:       General: She is not in acute distress.     Appearance: Normal appearance. She is well-developed. She is obese. She is not ill-appearing, toxic-appearing or diaphoretic.   HENT:      Head: Normocephalic and atraumatic.      Right Ear: External ear normal.      Left Ear: External ear normal.   Eyes:      Pupils: Pupils are equal, round, and reactive to light.   Neck:      Vascular: No carotid bruit.   Cardiovascular:      Rate and Rhythm: Normal rate and regular rhythm.      Pulses: Normal pulses.      Heart sounds: Normal heart sounds.   Pulmonary:      Effort: Pulmonary effort is normal. No " respiratory distress.      Breath sounds: Normal breath sounds. No stridor. No wheezing, rhonchi or rales.   Chest:      Chest wall: No tenderness.   Abdominal:      General: Bowel sounds are normal. There is no distension.      Palpations: Abdomen is soft.      Tenderness: There is no abdominal tenderness.   Musculoskeletal:         General: Normal range of motion.      Cervical back: Normal range of motion and neck supple. No rigidity or tenderness.   Lymphadenopathy:      Cervical: No cervical adenopathy.   Skin:     General: Skin is warm and dry.      Capillary Refill: Capillary refill takes less than 2 seconds.   Neurological:      General: No focal deficit present.      Mental Status: She is alert and oriented to person, place, and time. Mental status is at baseline.   Psychiatric:         Mood and Affect: Mood normal.         Behavior: Behavior normal.         Thought Content: Thought content normal.         Judgment: Judgment normal.        Result Review :  The following data was reviewed by: ELEN York on 11/01/2022:      Lab Results   Component Value Date    GLUCOSE 104 (H) 09/14/2021    BUN 12 09/14/2021    CREATININE 0.58 09/14/2021    EGFRIFNONA 117 09/14/2021    EGFRIFAFRI >150 09/18/2019    BCR 20.7 09/14/2021    K 4.0 09/14/2021    CO2 21.8 (L) 09/14/2021    CALCIUM 9.7 09/14/2021    PROTENTOTREF 7.2 09/18/2019    ALBUMIN 4.50 09/14/2021    LABIL2 1.6 09/18/2019    AST 14 09/14/2021    ALT 20 09/14/2021       Current outpatient and discharge medications have been reconciled for the patient.  Reviewed by: ELEN York           Assessment and Plan   Diagnoses and all orders for this visit:    1. Facial flushing  -     CBC No Differential; Future  -     Comprehensive metabolic panel; Future  -     TSH Rfx On Abnormal To Free T4; Future  -     FSH & LH; Future  -     Estradiol; Future    2. Benign essential hypertension  -     CBC No Differential; Future  -     Comprehensive metabolic  panel; Future  -     TSH Rfx On Abnormal To Free T4; Future           Blood pressure is elevated today at 148/98 and in situ his hypertension may be a contributing factor to the facial flushing.  I have asked her to monitor her home blood pressure daily for the next 3 weeks along with anytime that she has the facial flushing and record readings in a log.  Continue labetalol 200 mg every 12 hours for hypertension.  She will notify me of any new symptoms in the meantime.  We will get labs today and I will notify her of the results and any recommendations based on these.    Follow-up as needed and I will see her back in 3 weeks for follow-up on her blood pressure log.    Follow Up   Return in about 3 weeks (around 11/22/2022).  Patient was given instructions and counseling regarding her condition or for health maintenance advice. Please see specific information pulled into the AVS if appropriate.

## 2022-11-07 ENCOUNTER — TELEMEDICINE (OUTPATIENT)
Dept: INTERNAL MEDICINE | Facility: CLINIC | Age: 37
End: 2022-11-07

## 2022-11-07 DIAGNOSIS — I10 BENIGN ESSENTIAL HYPERTENSION: Primary | Chronic | ICD-10-CM

## 2022-11-07 DIAGNOSIS — E66.01 CLASS 3 SEVERE OBESITY DUE TO EXCESS CALORIES WITHOUT SERIOUS COMORBIDITY WITH BODY MASS INDEX (BMI) OF 50.0 TO 59.9 IN ADULT: Chronic | ICD-10-CM

## 2022-11-07 DIAGNOSIS — R73.09 ELEVATED GLUCOSE: ICD-10-CM

## 2022-11-07 DIAGNOSIS — D72.829 LEUKOCYTOSIS, UNSPECIFIED TYPE: ICD-10-CM

## 2022-11-07 PROBLEM — E66.813 CLASS 3 SEVERE OBESITY DUE TO EXCESS CALORIES WITHOUT SERIOUS COMORBIDITY WITH BODY MASS INDEX (BMI) OF 50.0 TO 59.9 IN ADULT: Chronic | Status: ACTIVE | Noted: 2017-01-06

## 2022-11-07 PROBLEM — E66.813 CLASS 3 SEVERE OBESITY DUE TO EXCESS CALORIES WITHOUT SERIOUS COMORBIDITY WITH BODY MASS INDEX (BMI) OF 50.0 TO 59.9 IN ADULT: Status: ACTIVE | Noted: 2017-01-06

## 2022-11-07 PROCEDURE — 99213 OFFICE O/P EST LOW 20 MIN: CPT | Performed by: NURSE PRACTITIONER

## 2022-11-07 RX ORDER — IRBESARTAN 75 MG/1
75 TABLET ORAL NIGHTLY
Qty: 90 TABLET | Refills: 0 | Status: SHIPPED | OUTPATIENT
Start: 2022-11-07 | End: 2023-01-23 | Stop reason: SDUPTHER

## 2022-11-07 NOTE — PROGRESS NOTES
"Chief Complaint  Hypertension  You have chosen to receive care through a telehealth visit.  Do you consent to use a video/audio connection for your medical care today? Yes    The patient was in a secure location on a personal electronic device and I was at the office.    Subjective        Liz Bagley presents to [facility]  History of Present Illness  Patient presents for a follow-up on high blood pressure.  This is a 37-year-old female.  Her blood pressure has been running in the 150s over 80s to 90s at home.  She endorses some facial flushing concurrently with high blood pressure.  Currently taking labetalol 200 mg every 12 hours for hypertension.  She endorses good compliance with this medication.  Historically she has taken lisinopril-HCTZ but states that this dropped her blood pressure a bit too low therefore she was taken off of it in the past.  This was several years ago.    Of note, on her recent labs from 11/1/2022 her glucose was elevated at 145.  She reports that she had eaten a meal about an hour and a half prior to having that blood drawn.  Denies any known history of diabetes or prediabetes.    She has a history of idiopathic leukocytosis and in the past has seen Dr. Bass on hematology, last in 2019.    Otherwise doing well and denies development of other new issues today.      Objective   Vital Signs:  There were no vitals taken for this visit.  Estimated body mass index is 55.09 kg/m² as calculated from the following:    Height as of 11/1/22: 160 cm (63\").    Weight as of 11/1/22: 141 kg (311 lb).          Physical Exam  Vitals reviewed: This was a virtual visit therefore no physical exam was performed.        Result Review :  The following data was reviewed by: ELEN York on 11/07/2022:  Common labs    Common Labs 11/1/22 11/1/22    1609 1609   Glucose  145 (A)   BUN  11   Creatinine  0.59   Sodium  139   Potassium  4.1   Chloride  101   Calcium  9.4 "   Albumin  4.00   Total Bilirubin  <0.2   Alkaline Phosphatase  82   AST (SGOT)  11   ALT (SGPT)  13   WBC 13.67 (A)    Hemoglobin 12.1    Hematocrit 38.4    Platelets 511 (A)    (A) Abnormal value            Data reviewed: Office Visit with Pema Craft APRN (11/01/2022)     Current outpatient and discharge medications have been reconciled for the patient.  Reviewed by: ELEN York           Assessment and Plan   Diagnoses and all orders for this visit:    1. Benign essential hypertension (Primary)  Assessment & Plan:  Hypertension is uncontrolled.  I will add irbesartan 75 mg daily.  DASH diet, regular exercise are discussed.  Continue labetalol 200 mg every 12 hours.  She will keep a home blood pressure log and we will have a follow-up in 3 weeks to ensure efficacy.    Orders:  -     irbesartan (Avapro) 75 MG tablet; Take 1 tablet by mouth Every Night.  Dispense: 90 tablet; Refill: 0  -     Basic Metabolic Panel; Future    2. Leukocytosis, unspecified type  Comments:  I would like her to see hematology for a follow-up and I have placed a referral.  She has seen Dr. Bass in the past, 2019.  Orders:  -     Ambulatory Referral to Hematology    3. Elevated glucose  Comments:  She is going to get a BMP done in 3 weeks at [facility] and I have put an A1c order in as well.  Low glycemic diet discussed.  Orders:  -     Basic Metabolic Panel; Future  -     Hemoglobin A1c; Future    4. Class 3 severe obesity due to excess calories without serious comorbidity with body mass index (BMI) of 50.0 to 59.9 in adult (HCC)  Assessment & Plan:  Patient's (There is no height or weight on file to calculate BMI.)  She wants to potentially discuss pharmacologic options.  I did discuss with her that we need to ensure that diet and exercise are in place as this is the cornerstone of weight loss and weight maintenance over time.  I do not think Contrave would be a good idea as she recently was taken off of bupropion by  behavioral health.  I also do not think phentermine would be a good idea given her uncontrolled hypertension.  I think Saxenda would be a good option but we need to ensure that insurance will be on board with covering this and she will call them and let me know.           Follow-up as needed and I will see her back in 3 weeks with labs and follow-up on blood pressure.    Follow Up   No follow-ups on file.  Patient was given instructions and counseling regarding her condition or for health maintenance advice. Please see specific information pulled into the AVS if appropriate.

## 2022-11-07 NOTE — ASSESSMENT & PLAN NOTE
Patient's (There is no height or weight on file to calculate BMI.)  She wants to potentially discuss pharmacologic options.  I did discuss with her that we need to ensure that diet and exercise are in place as this is the cornerstone of weight loss and weight maintenance over time.  I do not think Contrave would be a good idea as she recently was taken off of bupropion by behavioral health.  I also do not think phentermine would be a good idea given her uncontrolled hypertension.  I think Saxenda would be a good option but we need to ensure that insurance will be on board with covering this and she will call them and let me know.

## 2022-11-07 NOTE — ASSESSMENT & PLAN NOTE
Hypertension is uncontrolled.  I will add irbesartan 75 mg daily.  DASH diet, regular exercise are discussed.  Continue labetalol 200 mg every 12 hours.  She will keep a home blood pressure log and we will have a follow-up in 3 weeks to ensure efficacy.

## 2022-11-08 ENCOUNTER — PATIENT MESSAGE (OUTPATIENT)
Dept: INTERNAL MEDICINE | Facility: CLINIC | Age: 37
End: 2022-11-08

## 2022-11-09 ENCOUNTER — APPOINTMENT (OUTPATIENT)
Dept: LAB | Facility: HOSPITAL | Age: 37
End: 2022-11-09

## 2022-11-11 DIAGNOSIS — R73.09 ELEVATED GLUCOSE: ICD-10-CM

## 2022-11-11 DIAGNOSIS — I10 BENIGN ESSENTIAL HYPERTENSION: Chronic | ICD-10-CM

## 2022-11-11 DIAGNOSIS — D72.829 LEUKOCYTOSIS, UNSPECIFIED TYPE: Primary | Chronic | ICD-10-CM

## 2022-11-11 DIAGNOSIS — E66.01 CLASS 3 SEVERE OBESITY DUE TO EXCESS CALORIES WITHOUT SERIOUS COMORBIDITY WITH BODY MASS INDEX (BMI) OF 50.0 TO 59.9 IN ADULT: Primary | ICD-10-CM

## 2022-11-23 ENCOUNTER — APPOINTMENT (OUTPATIENT)
Dept: LAB | Facility: HOSPITAL | Age: 37
End: 2022-11-23

## 2022-11-23 ENCOUNTER — TELEPHONE (OUTPATIENT)
Dept: ONCOLOGY | Facility: CLINIC | Age: 37
End: 2022-11-23

## 2022-11-23 ENCOUNTER — LAB (OUTPATIENT)
Dept: LAB | Facility: HOSPITAL | Age: 37
End: 2022-11-23

## 2022-11-23 ENCOUNTER — CONSULT (OUTPATIENT)
Dept: ONCOLOGY | Facility: CLINIC | Age: 37
End: 2022-11-23

## 2022-11-23 VITALS
WEIGHT: 293 LBS | TEMPERATURE: 97.1 F | SYSTOLIC BLOOD PRESSURE: 139 MMHG | BODY MASS INDEX: 51.91 KG/M2 | RESPIRATION RATE: 18 BRPM | DIASTOLIC BLOOD PRESSURE: 89 MMHG | HEART RATE: 75 BPM | HEIGHT: 63 IN | OXYGEN SATURATION: 96 %

## 2022-11-23 DIAGNOSIS — D50.9 IRON DEFICIENCY ANEMIA, UNSPECIFIED IRON DEFICIENCY ANEMIA TYPE: ICD-10-CM

## 2022-11-23 DIAGNOSIS — D72.829 LEUKOCYTOSIS, UNSPECIFIED TYPE: ICD-10-CM

## 2022-11-23 DIAGNOSIS — J01.00 ACUTE NON-RECURRENT MAXILLARY SINUSITIS: ICD-10-CM

## 2022-11-23 DIAGNOSIS — K90.89 POOR IRON ABSORPTION: ICD-10-CM

## 2022-11-23 DIAGNOSIS — D72.829 LEUKOCYTOSIS, UNSPECIFIED TYPE: Primary | Chronic | ICD-10-CM

## 2022-11-23 PROBLEM — R53.82 CHRONIC FATIGUE: Status: RESOLVED | Noted: 2018-05-21 | Resolved: 2022-11-23

## 2022-11-23 LAB
BASOPHILS # BLD AUTO: 0.1 10*3/MM3 (ref 0–0.2)
BASOPHILS NFR BLD AUTO: 0.8 % (ref 0–1.5)
DEPRECATED RDW RBC AUTO: 41.1 FL (ref 37–54)
EOSINOPHIL # BLD AUTO: 0.77 10*3/MM3 (ref 0–0.4)
EOSINOPHIL NFR BLD AUTO: 6.1 % (ref 0.3–6.2)
ERYTHROCYTE [DISTWIDTH] IN BLOOD BY AUTOMATED COUNT: 13.4 % (ref 12.3–15.4)
FERRITIN SERPL-MCNC: 54.3 NG/ML (ref 11–207)
HCT VFR BLD AUTO: 39.3 % (ref 34–46.6)
HGB BLD-MCNC: 12.1 G/DL (ref 12–15.9)
IMM GRANULOCYTES # BLD AUTO: 0.05 10*3/MM3 (ref 0–0.05)
IMM GRANULOCYTES NFR BLD AUTO: 0.4 % (ref 0–0.5)
IRON 24H UR-MRATE: 38 MCG/DL (ref 37–145)
IRON SATN MFR SERPL: 8 % (ref 14–48)
LYMPHOCYTES # BLD AUTO: 3.24 10*3/MM3 (ref 0.7–3.1)
LYMPHOCYTES NFR BLD AUTO: 25.7 % (ref 19.6–45.3)
MCH RBC QN AUTO: 25.8 PG (ref 26.6–33)
MCHC RBC AUTO-ENTMCNC: 30.8 G/DL (ref 31.5–35.7)
MCV RBC AUTO: 83.8 FL (ref 79–97)
MONOCYTES # BLD AUTO: 0.66 10*3/MM3 (ref 0.1–0.9)
MONOCYTES NFR BLD AUTO: 5.2 % (ref 5–12)
NEUTROPHILS NFR BLD AUTO: 61.8 % (ref 42.7–76)
NEUTROPHILS NFR BLD AUTO: 7.79 10*3/MM3 (ref 1.7–7)
NRBC BLD AUTO-RTO: 0 /100 WBC (ref 0–0.2)
PLATELET # BLD AUTO: 407 10*3/MM3 (ref 140–450)
PMV BLD AUTO: 10.2 FL (ref 6–12)
RBC # BLD AUTO: 4.69 10*6/MM3 (ref 3.77–5.28)
TIBC SERPL-MCNC: 470 MCG/DL (ref 249–505)
TRANSFERRIN SERPL-MCNC: 336 MG/DL (ref 200–360)
WBC NRBC COR # BLD: 12.61 10*3/MM3 (ref 3.4–10.8)

## 2022-11-23 PROCEDURE — 85025 COMPLETE CBC W/AUTO DIFF WBC: CPT

## 2022-11-23 PROCEDURE — 99204 OFFICE O/P NEW MOD 45 MIN: CPT | Performed by: INTERNAL MEDICINE

## 2022-11-23 PROCEDURE — 82728 ASSAY OF FERRITIN: CPT | Performed by: INTERNAL MEDICINE

## 2022-11-23 PROCEDURE — 84466 ASSAY OF TRANSFERRIN: CPT | Performed by: INTERNAL MEDICINE

## 2022-11-23 PROCEDURE — 83540 ASSAY OF IRON: CPT | Performed by: INTERNAL MEDICINE

## 2022-11-23 PROCEDURE — 36415 COLL VENOUS BLD VENIPUNCTURE: CPT

## 2022-11-23 RX ORDER — AMOXICILLIN AND CLAVULANATE POTASSIUM 875; 125 MG/1; MG/1
1 TABLET, FILM COATED ORAL 2 TIMES DAILY
Qty: 14 TABLET | Refills: 0 | Status: SHIPPED | OUTPATIENT
Start: 2022-11-23 | End: 2023-03-03

## 2022-11-23 NOTE — TELEPHONE ENCOUNTER
Attempted to call no answer left message.  Per Dr Bass Ferritin 54.2 and Iron Sat 8. Dr Bass has ordered IV Venofer 300 mg times 3 treatments. Message sent to appointment desk to schedule.

## 2022-11-23 NOTE — PROGRESS NOTES
REASON FOR FOLLOWUP:     1.  Chronic mild leukocytosis.  Likely inflammatory in nature.  2.  Iron deficiency anemia secondary to poor iron absorption and menorrhagia.    · Patient was given Injectafer in November 2018.  · Recurrent iron deficiency anemia, Injectafer infusion 2 doses in July 2019.                               HISTORY OF PRESENT ILLNESS:  The patient is a 37 y.o. year old female who is here for re-evaluation because of chronic mild leukocytosis and iron deficiency anemia.     The patient was last seen here on 7/9/2019, at which time she had iron deficiency again, and was given 2 doses of Injectafer.    Today on 11/23/2022 she comes back for reevaluation of leukocytosis.  The patient denies any recent infections. Although patient feels like she has a cold currently with nasal congestion, and sinus pressure which is worse in the morning and resolves during the day. She denies any fever, sweating, or chills.     I reviewed her laboratory results.  Recently on 11/1/2022 she had elevated WBC 13,670 without differentiation, elevated platelets 511,000 and normal hemoglobin 12.1, MCV 84.0 and MCHC 31.5.  A few months back, on 9/14/2021 she had a WBC 14,200 without differentiation, platelets 496,000 and hemoglobin 12.5 with MCV 84.8 MCHC 33.1.    Further back, laboratory study on 12/09/2020 showed a normal total WBC of 7,600, including ANC 6050 lymphocytes 1160 and monocytes 350.  Hemoglobin of 14.1, and platelets of 490,000.     Laboratory study done today 11/23/2022 shows platelets of 407,000, WBC of 12,610, neutrophils of 7,790, lymphocytes of 3,240, eosinophils of 770, and hemoglobin of 12.1. Patient states that her platelet count has always been elevated.     The patient is taking Emgality (monoclonal antibody) for her migraines once a month which provides relief. She also takes Rexulti and trazodone for her depression and anxiety. Patient does not have any medications for her allergies.        Past Medical History:   Diagnosis Date   • Abnormal uterine bleeding    • Abnormal uterine bleeding (AUB) 05/2020   • Allergic rhinitis    • Anemia    • Anxiety    • Arm pain, left     CHRONIC   • Arthritis    • Asthma due to environmental allergies 4/2/2018   • Chronic fatigue 8/4/2017   • COVID-19 virus infection 12/09/2020   • Crohn's disease (HCC)    • Depression    • Eczema     LEG, ABD, HIPS   • H/O transfusion of packed red blood cells     1 UNIT, NO REACTIONS   • Headache 06/20/2015    CT SCAN OF BRAIN NEGATIVE, SEEN AT [facility] ER   • Headache syndrome 06/25/2017    SPINAL PERFORMED, SEEN AT [facility] ER   • Hearing loss of left ear 2/6/2017   • History of steroid therapy     LONG TERM USE   • Hypertension    • Hypovitaminosis D 10/10/2017   • IBS (irritable bowel syndrome)    • Ileostomy present ([facility])    • Insomnia    • Laceration of left thumb 10/06/2020    SEEN AT [facility] ER   • Leukocytosis 11/8/2018   • Menorrhagia with regular cycle 07/2020   • Metatarsalgia of right foot 3/11/2019   • Migraines    • Myopia     BILATERAL   • Neuropathy 2/28/2019   • Non-neoplastic nevus of skin    • Pancolitis (HCC)    • Peroneal tendonitis of right lower extremity 5/1/2019   • Plantar fasciitis, left 07/2020   • Poor iron absorption 11/8/2018   • Recurrent sinus infections    • Rotavirus infection 05/06/2019    SEEN AT [facility] ER   • Spinal headache 06/28/2015    SEEN AT [facility] ER   • Tattoos    • Ulcerative colitis (HCC)    • Vitamin B 12 deficiency      Past Surgical History:   Procedure Laterality Date   • APPENDECTOMY N/A 06/02/2014     AT [facility]   • BLOOD PATCH N/A 06/28/2015    EPIDURAL BLOOD PATCH, DR.DONAL ARMSTRONG AT [facility]   • COLON RESECTION N/A 11/20/2017    Procedure: LAPAROSCOPIC TOTAL PROCTOCOLECTOMY WITH END ILEOSTOMY;  Surgeon: Colin Evans MD;  Location: [facility];  Service:    • COLON RESECTION      illeostomy   • COLONOSCOPY N/A 11/11/2016    Procedure: COLONOSCOPY TO CECUM AND TERMINAL ILEUM WITH  BIOPSIES;  Surgeon: Yao Uribe MD;  Location: [facility] ENDOSCOPY;  Service:    • COLONOSCOPY N/A 07/2012    DR. MILLER JOSEPH IN [place]   • D & C HYSTEROSCOPY ENDOMETRIAL ABLATION N/A 5/4/2020    Procedure: DILATATION AND CURETTAGE HYSTEROSCOPY NOVASURE ENDOMETRIAL ABLATION;  Surgeon: Hellen Alaniz MD;  Location: [facility] MAIN OR;  Service: Gynecology;  Laterality: N/A;   • EPIDURAL BLOOD PATCH N/A 07/02/2015    DR. MILLER LOPEZ AT [facility]   • FINGER SURGERY Right 12/18/2015    MIDDLE FINGER, EXCISION OF FOREIGN BODYX3, LILLIAN GARCIA   • FOREARM SURGERY Left 2000    w/ internal device,   • LUMBAR PUNCTURE N/A 06/25/2015    [facility]    • NOSE SURGERY Bilateral 02/10/2017    NASAL SCOPE, BILATERAL EDEMA, MIDLINE SEPTUM, NO POLYPS, DR. GHADA LEGGETT   • ORIF ANKLE FRACTURE Left 2011    w/ internal devices, DR. AGUAYO   • TONSILLECTOMY Bilateral 2000       HEMATOLOGIC/ONCOLOGIC HISTORY:  The patient is a 33 y.o. year old female who is here for initial evaluation because of chronic mild leukocytosis referred by her surgeon Dr. Evans. This patient had history of ulcerative colitis diagnosed when she was 13 years old. She reports that she failed all of the therapy except steroids but she was not tolerating steroids well. So eventually the patient had pancolectomy by Dr. Evans in November 2017. She has ileostomy in place. She is doing well, without fever, sweating or chills. She reports no recurrent infection. She has no weight loss, as a matter of fact she is overweight.     The patient also has long history of anemia, however has no formal workup for iron deficiency. She also reports heavy menses.  Patient also reports that she was previously taking oral iron treatment of for 1 year when she was living in [place] many years ago, there was no improvement of her anemia.  As a matter of fact, patient reports her oral iron tablet would came out in her stool intact.  She was given vitamin B12 injection in August at  her primary care physician Dr. Maldonado's office.    Reviewed her laboratory studies dating back to 06/02/2014 within the [facility] EMR system. This patient had low normal hemoglobin 12.2, and MCV 80.7. Had elevated platelets 571,000 with WBC 12,470 including neutrophils 8500, lymphocytes 2800, monocytes 600 and eosinophils 200. Her chemistry that day reported unremarkable CMP. There were no lab results from that time until 11/15/2017 when she had hemoglobin 11.3, MCV 78.6, and platelets 669,000 and WBC 13,970. That is when the patient had pancolectomy on 11/20/2017 with postsurgery labs reported hemoglobin 8.8 and platelets 525,000, MCV 80.6 and WBC 14,400 including neutrophils 11,350, lymphocytes 1900, monocytes 1100 on 11/21/2017 one day after surgery. Her hemoglobin was below 9 grams in the following several days. On 04/02/2018, the patient had hemoglobin 10.3, MCV 77.1, platelets 632,000, WBC 15,400, including neutrophils 8950, lymphocytes 3340, and monocytes 600, eosinophils 440. On 05/21/2018 patient had chemistry lab reporting normal thyroid profile, folic acid 11.1 ng/mL, and B12 at 363 pg/mL. Her hemoglobin was 10.8, MCV 79.3, platelets 608,000, WBC 14,100 including neutrophils 9600, lymphocytes 3500, monocytes 720 and eosinophils 240. CMP was unremarkable. There was no iron study.    Laboratory study on 11/8/2018 reported hemoglobin 10.2, MCV 76.4, platelets 497,000, WBC 12,680 including neutrophils 6990, lymphocytes 4230, monocytes 720, eosinophils 580.  Serum iron study reported a ferritin 5.8 ng/mL, free iron 16 TIBC 582, iron saturation 3%, folic acid and 10.5 ng/mL, vitamin B12 at 425 pg/mL, haptoglobin 177, , C-reactive protein 1.18 mg/dL, ESR 18, negative for direct MARIA DOLORES, rheumatoid factor, and negative for comprehensive MARIA DOLORES panel.  Chemistry lab reported normal renal function, normal electrolytes and normal liver function panel.  Total protein 7.5 and albumin 3.9.     Peripheral blood smear  reviewed under microscope. There are hypochromia and microcytosis, poikilocytosis, tong-shaped RBCs and some target cells. The morphologies of WBCs and platelets are normal.     Patient was given Injectafer in November 2019, 2 doses total 1500 mg.    Patient was given IV Injectafer in November 2018 due to anemia not responding to oral iron treatment.  She reports resolution of pica for ice chips.     Patient notes she does not currently take Vitamin B-12 but is taking Vitamin D. I advised her to start taking over the counter Vitamin B-12 1000 mcg and Folic acid 1 mg. She reports no difficulties with her colitis today which is not currently managed with any medications. She denies any other complaints at this time. Patient denies any craving for ice chips at this time.     On 2/21/2019, her iron study reports ferritin 104.6, and iron saturation 19% and vitamin B12 at 404, normalized to Hb 12.2 MCV 89.9.    Patient was seen at ED on 05/06/2019 for diarrhea, abdominal pain, vomiting, and nausea. She was found to have Rotavirus infection with diarrhea of infectious origin.     Laboratory study on 7/9/2019 reported total WBC of 12,660 including neutrophils 7800, lymphocytes 3800, monocytes 560 and eosinophil 400.  Iron study reported ferritin 16.2, iron saturation 12%, 4950 and TIBC 434.  Patient was given 2 more doses of Injectafer.      MEDICATIONS    Current Outpatient Medications:   •  Brexpiprazole (Rexulti) 0.25 MG tablet, Take 1 tablet by mouth Daily., Disp: 15 tablet, Rfl: 14  •  Brexpiprazole (Rexulti) 0.25 MG tablet, Take 1 tablet by mouth daily, Disp: 30 tablet, Rfl: 3  •  galcanezumab-gnlm (Emgality) 120 MG/ML auto-injector pen, Inject 1 mL under the skin into the appropriate area as directed Every 30 (Thirty) Days., Disp: 1 mL, Rfl: 5  •  irbesartan (Avapro) 75 MG tablet, Take 1 tablet by mouth Every Night., Disp: 90 tablet, Rfl: 0  •  labetalol (NORMODYNE) 200 MG tablet, Take 1 tablet by mouth Every 12  (Twelve) Hours., Disp: 180 tablet, Rfl: 2  •  Liraglutide (SAXENDA) 18 MG/3ML injection pen, Inject 0.6 mg under the skin into the appropriate area as directed Daily for 7 days, THEN 1.2 mg Daily for 7 days, THEN 1.8 mg Daily for 7 days, THEN 2.4 mg Daily, Disp: 15 mL, Rfl: 0  •  ondansetron (ZOFRAN) 4 MG tablet, Take 1 tablet  by mouth 1 to 2 times per day as needed nausea and vomiting, Disp: 30 tablet, Rfl: 0  •  traZODone (DESYREL) 50 MG tablet, Take 1 tablet by mouth Daily., Disp: 90 tablet, Rfl: 0  •  Vortioxetine HBr (Trintellix) 20 MG tablet, Take 1 tablet by mouth Daily., Disp: 90 tablet, Rfl: 0    ALLERGIES:   No Known Allergies    SOCIAL HISTORY:       Social History     Social History   • Marital status: Single     Spouse name: N/A   • Number of children: 0   • Years of education: College education      Occupational History   •  [employer]     Social History Main Topics   • Smoking status: Never Smoker   • Smokeless tobacco: Never Used   • Alcohol use Yes      Comment: 1-2 monthly   • Drug use: No   • Sexual activity: Defer         FAMILY HISTORY:   · Father had a skin cancer in his 40s, currently in late 60s with poor health condition including diabetes hypertension.    · Mother in late 60s with poor health condition also diabetes and hypertension together with mental illness.  Patient has 2 brothers in their 40s in good health condition, they both have hypertension.  Maternal grandfather has  hemochromatosis.      REVIEW OF SYSTEMS:  Review of Systems   Constitutional: Positive for fatigue. Negative for activity change, chills, diaphoresis and unexpected weight change.   HENT: Positive for rhinorrhea and sinus pressure. Negative for facial swelling, nosebleeds and sore throat.    Eyes: Negative for visual disturbance.   Respiratory: Negative for cough and shortness of breath.    Cardiovascular: Negative for chest pain, palpitations and leg swelling.   Gastrointestinal: Negative for  "abdominal pain, anal bleeding, blood in stool, diarrhea and nausea.        Postoperative pancolectomy in November 2017 for ulcerative colitis with ileostomy in place.   Endocrine: Negative for polyuria.   Genitourinary: Negative for dysuria, frequency and hematuria.        Heavy menses every month   Musculoskeletal: Negative for arthralgias and joint swelling.   Skin: Negative for color change.        Sun sensitivity   Allergic/Immunologic: Negative for immunocompromised state.   Neurological: Negative for dizziness and headaches.   Hematological: Negative for adenopathy. Does not bruise/bleed easily.   Psychiatric/Behavioral: Negative for confusion.              Vitals:    11/23/22 0843   BP: 139/89   Pulse: 75   Resp: 18   Temp: 97.1 °F (36.2 °C)   TempSrc: Temporal   SpO2: 96%   Weight: (!) 144 kg (316 lb 9.6 oz)   Height: 160 cm (62.99\")   PainSc: 0-No pain     Current Status 11/23/2022   ECOG score 0      PHYSICAL EXAM:   GENERAL:  Well-developed, well-nourished in no acute distress.  Orientated to time place and the people.  SKIN:  Warm, dry without rashes, purpura or petechiae.  HEENT:  Normocephalic.  Wearing mask.   LYMPHATICS:  No cervical, supraclavicular adenopathy.  CHEST: Normal respiratory effort.  Lungs clear to auscultation. Good airflow.  CARDIAC:  Regular rate and rhythm without murmurs. Normal S1,S2.  ABDOMEN:  Soft, nontender with no organomegaly or masses.  Bowel sounds normal.  EXTREMITIES:  No clubbing, cyanosis or edema.  NEUROLOGICAL:  Grossly intact.  No focal neurological deficits.  PSYCHIATRIC:  Normal affect and mood.           RECENT LABS:    Lab Results   Component Value Date    WBC 12.61 (H) 11/23/2022    HGB 12.1 11/23/2022    HCT 39.3 11/23/2022    MCV 83.8 11/23/2022     11/23/2022     Lab Results   Component Value Date    NEUTROABS 7.79 (H) 11/23/2022     Lab Results   Component Value Date    GLUCOSE 145 (H) 11/01/2022    BUN 11 11/01/2022    CREATININE 0.59 11/01/2022    " EGFRIFNONA 117 09/14/2021    EGFRIFAFRI >150 09/18/2019    BCR 18.6 11/01/2022    K 4.1 11/01/2022    CO2 27.0 11/01/2022    CALCIUM 9.4 11/01/2022    PROTENTOTREF 7.2 09/18/2019    ALBUMIN 4.00 11/01/2022    LABIL2 1.6 09/18/2019    AST 11 11/01/2022    ALT 13 11/01/2022     Sodium   Date Value Ref Range Status   11/01/2022 139 136 - 145 mmol/L Final     Potassium   Date Value Ref Range Status   11/01/2022 4.1 3.5 - 5.2 mmol/L Final     Total Bilirubin   Date Value Ref Range Status   11/01/2022 <0.2 0.0 - 1.2 mg/dL Final     Alkaline Phosphatase   Date Value Ref Range Status   11/01/2022 82 39 - 117 U/L Final   ]  Lab Results   Component Value Date    BBEHBEYR18 374 02/03/2020     Lab Results   Component Value Date    FOLATE 7.87 02/03/2020     Lab Results   Component Value Date    IRON 41 02/03/2020    TIBC 447 02/03/2020    FERRITIN 197.00 (H) 02/03/2020     Iron saturation 12% on 7/9/2019      Assessment & Plan      1.  Chronic mild leukocytosis.  The patient is a 33-year-old female with previous lab results mostly with increased neutrophils, she typically had normal lymphocytes and monocytes. She occasionally has elevated eosinophils.   · Laboratory study on 11/8/2018 showed only mildly elevated C-reactive protein, however was negative for the rheumatoid disease panel and review of peripheral blood smear has no signs of malignancy.    · 11/13/2018 she had slightly worsened leukocytosis, together with elevated neutrophils, slightly increased lymphocytes in the significant increased eosinophils.  This patient also had seasonal allergies, she had nasal congestion.  I think her leukocytosis was at least partially contributed by her seasonal allergy with nasal congestion.  I advised the patient to take over-the-counter antiallergy medicine.  · Laboratory study today 07/09/2019 reported total WBC of 12,660 including neutrophils 7800, lymphocytes 3800, monocytes 560 and eosinophil 400.  This is relatively stable.   Continue to monitor.  · Her WBC today on 11/23/2022 is slightly better compared to the numbers in 09/2022 and 11/2022. Today on 11/23/2022 she has some sinus infection. Otherwise, there are no major issues going on. Patient does not need to do further laboratory studies at this point.      2.  Iron deficiency anemia, microcytic hypochromic.  This is secondary to her menorrhagia and they have poor iron absorption because of her GI disease with ulcerative colitis/Crohn's disease. I recommended intravenous iron therapy using Injectafer weekly for 2 doses started on 11/13/2018.   · This patient has low normal vitamin B12 level.  I advised her to start over-the-counter B12 to help with erythropoiesis and folic acid 1 mg.   · On 2/21/2019, her iron study reports ferritin 104.6, and iron saturation 19% and vitamin B12 at 404, normalized to Hb 12.2 MCV 89.9.   · Iron saturation 07/09/2019 is 12%.  Patient has deteriorating iron profile with recurrent iron deficiency.  We will initiate IV iron therapy again.  Patient was given 2 doses of Injectafer.     · The patient has a normal hemoglobin of 12.1. Her iron study has never been repeated for the past 2 years. She has decreased iron saturation of 9 percent on 02/03/2020. Will reassess her iron studies.    3. Acute sinusitis.  -Recommended to treat her with Augmentin 875 mg 2 times a day for 7 days. She is agreeable to this. Also advised patient to buy Zyrtec zyrtec). She does have elevated eosinophil indicating allergic reaction associated with her sinusitis.     PLAN:     1. Laboratory studies for ferritin, iron profile and will call her for results to see if she needs IV iron therapy.  2. Start Augmentin 875 mg 2 times a day for 7 days. E-scribed to her pharmacy today.   3. Follow-up in 6 months for re-evaluation and will recheck CBC and iron studies.       Addendum:  Lab Results   Component Value Date    IRON 38 11/23/2022    TIBC 470 11/23/2022    FERRITIN 54.30  "11/23/2022   Iron saturation 8%.    This patient clearly has iron deficiency with borderline hemoglobin and MCV value.  With her history of poor oral iron tolerance, we recommended to repeat with intravenous iron therapy.  Per her insurance policy, will choose better for 300 mg weekly for 3 doses.         Guadalupe Bass MD PhD  11/23/2022      CC:   Pema Craft APRN Nechol L Allen, MD               Transcribed from ambient dictation for Guadalupe Bass MD PhD by Meenu Ram.  11/23/22   12:21 EST    [facility] Provider Statement:[id number]::\"Patient or patient representative verbalized consent to the visit recording.\",\"I have personally performed the services described in this document as transcribed by the above individual, and it is both accurate and complete.\"            "

## 2022-11-28 ENCOUNTER — PATIENT ROUNDING (BHMG ONLY) (OUTPATIENT)
Dept: ONCOLOGY | Facility: CLINIC | Age: 37
End: 2022-11-28

## 2022-11-28 DIAGNOSIS — I10 BENIGN ESSENTIAL HYPERTENSION: ICD-10-CM

## 2022-11-28 LAB
ANION GAP SERPL CALCULATED.3IONS-SCNC: 8 MMOL/L (ref 5–15)
BUN SERPL-MCNC: 17 MG/DL (ref 6–20)
BUN/CREAT SERPL: 30.4 (ref 7–25)
CALCIUM SPEC-SCNC: 9.8 MG/DL (ref 8.6–10.5)
CHLORIDE SERPL-SCNC: 100 MMOL/L (ref 98–107)
CO2 SERPL-SCNC: 30 MMOL/L (ref 22–29)
CREAT SERPL-MCNC: 0.56 MG/DL (ref 0.57–1)
EGFRCR SERPLBLD CKD-EPI 2021: 119.2 ML/MIN/1.73
GLUCOSE SERPL-MCNC: 111 MG/DL (ref 65–99)
HBA1C MFR BLD: 7.1 % (ref 4.8–5.6)
POTASSIUM SERPL-SCNC: 4 MMOL/L (ref 3.5–5.2)
SODIUM SERPL-SCNC: 138 MMOL/L (ref 136–145)

## 2022-11-28 PROCEDURE — 83036 HEMOGLOBIN GLYCOSYLATED A1C: CPT | Performed by: NURSE PRACTITIONER

## 2022-11-28 PROCEDURE — 80048 BASIC METABOLIC PNL TOTAL CA: CPT | Performed by: NURSE PRACTITIONER

## 2022-11-28 PROCEDURE — 36415 COLL VENOUS BLD VENIPUNCTURE: CPT | Performed by: NURSE PRACTITIONER

## 2022-11-29 ENCOUNTER — TELEPHONE (OUTPATIENT)
Dept: INTERNAL MEDICINE | Facility: CLINIC | Age: 37
End: 2022-11-29

## 2022-11-29 ENCOUNTER — TELEMEDICINE (OUTPATIENT)
Dept: INTERNAL MEDICINE | Facility: CLINIC | Age: 37
End: 2022-11-29

## 2022-11-29 DIAGNOSIS — E66.01 CLASS 3 SEVERE OBESITY DUE TO EXCESS CALORIES WITHOUT SERIOUS COMORBIDITY WITH BODY MASS INDEX (BMI) OF 50.0 TO 59.9 IN ADULT: ICD-10-CM

## 2022-11-29 DIAGNOSIS — R11.0 NAUSEA: ICD-10-CM

## 2022-11-29 DIAGNOSIS — I10 BENIGN ESSENTIAL HYPERTENSION: ICD-10-CM

## 2022-11-29 DIAGNOSIS — I10 BENIGN ESSENTIAL HYPERTENSION: Primary | Chronic | ICD-10-CM

## 2022-11-29 DIAGNOSIS — E11.9 TYPE 2 DIABETES MELLITUS WITHOUT COMPLICATION, WITHOUT LONG-TERM CURRENT USE OF INSULIN: Chronic | ICD-10-CM

## 2022-11-29 PROCEDURE — 99214 OFFICE O/P EST MOD 30 MIN: CPT | Performed by: NURSE PRACTITIONER

## 2022-11-29 RX ORDER — LABETALOL 200 MG/1
200 TABLET, FILM COATED ORAL EVERY 12 HOURS SCHEDULED
Qty: 180 TABLET | Refills: 2 | Status: CANCELLED | OUTPATIENT
Start: 2022-11-29

## 2022-11-29 RX ORDER — LABETALOL 200 MG/1
200 TABLET, FILM COATED ORAL EVERY 12 HOURS SCHEDULED
Qty: 180 TABLET | Refills: 0 | Status: SHIPPED | OUTPATIENT
Start: 2022-11-29 | End: 2023-03-03 | Stop reason: SDUPTHER

## 2022-11-29 RX ORDER — ONDANSETRON 4 MG/1
4 TABLET, FILM COATED ORAL
Qty: 30 TABLET | Refills: 1 | Status: SHIPPED | OUTPATIENT
Start: 2022-11-29

## 2022-11-29 NOTE — PROGRESS NOTES
Chief Complaint  Hypertension (3 wk f/u)  You have chosen to receive care through a telehealth visit.  Do you consent to use a video/audio connection for your medical care today? Yes    Pt was in a secure location on a personal electronic device for this visit and I was at the office.     Subjective        Liz Bagley presents to [facility]  History of Present Illness  Patient presents for a 3-week follow-up on hypertension.  This is a 37-year-old female.    She saw me on 11/7/2022 at which time I started irbesartan 75 mg daily for hypertension.  She continues to take labetalol 200 mg every 12 hours as well.  She reports that her blood pressure at her hematology appointment was improved into the 130s over 80s.  She has not been checking home blood pressures.  Denies any side effects with the irbesartan but the facial flushing that she was having when her blood pressure was running high has subsided for the most part in frequency.  She denies headaches, vision changes, shortness of breath or chest discomfort.    She is on Saxenda for weight loss.  Reports that the weight loss is going well and she has recently increased her dose to the 1.2 mg.  She reports that for the first few days after dose adjustment she experiences some mild nausea with no vomiting.  She would like a prescription for Zofran to take on the first 1 to 2 days after dose titration.  Overall she is tolerating the Saxenda well.    She is newly diagnosed as type II diabetic.  Her A1c has come back at 7.1.  She denies any known history of diabetes.  She would like to hold off on pharmacologic interventions for diabetes at this time stating that she is hoping that if she loses weight the A1c will decline.    Denies development of other new issues today.      Objective   Vital Signs:  There were no vitals taken for this visit.  Estimated body mass index is 56.1 kg/m² as calculated from the following:    Height as of  Pt. AAO x 4, WBC 7.58 afebrile, Tele SR-ST, wife at the bedside- interacting with the patient and participating in care.  ST into 130's during activity and ambulating in the room/hallway  CBG 83, 264, 260-- insulin given per orders-- patient educated and provided information about insulin, how to administer, and signs and symptoms of a low/high blood sugar-- patient able to correctly demonstrate how to check his sugar, prep the insulin pen, and administer insulin  Zosyn continued q8h for purulent secretions found during bronchoscopy on 1/3  C/o generalized pain-- Norco 7.5 mg given x 2 and provided relief  Patient ambulated in the room and hallway throughout the day independently  Plans to discharge patient Monday 1/6  Safety precautions maintained throughout the shift, call light within reach, and nonslip socks when out of bed.   "11/23/22: 160 cm (62.99\").    Weight as of 11/23/22: 144 kg (316 lb 9.6 oz).          Physical Exam  Vitals reviewed: This was a virtual visit therefore no physical exam was performed.        Result Review :  The following data was reviewed by: ELEN York on 11/29/2022:  Common labs    Common Labs 11/1/22 11/1/22 11/23/22 11/28/22 11/28/22    1609 1609  2300 2300   Glucose  145 (A)   111 (A)   BUN  11   17   Creatinine  0.59   0.56 (A)   Sodium  139   138   Potassium  4.1   4.0   Chloride  101   100   Calcium  9.4   9.8   Albumin  4.00      Total Bilirubin  <0.2      Alkaline Phosphatase  82      AST (SGOT)  11      ALT (SGPT)  13      WBC 13.67 (A)  12.61 (A)     Hemoglobin 12.1  12.1     Hematocrit 38.4  39.3     Platelets 511 (A)  407     Hemoglobin A1C    7.10 (A)    (A) Abnormal value            Data reviewed: Telemedicine with Pema Craft APRN (11/07/2022)       Current outpatient and discharge medications have been reconciled for the patient.  Reviewed by: ELEN York       Assessment and Plan   Diagnoses and all orders for this visit:    1. Benign essential hypertension (Primary)  Assessment & Plan:  Hypertension is improving.  Discussed goal of less than 130/80, continue DASH diet, increase exercise and decrease caffeine.  Continue irbesartan 75 mg daily and she will send me a [employer] message in about 2 weeks with her home blood pressure averages.  We will titrate dose if needed based on home readings.      2. Class 3 severe obesity due to excess calories without serious comorbidity with body mass index (BMI) of 50.0 to 59.9 in adult (HCC)  Assessment & Plan:  Patient's (There is no height or weight on file to calculate BMI.)  Improving.  We will get her scheduled within the next 2 to 3 months for a follow-up on Saxenda.  We will get her needles sent and some Zofran to help with mild nausea with dose increases.      3. Nausea  Comments:  Zofran sent to be taken for mild nausea " associated with Saxenda dose titration.  Orders:  -     ondansetron (ZOFRAN) 4 MG tablet; Take 1 tablet  by mouth 1 to 2 times per day as needed nausea and vomiting  Dispense: 30 tablet; Refill: 1    4. Type 2 diabetes mellitus without complication, without long-term current use of insulin ([facility])  Assessment & Plan:  She defers diabetic educator referral at this time and would like to try decreasing her blood glucose with weight loss, exercise and low glycemic diet and hold off on pharmacologic interventions.  I think this is reasonable however I would like her A1c to be rechecked in about 3 months to ensure that it is trending down to goal of less than 7.0.  If it is not at goal at that time I would recommend initiating metformin.           Follow-up as needed and we will have her contacted to schedule a 3-month follow-up on diabetes and weight loss, hypertension.    Follow Up   No follow-ups on file.  Patient was given instructions and counseling regarding her condition or for health maintenance advice. Please see specific information pulled into the AVS if appropriate.

## 2022-11-29 NOTE — TELEPHONE ENCOUNTER
Caller: Liz Bagley    Relationship to patient: Self    Best call back number: [phone]    Patient is needing: HUB RELAYED MESSAGE FROM ANGELINA CALDERON. PATIENT VOICED VERBAL UNDERSTANDING AND  HAS SCHEDULED A NEW PATIENT APPOINTMENTS WITH RENA BENAVIDEZ FOR 3/3/23 AT 9AM.

## 2022-11-29 NOTE — ASSESSMENT & PLAN NOTE
She defers diabetic educator referral at this time and would like to try decreasing her blood glucose with weight loss, exercise and low glycemic diet and hold off on pharmacologic interventions.  I think this is reasonable however I would like her A1c to be rechecked in about 3 months to ensure that it is trending down to goal of less than 7.0.  If it is not at goal at that time I would recommend initiating metformin.

## 2022-11-29 NOTE — TELEPHONE ENCOUNTER
LVM to callback to schedule new patient appointment with Cale Ojeda in 3 months (around 2/28/2023)    FOR HUB:   Please schedule patient

## 2022-11-29 NOTE — ASSESSMENT & PLAN NOTE
Patient's (There is no height or weight on file to calculate BMI.)  Improving.  We will get her scheduled within the next 2 to 3 months for a follow-up on Saxenda.  We will get her needles sent and some Zofran to help with mild nausea with dose increases.

## 2022-12-01 ENCOUNTER — E-VISIT (OUTPATIENT)
Dept: FAMILY MEDICINE CLINIC | Facility: TELEHEALTH | Age: 37
End: 2022-12-01
Payer: COMMERCIAL

## 2022-12-01 PROCEDURE — BRIGHTMDVISIT: Performed by: NURSE PRACTITIONER

## 2022-12-02 NOTE — E-VISIT TREATED
Chief Complaint: Bladder infection (UTI)   Patient introduction   Patient is 37-year-old female. Patient provided the following organ inventory: Presence of a vagina, ovaries, a uterus, and breasts.   Patient has had dysuria, frequent urination, and urinary urgency for 1 to 3 days.   Urine is cloudy with a strong or pungent odor.   Patient did not request an excuse note.   General presentation   Patient has not had a fever. No nausea or vomiting.   Moderate abdominal or pelvic pain.   Moderate back pain.   Bilateral flank pain. Difficulty starting, stopping, or delaying urination.   The following treatments were not helpful for current symptoms: - Acetaminophen - Ibuprofen - Phenazopyridine   Previous history of UTI. Current symptoms feel exactly the same as previous UTIs. Received treatment for UTI 1 to 3 times in last year. Patient's last use of antibiotics for UTI was over 1 month ago.   The following antibiotics were helpful for past UTIs:    TMP/SMX   No known history of yeast infections as a result of taking antibiotics for past UTIs.   No history of pyelonephritis. No history of kidney stones.   Had sexual intercourse in the past week. Does not use diaphragm. No unprotected sexual intercourse with a new partner in the last 2 weeks. Has not been exposed to sexually transmitted infections in the last month.   Patient is not being treated for diabetes mellitus.   Review of red flags/alarm symptoms:    No recent hospitalizations or nursing home care (last 3 months)    No history of renal failure    No recent history of urologic instrumentation    No anatomic abnormalities of the urinary tract    No abnormal vaginal discharge    No visible vaginal sores    No pain with sexual intercourse    No abnormal vaginal bleeding or spotting   Pregnancy/menstrual status/breastfeeding:   Patient is not pregnant. Patient is not breastfeeding. Regarding last menstrual period, patient writes: Had an ablation .   Current  medications   Currently taking Trintellix 20 MG tablet, Saxenda 18 MG/3ML injection pen, BD Pen Needle Pham 2nd Gen 32G X 4 MM misc, Emgality 120 MG/ML auto-injector pen, traZODone 50 MG tablet, Rexulti 0.25 MG tablet, labetalol 200 MG tablet, irbesartan 75 MG tablet, and ondansetron 4 MG tablet.   Medication allergies   None.   Medication contraindication review   Not taking ACE inhibitors and ARBs.   No history of anaphylactic reaction to beta-lactams; folate deficiency; G6PD deficiency; arrhythmia; coronary artery disease; megaloblastic anemia; mononucleosis; myasthenia gravis; cholestatic jaundice; oliguria/anuria; and TMP/SMX-associated thrombocytopenia.   No known history of amoxicillin-clavulanate-associated cholestatic jaundice or nitrofurantoin-associated cholestatic jaundice.   Past medical history   Immune conditions: No immunocompromising conditions. No history of cancer.   Assessment   Uncomplicated acute UTI.   This is the likely diagnosis based on patient's symptoms and history, including:    Previous history of UTI    Current symptoms are exactly the same as previous UTIs    Moderate pelvic or abdominal pain that interferes with daily tasks    Moderate back pain that interferes with daily tasks    Urine described as cloudy with a strong odor   Plan   Medications:    sulfamethoxazole 800 mg-trimethoprim 160 mg tablet RX 160mg/800mg 1 tab PO q12h 3d for infection. This medication is an antibiotic. Take it exactly as directed. You must finish the entire course of medication, even if you feel better after taking the first few doses. Amount is 6 tab.   The patient's prescription will be sent to:   [facility] [place] [address]   Phone: [phone]     Fax: [phone]   Education:    Condition and causes    Prevention    Treatment and self-care    When to call provider   Follow-up:   Patient to follow up as needed for progression or lack of improvement in  symptoms within 3d.   ----------   Electronically signed by ELEN [facility] on 2022-12-01 at 21:12PM   ----------   Patient Interview Transcript:   Knowing about your anatomy is important for diagnosing and treating UTIs. The gender we have on file for you is female, but we realize that this might not tell the whole story. Would you like to tell us more about your anatomy?    Yes   Not selected:    No   OK, which of these do you have? Select all that apply.    Vagina    Ovaries    Uterus    Breasts   Not selected:    Penis    Testes    Prostate   Which of these symptoms do you have? Select all that apply.    Pain or burning while urinating    Frequent urination    Sudden urge to urinate and it's hard to hold the urine in   How long have you had these symptoms? Select one.    1 to 3 days   Not selected:    Less than 24 hours    4 to 6 days    7 to 10 days    More than 10 days   Since your current symptoms started, has it been difficult to start, stop, or delay urination? Select one.    Yes   Not selected:    No   What color is your urine? Select one.    Cloudy   Not selected:    Clear    Yellow    Pink or red   Does your urine smell strange (like ammonia) or stronger than usual? Select one.    Yes   Not selected:    No   Do you also have any of these symptoms? Select all that apply.    Pain, pressure, or discomfort in the lower abdomen    Back pain   Not selected:    Fever    Nausea    Vomiting    No   How would you describe your lower abdominal pain, pressure, or discomfort? Select one.    Moderate; it's uncomfortable and gets in the way of doing daily tasks   Not selected:    Mild; I only notice it when I pay attention to it    Severe; I can't get comfortable, and it stops me from doing daily tasks   How would you describe your back pain? Select one.    Moderate pain that gets in the way of my daily tasks   Not selected:    Mild, achy pain    Severe, sharp pain that keeps me from my daily tasks   Do you  have any flank pain? The flank is the side of the body between the ribs and the hips.    Yes, in both my left and right flanks   Not selected:    Yes, in my left flank    Yes, in my right flank    No   Do you have any of these vaginal symptoms? Select all that apply.    No   Not selected:    Abnormal vaginal itching    Unscheduled or abnormal vaginal bleeding or spotting    Pain during sex    Visible sores on the vagina    Abnormal vaginal discharge   In the past 2 weeks, have you had a medical device or instrument placed in your urinary tract? Examples include catheters, stents, and nephrostomy tubes. Select one.    No   Not selected:    Yes   Have you recently been hospitalized or been a resident of a nursing home or other long-term care facility? This doesn't include emergency room (ER) visits. Select one.    No   Not selected:    Yes, within the last 2 weeks    Yes, within the last 3 months   Have you ever had severe problems with your kidneys, such as kidney failure? Select one.    No   Not selected:    Yes   Kidney stones    No   Not selected:    Within the last year    More than a year ago   Kidney infection (pyelonephritis)    No   Not selected:    Within the last year    More than a year ago   Have you ever been diagnosed with any of these? Select all that apply.    No   Not selected:    Urinary reflux    Bladder diverticula    Single (or horseshoe) kidney    Duplicated urethra   Have you recently held your urine for a long time after you felt the urge to go? Select one.    No   Not selected:    Yes   Have you recently avoided eating or drinking so you wouldn't have the urge to urinate as often? Select one.    No   Not selected:    Yes   Do you use a diaphragm? Select one.    No   Not selected:    Yes   Are you pregnant? Select one.    No   Not selected:    Yes   When was your last menstrual period? If you don't currently have periods or no longer have periods, please briefly explain.    Had an ablation    Are you breastfeeding? Select one.    No   Not selected:    Yes   Have you had sexual intercourse in the past week? Recent sexual intercourse is a risk factor for urinary tract infections. Select one.    Yes   Not selected:    No   Have you been exposed to a sexually transmitted infection (STI or STD) in the last month? Examples include chlamydia, gonorrhea, trichomoniasis, and herpes. Select one.    No, not that I know of   Not selected:    Yes   Have you had unprotected sexual intercourse with a new partner in the last 2 weeks? Select one.    No   Not selected:    Yes   Have you traveled to any of these countries within the last 3 months? Recent travel to these countries may affect which medication we recommend for your symptoms. Select all that apply.    None of these   Not selected:    [place]    [place]    [place]    [place]   Acetaminophen (Tylenol)    Not helpful   Not selected:    Helpful   Ibuprofen (Advil, Motrin)    Not helpful   Not selected:    Helpful   Phenazopyridine (Azo, Baridium, Pyridium, Uricalm, Uristat)    Not helpful   Not selected:    Helpful   Have you ever had a urinary tract infection (UTI)? A UTI is often called a bladder infection or acute cystitis. Select one.    Yes   Not selected:    No, not that I know of   How much do your current symptoms feel like past UTIs? Select one.    Exactly the same   Not selected:    Mostly the same    Somewhat the same    Totally different   In the past year, how many times have you taken antibiotics for a UTI? Select one.    1 to 3   Not selected:    0    4 or more   When did you last take antibiotics for a UTI? Select one.    More than 1 month ago   Not selected:    Less than 1 month ago   Do you remember which antibiotics you took for UTIs in the past? Select one.    Yes   Not selected:    No   Sulfamethoxazole/trimethoprim, also known as TMP/SMX (Bactrim, Bactrim DS)    Helpful   Not selected:    Not helpful   You mentioned using trimethoprim (Primsol) or  Sulfamethoxazole/trimethoprim, also known as TMP/SMX (Bactrim or Bactrim DS), in the past. Have you used these antibiotics in the last 3 months? Select one.    No   Not selected:    Yes   Have you ever developed a yeast infection as a result of taking antibiotics? Select one.    No, not that I know of   Not selected:    Yes   UTIs may be more serious when other factors are present. Let's address those now. Are you being treated for type 1 or type 2 diabetes? Select one.    No   Not selected:    Yes   Do you have any of these conditions that can affect the immune system? Scroll to see all options. Select all that apply.    None of these   Not selected:    History of bone marrow transplant    Chronic kidney disease    Chronic liver disease (including cirrhosis)    HIV/AIDS    Inflammatory bowel disease (Crohn's disease or ulcerative colitis)    Lupus    Moderate to severe plaque psoriasis    Multiple sclerosis    Rheumatoid arthritis    Sickle cell anemia    Alpha or beta thalassemia    History of solid organ transplant (kidney, liver, or heart)    History of spleen removal    An autoimmune disorder not listed here    A condition requiring treatment with long-term use of oral steroids (such as prednisone, prednisolone, or dexamethasone)   Have you ever been diagnosed with cancer? Select one.    No   Not selected:    Yes, I have cancer now    Yes, but I'm in remission   These last few questions will help us create the right treatment plan for you. Are you being treated for any of these conditions? Select all that apply.    No   Not selected:    Sammy-Danlos syndrome    Folate deficiency    G6PD deficiency    High blood pressure    History of aortic aneurysm or dissection    Marfan syndrome    Megaloblastic anemia    Mono (mononucleosis)    Myasthenia gravis    Oliguria or anuria    Peripheral vascular disease   Have you ever had jaundice as a result of taking amoxicillin-clavulanate (Augmentin) or nitrofurantoin  (Macrobid)? Select all that apply.    No   Not selected:    Yes, from amoxicillin-clavulanate (Augmentin)    Yes, from nitrofurantoin (Macrobid, Macrodantin)   Are you taking any of these medications? Select all that apply.    No   Not selected:    An ACE inhibitor such as lisinopril, enalapril, captopril, or benazepril    An angiotensin II receptor blocker (ARB) such as candesartan, irbesartan, losartan, or valsartan   Are you still taking these medications listed in your medical record? If you're not taking any of these, click Next. Select all that apply.    Trintellix 20 MG tablet    Saxenda 18 MG/3ML injection pen    BD Pen Needle Pham 2nd Gen 32G X 4 MM misc    Emgality 120 MG/ML auto-injector pen    traZODone 50 MG tablet    Rexulti 0.25 MG tablet    labetalol 200 MG tablet    irbesartan 75 MG tablet    ondansetron 4 MG tablet   Are you taking any other medications, vitamins, or supplements? Select one.    No   Not selected:    Yes   Have you ever had an allergic or bad reaction to any medication? Select one.    No   Not selected:    Yes   Do you need a doctor's note? A doctor's note confirms that you received care today and states when you can return to school or work. It does not contain information about your diagnosis or treatment plan. Your provider will make the final decision on whether to give you a doctor's note. Doctor's notes CANNOT be backdated. Select one.    No   Not selected:    Today only (1 day)   Is there anything else you'd like to tell us about your symptoms?   The patient did not enter any additional information.   ----------   Medical history   Medical history data does not currently exist for this patient.

## 2022-12-02 NOTE — EXTERNAL PATIENT INSTRUCTIONS
Note   I have prescribed Bactrim. If your symptoms do not improve, or become worse, follow up with your PCP   Diagnosis   Urinary tract infection (UTI)   My name is Esther Parsons. I'm a healthcare provider at [facility]. After reviewing your interview, I see you have a urinary tract infection (UTI).   Medications   Your pharmacy   [facility] [place] [address] [place] [id number] [phone]     Prescription   TMP/SMX (160mg/800mg): Take 1 tablet by mouth every 12 hours for 3 days for infection. This medication is an antibiotic. Take it exactly as directed. You must finish the entire course of medication, even if you feel better after taking the first few doses.   About your diagnosis   A UTI is an infection of one or more parts of the urinary tract, most commonly the bladder.   Most UTIs are caused by bacteria (usually E. coli) that travel up the urethra and into the bladder. I see that you have some common signs and symptoms of a UTI:    Pain or burning while urinating    Frequent urination    Sudden urge to urinate    Symptoms that feel a lot like past UTIs    Mild or moderate pain, pressure, or discomfort in your lower abdomen    Moderate back pain    Cloudy urine    Strange or strong smelling urine    Symptoms that began shortly after sexual intercourse   Fortunately, most UTIs aren't serious, and they're easily treated with antibiotics. Make sure you take all of the antibiotic pills given to you, even if you start to feel better after the first few doses. Otherwise, the UTI might come back.   What to expect   If you follow this treatment plan, you should start to feel better within 1 to 2 days.   When to seek care   Call us at [phone]   with any sudden or unexpected symptoms.    Symptoms that don't improve or get worse in the next 48 hours    Fever that goes above 101F or lasts longer than 24 hours    Shaking or chills    Nausea or vomiting    Severe flank pain (pain in  your back or side) or pain that gets worse   Other treatment    Rest and drink plenty of water    Urinate frequently and when you first feel the urge    Place a heating pad on your back or stomach to help relieve some of the discomfort   Prevention    Drink a lot of liquids to help flush bacteria from your system. Water is best. Try for six to eight, 8-ounce glasses a day on a regular basis.    Urinate often and when you first feel the urge. Bacteria can grow when urine stays in the bladder too long. Urinate after sex to flush away bacteria.    After using the toilet, always wipe from front to back. This step is most important after a bowel movement. Wiping from front to back prevents bacteria normally found in stool from entering the urinary tract.   Your provider   Your diagnosis was provided by Esther Parsons, a member of your trusted care team at [facility].   If you have any questions, call us at [phone]  .

## 2022-12-05 ENCOUNTER — INFUSION (OUTPATIENT)
Dept: ONCOLOGY | Facility: HOSPITAL | Age: 37
End: 2022-12-05

## 2022-12-05 VITALS
RESPIRATION RATE: 16 BRPM | TEMPERATURE: 98.4 F | SYSTOLIC BLOOD PRESSURE: 144 MMHG | BODY MASS INDEX: 55.6 KG/M2 | DIASTOLIC BLOOD PRESSURE: 77 MMHG | WEIGHT: 293 LBS | HEART RATE: 90 BPM | OXYGEN SATURATION: 95 %

## 2022-12-05 DIAGNOSIS — K90.89 POOR IRON ABSORPTION: ICD-10-CM

## 2022-12-05 DIAGNOSIS — D50.9 IRON DEFICIENCY ANEMIA, UNSPECIFIED IRON DEFICIENCY ANEMIA TYPE: Primary | ICD-10-CM

## 2022-12-05 PROCEDURE — 96365 THER/PROPH/DIAG IV INF INIT: CPT

## 2022-12-05 PROCEDURE — 25010000002 IRON SUCROSE PER 1 MG: Performed by: INTERNAL MEDICINE

## 2022-12-05 PROCEDURE — 96366 THER/PROPH/DIAG IV INF ADDON: CPT

## 2022-12-05 PROCEDURE — 63710000001 DIPHENHYDRAMINE PER 50 MG: Performed by: INTERNAL MEDICINE

## 2022-12-05 RX ORDER — DIPHENHYDRAMINE HCL 25 MG
25 CAPSULE ORAL ONCE
Status: COMPLETED | OUTPATIENT
Start: 2022-12-05 | End: 2022-12-05

## 2022-12-05 RX ORDER — ACETAMINOPHEN 325 MG/1
650 TABLET ORAL ONCE
Status: COMPLETED | OUTPATIENT
Start: 2022-12-05 | End: 2022-12-05

## 2022-12-05 RX ORDER — SODIUM CHLORIDE 9 MG/ML
250 INJECTION, SOLUTION INTRAVENOUS ONCE
Status: COMPLETED | OUTPATIENT
Start: 2022-12-05 | End: 2022-12-05

## 2022-12-05 RX ADMIN — DIPHENHYDRAMINE HYDROCHLORIDE 25 MG: 25 CAPSULE ORAL at 10:18

## 2022-12-05 RX ADMIN — ACETAMINOPHEN 650 MG: 325 TABLET, FILM COATED ORAL at 10:18

## 2022-12-05 RX ADMIN — SODIUM CHLORIDE 250 ML: 9 INJECTION, SOLUTION INTRAVENOUS at 10:18

## 2022-12-05 RX ADMIN — IRON SUCROSE 300 MG: 20 INJECTION, SOLUTION INTRAVENOUS at 10:40

## 2022-12-12 ENCOUNTER — INFUSION (OUTPATIENT)
Dept: ONCOLOGY | Facility: HOSPITAL | Age: 37
End: 2022-12-12

## 2022-12-12 VITALS
RESPIRATION RATE: 16 BRPM | SYSTOLIC BLOOD PRESSURE: 133 MMHG | TEMPERATURE: 96.8 F | OXYGEN SATURATION: 97 % | HEART RATE: 88 BPM | DIASTOLIC BLOOD PRESSURE: 79 MMHG | WEIGHT: 293 LBS | BODY MASS INDEX: 55.56 KG/M2

## 2022-12-12 DIAGNOSIS — D50.9 IRON DEFICIENCY ANEMIA, UNSPECIFIED IRON DEFICIENCY ANEMIA TYPE: Primary | ICD-10-CM

## 2022-12-12 DIAGNOSIS — K90.89 POOR IRON ABSORPTION: ICD-10-CM

## 2022-12-12 PROCEDURE — 96365 THER/PROPH/DIAG IV INF INIT: CPT

## 2022-12-12 PROCEDURE — 25010000002 IRON SUCROSE PER 1 MG: Performed by: INTERNAL MEDICINE

## 2022-12-12 RX ORDER — DIPHENHYDRAMINE HCL 25 MG
25 CAPSULE ORAL ONCE
Status: DISCONTINUED | OUTPATIENT
Start: 2022-12-12 | End: 2022-12-12 | Stop reason: HOSPADM

## 2022-12-12 RX ORDER — SODIUM CHLORIDE 9 MG/ML
250 INJECTION, SOLUTION INTRAVENOUS ONCE
Status: COMPLETED | OUTPATIENT
Start: 2022-12-12 | End: 2022-12-12

## 2022-12-12 RX ORDER — ACETAMINOPHEN 325 MG/1
650 TABLET ORAL ONCE
Status: COMPLETED | OUTPATIENT
Start: 2022-12-12 | End: 2022-12-12

## 2022-12-12 RX ADMIN — SODIUM CHLORIDE 250 ML: 9 INJECTION, SOLUTION INTRAVENOUS at 10:41

## 2022-12-12 RX ADMIN — IRON SUCROSE 300 MG: 20 INJECTION, SOLUTION INTRAVENOUS at 10:47

## 2022-12-12 RX ADMIN — ACETAMINOPHEN 650 MG: 325 TABLET, FILM COATED ORAL at 10:41

## 2022-12-19 ENCOUNTER — INFUSION (OUTPATIENT)
Dept: ONCOLOGY | Facility: HOSPITAL | Age: 37
End: 2022-12-19

## 2022-12-19 VITALS
HEART RATE: 89 BPM | DIASTOLIC BLOOD PRESSURE: 79 MMHG | BODY MASS INDEX: 54.82 KG/M2 | WEIGHT: 293 LBS | OXYGEN SATURATION: 96 % | RESPIRATION RATE: 18 BRPM | TEMPERATURE: 98.2 F | SYSTOLIC BLOOD PRESSURE: 131 MMHG

## 2022-12-19 DIAGNOSIS — D50.9 IRON DEFICIENCY ANEMIA, UNSPECIFIED IRON DEFICIENCY ANEMIA TYPE: Primary | ICD-10-CM

## 2022-12-19 DIAGNOSIS — K90.89 POOR IRON ABSORPTION: ICD-10-CM

## 2022-12-19 PROCEDURE — 96365 THER/PROPH/DIAG IV INF INIT: CPT

## 2022-12-19 PROCEDURE — 25010000002 IRON SUCROSE PER 1 MG: Performed by: INTERNAL MEDICINE

## 2022-12-19 RX ORDER — SODIUM CHLORIDE 9 MG/ML
250 INJECTION, SOLUTION INTRAVENOUS ONCE
Status: COMPLETED | OUTPATIENT
Start: 2022-12-19 | End: 2022-12-19

## 2022-12-19 RX ORDER — ACETAMINOPHEN 325 MG/1
650 TABLET ORAL ONCE
Status: COMPLETED | OUTPATIENT
Start: 2022-12-19 | End: 2022-12-19

## 2022-12-19 RX ORDER — DIPHENHYDRAMINE HCL 25 MG
25 CAPSULE ORAL ONCE
Status: DISCONTINUED | OUTPATIENT
Start: 2022-12-19 | End: 2022-12-19 | Stop reason: HOSPADM

## 2022-12-19 RX ADMIN — SODIUM CHLORIDE 250 ML: 9 INJECTION, SOLUTION INTRAVENOUS at 10:22

## 2022-12-19 RX ADMIN — ACETAMINOPHEN 650 MG: 325 TABLET, FILM COATED ORAL at 10:10

## 2022-12-19 RX ADMIN — IRON SUCROSE 300 MG: 20 INJECTION, SOLUTION INTRAVENOUS at 10:22

## 2023-01-09 ENCOUNTER — TELEPHONE (OUTPATIENT)
Dept: SURGERY | Facility: CLINIC | Age: 38
End: 2023-01-09

## 2023-01-09 NOTE — TELEPHONE ENCOUNTER
Caller: Liz Bagley    Relationship to patient:     Best call back number:[phone]    Patient is needing: PT IS WANTING TO BE SEEN BY DR COHEN ON 01.11.22  A.M. FOR GALLBLADDER ISSUE. FIRST AVAILABLE IS IN February

## 2023-01-10 ENCOUNTER — TRANSCRIBE ORDERS (OUTPATIENT)
Dept: ADMINISTRATIVE | Facility: HOSPITAL | Age: 38
End: 2023-01-10
Payer: COMMERCIAL

## 2023-01-10 ENCOUNTER — LAB (OUTPATIENT)
Dept: LAB | Facility: HOSPITAL | Age: 38
End: 2023-01-10
Payer: COMMERCIAL

## 2023-01-10 DIAGNOSIS — F33.2 SEVERE RECURRENT MAJOR DEPRESSION WITHOUT PSYCHOTIC FEATURES: Primary | ICD-10-CM

## 2023-01-10 DIAGNOSIS — F33.2 SEVERE RECURRENT MAJOR DEPRESSION WITHOUT PSYCHOTIC FEATURES: ICD-10-CM

## 2023-01-10 LAB
25(OH)D3 SERPL-MCNC: 13.1 NG/ML (ref 30–100)
BASOPHILS # BLD AUTO: 0.09 10*3/MM3 (ref 0–0.2)
BASOPHILS NFR BLD AUTO: 0.7 % (ref 0–1.5)
DEPRECATED RDW RBC AUTO: 39.7 FL (ref 37–54)
EOSINOPHIL # BLD AUTO: 0.56 10*3/MM3 (ref 0–0.4)
EOSINOPHIL NFR BLD AUTO: 4.2 % (ref 0.3–6.2)
ERYTHROCYTE [DISTWIDTH] IN BLOOD BY AUTOMATED COUNT: 13.6 % (ref 12.3–15.4)
FOLATE SERPL-MCNC: 6.62 NG/ML (ref 4.78–24.2)
HCT VFR BLD AUTO: 35.9 % (ref 34–46.6)
HGB BLD-MCNC: 12 G/DL (ref 12–15.9)
IMM GRANULOCYTES # BLD AUTO: 0.04 10*3/MM3 (ref 0–0.05)
IMM GRANULOCYTES NFR BLD AUTO: 0.3 % (ref 0–0.5)
LYMPHOCYTES # BLD AUTO: 3.39 10*3/MM3 (ref 0.7–3.1)
LYMPHOCYTES NFR BLD AUTO: 25.4 % (ref 19.6–45.3)
MCH RBC QN AUTO: 26.9 PG (ref 26.6–33)
MCHC RBC AUTO-ENTMCNC: 33.4 G/DL (ref 31.5–35.7)
MCV RBC AUTO: 80.5 FL (ref 79–97)
MONOCYTES # BLD AUTO: 0.69 10*3/MM3 (ref 0.1–0.9)
MONOCYTES NFR BLD AUTO: 5.2 % (ref 5–12)
NEUTROPHILS NFR BLD AUTO: 64.2 % (ref 42.7–76)
NEUTROPHILS NFR BLD AUTO: 8.58 10*3/MM3 (ref 1.7–7)
NRBC BLD AUTO-RTO: 0 /100 WBC (ref 0–0.2)
PLATELET # BLD AUTO: 497 10*3/MM3 (ref 140–450)
PMV BLD AUTO: 9.5 FL (ref 6–12)
RBC # BLD AUTO: 4.46 10*6/MM3 (ref 3.77–5.28)
TSH SERPL DL<=0.05 MIU/L-ACNC: 1.14 UIU/ML (ref 0.27–4.2)
VIT B12 BLD-MCNC: 353 PG/ML (ref 211–946)
WBC NRBC COR # BLD: 13.35 10*3/MM3 (ref 3.4–10.8)

## 2023-01-10 PROCEDURE — 36415 COLL VENOUS BLD VENIPUNCTURE: CPT

## 2023-01-10 PROCEDURE — 82306 VITAMIN D 25 HYDROXY: CPT

## 2023-01-10 PROCEDURE — 85025 COMPLETE CBC W/AUTO DIFF WBC: CPT

## 2023-01-10 PROCEDURE — 84443 ASSAY THYROID STIM HORMONE: CPT

## 2023-01-10 PROCEDURE — 82607 VITAMIN B-12: CPT

## 2023-01-10 PROCEDURE — 82746 ASSAY OF FOLIC ACID SERUM: CPT

## 2023-01-26 DIAGNOSIS — I10 BENIGN ESSENTIAL HYPERTENSION: Chronic | ICD-10-CM

## 2023-01-26 RX ORDER — IRBESARTAN 75 MG/1
75 TABLET ORAL NIGHTLY
Qty: 90 TABLET | Refills: 0 | Status: SHIPPED | OUTPATIENT
Start: 2023-01-26

## 2023-02-22 DIAGNOSIS — I10 BENIGN ESSENTIAL HYPERTENSION: ICD-10-CM

## 2023-02-22 RX ORDER — LABETALOL 200 MG/1
200 TABLET, FILM COATED ORAL EVERY 12 HOURS SCHEDULED
Qty: 180 TABLET | Refills: 0 | Status: CANCELLED | OUTPATIENT
Start: 2023-02-20

## 2023-03-03 ENCOUNTER — OFFICE VISIT (OUTPATIENT)
Dept: INTERNAL MEDICINE | Facility: CLINIC | Age: 38
End: 2023-03-03
Payer: COMMERCIAL

## 2023-03-03 VITALS
TEMPERATURE: 98 F | HEIGHT: 63 IN | SYSTOLIC BLOOD PRESSURE: 110 MMHG | BODY MASS INDEX: 51.91 KG/M2 | OXYGEN SATURATION: 96 % | WEIGHT: 293 LBS | DIASTOLIC BLOOD PRESSURE: 80 MMHG | HEART RATE: 73 BPM

## 2023-03-03 DIAGNOSIS — D72.829 LEUKOCYTOSIS, UNSPECIFIED TYPE: Chronic | ICD-10-CM

## 2023-03-03 DIAGNOSIS — F41.9 ANXIETY AND DEPRESSION: Chronic | ICD-10-CM

## 2023-03-03 DIAGNOSIS — F51.01 PRIMARY INSOMNIA: Chronic | ICD-10-CM

## 2023-03-03 DIAGNOSIS — E11.9 TYPE 2 DIABETES MELLITUS WITHOUT COMPLICATION, WITHOUT LONG-TERM CURRENT USE OF INSULIN: Chronic | ICD-10-CM

## 2023-03-03 DIAGNOSIS — Z93.2 ILEOSTOMY IN PLACE: Chronic | ICD-10-CM

## 2023-03-03 DIAGNOSIS — Z00.00 ANNUAL PHYSICAL EXAM: Primary | ICD-10-CM

## 2023-03-03 DIAGNOSIS — F32.A ANXIETY AND DEPRESSION: Chronic | ICD-10-CM

## 2023-03-03 DIAGNOSIS — E66.01 CLASS 3 SEVERE OBESITY DUE TO EXCESS CALORIES WITHOUT SERIOUS COMORBIDITY WITH BODY MASS INDEX (BMI) OF 50.0 TO 59.9 IN ADULT: Chronic | ICD-10-CM

## 2023-03-03 DIAGNOSIS — E55.9 HYPOVITAMINOSIS D: Chronic | ICD-10-CM

## 2023-03-03 DIAGNOSIS — I10 BENIGN ESSENTIAL HYPERTENSION: Chronic | ICD-10-CM

## 2023-03-03 DIAGNOSIS — D64.9 ANEMIA, UNSPECIFIED TYPE: Chronic | ICD-10-CM

## 2023-03-03 PROBLEM — J01.00 ACUTE NON-RECURRENT MAXILLARY SINUSITIS: Status: RESOLVED | Noted: 2022-11-23 | Resolved: 2023-03-03

## 2023-03-03 PROBLEM — H91.92 HEARING LOSS OF LEFT EAR: Status: RESOLVED | Noted: 2017-02-06 | Resolved: 2023-03-03

## 2023-03-03 PROBLEM — Z79.899 CONTROLLED SUBSTANCE AGREEMENT SIGNED: Status: RESOLVED | Noted: 2017-01-06 | Resolved: 2023-03-03

## 2023-03-03 PROBLEM — H65.93 BILATERAL NON-SUPPURATIVE OTITIS MEDIA: Status: RESOLVED | Noted: 2017-01-06 | Resolved: 2023-03-03

## 2023-03-03 LAB
25(OH)D3+25(OH)D2 SERPL-MCNC: 9.7 NG/ML (ref 30–100)
ALBUMIN SERPL-MCNC: 4 G/DL (ref 3.5–5.2)
ALBUMIN/GLOB SERPL: 1.3 G/DL
ALP SERPL-CCNC: 83 U/L (ref 39–117)
ALT SERPL-CCNC: 15 U/L (ref 1–33)
AST SERPL-CCNC: 8 U/L (ref 1–32)
BASOPHILS # BLD AUTO: 0.09 10*3/MM3 (ref 0–0.2)
BASOPHILS NFR BLD AUTO: 0.7 % (ref 0–1.5)
BILIRUB SERPL-MCNC: 0.3 MG/DL (ref 0–1.2)
BUN SERPL-MCNC: 10 MG/DL (ref 6–20)
BUN/CREAT SERPL: 20 (ref 7–25)
CALCIUM SERPL-MCNC: 9.7 MG/DL (ref 8.6–10.5)
CHLORIDE SERPL-SCNC: 103 MMOL/L (ref 98–107)
CHOLEST SERPL-MCNC: 153 MG/DL (ref 0–200)
CO2 SERPL-SCNC: 27.2 MMOL/L (ref 22–29)
CREAT SERPL-MCNC: 0.5 MG/DL (ref 0.57–1)
EGFRCR SERPLBLD CKD-EPI 2021: 122.5 ML/MIN/1.73
EOSINOPHIL # BLD AUTO: 0.37 10*3/MM3 (ref 0–0.4)
EOSINOPHIL NFR BLD AUTO: 3 % (ref 0.3–6.2)
ERYTHROCYTE [DISTWIDTH] IN BLOOD BY AUTOMATED COUNT: 13.8 % (ref 12.3–15.4)
GLOBULIN SER CALC-MCNC: 3 GM/DL
GLUCOSE SERPL-MCNC: 149 MG/DL (ref 65–99)
HBA1C MFR BLD: 7.4 % (ref 4.8–5.6)
HCT VFR BLD AUTO: 37.1 % (ref 34–46.6)
HDLC SERPL-MCNC: 44 MG/DL (ref 40–60)
HGB BLD-MCNC: 11.7 G/DL (ref 12–15.9)
IMM GRANULOCYTES # BLD AUTO: 0.04 10*3/MM3 (ref 0–0.05)
IMM GRANULOCYTES NFR BLD AUTO: 0.3 % (ref 0–0.5)
LDLC SERPL CALC-MCNC: 91 MG/DL (ref 0–100)
LYMPHOCYTES # BLD AUTO: 3.13 10*3/MM3 (ref 0.7–3.1)
LYMPHOCYTES NFR BLD AUTO: 25.8 % (ref 19.6–45.3)
MCH RBC QN AUTO: 26.4 PG (ref 26.6–33)
MCHC RBC AUTO-ENTMCNC: 31.5 G/DL (ref 31.5–35.7)
MCV RBC AUTO: 83.7 FL (ref 79–97)
MONOCYTES # BLD AUTO: 0.7 10*3/MM3 (ref 0.1–0.9)
MONOCYTES NFR BLD AUTO: 5.8 % (ref 5–12)
NEUTROPHILS # BLD AUTO: 7.82 10*3/MM3 (ref 1.7–7)
NEUTROPHILS NFR BLD AUTO: 64.4 % (ref 42.7–76)
NRBC BLD AUTO-RTO: 0 /100 WBC (ref 0–0.2)
PLATELET # BLD AUTO: 495 10*3/MM3 (ref 140–450)
POTASSIUM SERPL-SCNC: 4.5 MMOL/L (ref 3.5–5.2)
PROT SERPL-MCNC: 7 G/DL (ref 6–8.5)
RBC # BLD AUTO: 4.43 10*6/MM3 (ref 3.77–5.28)
SODIUM SERPL-SCNC: 137 MMOL/L (ref 136–145)
TRIGL SERPL-MCNC: 94 MG/DL (ref 0–150)
TSH SERPL DL<=0.005 MIU/L-ACNC: 3.09 UIU/ML (ref 0.27–4.2)
VIT B12 SERPL-MCNC: 307 PG/ML (ref 211–946)
VLDLC SERPL CALC-MCNC: 18 MG/DL (ref 5–40)
WBC # BLD AUTO: 12.15 10*3/MM3 (ref 3.4–10.8)

## 2023-03-03 PROCEDURE — 99395 PREV VISIT EST AGE 18-39: CPT | Performed by: NURSE PRACTITIONER

## 2023-03-03 PROCEDURE — 82043 UR ALBUMIN QUANTITATIVE: CPT | Performed by: NURSE PRACTITIONER

## 2023-03-03 PROCEDURE — 99214 OFFICE O/P EST MOD 30 MIN: CPT | Performed by: NURSE PRACTITIONER

## 2023-03-03 RX ORDER — PEN NEEDLE, DIABETIC 30 GX3/16"
NEEDLE, DISPOSABLE MISCELLANEOUS
Qty: 100 EACH | Refills: 0 | Status: SHIPPED | OUTPATIENT
Start: 2023-03-03

## 2023-03-03 RX ORDER — LABETALOL 200 MG/1
200 TABLET, FILM COATED ORAL EVERY 12 HOURS SCHEDULED
Qty: 180 TABLET | Refills: 1 | Status: SHIPPED | OUTPATIENT
Start: 2023-03-03

## 2023-03-03 NOTE — ASSESSMENT & PLAN NOTE
Patient's (Body mass index is 55.29 kg/m².) indicates that they are morbidly/severely obese (BMI > 40 or > 35 with obesity - related health condition) with health conditions that include hypertension and diabetes mellitus . Weight is unchanged. BMI  is above average; BMI management plan is completed. We discussed portion control, increasing exercise and pharmacologic options including wegovy.   Will start wegovy-- discussed common side effects including constipation-- if this occurs, patient may benefit from benefiber/miralax use.   F/u in office in 6 weeks

## 2023-03-03 NOTE — PROGRESS NOTES
"Chief Complaint  Hypertension (Establish care)    Subjective        Liz Bagley presents to [facility]  History of Present Illness  This is a 36 y/o female presenting to office for establishment of care and chronic care management. Patient is currently working at hospital.     Patient reports working on exercise plan. Patient is working on managing weight. Patient reports following healthy diet.     Denies any tobacco. Reports occasional alcohol use.     [facility]-- Pap smear completed 2021. Hx ablation.     Patient has hx IBD and has ileostomy in place-- patient follows with Dr. Evans for this.     Hx anemia/leukocytosis-- following with Dr. Bass; hx iron infusions.     Hx migraines-- continues on emgality.     HTN-- continues labetolol and irbesartan. Patient reports she does not check BP. Denies any CP.     Hx anxiety and depression-- continues on rexulti and trintellix. Patient follows with psychiatry; reports mood is stable. Denies SI. Continues with mental health therapy.     Hx insomnia-- continues on trazodone.     DM2-- not currently on medication. Patient reports she was just recently dx with this. Patient reports she does get an eye exam every year. Patient denies any foot issues at this time.     Morbid obesity-- we discussed weight loss management-- patient reports she had previously been on saxenda and was having a lot of nausea of this so she stopped. Patient would like to try a different medication to help with weight management.             Objective   Vital Signs:  /80 (BP Location: Left arm, Patient Position: Sitting, Cuff Size: Large Adult)   Pulse 73   Temp 98 °F (36.7 °C) (Temporal)   Ht 160 cm (62.99\")   Wt (!) 142 kg (312 lb)   SpO2 96%   BMI 55.29 kg/m²   Estimated body mass index is 55.29 kg/m² as calculated from the following:    Height as of this encounter: 160 cm (62.99\").    Weight as of this encounter: " 142 kg (312 lb).             Physical Exam  Constitutional:       Appearance: Normal appearance. She is obese.   HENT:      Head: Normocephalic and atraumatic.      Right Ear: External ear normal.      Left Ear: External ear normal.      Nose: Nose normal.      Mouth/Throat:      Mouth: Mucous membranes are moist.      Pharynx: Oropharynx is clear.   Eyes:      Conjunctiva/sclera: Conjunctivae normal.      Pupils: Pupils are equal, round, and reactive to light.      Comments: +glasses   Neck:      Vascular: No carotid bruit.   Cardiovascular:      Rate and Rhythm: Normal rate and regular rhythm.      Pulses: Normal pulses.      Heart sounds: Normal heart sounds. No murmur heard.    No friction rub. No gallop.   Pulmonary:      Effort: Pulmonary effort is normal. No respiratory distress.      Breath sounds: Normal breath sounds. No stridor. No wheezing, rhonchi or rales.   Abdominal:      General: Abdomen is flat. Bowel sounds are normal. There is no distension.      Palpations: Abdomen is soft. There is no mass.      Tenderness: There is no abdominal tenderness.      Hernia: No hernia is present.       Musculoskeletal:         General: No tenderness. Normal range of motion.      Cervical back: Normal range of motion and neck supple.   Skin:     General: Skin is warm and dry.      Coloration: Skin is not jaundiced.      Findings: No bruising.   Neurological:      General: No focal deficit present.      Mental Status: She is alert and oriented to person, place, and time. Mental status is at baseline.   Psychiatric:         Mood and Affect: Mood normal.         Thought Content: Thought content normal.         Judgment: Judgment normal.        Result Review :  The following data was reviewed by: ELEN Prasad on 03/03/2023:  Common labs    Common Labs 11/23/22 11/28/22 11/28/22 1/10/23     2300 2300    Glucose   111 (A)    BUN   17    Creatinine   0.56 (A)    Sodium   138    Potassium   4.0    Chloride   100     Calcium   9.8    WBC 12.61 (A)   13.35 (A)   Hemoglobin 12.1   12.0   Hematocrit 39.3   35.9   Platelets 407   497 (A)   Hemoglobin A1C  7.10 (A)     (A) Abnormal value                         Assessment and Plan   Diagnoses and all orders for this visit:    1. Annual physical exam (Primary)  Assessment & Plan:  Recommended 150-300 minutes weekly exercise.   Continue with heart healthy diabetic diet choices.   Labs ordered.   Continue with monthly self breast examinations.   Pap smear UTD.   Anticipatory guidance given regarding health prevention/wellness, diet/exercise, tobacco/alcohol/drug education, exercise and wellbeing, covid 19 guidance, vaccination recommendations, and sexual health/STD education.   Recommended bi-yearly dental exams and regular vision examinations.       Orders:  -     TSH Rfx On Abnormal To Free T4  -     Vitamin D 25 hydroxy  -     Lipid panel    2. Benign essential hypertension  Assessment & Plan:  Hypertension is improving with treatment.  Continue current treatment regimen.  Dietary sodium restriction.  Weight loss.  Regular aerobic exercise.  Continue current medications.  Blood pressure will be reassessed at the next regular appointment.    Orders:  -     labetalol (NORMODYNE) 200 MG tablet; Take 1 tablet by mouth Every 12 (Twelve) Hours.  Dispense: 180 tablet; Refill: 1  -     CBC & Differential  -     Comprehensive metabolic panel    3. Type 2 diabetes mellitus without complication, without long-term current use of insulin (HCC)  Assessment & Plan:  Diabetes is unchanged.   Dietary recommendations for ADA diet.  Regular aerobic exercise.  Discussed ways to avoid symptomatic hypoglycemia.  Discussed sick day management.  Discussed foot care.  Reminded to get yearly retinal exam.  Medication changes per orders.  Diabetes will be reassessed in 1 month.  Patient will be starting wegovy. We will see if with weight management we can control blood sugars more adequately.   A1C today.    We also discussed if wegovy is intolerable, we may need to pursue other treatment such as metformin.     Orders:  -     Cancel: MicroAlbumin, Urine, Random - Urine, Clean Catch; Future  -     Hemoglobin A1c  -     MicroAlbumin, Urine, Random - Urine, Clean Catch; Future    4. Primary insomnia  Assessment & Plan:  Continues on trazodone.   Stable      5. Class 3 severe obesity due to excess calories without serious comorbidity with body mass index (BMI) of 50.0 to 59.9 in adult ([facility])  Assessment & Plan:  Patient's (Body mass index is 55.29 kg/m².) indicates that they are morbidly/severely obese (BMI > 40 or > 35 with obesity - related health condition) with health conditions that include hypertension and diabetes mellitus . Weight is unchanged. BMI  is above average; BMI management plan is completed. We discussed portion control, increasing exercise and pharmacologic options including wegovy.   Will start wegovy-- discussed common side effects including constipation-- if this occurs, patient may benefit from benefiber/miralax use.   F/u in office in 6 weeks    Orders:  -     Semaglutide-Weight Management 0.5 MG/0.5ML solution auto-injector; Inject 0.5 mL under the skin into the appropriate area as directed 1 (One) Time Per Week.  Dispense: 2 mL; Refill: 0  -     Insulin Pen Needle (Pen Needles) 32G X 4 MM misc; Use as directed with Saxenda.  Dispense: 100 each; Refill: 0    6. Hypovitaminosis D  Assessment & Plan:  Lab today.   Trial sublingal/wafer form of OTC vitamin d3 2000iu daily.         7. Leukocytosis, unspecified type  Assessment & Plan:  Following with CBC.   Stable.       8. Anemia, unspecified type  Assessment & Plan:  Recheck b12 levels.   Has not been taking b12 injections or medication.   Will trial SL tablets.   If this does not improve, may require monthly b12 injections due to hx ileostomy/IBD.     Orders:  -     Vitamin B12    9. Anxiety and depression  Assessment & Plan:  Patient's depression is  recurrent and is moderate without psychosis. Their depression is currently in full remission and the condition is improving with treatment. This will be reassessed at the next regular appointment. F/U as described:patient will continue current medication therapy.  Continues following with psychiatry.       10. Ileostomy in place (HCC)  Assessment & Plan:  Stable.   Changing weekly.   Follows with Dr. Nathan Joyce IBD             Follow Up   Return in about 6 weeks (around 4/14/2023) for Recheck chronic condition/ weight loss management .  Patient was given instructions and counseling regarding her condition or for health maintenance advice. Please see specific information pulled into the AVS if appropriate.

## 2023-03-03 NOTE — ASSESSMENT & PLAN NOTE
Diabetes is unchanged.   Dietary recommendations for ADA diet.  Regular aerobic exercise.  Discussed ways to avoid symptomatic hypoglycemia.  Discussed sick day management.  Discussed foot care.  Reminded to get yearly retinal exam.  Medication changes per orders.  Diabetes will be reassessed in 1 month.  Patient will be starting wegovy. We will see if with weight management we can control blood sugars more adequately.   A1C today.   We also discussed if wegovy is intolerable, we may need to pursue other treatment such as metformin.

## 2023-03-03 NOTE — ASSESSMENT & PLAN NOTE
Patient's depression is recurrent and is moderate without psychosis. Their depression is currently in full remission and the condition is improving with treatment. This will be reassessed at the next regular appointment. F/U as described:patient will continue current medication therapy.  Continues following with psychiatry.

## 2023-03-03 NOTE — ASSESSMENT & PLAN NOTE
Recommended 150-300 minutes weekly exercise.   Continue with heart healthy diabetic diet choices.   Labs ordered.   Continue with monthly self breast examinations.   Pap smear UTD.   Anticipatory guidance given regarding health prevention/wellness, diet/exercise, tobacco/alcohol/drug education, exercise and wellbeing, covid 19 guidance, vaccination recommendations, and sexual health/STD education.   Recommended bi-yearly dental exams and regular vision examinations.

## 2023-03-03 NOTE — ASSESSMENT & PLAN NOTE
Recheck b12 levels.   Has not been taking b12 injections or medication.   Will trial SL tablets.   If this does not improve, may require monthly b12 injections due to hx ileostomy/IBD.

## 2023-03-05 LAB — MICROALBUMIN UR-MCNC: 31 UG/ML

## 2023-03-10 ENCOUNTER — TELEPHONE (OUTPATIENT)
Dept: INTERNAL MEDICINE | Facility: CLINIC | Age: 38
End: 2023-03-10
Payer: COMMERCIAL

## 2023-03-10 NOTE — TELEPHONE ENCOUNTER
Provider: RENA  Caller: PATIENT  Relationship to Patient: SELF  Pharmacy: [facility]  Phone Number: [phone]  Reason for Call: PATIENT STATES THAT  [facility] IS NEEDING PA ON THE WAGOVEY MEDICATION. SHE IS LEAVING AN ID NUMBER.      [facility] NUMBER IS  [phone]        [id number] IS HER REF NUMBER

## 2023-03-13 NOTE — TELEPHONE ENCOUNTER
PATIENT CALLED AND ADVISED THAT THEY HAVE NOT RECEIVED THE P.A. YET.  SHE DOESN'T WANT IT TO GET DENIED.

## 2023-04-17 ENCOUNTER — OFFICE VISIT (OUTPATIENT)
Dept: INTERNAL MEDICINE | Facility: CLINIC | Age: 38
End: 2023-04-17
Payer: COMMERCIAL

## 2023-04-17 VITALS
HEART RATE: 78 BPM | BODY MASS INDEX: 51.91 KG/M2 | HEIGHT: 63 IN | DIASTOLIC BLOOD PRESSURE: 70 MMHG | WEIGHT: 293 LBS | OXYGEN SATURATION: 92 % | SYSTOLIC BLOOD PRESSURE: 130 MMHG

## 2023-04-17 DIAGNOSIS — E66.01 CLASS 3 SEVERE OBESITY DUE TO EXCESS CALORIES WITHOUT SERIOUS COMORBIDITY WITH BODY MASS INDEX (BMI) OF 50.0 TO 59.9 IN ADULT: Primary | Chronic | ICD-10-CM

## 2023-04-17 PROBLEM — J45.909 ASTHMA DUE TO ENVIRONMENTAL ALLERGIES: Status: RESOLVED | Noted: 2018-04-02 | Resolved: 2023-04-17

## 2023-04-17 RX ORDER — SEMAGLUTIDE 1 MG/.5ML
1 INJECTION, SOLUTION SUBCUTANEOUS WEEKLY
Qty: 2 ML | Refills: 0 | Status: SHIPPED | OUTPATIENT
Start: 2023-04-17

## 2023-04-17 NOTE — ASSESSMENT & PLAN NOTE
Patient's (Body mass index is 54.22 kg/m².) indicates that they are morbidly/severely obese (BMI > 40 or > 35 with obesity - related health condition) with health conditions that include hypertension and diabetes mellitus . Weight is improving with treatment. BMI  is above average; BMI management plan is completed. We discussed portion control, increasing exercise and pharmacologic options including wegovy.

## 2023-04-17 NOTE — PROGRESS NOTES
"Chief Complaint  Weight loss management      Subjective        Liz Bagley presents to [facility]  History of Present Illness  This is a 38 y/o female presenting to office for f/u with weight loss management. Patient continues on 0.5mg weekly injection of wegovy. Patient denies any nausea or issues with ileostomy. Patient reports her output consistency has been normal. Patient reports she has noticed that she has had decreased appetite over the first few days, then feels like her appetite returns. Denies any s/e of medication. Patient reports she has done well with her diet; planning on increasing exercise.     Objective   Vital Signs:  /70 (BP Location: Left arm, Patient Position: Sitting, Cuff Size: Large Adult)   Pulse 78   Ht 160 cm (62.99\")   Wt (!) 139 kg (306 lb)   SpO2 92%   BMI 54.22 kg/m²   Estimated body mass index is 54.22 kg/m² as calculated from the following:    Height as of this encounter: 160 cm (62.99\").    Weight as of this encounter: 139 kg (306 lb).             Physical Exam  Constitutional:       Appearance: Normal appearance. She is obese.   HENT:      Head: Normocephalic and atraumatic.      Right Ear: External ear normal.      Left Ear: External ear normal.   Eyes:      Conjunctiva/sclera: Conjunctivae normal.      Pupils: Pupils are equal, round, and reactive to light.   Cardiovascular:      Rate and Rhythm: Normal rate and regular rhythm.      Pulses: Normal pulses.      Heart sounds: Normal heart sounds. No murmur heard.    No gallop.   Pulmonary:      Effort: Pulmonary effort is normal. No respiratory distress.      Breath sounds: Normal breath sounds. No stridor. No wheezing, rhonchi or rales.   Musculoskeletal:      Cervical back: Normal range of motion and neck supple.   Skin:     General: Skin is warm and dry.   Neurological:      General: No focal deficit present.      Mental Status: She is alert and oriented to person, place, and time. " Mental status is at baseline.   Psychiatric:         Mood and Affect: Mood normal.         Thought Content: Thought content normal.         Judgment: Judgment normal.        Result Review :  The following data was reviewed by: ELEN Prasad on 04/17/2023:  Common labs        11/28/2022    23:00 1/10/2023    20:05 3/3/2023    09:43 3/3/2023    22:53   Common Labs   Glucose 111    149      BUN 17    10      Creatinine 0.56    0.50      Sodium 138    137      Potassium 4.0    4.5      Chloride 100    103      Calcium 9.8    9.7      Total Protein   7.0      Albumin   4.0      Total Bilirubin   0.3      Alkaline Phosphatase   83      AST (SGOT)   8      ALT (SGPT)   15      WBC  13.35   12.15      Hemoglobin  12.0   11.7      Hematocrit  35.9   37.1      Platelets  497   495      Total Cholesterol   153      Triglycerides   94      HDL Cholesterol   44      LDL Cholesterol    91      Hemoglobin A1C 7.10    7.40      Microalbumin, Urine    31.0                    Assessment and Plan   Diagnoses and all orders for this visit:    1. Class 3 severe obesity due to excess calories without serious comorbidity with body mass index (BMI) of 50.0 to 59.9 in adult (Primary)  Assessment & Plan:  Patient's (Body mass index is 54.22 kg/m².) indicates that they are morbidly/severely obese (BMI > 40 or > 35 with obesity - related health condition) with health conditions that include hypertension and diabetes mellitus . Weight is improving with treatment. BMI  is above average; BMI management plan is completed. We discussed portion control, increasing exercise and pharmacologic options including wegovy.     Orders:  -     Semaglutide-Weight Management (Wegovy) 1 MG/0.5ML solution auto-injector; Inject 0.5 mL under the skin into the appropriate area as directed 1 (One) Time Per Week.  Dispense: 3 mL; Refill: 0           Follow Up   Return in about 8 weeks (around 6/12/2023) for Recheck weight management/diabetes.  Patient was given  instructions and counseling regarding her condition or for health maintenance advice. Please see specific information pulled into the AVS if appropriate.

## 2023-05-04 ENCOUNTER — PATIENT MESSAGE (OUTPATIENT)
Dept: INTERNAL MEDICINE | Facility: CLINIC | Age: 38
End: 2023-05-04
Payer: COMMERCIAL

## 2023-05-04 DIAGNOSIS — I10 BENIGN ESSENTIAL HYPERTENSION: Chronic | ICD-10-CM

## 2023-05-04 DIAGNOSIS — E66.01 CLASS 3 SEVERE OBESITY DUE TO EXCESS CALORIES WITHOUT SERIOUS COMORBIDITY WITH BODY MASS INDEX (BMI) OF 50.0 TO 59.9 IN ADULT: Primary | ICD-10-CM

## 2023-05-04 RX ORDER — SEMAGLUTIDE 1.7 MG/.75ML
1.7 INJECTION, SOLUTION SUBCUTANEOUS WEEKLY
Qty: 3 ML | Refills: 0 | Status: SHIPPED | OUTPATIENT
Start: 2023-05-04

## 2023-05-04 NOTE — TELEPHONE ENCOUNTER
From: Liz Bagley  To: Lynette Brooke  Sent: 5/4/2023 12:58 PM EDT  Subject: Wegovy    Tal Ojeda,   I have one injection pen left of the 1.0 wegovy. I will take that on Monday. Can you send in the prescription for the 1.7 wegovy?     Thanks,   Liz Bagley

## 2023-05-05 RX ORDER — IRBESARTAN 75 MG/1
75 TABLET ORAL NIGHTLY
Qty: 90 TABLET | Refills: 0 | Status: SHIPPED | OUTPATIENT
Start: 2023-05-05

## 2023-06-12 ENCOUNTER — OFFICE VISIT (OUTPATIENT)
Dept: INTERNAL MEDICINE | Facility: CLINIC | Age: 38
End: 2023-06-12
Payer: COMMERCIAL

## 2023-06-12 VITALS
HEART RATE: 100 BPM | SYSTOLIC BLOOD PRESSURE: 115 MMHG | WEIGHT: 293 LBS | BODY MASS INDEX: 51.91 KG/M2 | HEIGHT: 63 IN | DIASTOLIC BLOOD PRESSURE: 80 MMHG | OXYGEN SATURATION: 96 %

## 2023-06-12 DIAGNOSIS — E66.01 CLASS 3 SEVERE OBESITY DUE TO EXCESS CALORIES WITHOUT SERIOUS COMORBIDITY WITH BODY MASS INDEX (BMI) OF 50.0 TO 59.9 IN ADULT: Chronic | ICD-10-CM

## 2023-06-12 DIAGNOSIS — E11.9 TYPE 2 DIABETES MELLITUS WITHOUT COMPLICATION, WITHOUT LONG-TERM CURRENT USE OF INSULIN: Primary | Chronic | ICD-10-CM

## 2023-06-12 DIAGNOSIS — E11.9 TYPE 2 DIABETES MELLITUS WITHOUT COMPLICATION, WITHOUT LONG-TERM CURRENT USE OF INSULIN: Chronic | ICD-10-CM

## 2023-06-12 LAB — HBA1C MFR BLD: 6.5 % (ref 4.8–5.6)

## 2023-06-12 NOTE — ASSESSMENT & PLAN NOTE
Diabetes is unchanged.   Continue current treatment regimen.  Dietary recommendations for ADA diet.  Regular aerobic exercise.  Discussed ways to avoid symptomatic hypoglycemia.  Discussed sick day management.  Discussed foot care.  Reminded to get yearly retinal exam.  Diabetes will be reassessed in 3 months.

## 2023-06-12 NOTE — ASSESSMENT & PLAN NOTE
Patient's (Body mass index is 52.1 kg/m².) indicates that they are morbidly/severely obese (BMI > 40 or > 35 with obesity - related health condition) with health conditions that include hypertension and diabetes mellitus . Weight is improving with treatment. BMI  is above average; BMI management plan is completed. We discussed portion control, increasing exercise, and pharmacologic options including wegovy .

## 2023-06-12 NOTE — PROGRESS NOTES
"Chief Complaint  Diabetes (Type 2 ) and Weight Management     Subjective        Liz Bagley presents to [facility]  History of Present Illness  This is a 38 y/o female presenting to office for f/u with weight management and diabetes. Patient reports she has not been able to exercise; she is going to hopefully start doing this in the next week or two. Patient reports she has noticed she is not eating as much. Patient reports she is planning on going on vacation in the next week. Patient overall has lost 20 pounds since initiating medication. Patient reports she is prioritizing protein in her diet. Denies any diarrhea or vomiting with medication.       Objective   Vital Signs:  /90 (BP Location: Left arm, Patient Position: Sitting, Cuff Size: Adult)   Pulse 100   Ht 160 cm (62.99\")   Wt 133 kg (294 lb)   SpO2 96%   BMI 52.10 kg/m²   Estimated body mass index is 52.1 kg/m² as calculated from the following:    Height as of this encounter: 160 cm (62.99\").    Weight as of this encounter: 133 kg (294 lb).               Physical Exam  Constitutional:       Appearance: Normal appearance. She is obese.   HENT:      Head: Normocephalic and atraumatic.      Right Ear: External ear normal.      Left Ear: External ear normal.   Eyes:      Conjunctiva/sclera: Conjunctivae normal.      Pupils: Pupils are equal, round, and reactive to light.   Cardiovascular:      Rate and Rhythm: Normal rate and regular rhythm.      Pulses: Normal pulses.      Heart sounds: Normal heart sounds. No murmur heard.    No friction rub. No gallop.   Pulmonary:      Effort: Pulmonary effort is normal. No respiratory distress.      Breath sounds: Normal breath sounds. No stridor. No wheezing, rhonchi or rales.   Musculoskeletal:      Cervical back: Normal range of motion and neck supple.   Skin:     General: Skin is warm and dry.   Neurological:      General: No focal deficit present.      Mental Status: She is " alert and oriented to person, place, and time. Mental status is at baseline.   Psychiatric:         Mood and Affect: Mood normal.         Thought Content: Thought content normal.         Judgment: Judgment normal.      Result Review :  The following data was reviewed by: ELEN Prasad on 06/12/2023:  Common labs          11/28/2022    23:00 1/10/2023    20:05 3/3/2023    09:43 3/3/2023    22:53   Common Labs   Glucose 111   149     BUN 17   10     Creatinine 0.56   0.50     Sodium 138   137     Potassium 4.0   4.5     Chloride 100   103     Calcium 9.8   9.7     Total Protein   7.0     Albumin   4.0     Total Bilirubin   0.3     Alkaline Phosphatase   83     AST (SGOT)   8     ALT (SGPT)   15     WBC  13.35  12.15     Hemoglobin  12.0  11.7     Hematocrit  35.9  37.1     Platelets  497  495     Total Cholesterol   153     Triglycerides   94     HDL Cholesterol   44     LDL Cholesterol    91     Hemoglobin A1C 7.10   7.40     Microalbumin, Urine    31.0                   Assessment and Plan   Diagnoses and all orders for this visit:    1. Type 2 diabetes mellitus without complication, without long-term current use of insulin (Primary)  Assessment & Plan:  Diabetes is unchanged.   Continue current treatment regimen.  Dietary recommendations for ADA diet.  Regular aerobic exercise.  Discussed ways to avoid symptomatic hypoglycemia.  Discussed sick day management.  Discussed foot care.  Reminded to get yearly retinal exam.  Diabetes will be reassessed in 3 months.    Orders:  -     Hemoglobin A1c; Future    2. Class 3 severe obesity due to excess calories without serious comorbidity with body mass index (BMI) of 50.0 to 59.9 in adult  Assessment & Plan:  Patient's (Body mass index is 52.1 kg/m².) indicates that they are morbidly/severely obese (BMI > 40 or > 35 with obesity - related health condition) with health conditions that include hypertension and diabetes mellitus . Weight is improving with treatment. BMI   is above average; BMI management plan is completed. We discussed portion control, increasing exercise, and pharmacologic options including wegovy .     Orders:  -     Semaglutide-Weight Management 2.4 MG/0.75ML solution auto-injector; Inject 2.4 mg under the skin into the appropriate area as directed 1 (One) Time Per Week for 90 days.  Dispense: 9 mL; Refill: 2             Follow Up   Return in about 8 weeks (around 8/7/2023) for Recheck weight management.  Patient was given instructions and counseling regarding her condition or for health maintenance advice. Please see specific information pulled into the AVS if appropriate.

## 2023-06-26 NOTE — ASSESSMENT & PLAN NOTE
CT UROGRAM W WO CONTRAST  Order: [id number]   Status: Final result      Visible to patient: Yes (seen)      Dx: Hematuria, gross      0 Result Notes     1 Follow-up Encounter  Details    Reading Physician Reading Date Result Priority   Nahid Sheridan MD  [phone] 6/25/2023      Narrative & Impression  CT UROGRAM W WO CONTRAST     DATE OF EXAM:  6/16/2023 1:57 PM.     IMPRESSION:  1. No evidence of genitourinary calculi or obstruction.  2. No enhancing renal mass or upper tract urothelial abnormality.  3. Hepatic steatosis.  4. Sigmoid diverticulosis, without acute inflammation.    Called and left message for patient that his CT results are back and to call our office to schedule a follow up appt.    Hypertension is improving.  Discussed goal of less than 130/80, continue DASH diet, increase exercise and decrease caffeine.  Continue irbesartan 75 mg daily and she will send me a MyChart message in about 2 weeks with her home blood pressure averages.  We will titrate dose if needed based on home readings.

## 2023-07-31 DIAGNOSIS — I10 BENIGN ESSENTIAL HYPERTENSION: Chronic | ICD-10-CM

## 2023-08-01 RX ORDER — IRBESARTAN 75 MG/1
75 TABLET ORAL NIGHTLY
Qty: 90 TABLET | Refills: 0 | Status: SHIPPED | OUTPATIENT
Start: 2023-08-01

## 2023-08-07 ENCOUNTER — OFFICE VISIT (OUTPATIENT)
Dept: INTERNAL MEDICINE | Facility: CLINIC | Age: 38
End: 2023-08-07
Payer: COMMERCIAL

## 2023-08-07 VITALS
BODY MASS INDEX: 49.61 KG/M2 | HEIGHT: 63 IN | SYSTOLIC BLOOD PRESSURE: 110 MMHG | OXYGEN SATURATION: 95 % | HEART RATE: 80 BPM | WEIGHT: 280 LBS | DIASTOLIC BLOOD PRESSURE: 70 MMHG

## 2023-08-07 DIAGNOSIS — E66.01 CLASS 3 SEVERE OBESITY DUE TO EXCESS CALORIES WITHOUT SERIOUS COMORBIDITY WITH BODY MASS INDEX (BMI) OF 45.0 TO 49.9 IN ADULT: Primary | ICD-10-CM

## 2023-08-07 DIAGNOSIS — L02.31 ABSCESS OF BUTTOCK, RIGHT: ICD-10-CM

## 2023-08-07 PROCEDURE — 99213 OFFICE O/P EST LOW 20 MIN: CPT | Performed by: NURSE PRACTITIONER

## 2023-08-07 NOTE — PROGRESS NOTES
"Chief Complaint  Weight Loss and Abscess    Subjective        Liz Bagley presents to [facility]  History of Present Illness  This is a 38 y/o female presenting to office for f/u with weight management. Continues on wegovy 2.4mg weekly injection; denies any n/v/d. Patient continues with regular exercise. Reports tolerating medication well. Continues with healthy diet choices.     Patient has recent right buttock abscess-- reports it has been draining. Patient did see  7/29/23; completing bactrim. Patient reports pain is controlled; reports her skin has been breaking down due to tape in that area.     Objective   Vital Signs:  /70 (BP Location: Left arm, Patient Position: Sitting, Cuff Size: Large Adult)   Pulse 80   Ht 160 cm (63\")   Wt 127 kg (280 lb)   SpO2 95%   BMI 49.60 kg/mý   Estimated body mass index is 49.6 kg/mý as calculated from the following:    Height as of this encounter: 160 cm (63\").    Weight as of this encounter: 127 kg (280 lb).               Physical Exam  Constitutional:       Appearance: Normal appearance. She is obese.   HENT:      Head: Normocephalic and atraumatic.      Right Ear: External ear normal.      Left Ear: External ear normal.   Eyes:      Conjunctiva/sclera: Conjunctivae normal.      Pupils: Pupils are equal, round, and reactive to light.   Pulmonary:      Effort: Pulmonary effort is normal.   Skin:     General: Skin is warm and dry.          Neurological:      General: No focal deficit present.      Mental Status: She is alert and oriented to person, place, and time. Mental status is at baseline.   Psychiatric:         Mood and Affect: Mood normal.         Thought Content: Thought content normal.         Judgment: Judgment normal.      Result Review :  The following data was reviewed by: ELEN Prasad on 08/07/2023:  Common labs          3/3/2023    09:43 3/3/2023    22:53 6/12/2023    09:09 7/7/2023    09:18   Common Labs "   Glucose 149       BUN 10       Creatinine 0.50       Sodium 137       Potassium 4.5       Chloride 103       Calcium 9.7       Total Protein 7.0       Albumin 4.0       Total Bilirubin 0.3       Alkaline Phosphatase 83       AST (SGOT) 8       ALT (SGPT) 15       WBC 12.15    13.25    Hemoglobin 11.7    12.3    Hematocrit 37.1    39.8    Platelets 495    526    Total Cholesterol 153       Triglycerides 94       HDL Cholesterol 44       LDL Cholesterol  91       Hemoglobin A1C 7.40   6.50     Microalbumin, Urine  31.0                     Assessment and Plan   Diagnoses and all orders for this visit:    1. Class 3 severe obesity due to excess calories without serious comorbidity with body mass index (BMI) of 45.0 to 49.9 in adult (Primary)  Assessment & Plan:  Patient's (Body mass index is 49.6 kg/mý.) indicates that they are morbidly/severely obese (BMI > 40 or > 35 with obesity - related health condition) with health conditions that include hypertension, diabetes mellitus, and dyslipidemias . Weight is improving with treatment. BMI  is above average; BMI management plan is completed. We discussed portion control, increasing exercise, pharmacologic options including wegovy, and an glenn-based approach such as [employer] Pal or Lose It.   Continues on 2.4mg wegovy injection weekly  Recommended 140-150 grams of protein daily  Continue with 150-300 minutes weekly exercise as tolerated        2. Abscess of buttock, right  Assessment & Plan:  Urgent referral to gen sx  Recommended to finish bactrim  Can use ABD pad and mesh painties to avoid using tape to area  Continue to cleanse area with saline      Orders:  -     Ambulatory Referral to General Surgery             Follow Up   Return in about 8 weeks (around 10/2/2023) for Recheck weight management.  Patient was given instructions and counseling regarding her condition or for health maintenance advice. Please see specific information pulled into the AVS if appropriate.

## 2023-08-07 NOTE — ASSESSMENT & PLAN NOTE
Patient's (Body mass index is 49.6 kg/mý.) indicates that they are morbidly/severely obese (BMI > 40 or > 35 with obesity - related health condition) with health conditions that include hypertension, diabetes mellitus, and dyslipidemias . Weight is improving with treatment. BMI  is above average; BMI management plan is completed. We discussed portion control, increasing exercise, pharmacologic options including wegovy, and an glenn-based approach such as [employer] Pal or Lose It.   Continues on 2.4mg wegovy injection weekly  Recommended 140-150 grams of protein daily  Continue with 150-300 minutes weekly exercise as tolerated

## 2023-08-07 NOTE — ASSESSMENT & PLAN NOTE
Urgent referral to gen sx  Recommended to finish bactrim  Can use ABD pad and mesh painties to avoid using tape to area  Continue to cleanse area with saline

## 2023-08-15 ENCOUNTER — OFFICE VISIT (OUTPATIENT)
Dept: SURGERY | Facility: CLINIC | Age: 38
End: 2023-08-15
Payer: COMMERCIAL

## 2023-08-15 VITALS
DIASTOLIC BLOOD PRESSURE: 78 MMHG | BODY MASS INDEX: 49.79 KG/M2 | SYSTOLIC BLOOD PRESSURE: 108 MMHG | HEIGHT: 63 IN | WEIGHT: 281 LBS

## 2023-08-15 DIAGNOSIS — L02.31 ABSCESS, GLUTEAL, RIGHT: Primary | ICD-10-CM

## 2023-08-15 PROCEDURE — 99203 OFFICE O/P NEW LOW 30 MIN: CPT | Performed by: SURGERY

## 2023-08-15 NOTE — PROGRESS NOTES
General Surgery Initial Office Visit    Referring Provider: ELEN Prasad    Chief Complaint:    Right gluteal abscess    History of Present Illness:    Liz Bagley is a 37 y.o. female with history of ulcerative colitis who has undergone a total colectomy with end ileostomy, presents with an abscess of the right gluteus.  She reports first noticing it about a month ago, it then opened and drained 2 weeks ago and has continued to drain for the last 2 weeks.  She has been placing a dressing on the wound and changing it daily, but it continues to have drainage.  She denies fevers or chills.  She has not had any previous problems with perirectal abscesses or fistulas prior to undergoing colectomy.  She is not on any medications for ulcerative colitis at this time.    Past Medical History:   Past Medical History:   Diagnosis Date    Abnormal uterine bleeding     Abnormal uterine bleeding (AUB) 05/2020    Allergic rhinitis     Anemia     Anxiety     Arm pain, left     CHRONIC    Arthritis     Asthma due to environmental allergies 04/02/2018    Chronic fatigue 08/04/2017    Colon polyps     COVID-19 virus infection 12/09/2020    Crohn's disease     Depression     Eczema     LEG, ABD, HIPS    H/O transfusion of packed red blood cells     1 UNIT, NO REACTIONS    Headache 06/20/2015    CT SCAN OF BRAIN NEGATIVE, SEEN AT [facility]    Headache syndrome 06/25/2017    SPINAL PERFORMED, SEEN AT [facility]    Hearing loss of left ear 02/06/2017    History of steroid therapy     LONG TERM USE    Hypertension     Hypovitaminosis D 10/10/2017    IBS (irritable bowel syndrome)     Ileostomy present     Insomnia     Laceration of left thumb 10/06/2020    SEEN AT [facility]    Leukocytosis 11/08/2018    Menorrhagia with regular cycle 07/2020    Metatarsalgia of right foot 03/11/2019    Migraines     Myopia     BILATERAL    Neuropathy 02/28/2019    Non-neoplastic nevus of skin     Obesity     Pancolitis     Peroneal tendonitis of right  lower extremity 05/01/2019    Plantar fasciitis, left 07/2020    Poor iron absorption 11/08/2018    Recurrent sinus infections     Rotavirus infection 05/06/2019    SEEN AT [facility]    Spinal headache 06/28/2015    SEEN AT [facility]    Tattoos     Ulcerative colitis     Vitamin B 12 deficiency         Past Surgical History:    Appendectomy 2014 with Dr. Joseph  Total proctocolectomy with end ileostomy 2017 with Dr. Evans  Lumbar puncture followed by epidural blood patch 2015  No surgery  Ankle surgery  Tonsillectomy  Forearm surgery  Endometrial ablation    Family History:    Family History   Problem Relation Age of Onset    Heart disease Mother     Hypertension Mother     Depression Mother     Diabetes Mother     Mental illness Mother     Hypertension Father     Diabetes Father     Skin cancer Father     Cancer Father     Hypertension Brother     Heart disease Maternal Grandmother     Heart attack Maternal Grandmother     Diabetes Maternal Grandmother     Hypertension Maternal Grandmother     Stroke Maternal Grandmother     Hypertension Brother     Diabetes Maternal Grandfather     Heart disease Maternal Grandfather     Malig Hyperthermia Neg Hx        Social History:    Single  Denies tobacco use  Occasional alcohol use    Allergies:   No Known Allergies    Medications:     Current Outpatient Medications:     betamethasone, augmented, (DIPROLENE) 0.05 % ointment, Apply to affected area twice a day for 2 weeks, Disp: 50 g, Rfl: 11    Brexpiprazole (Rexulti) 0.25 MG tablet, take 1 tablet by mouth daily, Disp: 90 tablet, Rfl: 0    galcanezumab-gnlm (Emgality) 120 MG/ML auto-injector pen, Inject 1 mL under the skin into the appropriate area as directed Every 30 (Thirty) Days., Disp: 1 mL, Rfl: 5    irbesartan (Avapro) 75 MG tablet, Take 1 tablet by mouth Every Night., Disp: 90 tablet, Rfl: 0    labetalol (NORMODYNE) 200 MG tablet, Take 1 tablet by mouth Every 12 (Twelve) Hours., Disp: 180 tablet, Rfl: 1    mupirocin  (BACTROBAN) 2 % ointment, Apply 1 application  topically to the appropriate area as directed 3 (Three) Times a Day., Disp: 22 g, Rfl: 0    neomycin-polymyxin-dexamethasone (Maxitrol) 0.1 % ophthalmic suspension, Instill 1 drop in both eyes twice a day, Disp: 10 mL, Rfl: 0    ondansetron (ZOFRAN) 4 MG tablet, Take 1 tablet  by mouth 1 to 2 times per day as needed nausea and vomiting, Disp: 30 tablet, Rfl: 1    Semaglutide-Weight Management 2.4 MG/0.75ML solution auto-injector, Inject 2.4 mg under the skin into the appropriate area as directed 1 (One) Time Per Week., Disp: 9 mL, Rfl: 2    traZODone (DESYREL) 50 MG tablet, Take 1 tablet by mouth Daily., Disp: 90 tablet, Rfl: 0    Vortioxetine HBr (Trintellix) 20 MG tablet, Take 1 tablet by mouth Daily., Disp: 90 tablet, Rfl: 0      Labs:    Labs from 7/7/2023 reviewed.  Leukocytosis of 13.2, although it appears this is near her baseline.  She fluctuates between 12 and 14.    Review of Systems:   Influenza-like illness: no fever, no  cough, no  sore throat, no  body aches, no loss of sense of taste or smell, no known exposure to person with Covid-19.  Constitutional: Negative for fevers or chills  HENT: Negative for hearing loss or runny nose  Eyes: Negative for vision changes or scleral icterus  Respiratory: Negative for cough or shortness of breath  Cardiovascular: Negative for chest pain or heart palpitations  Gastrointestinal:  Negative for abdominal pain, nausea, vomiting, constipation, melena, or hematochezia  Genitourinary: Negative for hematuria or dysuria  Musculoskeletal: Negative for joint swelling or gait instability  Neurologic: Negative for tremors or seizures  Psychiatric: Negative for suicidal ideations or depression  All other systems reviewed and negative    Physical Exam:   Body mass index is 49.79 kg/mý.  Constitutional: Well-developed well-nourished, no acute distress  Eyes: Conjunctiva normal, sclera nonicteric  ENMT: Hearing grossly normal, oral  mucosa moist  Neck: Supple, trachea midline  Respiratory: No increased work of breathing, normal inspiratory effort  Cardiovascular: Regular rate, no peripheral edema, no jugular venous distention  Gastrointestinal: Soft, nontender  Skin:  Warm, dry, no rash on visualized skin surfaces, on the right mid gluteus there is a wound measuring approximately 1 cm in diameter with 1 cm of undermining inferiorly.  There is fibrinous material at the base of the wound but no surrounding erythema or induration  Musculoskeletal: Symmetric strength, normal gait  Psychiatric: Alert and oriented x3, normal affect     Assessment/Plan:  1.  Right gluteal abscess with chronic wound  I cleaned and packed the wound with gauze where it undermines.  She does not have anyone at home who can help her changes packing daily, so I will have her return for dressing change tomorrow and again later this week.  If this does not improve the appearance of the wound, I explained to her that we may need to excise the overlying skin so that she can care for the wound.  If this is needed, I would plan to do it in the operating room due to this being a sensitive area.  No antibiotics needed based on wound appearance.      MARY JO BECKER M.D.  General, Robotic, and Endoscopic Surgery  [facility]    [address], [address]  [place], [place], [address]  P: [phone]  F: [phone]

## 2023-08-16 ENCOUNTER — OFFICE VISIT (OUTPATIENT)
Dept: SURGERY | Facility: CLINIC | Age: 38
End: 2023-08-16
Payer: COMMERCIAL

## 2023-08-16 ENCOUNTER — PREP FOR SURGERY (OUTPATIENT)
Dept: OTHER | Facility: HOSPITAL | Age: 38
End: 2023-08-16
Payer: COMMERCIAL

## 2023-08-16 VITALS
SYSTOLIC BLOOD PRESSURE: 120 MMHG | DIASTOLIC BLOOD PRESSURE: 74 MMHG | WEIGHT: 281 LBS | HEIGHT: 63 IN | BODY MASS INDEX: 49.79 KG/M2

## 2023-08-16 DIAGNOSIS — S31.819A BUTTOCK WOUND, RIGHT, INITIAL ENCOUNTER: Primary | ICD-10-CM

## 2023-08-16 DIAGNOSIS — L02.31 ABSCESS, GLUTEAL, RIGHT: Primary | ICD-10-CM

## 2023-08-16 PROCEDURE — 99212 OFFICE O/P EST SF 10 MIN: CPT | Performed by: SURGERY

## 2023-08-16 RX ORDER — SODIUM CHLORIDE 0.9 % (FLUSH) 0.9 %
10 SYRINGE (ML) INJECTION AS NEEDED
Status: CANCELLED | OUTPATIENT
Start: 2023-08-21

## 2023-08-16 RX ORDER — SODIUM CHLORIDE 9 MG/ML
40 INJECTION, SOLUTION INTRAVENOUS AS NEEDED
Status: CANCELLED | OUTPATIENT
Start: 2023-08-21

## 2023-08-16 RX ORDER — SODIUM CHLORIDE 0.9 % (FLUSH) 0.9 %
10 SYRINGE (ML) INJECTION EVERY 12 HOURS SCHEDULED
Status: CANCELLED | OUTPATIENT
Start: 2023-08-21

## 2023-08-16 RX ORDER — CEFAZOLIN SODIUM IN 0.9 % NACL 3 G/100 ML
3000 INTRAVENOUS SOLUTION, PIGGYBACK (ML) INTRAVENOUS ONCE
Status: CANCELLED | OUTPATIENT
Start: 2023-08-21 | End: 2023-08-16

## 2023-08-16 NOTE — PROGRESS NOTES
General Surgery Follow Up Note     CC:   Chronic right gluteal wound    History of Present Illness:    Returns today after wound was packed yesterday. States packing fell out yesterday afternoon and has had moderate amount of drainage since then.  She unfortunately does not have anyone who can help her pack this daily.    Physical Exam:   Constitutional: Well-developed well-nourished, no acute distress  Eyes: Conjunctiva normal, sclera nonicteric  ENMT: Hearing grossly normal, oral mucosa moist  Respiratory: No increased work of breathing, normal inspiratory effort  Cardiovascular: Regular rate, no peripheral edema, no jugular venous distention  Gastrointestinal: Soft, nontender  : 1 cm right buttock wound with serofibrinous drainage, no surrounding skin erythema, undermines laterally about 1 cm  Skin:  Warm, dry, no rash on visualized skin surfaces  Musculoskeletal: Symmetric strength, normal gait  Psychiatric: Alert and oriented x3, normal affect       Assessment/Plan:  Chronic draining wound of right buttock  - in order to aid in wound care and healing of the wound, I recommended proceeding with debridement of the wound in the OR under MAC. Risks and rationale were discussed with her.  All questions were answered and she is willing to proceed with the above recommendations.   - OR Monday for debridement.      Bobbi Valverde M.D.  General, Robotic and Endoscopic Surgery  [facility]    [address], [address]  [place], [place], [address]  P: [phone]  F: [phone]

## 2023-08-16 NOTE — H&P (VIEW-ONLY)
General Surgery Follow Up Note     CC:   Chronic right gluteal wound    History of Present Illness:    Returns today after wound was packed yesterday. States packing fell out yesterday afternoon and has had moderate amount of drainage since then.  She unfortunately does not have anyone who can help her pack this daily.    Physical Exam:   Constitutional: Well-developed well-nourished, no acute distress  Eyes: Conjunctiva normal, sclera nonicteric  ENMT: Hearing grossly normal, oral mucosa moist  Respiratory: No increased work of breathing, normal inspiratory effort  Cardiovascular: Regular rate, no peripheral edema, no jugular venous distention  Gastrointestinal: Soft, nontender  : 1 cm right buttock wound with serofibrinous drainage, no surrounding skin erythema, undermines laterally about 1 cm  Skin:  Warm, dry, no rash on visualized skin surfaces  Musculoskeletal: Symmetric strength, normal gait  Psychiatric: Alert and oriented x3, normal affect       Assessment/Plan:  Chronic draining wound of right buttock  - in order to aid in wound care and healing of the wound, I recommended proceeding with debridement of the wound in the OR under MAC. Risks and rationale were discussed with her.  All questions were answered and she is willing to proceed with the above recommendations.   - OR Monday for debridement.      Bobbi Valverde M.D.  General, Robotic and Endoscopic Surgery  [facility]    [address], [address]  [place], [place], [address]  P: [phone]  F: [phone]

## 2023-08-17 ENCOUNTER — PRE-ADMISSION TESTING (OUTPATIENT)
Dept: PREADMISSION TESTING | Facility: HOSPITAL | Age: 38
End: 2023-08-17
Payer: COMMERCIAL

## 2023-08-17 VITALS
SYSTOLIC BLOOD PRESSURE: 138 MMHG | DIASTOLIC BLOOD PRESSURE: 78 MMHG | OXYGEN SATURATION: 99 % | RESPIRATION RATE: 20 BRPM | HEIGHT: 63 IN | TEMPERATURE: 97.9 F | HEART RATE: 74 BPM | BODY MASS INDEX: 50.14 KG/M2 | WEIGHT: 283 LBS

## 2023-08-17 LAB
ANION GAP SERPL CALCULATED.3IONS-SCNC: 9 MMOL/L (ref 5–15)
BUN SERPL-MCNC: 13 MG/DL (ref 6–20)
BUN/CREAT SERPL: 24.1 (ref 7–25)
CALCIUM SPEC-SCNC: 9.4 MG/DL (ref 8.6–10.5)
CHLORIDE SERPL-SCNC: 105 MMOL/L (ref 98–107)
CO2 SERPL-SCNC: 25 MMOL/L (ref 22–29)
CREAT SERPL-MCNC: 0.54 MG/DL (ref 0.57–1)
DEPRECATED RDW RBC AUTO: 41.1 FL (ref 37–54)
EGFRCR SERPLBLD CKD-EPI 2021: 120.3 ML/MIN/1.73
ERYTHROCYTE [DISTWIDTH] IN BLOOD BY AUTOMATED COUNT: 13.6 % (ref 12.3–15.4)
GLUCOSE SERPL-MCNC: 104 MG/DL (ref 65–99)
HCG SERPL QL: NEGATIVE
HCT VFR BLD AUTO: 34.9 % (ref 34–46.6)
HGB BLD-MCNC: 11.4 G/DL (ref 12–15.9)
MCH RBC QN AUTO: 27.2 PG (ref 26.6–33)
MCHC RBC AUTO-ENTMCNC: 32.7 G/DL (ref 31.5–35.7)
MCV RBC AUTO: 83.3 FL (ref 79–97)
PLATELET # BLD AUTO: 541 10*3/MM3 (ref 140–450)
PMV BLD AUTO: 9.3 FL (ref 6–12)
POTASSIUM SERPL-SCNC: 4.3 MMOL/L (ref 3.5–5.2)
RBC # BLD AUTO: 4.19 10*6/MM3 (ref 3.77–5.28)
SODIUM SERPL-SCNC: 139 MMOL/L (ref 136–145)
WBC NRBC COR # BLD: 13.65 10*3/MM3 (ref 3.4–10.8)

## 2023-08-17 PROCEDURE — 36415 COLL VENOUS BLD VENIPUNCTURE: CPT

## 2023-08-17 PROCEDURE — 85027 COMPLETE CBC AUTOMATED: CPT

## 2023-08-17 PROCEDURE — 84703 CHORIONIC GONADOTROPIN ASSAY: CPT

## 2023-08-17 PROCEDURE — 80048 BASIC METABOLIC PNL TOTAL CA: CPT

## 2023-08-17 RX ORDER — CHLORHEXIDINE GLUCONATE 500 MG/1
CLOTH TOPICAL
COMMUNITY
End: 2023-08-25

## 2023-08-17 NOTE — DISCHARGE INSTRUCTIONS
Take the following medications the morning of surgery:    Labetalol   rexulti    DO NOT TAKE IRBESARTAN NIGHT BEFORE SURGERY    If you are on prescription narcotic pain medication to control your pain you may also take that medication the morning of surgery.    General Instructions:  Do not eat solid food after midnight the night before surgery.  You may drink clear liquids day of surgery but must stop at least one hour before your hospital arrival time.  It is beneficial for you to have a clear drink that contains carbohydrates the day of surgery.  We suggest a 12 to 20 ounce bottle of Gatorade or Powerade for non-diabetic patients or a 12 to 20 ounce bottle of G2 or Powerade Zero for diabetic patients. (Pediatric patients, are not advised to drink a 12 to 20 ounce carbohydrate drink)    Clear liquids are liquids you can see through.  Nothing red in color.     Plain water                               Sports drinks  Sodas                                   Gelatin (Jell-O)  Fruit juices without pulp such as white grape juice and apple juice  Popsicles that contain no fruit or yogurt  Tea or coffee (no cream or milk added)  Gatorade / Powerade  G2 / Powerade Zero    Infants may have breast milk up to four hours before surgery.  Infants drinking formula may drink formula up to six hours before surgery.   Patients who avoid smoking, chewing tobacco and alcohol for 4 weeks prior to surgery have a reduced risk of post-operative complications.  Quit smoking as many days before surgery as you can.  Do not smoke, use chewing tobacco or drink alcohol the day of surgery.   If applicable bring your C-PAP/ BI-PAP machine in with you to preop day of surgery.  Bring any papers given to you in the doctor's office.  Wear clean comfortable clothes.  Do not wear contact lenses, false eyelashes or make-up.  Bring a case for your glasses.   Bring crutches or walker if applicable.  Remove all piercings.  Leave jewelry and any other  valuables at home.  Hair extensions with metal clips must be removed prior to surgery.  The Pre-Admission Testing nurse will instruct you to bring medications if unable to obtain an accurate list in Pre-Admission Testing.        If you were given a blood bank ID arm band remember to bring it with you the day of surgery.    Preventing a Surgical Site Infection:  For 2 to 3 days before surgery, avoid shaving with a razor because the razor can irritate skin and make it easier to develop an infection.    Any areas of open skin can increase the risk of a post-operative wound infection by allowing bacteria to enter and travel throughout the body.  Notify your surgeon if you have any skin wounds / rashes even if it is not near the expected surgical site.  The area will need assessed to determine if surgery should be delayed until it is healed.  The night prior to surgery shower using a fresh bar of anti-bacterial soap (such as Dial) and clean washcloth.  Sleep in a clean bed with clean clothing.  Do not allow pets to sleep with you.  Shower on the morning of surgery using a fresh bar of anti-bacterial soap (such as Dial) and clean washcloth.  Dry with a clean towel and dress in clean clothing.  Ask your surgeon if you will be receiving antibiotics prior to surgery.  Make sure you, your family, and all healthcare providers clean their hands with soap and water or an alcohol based hand  before caring for you or your wound.    Day of surgery:8/21/2023   1100  Your arrival time is approximately two hours before your scheduled surgery time.  Upon arrival, a Pre-op nurse and Anesthesiologist will review your health history, obtain vital signs, and answer questions you may have.  The only belongings needed at this time will be a list of your home medications and if applicable your C-PAP/BI-PAP machine.  A Pre-op nurse will start an IV and you may receive medication in preparation for surgery, including something to help  you relax.     Please be aware that surgery does come with discomfort.  We want to make every effort to control your discomfort so please discuss any uncontrolled symptoms with your nurse.   Your doctor will most likely have prescribed pain medications.      If you are going home after surgery you will receive individualized written care instructions before being discharged.  A responsible adult must drive you to and from the hospital on the day of your surgery and stay with you for 24 hours.  Discharge prescriptions can be filled by the hospital pharmacy during regular pharmacy hours.  If you are having surgery late in the day/evening your prescription may be e-prescribed to your pharmacy.  Please verify your pharmacy hours or chose a 24 hour pharmacy to avoid not having access to your prescription because your pharmacy has closed for the day.    If you are staying overnight following surgery, you will be transported to your hospital room following the recovery period.  [facility] has all private rooms.    If you have any questions please call Pre-Admission Testing at [phone].  Deductibles and co-payments are collected on the day of service. Please be prepared to pay the required co-pay, deductible or deposit on the day of service as defined by your plan.    Call your surgeon immediately if you experience any of the following symptoms:  Sore Throat  Shortness of Breath or difficulty breathing  Cough  Chills  Body soreness or muscle pain  Headache  Fever  New loss of taste or smell  Do not arrive for your surgery ill.  Your procedure will need to be rescheduled to another time.  You will need to call your physician before the day of surgery to avoid any unnecessary exposure to hospital staff as well as other patients.  CHLORHEXIDINE CLOTH INSTRUCTIONS  The morning of surgery follow these instructions using the Chlorhexidine cloths you've been given.  These steps reduce bacteria on the body.  Do  not use the cloths near your eyes, ears mouth, genitalia or on open wounds.  Throw the cloths away after use but do not try to flush them down a toilet.      Open and remove one cloth at a time from the package.    Leave the cloth unfolded and begin the bathing.  Massage the skin with the cloths using gentle pressure to remove bacteria.  Do not scrub harshly.   Follow the steps below with one 2% CHG cloth per area (6 total cloths).  One cloth for neck, shoulders and chest.  One cloth for both arms, hands, fingers and underarms (do underarms last).  One cloth for the abdomen followed by groin.  One cloth for right leg and foot including between the toes.  One cloth for left leg and foot including between the toes.  The last cloth is to be used for the back of the neck, back and buttocks.    Allow the CHG to air dry 3 minutes on the skin which will give it time to work and decrease the chance of irritation.  The skin may feel sticky until it is dry.  Do not rinse with water or any other liquid or you will lose the beneficial effects of the CHG.  If mild skin irritation occurs, do rinse the skin to remove the CHG.  Report this to the nurse at time of admission.  Do not apply lotions, creams, ointments, deodorants or perfumes after using the clothes. Dress in clean clothes before coming to the hospital.

## 2023-08-21 ENCOUNTER — ANESTHESIA (OUTPATIENT)
Dept: PERIOP | Facility: HOSPITAL | Age: 38
End: 2023-08-21
Payer: COMMERCIAL

## 2023-08-21 ENCOUNTER — ANESTHESIA EVENT (OUTPATIENT)
Dept: PERIOP | Facility: HOSPITAL | Age: 38
End: 2023-08-21
Payer: COMMERCIAL

## 2023-08-21 ENCOUNTER — HOSPITAL ENCOUNTER (OUTPATIENT)
Facility: HOSPITAL | Age: 38
Setting detail: HOSPITAL OUTPATIENT SURGERY
Discharge: HOME | End: 2023-08-21
Attending: SURGERY | Admitting: SURGERY
Payer: COMMERCIAL

## 2023-08-21 VITALS
HEART RATE: 61 BPM | OXYGEN SATURATION: 98 % | SYSTOLIC BLOOD PRESSURE: 140 MMHG | TEMPERATURE: 97.8 F | RESPIRATION RATE: 16 BRPM | DIASTOLIC BLOOD PRESSURE: 82 MMHG

## 2023-08-21 DIAGNOSIS — S31.819A BUTTOCK WOUND, RIGHT, INITIAL ENCOUNTER: ICD-10-CM

## 2023-08-21 LAB — GLUCOSE BLDC GLUCOMTR-MCNC: 112 MG/DL (ref 70–130)

## 2023-08-21 PROCEDURE — 88304 TISSUE EXAM BY PATHOLOGIST: CPT | Performed by: SURGERY

## 2023-08-21 PROCEDURE — 82948 REAGENT STRIP/BLOOD GLUCOSE: CPT

## 2023-08-21 PROCEDURE — 25010000002 ONDANSETRON PER 1 MG: Performed by: ANESTHESIOLOGY

## 2023-08-21 PROCEDURE — 25010000002 CEFAZOLIN PER 500 MG: Performed by: SURGERY

## 2023-08-21 PROCEDURE — 11042 DBRDMT SUBQ TIS 1ST 20SQCM/<: CPT | Performed by: SURGERY

## 2023-08-21 PROCEDURE — 88312 SPECIAL STAINS GROUP 1: CPT | Performed by: SURGERY

## 2023-08-21 PROCEDURE — 25010000002 PROPOFOL 10 MG/ML EMULSION: Performed by: NURSE ANESTHETIST, CERTIFIED REGISTERED

## 2023-08-21 RX ORDER — NALOXONE HCL 0.4 MG/ML
0.2 VIAL (ML) INJECTION AS NEEDED
Status: DISCONTINUED | OUTPATIENT
Start: 2023-08-21 | End: 2023-08-21 | Stop reason: HOSPADM

## 2023-08-21 RX ORDER — SODIUM CHLORIDE 0.9 % (FLUSH) 0.9 %
10 SYRINGE (ML) INJECTION AS NEEDED
Status: DISCONTINUED | OUTPATIENT
Start: 2023-08-21 | End: 2023-08-21 | Stop reason: HOSPADM

## 2023-08-21 RX ORDER — SODIUM CHLORIDE 9 MG/ML
40 INJECTION, SOLUTION INTRAVENOUS AS NEEDED
Status: DISCONTINUED | OUTPATIENT
Start: 2023-08-21 | End: 2023-08-21 | Stop reason: HOSPADM

## 2023-08-21 RX ORDER — LIDOCAINE HYDROCHLORIDE 10 MG/ML
0.5 INJECTION, SOLUTION INFILTRATION; PERINEURAL ONCE AS NEEDED
Status: DISCONTINUED | OUTPATIENT
Start: 2023-08-21 | End: 2023-08-21 | Stop reason: HOSPADM

## 2023-08-21 RX ORDER — DROPERIDOL 2.5 MG/ML
0.62 INJECTION, SOLUTION INTRAMUSCULAR; INTRAVENOUS
Status: DISCONTINUED | OUTPATIENT
Start: 2023-08-21 | End: 2023-08-21 | Stop reason: HOSPADM

## 2023-08-21 RX ORDER — FAMOTIDINE 10 MG/ML
20 INJECTION, SOLUTION INTRAVENOUS ONCE
Status: COMPLETED | OUTPATIENT
Start: 2023-08-21 | End: 2023-08-21

## 2023-08-21 RX ORDER — ONDANSETRON 2 MG/ML
4 INJECTION INTRAMUSCULAR; INTRAVENOUS EVERY 6 HOURS PRN
Status: DISCONTINUED | OUTPATIENT
Start: 2023-08-21 | End: 2023-08-21 | Stop reason: HOSPADM

## 2023-08-21 RX ORDER — SODIUM CHLORIDE, SODIUM LACTATE, POTASSIUM CHLORIDE, CALCIUM CHLORIDE 600; 310; 30; 20 MG/100ML; MG/100ML; MG/100ML; MG/100ML
9 INJECTION, SOLUTION INTRAVENOUS CONTINUOUS
Status: DISCONTINUED | OUTPATIENT
Start: 2023-08-21 | End: 2023-08-21 | Stop reason: HOSPADM

## 2023-08-21 RX ORDER — PROMETHAZINE HYDROCHLORIDE 25 MG/1
25 TABLET ORAL ONCE AS NEEDED
Status: DISCONTINUED | OUTPATIENT
Start: 2023-08-21 | End: 2023-08-21 | Stop reason: HOSPADM

## 2023-08-21 RX ORDER — HYDROMORPHONE HYDROCHLORIDE 1 MG/ML
0.5 INJECTION, SOLUTION INTRAMUSCULAR; INTRAVENOUS; SUBCUTANEOUS
Status: DISCONTINUED | OUTPATIENT
Start: 2023-08-21 | End: 2023-08-21 | Stop reason: HOSPADM

## 2023-08-21 RX ORDER — OXYCODONE AND ACETAMINOPHEN 7.5; 325 MG/1; MG/1
1 TABLET ORAL EVERY 4 HOURS PRN
Status: DISCONTINUED | OUTPATIENT
Start: 2023-08-21 | End: 2023-08-21 | Stop reason: HOSPADM

## 2023-08-21 RX ORDER — PROMETHAZINE HYDROCHLORIDE 25 MG/1
25 SUPPOSITORY RECTAL ONCE AS NEEDED
Status: DISCONTINUED | OUTPATIENT
Start: 2023-08-21 | End: 2023-08-21 | Stop reason: HOSPADM

## 2023-08-21 RX ORDER — SODIUM CHLORIDE 0.9 % (FLUSH) 0.9 %
10 SYRINGE (ML) INJECTION EVERY 12 HOURS SCHEDULED
Status: DISCONTINUED | OUTPATIENT
Start: 2023-08-21 | End: 2023-08-21 | Stop reason: HOSPADM

## 2023-08-21 RX ORDER — DIPHENHYDRAMINE HYDROCHLORIDE 50 MG/ML
12.5 INJECTION INTRAMUSCULAR; INTRAVENOUS
Status: DISCONTINUED | OUTPATIENT
Start: 2023-08-21 | End: 2023-08-21 | Stop reason: HOSPADM

## 2023-08-21 RX ORDER — FLUMAZENIL 0.1 MG/ML
0.2 INJECTION INTRAVENOUS AS NEEDED
Status: DISCONTINUED | OUTPATIENT
Start: 2023-08-21 | End: 2023-08-21 | Stop reason: HOSPADM

## 2023-08-21 RX ORDER — CEFAZOLIN SODIUM IN 0.9 % NACL 3 G/100 ML
3000 INTRAVENOUS SOLUTION, PIGGYBACK (ML) INTRAVENOUS ONCE
Status: COMPLETED | OUTPATIENT
Start: 2023-08-21 | End: 2023-08-21

## 2023-08-21 RX ORDER — HYDROCODONE BITARTRATE AND ACETAMINOPHEN 7.5; 325 MG/1; MG/1
1 TABLET ORAL ONCE AS NEEDED
Status: DISCONTINUED | OUTPATIENT
Start: 2023-08-21 | End: 2023-08-21 | Stop reason: HOSPADM

## 2023-08-21 RX ORDER — PROPOFOL 10 MG/ML
VIAL (ML) INTRAVENOUS CONTINUOUS PRN
Status: DISCONTINUED | OUTPATIENT
Start: 2023-08-21 | End: 2023-08-21 | Stop reason: SURG

## 2023-08-21 RX ORDER — LABETALOL HYDROCHLORIDE 5 MG/ML
5 INJECTION, SOLUTION INTRAVENOUS
Status: DISCONTINUED | OUTPATIENT
Start: 2023-08-21 | End: 2023-08-21 | Stop reason: HOSPADM

## 2023-08-21 RX ORDER — ONDANSETRON 2 MG/ML
4 INJECTION INTRAMUSCULAR; INTRAVENOUS ONCE AS NEEDED
Status: DISCONTINUED | OUTPATIENT
Start: 2023-08-21 | End: 2023-08-21 | Stop reason: HOSPADM

## 2023-08-21 RX ORDER — EPHEDRINE SULFATE 50 MG/ML
5 INJECTION, SOLUTION INTRAVENOUS ONCE AS NEEDED
Status: DISCONTINUED | OUTPATIENT
Start: 2023-08-21 | End: 2023-08-21 | Stop reason: HOSPADM

## 2023-08-21 RX ORDER — PROPOFOL 10 MG/ML
VIAL (ML) INTRAVENOUS AS NEEDED
Status: DISCONTINUED | OUTPATIENT
Start: 2023-08-21 | End: 2023-08-21 | Stop reason: SURG

## 2023-08-21 RX ORDER — HYDRALAZINE HYDROCHLORIDE 20 MG/ML
5 INJECTION INTRAMUSCULAR; INTRAVENOUS
Status: DISCONTINUED | OUTPATIENT
Start: 2023-08-21 | End: 2023-08-21 | Stop reason: HOSPADM

## 2023-08-21 RX ORDER — LIDOCAINE HYDROCHLORIDE 20 MG/ML
INJECTION, SOLUTION INFILTRATION; PERINEURAL AS NEEDED
Status: DISCONTINUED | OUTPATIENT
Start: 2023-08-21 | End: 2023-08-21 | Stop reason: SURG

## 2023-08-21 RX ORDER — FENTANYL CITRATE 50 UG/ML
50 INJECTION, SOLUTION INTRAMUSCULAR; INTRAVENOUS
Status: DISCONTINUED | OUTPATIENT
Start: 2023-08-21 | End: 2023-08-21 | Stop reason: HOSPADM

## 2023-08-21 RX ORDER — IPRATROPIUM BROMIDE AND ALBUTEROL SULFATE 2.5; .5 MG/3ML; MG/3ML
3 SOLUTION RESPIRATORY (INHALATION) ONCE AS NEEDED
Status: DISCONTINUED | OUTPATIENT
Start: 2023-08-21 | End: 2023-08-21 | Stop reason: HOSPADM

## 2023-08-21 RX ORDER — SODIUM CHLORIDE 0.9 % (FLUSH) 0.9 %
3 SYRINGE (ML) INJECTION EVERY 12 HOURS SCHEDULED
Status: DISCONTINUED | OUTPATIENT
Start: 2023-08-21 | End: 2023-08-21 | Stop reason: HOSPADM

## 2023-08-21 RX ORDER — SODIUM CHLORIDE 0.9 % (FLUSH) 0.9 %
3-10 SYRINGE (ML) INJECTION AS NEEDED
Status: DISCONTINUED | OUTPATIENT
Start: 2023-08-21 | End: 2023-08-21 | Stop reason: HOSPADM

## 2023-08-21 RX ORDER — FENTANYL CITRATE 50 UG/ML
50 INJECTION, SOLUTION INTRAMUSCULAR; INTRAVENOUS ONCE AS NEEDED
Status: DISCONTINUED | OUTPATIENT
Start: 2023-08-21 | End: 2023-08-21 | Stop reason: HOSPADM

## 2023-08-21 RX ORDER — MIDAZOLAM HYDROCHLORIDE 1 MG/ML
1 INJECTION INTRAMUSCULAR; INTRAVENOUS
Status: DISCONTINUED | OUTPATIENT
Start: 2023-08-21 | End: 2023-08-21 | Stop reason: HOSPADM

## 2023-08-21 RX ORDER — BUPIVACAINE HYDROCHLORIDE AND EPINEPHRINE 5; 5 MG/ML; UG/ML
INJECTION, SOLUTION EPIDURAL; INTRACAUDAL; PERINEURAL AS NEEDED
Status: DISCONTINUED | OUTPATIENT
Start: 2023-08-21 | End: 2023-08-21 | Stop reason: HOSPADM

## 2023-08-21 RX ADMIN — SODIUM CHLORIDE, POTASSIUM CHLORIDE, SODIUM LACTATE AND CALCIUM CHLORIDE 9 ML/HR: 600; 310; 30; 20 INJECTION, SOLUTION INTRAVENOUS at 12:13

## 2023-08-21 RX ADMIN — PROPOFOL 180 MCG/KG/MIN: 10 INJECTION, EMULSION INTRAVENOUS at 13:00

## 2023-08-21 RX ADMIN — FAMOTIDINE 20 MG: 10 INJECTION INTRAVENOUS at 12:20

## 2023-08-21 RX ADMIN — LIDOCAINE HYDROCHLORIDE 100 MG: 20 INJECTION, SOLUTION INFILTRATION; PERINEURAL at 13:00

## 2023-08-21 RX ADMIN — CEFAZOLIN 3000 MG: 10 INJECTION, POWDER, FOR SOLUTION INTRAVENOUS at 12:48

## 2023-08-21 RX ADMIN — ONDANSETRON 4 MG: 2 INJECTION INTRAMUSCULAR; INTRAVENOUS at 12:36

## 2023-08-21 RX ADMIN — Medication 50 MG: at 13:02

## 2023-08-21 NOTE — OP NOTE
Operative Note :  Bobbi Valverde MD      Liz Ryanrew  1985    Procedure Date: 08/21/23    Pre-op Diagnosis:  Buttock wound, right, initial encounter [S31.819A]    Post-Operative Diagnosis:  Post-Op Diagnosis Codes:     * Buttock wound, right, initial encounter [S31.819A]    Procedure:   Sharp excisional debridement of right buttock wound measuring 3 cm x 3 cm    Surgeon: Bobbi Valverde MD    Anesthesia:  MAC (monitored anesthetic care)    Estimated Blood Loss: minimal    Specimens: Right buttock wound    Complications: None    Indications:  This is a 37-year-old female who presented with a chronic draining wound of the right buttock after having an abscess.  She was unable to pack the wound due to its location, she is she elected to proceed with debridement in order to make wound care easier.    Findings: Epithelialized tract from skin opening to small superficial abscess cavity.  Deeper cavity noted after debridement of this superficial skin and subcutaneous fat.  Unable to identify fistula to rectal stump.    Description of procedure:  Patient was taken to the operating room where she was positioned prone on the operating room table and was given sedation by anesthesia.  The right buttock wound was prepped and draped sterilely using Betadine.  A timeout was performed.  I began by injecting half percent Marcaine with epinephrine circumferentially around the wound.  Bovie electrocautery was then used to sharply excise the overlying skin and subcutaneous fat surrounding the wound, measuring 3 cm in diameter.  In probing the wound, I discovered a deeper cavity but there was no pus that drained from this.  I was concerned about a fistula between her rectal stump and this cavity, so I performed a digital rectal exam. I was unable to appreciate an obvious fistula despite irrigating the wound with betadine. I then packed the wound with kerlix gauze covered with 4x4 and ABD pad. All counts were correct at the  conclusion of the procedure.      MARY JO BECKER MD  General, Robotic, and Endoscopic Surgery  [facility]    [address], [address]  [place] [address]  P: [phone]  F: [phone]

## 2023-08-21 NOTE — DISCHARGE INSTRUCTIONS
Dr. Bobbi Valverde  [address]  [place] [id number]  [phone]    Discharge Instructions for Skin and Soft Tissue Excision    Go home, rest and take it easy today; however, you should get up and move about several times today to reduce the risk of developing a clot in your legs.      You may experience some dizziness or memory loss from the anesthesia.  This may last for the next 24 hours.  Someone should plan on staying with you for the first 24 hours for your safety.    Do not drive, make any important legal decisions or sign any legal papers for the next 24 hours.      Eat and drink lightly today.  Start off with liquids, jello, soup, crackers or other bland foods at first. You may advance your diet as tolerated as long as you do not experience any nausea or vomiting.     Wound care: Wait 24 hours before changing her dressing.  Remove the gauze including the packing and replaced with a saline moistened 4 x 4 gauze covered by dry gauze or ABD pad.  Do this at least once a day or more frequently if needed.    You may notice some bleeding/drainage on your outer dressings. A little bloody drainage is normal. If the bleeding/drainage is such that the bandage cannot absorb it, remove the dressing, apply clean gauze and apply firm pressure for a full 15 minutes.  If the bleeding continues, please call me.    You may shower tomorrow.  No tub baths until your incisions are completely healed.    You can take Tylenol or Motrin as needed for incisional pain.    You may follow-up with me in 2 weeks for a wound check.  If your incisions are healing well and you do not have any concerns about them, you can cancel your follow-up visit.  I will call you with any abnormal pathology results.     Remember to contact me for any of the following:    Fever> 101 degrees  Severe pain that cannot be controlled by taking your pain pills  Severe nausea or vomiting that cannot be controlled by taking your nausea  medicine  Significant bleeding from your incision  Drainage that has a bad smell or is yellow or green in appearance  Any other questions or concerns

## 2023-08-21 NOTE — ANESTHESIA POSTPROCEDURE EVALUATION
Patient: Liz Bagley    Procedure Summary       Date: 08/21/23 Room / Location: [facility] OR  / [facility] MAIN OR    Anesthesia Start: 1251 Anesthesia Stop: 1331    Procedure: sharp incisional DEBRIDEMENT OFright BUTTOCKS WOUND (Right) Diagnosis:       Buttock wound, right, initial encounter      (Buttock wound, right, initial encounter [S31.819A])    Surgeons: Bobbi Valverde MD Provider: Gregory Nichols MD    Anesthesia Type: MAC ASA Status: 3            Anesthesia Type: MAC    Vitals  Vitals Value Taken Time   /86 08/21/23 1350   Temp 36.6 øC (97.8 øF) 08/21/23 1335   Pulse 79 08/21/23 1352   Resp 14 08/21/23 1335   SpO2 96 % 08/21/23 1352   Vitals shown include unvalidated device data.        Post Anesthesia Care and Evaluation    Patient location during evaluation: bedside  Patient participation: complete - patient participated  Level of consciousness: awake  Pain management: adequate    Airway patency: patent  Anesthetic complications: No anesthetic complications    Cardiovascular status: acceptable  Respiratory status: acceptable  Hydration status: acceptable    Comments: */82 (BP Location: Right arm, Patient Position: Lying)   Pulse 67   Temp 36.6 øC (97.8 øF) (Oral)   Resp 14   SpO2 97%

## 2023-08-21 NOTE — ANESTHESIA PREPROCEDURE EVALUATION
Anesthesia Evaluation     Patient summary reviewed and Nursing notes reviewed   NPO Solid Status: > 8 hours  NPO Liquid Status: > 4 hours           Airway   Mallampati: II  Neck ROM: full  No difficulty expected  Dental - normal exam     Pulmonary     breath sounds clear to auscultation  Cardiovascular     Rhythm: regular    (+) hypertension      Neuro/Psych  (+) headaches, psychiatric history Anxiety and Depression  GI/Hepatic/Renal/Endo    (+) obesity (BMI 50), morbid obesity, GI bleeding , diabetes mellitus    Musculoskeletal     Abdominal   (+) obese   Substance History      OB/GYN          Other   arthritis,                   Anesthesia Plan    ASA 3     MAC     intravenous induction     Anesthetic plan, risks, benefits, and alternatives have been provided, discussed and informed consent has been obtained with: patient.    CODE STATUS:

## 2023-08-24 LAB
LAB AP CASE REPORT: NORMAL
LAB AP SPECIAL STAINS: NORMAL
PATH REPORT.FINAL DX SPEC: NORMAL
PATH REPORT.GROSS SPEC: NORMAL

## 2023-08-25 ENCOUNTER — OFFICE VISIT (OUTPATIENT)
Dept: SURGERY | Facility: CLINIC | Age: 38
End: 2023-08-25
Payer: COMMERCIAL

## 2023-08-25 VITALS
HEART RATE: 71 BPM | HEIGHT: 63 IN | DIASTOLIC BLOOD PRESSURE: 72 MMHG | TEMPERATURE: 97.6 F | WEIGHT: 280.3 LBS | BODY MASS INDEX: 49.66 KG/M2 | OXYGEN SATURATION: 98 % | SYSTOLIC BLOOD PRESSURE: 136 MMHG

## 2023-08-25 DIAGNOSIS — K52.9 INFLAMMATORY BOWEL DISEASE: Primary | ICD-10-CM

## 2023-08-25 RX ORDER — SODIUM CHLORIDE 9 MG/ML
40 INJECTION, SOLUTION INTRAVENOUS AS NEEDED
OUTPATIENT
Start: 2023-08-25

## 2023-08-25 RX ORDER — SODIUM CHLORIDE 0.9 % (FLUSH) 0.9 %
10 SYRINGE (ML) INJECTION EVERY 12 HOURS SCHEDULED
OUTPATIENT
Start: 2023-08-25

## 2023-08-25 RX ORDER — SODIUM CHLORIDE 0.9 % (FLUSH) 0.9 %
10 SYRINGE (ML) INJECTION AS NEEDED
OUTPATIENT
Start: 2023-08-25

## 2023-08-25 RX ORDER — METRONIDAZOLE 500 MG/100ML
500 INJECTION, SOLUTION INTRAVENOUS ONCE
OUTPATIENT
Start: 2023-08-25 | End: 2023-08-25

## 2023-08-25 NOTE — PROGRESS NOTES
Liz Bagley is a 37 y.o. female in for follow up of Inflammatory bowel disease    The patient presents today for a buttock abscess.     The patient had an incision and drainage of a buttock abscess and there was some concern maybe it fistulized to a rectal stump. The patient is status post total proctocolectomy with end ileostomy several years ago. The patient reports she started with pain 2 months ago. She states one Saturday it burst open and continuously drained. The patient reports she empties her pouch 4 times a day. She denies taking Imodium or fiber  Past Medical History:   Diagnosis Date    Abnormal uterine bleeding     Abnormal uterine bleeding (AUB) 05/2020    Allergic rhinitis     Anemia     Anxiety     Arm pain, left     CHRONIC    Arthritis     Chronic fatigue 08/04/2017    Colon polyps     COVID-19 virus infection 12/09/2020    Crohn's disease     Depression     Eczema     LEG, ABD, HIPS    H/O transfusion of packed red blood cells     1 UNIT, NO REACTIONS    Headache 06/20/2015    CT SCAN OF BRAIN NEGATIVE, SEEN AT [facility] ER    Headache syndrome 06/25/2017    SPINAL PERFORMED, SEEN AT [facility] ER    Hearing loss of left ear 02/06/2017    History of Clostridium difficile infection     History of steroid therapy     LONG TERM USE    Hypertension     Hypovitaminosis D 10/10/2017    IBS (irritable bowel syndrome)     Ileostomy present     Insomnia     Laceration of left thumb 10/06/2020    SEEN AT [facility] ER    Leukocytosis 11/08/2018    Menorrhagia with regular cycle 07/2020    Metatarsalgia of right foot 03/11/2019    Migraines     Myopia     BILATERAL    Neuropathy 02/28/2019    Non-neoplastic nevus of skin     Obesity     Open wound     right buttock area   pt cleaning with soap & water and applying bactroban    Pancolitis     Peroneal tendonitis of right lower extremity 05/01/2019    Plantar fasciitis, left 07/2020    Poor iron absorption 11/08/2018    Recurrent sinus infections     Rotavirus infection  "05/06/2019    SEEN AT [facility] ER    Spinal headache 06/28/2015    SEEN AT [facility] ER    Tattoos     Ulcerative colitis     Vitamin B 12 deficiency      Past Surgical History:   Procedure Laterality Date    APPENDECTOMY N/A 06/02/2014     AT [facility]    BLOOD PATCH N/A 06/28/2015    EPIDURAL BLOOD PATCH, DR.DONAL ARMSTRONG AT [facility]    COLON RESECTION N/A 11/20/2017    Procedure: LAPAROSCOPIC TOTAL PROCTOCOLECTOMY WITH END ILEOSTOMY;  Surgeon: Colin Evans MD;  Location: [facility] MAIN OR;  Service:     COLON RESECTION      illeostomy    COLONOSCOPY N/A 11/11/2016    Procedure: COLONOSCOPY TO CECUM AND TERMINAL ILEUM WITH BIOPSIES;  Surgeon: Yao Uribe MD;  Location: [facility] ENDOSCOPY;  Service:     COLONOSCOPY N/A 07/2012    DR. MILLER JOSEPH IN [place]    D & C HYSTEROSCOPY ENDOMETRIAL ABLATION N/A 05/04/2020    Procedure: DILATATION AND CURETTAGE HYSTEROSCOPY NOVASURE ENDOMETRIAL ABLATION;  Surgeon: Hellen Alaniz MD;  Location: [facility] MAIN OR;  Service: Gynecology;  Laterality: N/A;    EPIDURAL BLOOD PATCH N/A 07/02/2015    DR. MILLER LOPEZ AT [facility]    FINGER SURGERY Right 12/18/2015    MIDDLE FINGER, EXCISION OF FOREIGN BODYX3, DRTSU-MIN MARV    FOREARM SURGERY Left 2000    w/ internal device, hardware    INCISION AND DRAINAGE TRUNK Right 08/21/2023    Procedure: sharp incisional DEBRIDEMENT OFright BUTTOCKS WOUND;  Surgeon: Bobbi Valverde MD;  Location: [facility] MAIN OR;  Service: General;  Laterality: Right;    LUMBAR PUNCTURE N/A 06/25/2015    [facility]     NOSE SURGERY Bilateral 02/10/2017    NASAL SCOPE, BILATERAL EDEMA, MIDLINE SEPTUM, NO POLYPS, DR. GHADA LEGGETT    ORIF ANKLE FRACTURE Left 2011    w/ internal devices, DR. AGUAYO  hardware    TONSILLECTOMY Bilateral 2000           /72 (BP Location: Left arm, Patient Position: Sitting, Cuff Size: Thigh Adult)   Pulse 71   Temp 97.6 øF (36.4 øC) (Temporal)   Ht 160 cm (63\")   Wt 127 kg (280 lb 4.8 oz)   LMP  (LMP Unknown)   SpO2 98%   " Breastfeeding No   BMI 49.65 kg/mý   Body mass index is 49.65 kg/mý.      PE:  Physical Exam  Constitutional:       General: She is not in acute distress.     Appearance: She is well-developed.   HENT:      Head: Normocephalic and atraumatic.   Abdominal:      General: There is no distension.      Palpations: Abdomen is soft.   Musculoskeletal:         General: Normal range of motion.   Neurological:      Mental Status: She is alert.   Psychiatric:         Thought Content: Thought content normal.     Perianal exam: Right buttock, she has a 3 cm wound that has very little granulation tissue to it. It does not track to the rectum. She only has a cuff of rectum, there is no way to do a true rectal exam. There is no skin irritation perianally.    Assessment:   1. Inflammatory bowel disease         Plan:    Plan to do a resection of anus and rectal cuff. I described with patient typical surgical time, postop recovery including pain management, and restrictions. I discussed with patient risks, benefits, and alternatives. The patient had an opportunity to ask questions. I answered all questions. Patient understands and wishes to proceed with the procedure.      Transcribed from ambient dictation for Colin Evans MD by Mary Sheikh.  08/25/23   09:08 EDT    Patient or patient representative verbalized consent to the visit recording.  I have personally performed the services described in this document as transcribed by the above individual, and it is both accurate and complete.

## 2023-08-25 NOTE — H&P (VIEW-ONLY)
Liz Bagley is a 37 y.o. female in for follow up of Inflammatory bowel disease    The patient presents today for a buttock abscess.     The patient had an incision and drainage of a buttock abscess and there was some concern maybe it fistulized to a rectal stump. The patient is status post total proctocolectomy with end ileostomy several years ago. The patient reports she started with pain 2 months ago. She states one Saturday it burst open and continuously drained. The patient reports she empties her pouch 4 times a day. She denies taking Imodium or fiber  Past Medical History:   Diagnosis Date    Abnormal uterine bleeding     Abnormal uterine bleeding (AUB) 05/2020    Allergic rhinitis     Anemia     Anxiety     Arm pain, left     CHRONIC    Arthritis     Chronic fatigue 08/04/2017    Colon polyps     COVID-19 virus infection 12/09/2020    Crohn's disease     Depression     Eczema     LEG, ABD, HIPS    H/O transfusion of packed red blood cells     1 UNIT, NO REACTIONS    Headache 06/20/2015    CT SCAN OF BRAIN NEGATIVE, SEEN AT [facility] ER    Headache syndrome 06/25/2017    SPINAL PERFORMED, SEEN AT [facility] ER    Hearing loss of left ear 02/06/2017    History of Clostridium difficile infection     History of steroid therapy     LONG TERM USE    Hypertension     Hypovitaminosis D 10/10/2017    IBS (irritable bowel syndrome)     Ileostomy present     Insomnia     Laceration of left thumb 10/06/2020    SEEN AT [facility] ER    Leukocytosis 11/08/2018    Menorrhagia with regular cycle 07/2020    Metatarsalgia of right foot 03/11/2019    Migraines     Myopia     BILATERAL    Neuropathy 02/28/2019    Non-neoplastic nevus of skin     Obesity     Open wound     right buttock area   pt cleaning with soap & water and applying bactroban    Pancolitis     Peroneal tendonitis of right lower extremity 05/01/2019    Plantar fasciitis, left 07/2020    Poor iron absorption 11/08/2018    Recurrent sinus infections     Rotavirus infection  "05/06/2019    SEEN AT [facility] ER    Spinal headache 06/28/2015    SEEN AT [facility] ER    Tattoos     Ulcerative colitis     Vitamin B 12 deficiency      Past Surgical History:   Procedure Laterality Date    APPENDECTOMY N/A 06/02/2014     AT [facility]    BLOOD PATCH N/A 06/28/2015    EPIDURAL BLOOD PATCH, DR.DONAL ARMSTRONG AT [facility]    COLON RESECTION N/A 11/20/2017    Procedure: LAPAROSCOPIC TOTAL PROCTOCOLECTOMY WITH END ILEOSTOMY;  Surgeon: Colin Evans MD;  Location: [facility] MAIN OR;  Service:     COLON RESECTION      illeostomy    COLONOSCOPY N/A 11/11/2016    Procedure: COLONOSCOPY TO CECUM AND TERMINAL ILEUM WITH BIOPSIES;  Surgeon: Yao Uribe MD;  Location: [facility] ENDOSCOPY;  Service:     COLONOSCOPY N/A 07/2012    DR. MILLER JOSEPH IN [place]    D & C HYSTEROSCOPY ENDOMETRIAL ABLATION N/A 05/04/2020    Procedure: DILATATION AND CURETTAGE HYSTEROSCOPY NOVASURE ENDOMETRIAL ABLATION;  Surgeon: Hellen Alaniz MD;  Location: [facility] OR;  Service: Gynecology;  Laterality: N/A;    EPIDURAL BLOOD PATCH N/A 07/02/2015    DR. MILLER LOPEZ AT [facility]    FINGER SURGERY Right 12/18/2015    MIDDLE FINGER, EXCISION OF FOREIGN BODYX3, DRTSU-MIN MARV    FOREARM SURGERY Left 2000    w/ internal device, hardware    INCISION AND DRAINAGE TRUNK Right 08/21/2023    Procedure: sharp incisional DEBRIDEMENT OFright BUTTOCKS WOUND;  Surgeon: Bobbi Valverde MD;  Location: [facility] MAIN OR;  Service: General;  Laterality: Right;    LUMBAR PUNCTURE N/A 06/25/2015    [facility]     NOSE SURGERY Bilateral 02/10/2017    NASAL SCOPE, BILATERAL EDEMA, MIDLINE SEPTUM, NO POLYPS, DR. GHADA LEGGETT    ORIF ANKLE FRACTURE Left 2011    w/ internal devices, DR. AGUAYO  hardware    TONSILLECTOMY Bilateral 2000           /72 (BP Location: Left arm, Patient Position: Sitting, Cuff Size: Thigh Adult)   Pulse 71   Temp 97.6 °F (36.4 °C) (Temporal)   Ht 160 cm (63\")   Wt 127 kg (280 lb 4.8 oz)   LMP  (LMP Unknown)   SpO2 98%   " Breastfeeding No   BMI 49.65 kg/m²   Body mass index is 49.65 kg/m².      PE:  Physical Exam  Constitutional:       General: She is not in acute distress.     Appearance: She is well-developed.   HENT:      Head: Normocephalic and atraumatic.   Abdominal:      General: There is no distension.      Palpations: Abdomen is soft.   Musculoskeletal:         General: Normal range of motion.   Neurological:      Mental Status: She is alert.   Psychiatric:         Thought Content: Thought content normal.     Perianal exam: Right buttock, she has a 3 cm wound that has very little granulation tissue to it. It does not track to the rectum. She only has a cuff of rectum, there is no way to do a true rectal exam. There is no skin irritation perianally.    Assessment:   1. Inflammatory bowel disease         Plan:    Plan to do a resection of anus and rectal cuff. I described with patient typical surgical time, postop recovery including pain management, and restrictions. I discussed with patient risks, benefits, and alternatives. The patient had an opportunity to ask questions. I answered all questions. Patient understands and wishes to proceed with the procedure.      Transcribed from ambient dictation for Colin Evans MD by Mary Sheikh.  08/25/23   09:08 EDT    Patient or patient representative verbalized consent to the visit recording.  I have personally performed the services described in this document as transcribed by the above individual, and it is both accurate and complete.

## 2023-08-29 ENCOUNTER — OFFICE VISIT (OUTPATIENT)
Dept: SURGERY | Facility: CLINIC | Age: 38
End: 2023-08-29
Payer: COMMERCIAL

## 2023-08-29 VITALS
BODY MASS INDEX: 49.82 KG/M2 | HEIGHT: 63 IN | DIASTOLIC BLOOD PRESSURE: 78 MMHG | SYSTOLIC BLOOD PRESSURE: 122 MMHG | WEIGHT: 281.2 LBS

## 2023-08-29 DIAGNOSIS — S31.819A BUTTOCK WOUND, RIGHT, INITIAL ENCOUNTER: Primary | ICD-10-CM

## 2023-08-29 PROCEDURE — 99024 POSTOP FOLLOW-UP VISIT: CPT | Performed by: SURGERY

## 2023-08-29 NOTE — PROGRESS NOTES
General Surgery Post-Operative Follow Up Note     History of Present Illness:    Liz Bagley is a 37 y.o. year old female who presents for post-operative follow up from debridement of right gluteal wound. She reports it is still draining a significant amount. She was able to get in to see Dr. Evans and has surgery scheduled on September 19th.      Procedure:    Irrigation and debridement right buttock wound    Pathology:    1. Right Buttock, Debridement:               A. Cellulitis with chronic active inflammation and focal granuloma.               B. Negative for microorganisms by special stains.               C. Negative for dysplasia and malignancy.    Physical Exam:   Constitutional: Well-developed well-nourished, no acute distress  Eyes: Conjunctiva normal, sclera nonicteric  ENMT: Hearing grossly normal, oral mucosa moist  Respiratory: No increased work of breathing, normal inspiratory effort  Cardiovascular: Regular rate, no peripheral edema, no jugular venous distention  Gastrointestinal: Soft, nontender  Buttock: gluteal wound with healthy appearing fat but minimal granulation tissue. Seropurulent drainage on bandage. No surrounding erythema.  Skin:  Warm, dry, no rash on visualized skin surfaces  Musculoskeletal: Symmetric strength, normal gait  Psychiatric: Alert and oriented x3, normal affect       Assessment/Plan:  Right buttock wound s/p I&D  - continue daily dressings. I suspect this is somehow fistulized to the rectal stump and would like to see what it does after her surgery with Dr. Evans. She will call if there is any evidence of infection such as redness or erythema.       Bobbi Valverde M.D.  General, Robotic and Endoscopic Surgery  [facility]    [address], [address]  [place], [address]  P: [phone]  F: [phone]

## 2023-09-12 ENCOUNTER — PRE-ADMISSION TESTING (OUTPATIENT)
Dept: PREADMISSION TESTING | Facility: HOSPITAL | Age: 38
End: 2023-09-12
Payer: COMMERCIAL

## 2023-09-12 VITALS
BODY MASS INDEX: 45.29 KG/M2 | SYSTOLIC BLOOD PRESSURE: 125 MMHG | DIASTOLIC BLOOD PRESSURE: 81 MMHG | TEMPERATURE: 98.6 F | OXYGEN SATURATION: 99 % | RESPIRATION RATE: 16 BRPM | HEART RATE: 74 BPM | WEIGHT: 281.8 LBS | HEIGHT: 66 IN

## 2023-09-12 DIAGNOSIS — K52.9 INFLAMMATORY BOWEL DISEASE: ICD-10-CM

## 2023-09-12 LAB
ANION GAP SERPL CALCULATED.3IONS-SCNC: 7.9 MMOL/L (ref 5–15)
BUN SERPL-MCNC: 6 MG/DL (ref 6–20)
BUN/CREAT SERPL: 11.3 (ref 7–25)
CALCIUM SPEC-SCNC: 8.9 MG/DL (ref 8.6–10.5)
CHLORIDE SERPL-SCNC: 106 MMOL/L (ref 98–107)
CO2 SERPL-SCNC: 24.1 MMOL/L (ref 22–29)
CREAT SERPL-MCNC: 0.53 MG/DL (ref 0.57–1)
DEPRECATED RDW RBC AUTO: 42.3 FL (ref 37–54)
EGFRCR SERPLBLD CKD-EPI 2021: 120.8 ML/MIN/1.73
ERYTHROCYTE [DISTWIDTH] IN BLOOD BY AUTOMATED COUNT: 13.7 % (ref 12.3–15.4)
GLUCOSE SERPL-MCNC: 93 MG/DL (ref 65–99)
HCT VFR BLD AUTO: 34.6 % (ref 34–46.6)
HGB BLD-MCNC: 11.2 G/DL (ref 12–15.9)
MCH RBC QN AUTO: 27.2 PG (ref 26.6–33)
MCHC RBC AUTO-ENTMCNC: 32.4 G/DL (ref 31.5–35.7)
MCV RBC AUTO: 84 FL (ref 79–97)
PLATELET # BLD AUTO: 473 10*3/MM3 (ref 140–450)
PMV BLD AUTO: 9.6 FL (ref 6–12)
POTASSIUM SERPL-SCNC: 4 MMOL/L (ref 3.5–5.2)
QT INTERVAL: 427 MS
QTC INTERVAL: 465 MS
RBC # BLD AUTO: 4.12 10*6/MM3 (ref 3.77–5.28)
SODIUM SERPL-SCNC: 138 MMOL/L (ref 136–145)
WBC NRBC COR # BLD: 11.62 10*3/MM3 (ref 3.4–10.8)

## 2023-09-12 PROCEDURE — 36415 COLL VENOUS BLD VENIPUNCTURE: CPT

## 2023-09-12 PROCEDURE — 80048 BASIC METABOLIC PNL TOTAL CA: CPT

## 2023-09-12 PROCEDURE — 93005 ELECTROCARDIOGRAM TRACING: CPT

## 2023-09-12 PROCEDURE — 85027 COMPLETE CBC AUTOMATED: CPT

## 2023-09-12 NOTE — DISCHARGE INSTRUCTIONS
Take the following medications the morning of surgery:  BRING LABETALOL WITH YOU    If you are on prescription narcotic pain medication to control your pain you may also take that medication the morning of surgery.    General Instructions:  Do not eat solid food after midnight the night before surgery.  You may drink clear liquids day of surgery but must stop at least one hour before your hospital arrival time.  4:30 AM  It is beneficial for you to have a clear drink that contains carbohydrates the day of surgery.  We suggest a 12 to 20 ounce bottle of Gatorade or Powerade for non-diabetic patients or a 12 to 20 ounce bottle of G2 or Powerade Zero for diabetic patients. (Pediatric patients, are not advised to drink a 12 to 20 ounce carbohydrate drink)    Clear liquids are liquids you can see through.  Nothing red in color.     Plain water                               Sports drinks  Sodas                                   Gelatin (Jell-O)  Fruit juices without pulp such as white grape juice and apple juice  Popsicles that contain no fruit or yogurt  Tea or coffee (no cream or milk added)  Gatorade / Powerade  G2 / Powerade Zero    Infants may have breast milk up to four hours before surgery.  Infants drinking formula may drink formula up to six hours before surgery.   Patients who avoid smoking, chewing tobacco and alcohol for 4 weeks prior to surgery have a reduced risk of post-operative complications.  Quit smoking as many days before surgery as you can.  Do not smoke, use chewing tobacco or drink alcohol the day of surgery.   If applicable bring your C-PAP/ BI-PAP machine in with you to preop day of surgery.  Bring any papers given to you in the doctor’s office.  Wear clean comfortable clothes.  Do not wear contact lenses, false eyelashes or make-up.  Bring a case for your glasses.   Bring crutches or walker if applicable.  Remove all piercings.  Leave jewelry and any other valuables at home.  Hair extensions  with metal clips must be removed prior to surgery.  The Pre-Admission Testing nurse will instruct you to bring medications if unable to obtain an accurate list in Pre-Admission Testing.        If you were given a blood bank ID arm band remember to bring it with you the day of surgery.    Preventing a Surgical Site Infection:  For 2 to 3 days before surgery, avoid shaving with a razor because the razor can irritate skin and make it easier to develop an infection.    Any areas of open skin can increase the risk of a post-operative wound infection by allowing bacteria to enter and travel throughout the body.  Notify your surgeon if you have any skin wounds / rashes even if it is not near the expected surgical site.  The area will need assessed to determine if surgery should be delayed until it is healed.  The night prior to surgery shower using a fresh bar of anti-bacterial soap (such as Dial) and clean washcloth.  Sleep in a clean bed with clean clothing.  Do not allow pets to sleep with you.  Shower on the morning of surgery using a fresh bar of anti-bacterial soap (such as Dial) and clean washcloth.  Dry with a clean towel and dress in clean clothing.  Ask your surgeon if you will be receiving antibiotics prior to surgery.  Make sure you, your family, and all healthcare providers clean their hands with soap and water or an alcohol based hand  before caring for you or your wound.    Day of surgery: 9/19/2023 ARRIVAL TIME 5:30 AM  Your arrival time is approximately two hours before your scheduled surgery time.  Upon arrival, a Pre-op nurse and Anesthesiologist will review your health history, obtain vital signs, and answer questions you may have.  The only belongings needed at this time will be a list of your home medications and if applicable your C-PAP/BI-PAP machine.  A Pre-op nurse will start an IV and you may receive medication in preparation for surgery, including something to help you relax.     Please  be aware that surgery does come with discomfort.  We want to make every effort to control your discomfort so please discuss any uncontrolled symptoms with your nurse.   Your doctor will most likely have prescribed pain medications.      If you are going home after surgery you will receive individualized written care instructions before being discharged.  A responsible adult must drive you to and from the hospital on the day of your surgery and stay with you for 24 hours.  Discharge prescriptions can be filled by the hospital pharmacy during regular pharmacy hours.  If you are having surgery late in the day/evening your prescription may be e-prescribed to your pharmacy.  Please verify your pharmacy hours or chose a 24 hour pharmacy to avoid not having access to your prescription because your pharmacy has closed for the day.    If you are staying overnight following surgery, you will be transported to your hospital room following the recovery period.  [facility] has all private rooms.    If you have any questions please call Pre-Admission Testing at [phone].  Deductibles and co-payments are collected on the day of service. Please be prepared to pay the required co-pay, deductible or deposit on the day of service as defined by your plan.    Call your surgeon immediately if you experience any of the following symptoms:  Sore Throat  Shortness of Breath or difficulty breathing  Cough  Chills  Body soreness or muscle pain  Headache  Fever  New loss of taste or smell  Do not arrive for your surgery ill.  Your procedure will need to be rescheduled to another time.  You will need to call your physician before the day of surgery to avoid any unnecessary exposure to hospital staff as well as other patients.

## 2023-09-15 DIAGNOSIS — I10 BENIGN ESSENTIAL HYPERTENSION: Chronic | ICD-10-CM

## 2023-09-15 RX ORDER — IRBESARTAN 75 MG/1
75 TABLET ORAL NIGHTLY
Qty: 90 TABLET | Refills: 0 | Status: SHIPPED | OUTPATIENT
Start: 2023-09-15

## 2023-09-15 RX ORDER — LABETALOL 200 MG/1
200 TABLET, FILM COATED ORAL EVERY 12 HOURS SCHEDULED
Qty: 180 TABLET | Refills: 1 | Status: SHIPPED | OUTPATIENT
Start: 2023-09-15

## 2023-09-19 ENCOUNTER — ANESTHESIA EVENT (OUTPATIENT)
Dept: PERIOP | Facility: HOSPITAL | Age: 38
End: 2023-09-19
Payer: COMMERCIAL

## 2023-09-19 ENCOUNTER — ANESTHESIA (OUTPATIENT)
Dept: PERIOP | Facility: HOSPITAL | Age: 38
End: 2023-09-19
Payer: COMMERCIAL

## 2023-09-19 ENCOUNTER — HOSPITAL ENCOUNTER (OUTPATIENT)
Facility: HOSPITAL | Age: 38
Setting detail: OBSERVATION
Discharge: HOME | End: 2023-09-21
Attending: COLON & RECTAL SURGERY | Admitting: COLON & RECTAL SURGERY
Payer: COMMERCIAL

## 2023-09-19 DIAGNOSIS — K52.9 INFLAMMATORY BOWEL DISEASE: ICD-10-CM

## 2023-09-19 DIAGNOSIS — S31.819A BUTTOCK WOUND, RIGHT, INITIAL ENCOUNTER: Primary | ICD-10-CM

## 2023-09-19 LAB
B-HCG UR QL: NEGATIVE
EXPIRATION DATE: NORMAL
GLUCOSE BLDC GLUCOMTR-MCNC: 126 MG/DL (ref 70–130)
GLUCOSE BLDC GLUCOMTR-MCNC: 97 MG/DL (ref 70–130)
INTERNAL NEGATIVE CONTROL: NEGATIVE
INTERNAL POSITIVE CONTROL: POSITIVE
Lab: NORMAL

## 2023-09-19 PROCEDURE — 25010000002 BUPIVACAINE (PF) 0.25 % SOLUTION 30 ML VIAL: Performed by: COLON & RECTAL SURGERY

## 2023-09-19 PROCEDURE — 88304 TISSUE EXAM BY PATHOLOGIST: CPT | Performed by: COLON & RECTAL SURGERY

## 2023-09-19 PROCEDURE — 25010000002 CEFAZOLIN IN DEXTROSE 2-4 GM/100ML-% SOLUTION: Performed by: COLON & RECTAL SURGERY

## 2023-09-19 PROCEDURE — 25010000002 MIDAZOLAM PER 1 MG: Performed by: ANESTHESIOLOGY

## 2023-09-19 PROCEDURE — 25010000002 FENTANYL CITRATE (PF) 50 MCG/ML SOLUTION: Performed by: NURSE ANESTHETIST, CERTIFIED REGISTERED

## 2023-09-19 PROCEDURE — 25010000002 ONDANSETRON PER 1 MG: Performed by: NURSE ANESTHETIST, CERTIFIED REGISTERED

## 2023-09-19 PROCEDURE — 25010000002 PROPOFOL 10 MG/ML EMULSION: Performed by: NURSE ANESTHETIST, CERTIFIED REGISTERED

## 2023-09-19 PROCEDURE — 88305 TISSUE EXAM BY PATHOLOGIST: CPT | Performed by: COLON & RECTAL SURGERY

## 2023-09-19 PROCEDURE — 25010000002 HYDROMORPHONE PER 4 MG: Performed by: COLON & RECTAL SURGERY

## 2023-09-19 PROCEDURE — 25010000002 HYDROMORPHONE PER 4 MG: Performed by: NURSE ANESTHETIST, CERTIFIED REGISTERED

## 2023-09-19 PROCEDURE — 81025 URINE PREGNANCY TEST: CPT | Performed by: ANESTHESIOLOGY

## 2023-09-19 PROCEDURE — 25010000002 METRONIDAZOLE 500 MG/100ML SOLUTION: Performed by: COLON & RECTAL SURGERY

## 2023-09-19 PROCEDURE — 82948 REAGENT STRIP/BLOOD GLUCOSE: CPT

## 2023-09-19 PROCEDURE — 25010000002 MAGNESIUM SULFATE PER 500 MG OF MAGNESIUM: Performed by: NURSE ANESTHETIST, CERTIFIED REGISTERED

## 2023-09-19 PROCEDURE — 25010000002 KETOROLAC TROMETHAMINE PER 15 MG: Performed by: NURSE ANESTHETIST, CERTIFIED REGISTERED

## 2023-09-19 PROCEDURE — 25010000002 ACETAMINOPHEN 10 MG/ML SOLUTION: Performed by: NURSE ANESTHETIST, CERTIFIED REGISTERED

## 2023-09-19 DEVICE — ARISTA AH ABSORBABLE HEMOSTATIC PARTICLES
Type: IMPLANTABLE DEVICE | Site: PERITONEUM | Status: FUNCTIONAL
Brand: ARISTA™ AH

## 2023-09-19 RX ORDER — HYDROMORPHONE HYDROCHLORIDE 1 MG/ML
0.5 INJECTION, SOLUTION INTRAMUSCULAR; INTRAVENOUS; SUBCUTANEOUS
Status: DISCONTINUED | OUTPATIENT
Start: 2023-09-19 | End: 2023-09-19 | Stop reason: HOSPADM

## 2023-09-19 RX ORDER — LIDOCAINE HYDROCHLORIDE 10 MG/ML
0.5 INJECTION, SOLUTION INFILTRATION; PERINEURAL ONCE AS NEEDED
Status: DISCONTINUED | OUTPATIENT
Start: 2023-09-19 | End: 2023-09-19 | Stop reason: HOSPADM

## 2023-09-19 RX ORDER — METRONIDAZOLE 500 MG/100ML
500 INJECTION, SOLUTION INTRAVENOUS ONCE
Status: COMPLETED | OUTPATIENT
Start: 2023-09-19 | End: 2023-09-19

## 2023-09-19 RX ORDER — SUCCINYLCHOLINE/SOD CL,ISO/PF 200MG/10ML
SYRINGE (ML) INTRAVENOUS AS NEEDED
Status: DISCONTINUED | OUTPATIENT
Start: 2023-09-19 | End: 2023-09-19 | Stop reason: SURG

## 2023-09-19 RX ORDER — GABAPENTIN 400 MG/1
400 CAPSULE ORAL EVERY 8 HOURS SCHEDULED
Status: DISCONTINUED | OUTPATIENT
Start: 2023-09-19 | End: 2023-09-21 | Stop reason: HOSPADM

## 2023-09-19 RX ORDER — NALOXONE HCL 0.4 MG/ML
0.4 VIAL (ML) INJECTION
Status: DISCONTINUED | OUTPATIENT
Start: 2023-09-19 | End: 2023-09-21 | Stop reason: HOSPADM

## 2023-09-19 RX ORDER — KETOROLAC TROMETHAMINE 30 MG/ML
INJECTION, SOLUTION INTRAMUSCULAR; INTRAVENOUS AS NEEDED
Status: DISCONTINUED | OUTPATIENT
Start: 2023-09-19 | End: 2023-09-19 | Stop reason: SURG

## 2023-09-19 RX ORDER — SODIUM CHLORIDE, SODIUM LACTATE, POTASSIUM CHLORIDE, CALCIUM CHLORIDE 600; 310; 30; 20 MG/100ML; MG/100ML; MG/100ML; MG/100ML
9 INJECTION, SOLUTION INTRAVENOUS CONTINUOUS
Status: DISCONTINUED | OUTPATIENT
Start: 2023-09-19 | End: 2023-09-19

## 2023-09-19 RX ORDER — SODIUM CHLORIDE 0.9 % (FLUSH) 0.9 %
10 SYRINGE (ML) INJECTION EVERY 12 HOURS SCHEDULED
Status: DISCONTINUED | OUTPATIENT
Start: 2023-09-19 | End: 2023-09-19 | Stop reason: HOSPADM

## 2023-09-19 RX ORDER — HYDROMORPHONE HYDROCHLORIDE 1 MG/ML
0.25 INJECTION, SOLUTION INTRAMUSCULAR; INTRAVENOUS; SUBCUTANEOUS
Status: DISCONTINUED | OUTPATIENT
Start: 2023-09-19 | End: 2023-09-21 | Stop reason: HOSPADM

## 2023-09-19 RX ORDER — ONDANSETRON 2 MG/ML
4 INJECTION INTRAMUSCULAR; INTRAVENOUS ONCE AS NEEDED
Status: DISCONTINUED | OUTPATIENT
Start: 2023-09-19 | End: 2023-09-19 | Stop reason: HOSPADM

## 2023-09-19 RX ORDER — NALOXONE HCL 0.4 MG/ML
0.2 VIAL (ML) INJECTION AS NEEDED
Status: DISCONTINUED | OUTPATIENT
Start: 2023-09-19 | End: 2023-09-19 | Stop reason: HOSPADM

## 2023-09-19 RX ORDER — PROPOFOL 10 MG/ML
VIAL (ML) INTRAVENOUS AS NEEDED
Status: DISCONTINUED | OUTPATIENT
Start: 2023-09-19 | End: 2023-09-19 | Stop reason: SURG

## 2023-09-19 RX ORDER — FLUMAZENIL 0.1 MG/ML
0.2 INJECTION INTRAVENOUS AS NEEDED
Status: DISCONTINUED | OUTPATIENT
Start: 2023-09-19 | End: 2023-09-19 | Stop reason: HOSPADM

## 2023-09-19 RX ORDER — FENTANYL CITRATE 50 UG/ML
50 INJECTION, SOLUTION INTRAMUSCULAR; INTRAVENOUS ONCE AS NEEDED
Status: DISCONTINUED | OUTPATIENT
Start: 2023-09-19 | End: 2023-09-19 | Stop reason: HOSPADM

## 2023-09-19 RX ORDER — FENTANYL CITRATE 50 UG/ML
INJECTION, SOLUTION INTRAMUSCULAR; INTRAVENOUS AS NEEDED
Status: DISCONTINUED | OUTPATIENT
Start: 2023-09-19 | End: 2023-09-19 | Stop reason: SURG

## 2023-09-19 RX ORDER — CEFAZOLIN SODIUM 2 G/100ML
2 INJECTION, SOLUTION INTRAVENOUS ONCE
Status: COMPLETED | OUTPATIENT
Start: 2023-09-19 | End: 2023-09-19

## 2023-09-19 RX ORDER — DIPHENHYDRAMINE HYDROCHLORIDE 50 MG/ML
12.5 INJECTION INTRAMUSCULAR; INTRAVENOUS
Status: DISCONTINUED | OUTPATIENT
Start: 2023-09-19 | End: 2023-09-19 | Stop reason: HOSPADM

## 2023-09-19 RX ORDER — HYDRALAZINE HYDROCHLORIDE 20 MG/ML
5 INJECTION INTRAMUSCULAR; INTRAVENOUS
Status: DISCONTINUED | OUTPATIENT
Start: 2023-09-19 | End: 2023-09-19 | Stop reason: HOSPADM

## 2023-09-19 RX ORDER — ONDANSETRON 2 MG/ML
INJECTION INTRAMUSCULAR; INTRAVENOUS AS NEEDED
Status: DISCONTINUED | OUTPATIENT
Start: 2023-09-19 | End: 2023-09-19 | Stop reason: SURG

## 2023-09-19 RX ORDER — FENTANYL CITRATE 50 UG/ML
50 INJECTION, SOLUTION INTRAMUSCULAR; INTRAVENOUS
Status: DISCONTINUED | OUTPATIENT
Start: 2023-09-19 | End: 2023-09-19 | Stop reason: HOSPADM

## 2023-09-19 RX ORDER — CELECOXIB 200 MG/1
200 CAPSULE ORAL EVERY 12 HOURS SCHEDULED
Status: DISCONTINUED | OUTPATIENT
Start: 2023-09-19 | End: 2023-09-21 | Stop reason: HOSPADM

## 2023-09-19 RX ORDER — SODIUM CHLORIDE 9 MG/ML
40 INJECTION, SOLUTION INTRAVENOUS AS NEEDED
Status: DISCONTINUED | OUTPATIENT
Start: 2023-09-19 | End: 2023-09-19 | Stop reason: HOSPADM

## 2023-09-19 RX ORDER — LABETALOL 200 MG/1
200 TABLET, FILM COATED ORAL EVERY 12 HOURS SCHEDULED
Status: DISCONTINUED | OUTPATIENT
Start: 2023-09-19 | End: 2023-09-20

## 2023-09-19 RX ORDER — MAGNESIUM SULFATE HEPTAHYDRATE 500 MG/ML
INJECTION, SOLUTION INTRAMUSCULAR; INTRAVENOUS AS NEEDED
Status: DISCONTINUED | OUTPATIENT
Start: 2023-09-19 | End: 2023-09-19 | Stop reason: SURG

## 2023-09-19 RX ORDER — SODIUM CHLORIDE, SODIUM LACTATE, POTASSIUM CHLORIDE, CALCIUM CHLORIDE 600; 310; 30; 20 MG/100ML; MG/100ML; MG/100ML; MG/100ML
50 INJECTION, SOLUTION INTRAVENOUS CONTINUOUS
Status: DISCONTINUED | OUTPATIENT
Start: 2023-09-19 | End: 2023-09-21 | Stop reason: HOSPADM

## 2023-09-19 RX ORDER — PROMETHAZINE HYDROCHLORIDE 25 MG/1
25 TABLET ORAL ONCE AS NEEDED
Status: DISCONTINUED | OUTPATIENT
Start: 2023-09-19 | End: 2023-09-19 | Stop reason: HOSPADM

## 2023-09-19 RX ORDER — LIDOCAINE HYDROCHLORIDE 20 MG/ML
INJECTION, SOLUTION INFILTRATION; PERINEURAL AS NEEDED
Status: DISCONTINUED | OUTPATIENT
Start: 2023-09-19 | End: 2023-09-19 | Stop reason: SURG

## 2023-09-19 RX ORDER — IPRATROPIUM BROMIDE AND ALBUTEROL SULFATE 2.5; .5 MG/3ML; MG/3ML
3 SOLUTION RESPIRATORY (INHALATION) ONCE AS NEEDED
Status: DISCONTINUED | OUTPATIENT
Start: 2023-09-19 | End: 2023-09-19 | Stop reason: HOSPADM

## 2023-09-19 RX ORDER — DROPERIDOL 2.5 MG/ML
0.62 INJECTION, SOLUTION INTRAMUSCULAR; INTRAVENOUS
Status: DISCONTINUED | OUTPATIENT
Start: 2023-09-19 | End: 2023-09-19 | Stop reason: HOSPADM

## 2023-09-19 RX ORDER — LABETALOL HYDROCHLORIDE 5 MG/ML
5 INJECTION, SOLUTION INTRAVENOUS
Status: DISCONTINUED | OUTPATIENT
Start: 2023-09-19 | End: 2023-09-19 | Stop reason: HOSPADM

## 2023-09-19 RX ORDER — MIDAZOLAM HYDROCHLORIDE 1 MG/ML
1 INJECTION INTRAMUSCULAR; INTRAVENOUS
Status: DISCONTINUED | OUTPATIENT
Start: 2023-09-19 | End: 2023-09-19 | Stop reason: HOSPADM

## 2023-09-19 RX ORDER — NITROGLYCERIN 0.4 MG/1
0.4 TABLET SUBLINGUAL
Status: DISCONTINUED | OUTPATIENT
Start: 2023-09-19 | End: 2023-09-21 | Stop reason: HOSPADM

## 2023-09-19 RX ORDER — FAMOTIDINE 10 MG/ML
20 INJECTION, SOLUTION INTRAVENOUS ONCE
Status: COMPLETED | OUTPATIENT
Start: 2023-09-19 | End: 2023-09-19

## 2023-09-19 RX ORDER — PROMETHAZINE HYDROCHLORIDE 25 MG/1
25 SUPPOSITORY RECTAL ONCE AS NEEDED
Status: DISCONTINUED | OUTPATIENT
Start: 2023-09-19 | End: 2023-09-19 | Stop reason: HOSPADM

## 2023-09-19 RX ORDER — ACETAMINOPHEN 650 MG
TABLET, EXTENDED RELEASE ORAL AS NEEDED
Status: DISCONTINUED | OUTPATIENT
Start: 2023-09-19 | End: 2023-09-19 | Stop reason: HOSPADM

## 2023-09-19 RX ORDER — TRAZODONE HYDROCHLORIDE 50 MG/1
50 TABLET ORAL NIGHTLY
Status: DISCONTINUED | OUTPATIENT
Start: 2023-09-19 | End: 2023-09-21 | Stop reason: HOSPADM

## 2023-09-19 RX ORDER — SODIUM CHLORIDE 0.9 % (FLUSH) 0.9 %
10 SYRINGE (ML) INJECTION AS NEEDED
Status: DISCONTINUED | OUTPATIENT
Start: 2023-09-19 | End: 2023-09-19 | Stop reason: HOSPADM

## 2023-09-19 RX ORDER — ONDANSETRON 2 MG/ML
4 INJECTION INTRAMUSCULAR; INTRAVENOUS EVERY 6 HOURS PRN
Status: DISCONTINUED | OUTPATIENT
Start: 2023-09-19 | End: 2023-09-21 | Stop reason: HOSPADM

## 2023-09-19 RX ORDER — ACETAMINOPHEN 500 MG
1000 TABLET ORAL EVERY 6 HOURS
Status: DISCONTINUED | OUTPATIENT
Start: 2023-09-19 | End: 2023-09-21 | Stop reason: HOSPADM

## 2023-09-19 RX ORDER — OXYCODONE AND ACETAMINOPHEN 7.5; 325 MG/1; MG/1
1 TABLET ORAL EVERY 4 HOURS PRN
Status: DISCONTINUED | OUTPATIENT
Start: 2023-09-19 | End: 2023-09-19 | Stop reason: HOSPADM

## 2023-09-19 RX ORDER — KETAMINE HCL IN NACL, ISO-OSM 100MG/10ML
SYRINGE (ML) INJECTION AS NEEDED
Status: DISCONTINUED | OUTPATIENT
Start: 2023-09-19 | End: 2023-09-19 | Stop reason: SURG

## 2023-09-19 RX ORDER — SODIUM CHLORIDE 0.9 % (FLUSH) 0.9 %
3-10 SYRINGE (ML) INJECTION AS NEEDED
Status: DISCONTINUED | OUTPATIENT
Start: 2023-09-19 | End: 2023-09-19 | Stop reason: HOSPADM

## 2023-09-19 RX ORDER — EPHEDRINE SULFATE 50 MG/ML
5 INJECTION, SOLUTION INTRAVENOUS ONCE AS NEEDED
Status: DISCONTINUED | OUTPATIENT
Start: 2023-09-19 | End: 2023-09-19 | Stop reason: HOSPADM

## 2023-09-19 RX ORDER — HYDROCODONE BITARTRATE AND ACETAMINOPHEN 7.5; 325 MG/1; MG/1
1 TABLET ORAL ONCE AS NEEDED
Status: DISCONTINUED | OUTPATIENT
Start: 2023-09-19 | End: 2023-09-19 | Stop reason: HOSPADM

## 2023-09-19 RX ORDER — ACETAMINOPHEN 10 MG/ML
INJECTION, SOLUTION INTRAVENOUS AS NEEDED
Status: DISCONTINUED | OUTPATIENT
Start: 2023-09-19 | End: 2023-09-19 | Stop reason: SURG

## 2023-09-19 RX ORDER — MAGNESIUM HYDROXIDE 1200 MG/15ML
LIQUID ORAL AS NEEDED
Status: DISCONTINUED | OUTPATIENT
Start: 2023-09-19 | End: 2023-09-19 | Stop reason: HOSPADM

## 2023-09-19 RX ORDER — LOSARTAN POTASSIUM 50 MG/1
50 TABLET ORAL NIGHTLY
Status: DISCONTINUED | OUTPATIENT
Start: 2023-09-19 | End: 2023-09-21 | Stop reason: HOSPADM

## 2023-09-19 RX ORDER — EPHEDRINE SULFATE 50 MG/ML
INJECTION INTRAVENOUS AS NEEDED
Status: DISCONTINUED | OUTPATIENT
Start: 2023-09-19 | End: 2023-09-19 | Stop reason: SURG

## 2023-09-19 RX ORDER — SODIUM CHLORIDE 0.9 % (FLUSH) 0.9 %
3 SYRINGE (ML) INJECTION EVERY 12 HOURS SCHEDULED
Status: DISCONTINUED | OUTPATIENT
Start: 2023-09-19 | End: 2023-09-19 | Stop reason: HOSPADM

## 2023-09-19 RX ADMIN — HYDROMORPHONE HYDROCHLORIDE 0.5 MG: 1 INJECTION, SOLUTION INTRAMUSCULAR; INTRAVENOUS; SUBCUTANEOUS at 10:44

## 2023-09-19 RX ADMIN — MAGNESIUM SULFATE HEPTAHYDRATE 2 G: 500 INJECTION, SOLUTION INTRAMUSCULAR; INTRAVENOUS at 07:09

## 2023-09-19 RX ADMIN — HYDROMORPHONE HYDROCHLORIDE 0.5 MG: 1 INJECTION, SOLUTION INTRAMUSCULAR; INTRAVENOUS; SUBCUTANEOUS at 11:35

## 2023-09-19 RX ADMIN — ACETAMINOPHEN 1000 MG: 500 TABLET ORAL at 20:29

## 2023-09-19 RX ADMIN — HYDROMORPHONE HYDROCHLORIDE 0.5 MG: 1 INJECTION, SOLUTION INTRAMUSCULAR; INTRAVENOUS; SUBCUTANEOUS at 09:23

## 2023-09-19 RX ADMIN — PROPOFOL 150 MG: 10 INJECTION, EMULSION INTRAVENOUS at 07:04

## 2023-09-19 RX ADMIN — MIDAZOLAM 1 MG: 1 INJECTION INTRAMUSCULAR; INTRAVENOUS at 06:52

## 2023-09-19 RX ADMIN — PROPOFOL 50 MG: 10 INJECTION, EMULSION INTRAVENOUS at 07:12

## 2023-09-19 RX ADMIN — ACETAMINOPHEN 1000 MG: 10 INJECTION, SOLUTION INTRAVENOUS at 07:33

## 2023-09-19 RX ADMIN — Medication 10 MG: at 08:07

## 2023-09-19 RX ADMIN — PROPOFOL 50 MG: 10 INJECTION, EMULSION INTRAVENOUS at 08:53

## 2023-09-19 RX ADMIN — LIDOCAINE HYDROCHLORIDE 80 MG: 20 INJECTION, SOLUTION INFILTRATION; PERINEURAL at 07:04

## 2023-09-19 RX ADMIN — KETOROLAC TROMETHAMINE 30 MG: 30 INJECTION, SOLUTION INTRAMUSCULAR; INTRAVENOUS at 07:29

## 2023-09-19 RX ADMIN — FENTANYL CITRATE 50 MCG: 50 INJECTION, SOLUTION INTRAMUSCULAR; INTRAVENOUS at 08:30

## 2023-09-19 RX ADMIN — SODIUM CHLORIDE, POTASSIUM CHLORIDE, SODIUM LACTATE AND CALCIUM CHLORIDE 50 ML/HR: 600; 310; 30; 20 INJECTION, SOLUTION INTRAVENOUS at 14:02

## 2023-09-19 RX ADMIN — SODIUM CHLORIDE, POTASSIUM CHLORIDE, SODIUM LACTATE AND CALCIUM CHLORIDE: 600; 310; 30; 20 INJECTION, SOLUTION INTRAVENOUS at 08:54

## 2023-09-19 RX ADMIN — Medication 120 MG: at 07:05

## 2023-09-19 RX ADMIN — Medication 30 MG: at 07:31

## 2023-09-19 RX ADMIN — HYDROMORPHONE HYDROCHLORIDE 0.25 MG: 1 INJECTION, SOLUTION INTRAMUSCULAR; INTRAVENOUS; SUBCUTANEOUS at 18:06

## 2023-09-19 RX ADMIN — Medication 10 MG: at 08:25

## 2023-09-19 RX ADMIN — TRAZODONE HYDROCHLORIDE 50 MG: 50 TABLET ORAL at 20:29

## 2023-09-19 RX ADMIN — ONDANSETRON 4 MG: 2 INJECTION INTRAMUSCULAR; INTRAVENOUS at 08:15

## 2023-09-19 RX ADMIN — EPHEDRINE SULFATE 10 MG: 50 INJECTION INTRAVENOUS at 07:39

## 2023-09-19 RX ADMIN — FENTANYL CITRATE 50 MCG: 50 INJECTION, SOLUTION INTRAMUSCULAR; INTRAVENOUS at 07:27

## 2023-09-19 RX ADMIN — FENTANYL CITRATE 50 MCG: 50 INJECTION, SOLUTION INTRAMUSCULAR; INTRAVENOUS at 09:51

## 2023-09-19 RX ADMIN — FENTANYL CITRATE 50 MCG: 50 INJECTION, SOLUTION INTRAMUSCULAR; INTRAVENOUS at 07:15

## 2023-09-19 RX ADMIN — PROPOFOL 50 MG: 10 INJECTION, EMULSION INTRAVENOUS at 07:28

## 2023-09-19 RX ADMIN — CELECOXIB 200 MG: 200 CAPSULE ORAL at 18:06

## 2023-09-19 RX ADMIN — METRONIDAZOLE 500 MG: 500 INJECTION, SOLUTION INTRAVENOUS at 06:52

## 2023-09-19 RX ADMIN — VORTIOXETINE 20 MG: 5 TABLET, FILM COATED ORAL at 20:30

## 2023-09-19 RX ADMIN — FAMOTIDINE 20 MG: 10 INJECTION INTRAVENOUS at 06:52

## 2023-09-19 RX ADMIN — HYDROMORPHONE HYDROCHLORIDE 0.5 MG: 1 INJECTION, SOLUTION INTRAMUSCULAR; INTRAVENOUS; SUBCUTANEOUS at 12:59

## 2023-09-19 RX ADMIN — SODIUM CHLORIDE, POTASSIUM CHLORIDE, SODIUM LACTATE AND CALCIUM CHLORIDE 9 ML/HR: 600; 310; 30; 20 INJECTION, SOLUTION INTRAVENOUS at 06:53

## 2023-09-19 RX ADMIN — EPHEDRINE SULFATE 10 MG: 50 INJECTION INTRAVENOUS at 07:36

## 2023-09-19 RX ADMIN — CEFAZOLIN SODIUM 2 G: 2 INJECTION, SOLUTION INTRAVENOUS at 06:52

## 2023-09-19 RX ADMIN — GABAPENTIN 400 MG: 400 CAPSULE ORAL at 14:07

## 2023-09-19 RX ADMIN — ACETAMINOPHEN 1000 MG: 500 TABLET ORAL at 13:00

## 2023-09-19 RX ADMIN — GABAPENTIN 400 MG: 400 CAPSULE ORAL at 22:19

## 2023-09-19 RX ADMIN — HYDROMORPHONE HYDROCHLORIDE 0.25 MG: 1 INJECTION, SOLUTION INTRAMUSCULAR; INTRAVENOUS; SUBCUTANEOUS at 22:19

## 2023-09-19 RX ADMIN — LABETALOL HYDROCHLORIDE 200 MG: 200 TABLET, FILM COATED ORAL at 20:29

## 2023-09-19 NOTE — ANESTHESIA PREPROCEDURE EVALUATION
Anesthesia Evaluation     Patient summary reviewed and Nursing notes reviewed   no history of anesthetic complications:   NPO Solid Status: > 8 hours  NPO Liquid Status: > 4 hours           Airway   Mallampati: II  Neck ROM: full  Dental - normal exam     Pulmonary - normal exam   Cardiovascular     Rhythm: regular  Rate: normal    (+) hypertension      Neuro/Psych  (+) headaches, psychiatric history Anxiety and Depression  GI/Hepatic/Renal/Endo    (+) obesity (BMI 50), morbid obesity, GI bleeding , diabetes mellitus    Musculoskeletal     Abdominal   (+) obese   Substance History      OB/GYN          Other   arthritis,                   Anesthesia Plan    ASA 3     general     (Had a grade 1 view with previous intubation)  intravenous induction     Anesthetic plan, risks, benefits, and alternatives have been provided, discussed and informed consent has been obtained with: patient and mother.    CODE STATUS:

## 2023-09-19 NOTE — ANESTHESIA POSTPROCEDURE EVALUATION
"Patient: Liz Bagley    Procedure Summary       Date: 09/19/23 Room / Location: [facility] OR [address] [place] [address] MAIN OR    Anesthesia Start: 0659 Anesthesia Stop: 0918    Procedure: Resection of anus and rectal cuff (Rectum) Diagnosis:       Inflammatory bowel disease      (Inflammatory bowel disease [K52.9])    Surgeons: Colin Evans MD Provider: Romaine Ennis MD    Anesthesia Type: general ASA Status: 3            Anesthesia Type: general    Vitals  Vitals Value Taken Time   /59 09/19/23 1300   Temp 36.4 °C (97.6 °F) 09/19/23 0915   Pulse 76 09/19/23 1308   Resp 16 09/19/23 1230   SpO2 100 % 09/19/23 1308   Vitals shown include unvalidated device data.        Post Anesthesia Care and Evaluation    Patient location during evaluation: PACU  Patient participation: complete - patient participated  Level of consciousness: awake and alert  Pain management: adequate    Airway patency: patent  Anesthetic complications: No anesthetic complications    Cardiovascular status: acceptable  Respiratory status: acceptable  Hydration status: acceptable    Comments: /62   Pulse 61   Temp 36.4 °C (97.6 °F) (Oral)   Resp 16   Ht 160 cm (63\")   LMP  (LMP Unknown)   SpO2 99%   BMI 49.92 kg/m²       "

## 2023-09-19 NOTE — BRIEF OP NOTE
RECTAL MASS EXCISION  Progress Note    Liz Bagley  9/19/2023    Pre-op Diagnosis:   Inflammatory bowel disease [K52.9]       Post-Op Diagnosis Codes:     * Inflammatory bowel disease [K52.9]    Procedure/CPT® Codes:        Procedure(s):  Resection of anus and rectal cuff              Surgeon(s):  Colin Evans MD    Anesthesia: General    Staff:   Circulator: Inocencio Bal RN  Scrub Person: Francine Ackerman Rebecca  Assistant: Ashwini Rosario PA-C  Assistant: Ashwini Rosario PA-C      Estimated Blood Loss: 500 mL    Urine Voided: * No values recorded between 9/19/2023  6:59 AM and 9/19/2023  8:53 AM *    Specimens:                Specimens       ID Source Type Tests Collected By Collected At Frozen?    A Anus Tissue TISSUE PATHOLOGY EXAM   Colin Evans MD 9/19/23 0825 No    Description: Anus and rectum    B Buttock, Right Tissue TISSUE PATHOLOGY EXAM   Colin Evans MD 9/19/23 0829 No    Description: Right Buttock Abcess Cavity                  Drains:   Closed/Suction Drain 1 Medial Perineal Bulb 19 Fr. (Active)       Ileostomy RLQ (Active)   Stomal Appliance Dry;Intact 08/21/23 1335   Stoma Appearance other (see comments) 08/21/23 1335   Peristomal Assessment Clean;Intact;Airway [place] 08/21/23 1335   Stoma Function no stool 08/21/23 1335       [REMOVED] Urethral Catheter Straight-tip;Silicone 16 Fr. (Removed)       Findings:         Complications:       Colin Evans MD     Date: 9/19/2023  Time: 09:00 EDT

## 2023-09-19 NOTE — OP NOTE
Surgeon: Colin Evans MD    Surgical  Assistant: Ashwini Rosario PA-C     Preoperative diagnosis: Inflammatory bowel disease [K52.9]    Post-Op Diagnosis Codes:     * Inflammatory bowel disease [K52.9]    Procedure: Resection of anus and rectal cuff, * Panel 2 does not exist *    Estimated Blood Loss: 500 mL    Specimens:   Specimens       ID Source Type Tests Collected By Collected At Frozen?    A Anus Tissue TISSUE PATHOLOGY EXAM   Colin Evans MD 9/19/23 0825 No    Description: Anus and rectum    B Buttock, Right Tissue TISSUE PATHOLOGY EXAM   Colin Evans MD 9/19/23 0829 No    Description: Right Buttock Abcess Cavity           Order Name Source Comment Collection Info Order Time   TISSUE PATHOLOGY EXAM Anus  Collected By: Colin Evans MD 9/19/2023  8:26 AM     Release to patient   Routine Release            Indication:  Liz Bagley is a 37 y.o. female who comes in with Inflammatory bowel disease  Patient understands risks, benefits,and alternatives wishes to proceed.      Procedure Details:    Assistant: Ashwini Rosario PA-C was responsible for performing the following activities: Retraction, Suction, Irrigation, Suturing, Closing and Placing Dressing and their skilled assistance was necessary for the success of this case   that was in the subcutaneous tissue adjacent to the rectum. I included that in the resection.  Anteriorly, in the rectovaginal septum after about 2 cm of rectum, there was a large draining abscess cavity that had a significant amount of mucus in it.  It was old and was granulating, had granulation tissue in it.  I excised all of that and included it in with the specimen.  On the right side, on the lateral side of the anus, it seemed like there was another cavity in it, had direct extension into this anterior cavity and then it went to the buttock where the right side was draining.  The cavity was in the shape of a lazy “S” going from the right buttock into the rectovaginal septal abscess cavity.  I debrided all of that area and sent that with anus and rectum.  I debrided the right buttock opening and then I placed a drain into the left buttock, it was a 19 Pio drain, and coiled it into the rectovaginal septum. I then closed the incision. I closed it in layers. I closed the levators with 0 Vicryl and then deep tissue with 0 Vicryl. I closed the skin with 4-0 Monocryl and then were all in interrupted fashion.  The skin was in horizontal mattress fashion. I secured the drain with silk. I examined the vagina because the drain would not hold suction and right at the introitus posteriorly there were 2 small holes where it appeared that that abscess cavity had drained into so I did some figure-of-eight sutures there to close with 2-0 Vicryl.  All instrument, lap counts and needle counts were correct.  The patient was stable throughout the entire case and was taken to recovery.       Assistant: Ashwini Rosario PA-C was responsible for performing the following activities: Retraction, Suction, Irrigation, Suturing, Closing and Placing Dressing and their skilled assistance was necessary for the success of this case

## 2023-09-19 NOTE — ANESTHESIA PROCEDURE NOTES
Airway  Urgency: elective    Date/Time: 9/19/2023 7:07 AM  Airway not difficult    General Information and Staff    Patient location during procedure: OR  CRNA/CAA: Ashwini Mosquera CRNA    Indications and Patient Condition    Preoxygenated: yes  Mask difficulty assessment: 1 - vent by mask    Final Airway Details  Final airway type: endotracheal airway      Successful airway: ETT  Cuffed: yes   Successful intubation technique: direct laryngoscopy  Facilitating devices/methods: anterior pressure/BURP  Endotracheal tube insertion site: oral  Blade: Annika  Blade size: 3  ETT size (mm): 7.0  Cormack-Lehane Classification: grade I - full view of glottis  Placement verified by: chest auscultation and capnometry   Measured from: teeth  ETT/EBT  to teeth (cm): 21  Number of attempts at approach: 1  Assessment: lips, teeth, and gum same as pre-op and atraumatic intubation    Additional Comments  Teeth, tongue, lips, and gums in preop condition. VSS throughout. Easy mask/smooth easy airway. Tube visualized through cords.

## 2023-09-19 NOTE — PLAN OF CARE
Goal Outcome Evaluation:           Progress: no change  Outcome Evaluation: Pt admitted to unit after surgery. CARISSA drain to low wall suction. Gauze and peripad changed X 1. Pt up to bedside commode with assist X 1. Voiding freely. Taking scheduled medications for pain. SCDs in place. V IVF as ordered. VSS.

## 2023-09-20 LAB
ANION GAP SERPL CALCULATED.3IONS-SCNC: 7.2 MMOL/L (ref 5–15)
BASOPHILS # BLD AUTO: 0.05 10*3/MM3 (ref 0–0.2)
BASOPHILS NFR BLD AUTO: 0.4 % (ref 0–1.5)
BUN SERPL-MCNC: 6 MG/DL (ref 6–20)
BUN/CREAT SERPL: 15.8 (ref 7–25)
CALCIUM SPEC-SCNC: 8.4 MG/DL (ref 8.6–10.5)
CHLORIDE SERPL-SCNC: 108 MMOL/L (ref 98–107)
CO2 SERPL-SCNC: 25.8 MMOL/L (ref 22–29)
CREAT SERPL-MCNC: 0.38 MG/DL (ref 0.57–1)
DEPRECATED RDW RBC AUTO: 40 FL (ref 37–54)
EGFRCR SERPLBLD CKD-EPI 2021: 130.9 ML/MIN/1.73
EOSINOPHIL # BLD AUTO: 0.34 10*3/MM3 (ref 0–0.4)
EOSINOPHIL NFR BLD AUTO: 2.7 % (ref 0.3–6.2)
ERYTHROCYTE [DISTWIDTH] IN BLOOD BY AUTOMATED COUNT: 13.4 % (ref 12.3–15.4)
GLUCOSE SERPL-MCNC: 104 MG/DL (ref 65–99)
HCT VFR BLD AUTO: 28.6 % (ref 34–46.6)
HGB BLD-MCNC: 9.1 G/DL (ref 12–15.9)
IMM GRANULOCYTES # BLD AUTO: 0.03 10*3/MM3 (ref 0–0.05)
IMM GRANULOCYTES NFR BLD AUTO: 0.2 % (ref 0–0.5)
INR PPP: 1.11 (ref 0.9–1.1)
LYMPHOCYTES # BLD AUTO: 3.13 10*3/MM3 (ref 0.7–3.1)
LYMPHOCYTES NFR BLD AUTO: 25.2 % (ref 19.6–45.3)
MAGNESIUM SERPL-MCNC: 2.3 MG/DL (ref 1.6–2.6)
MCH RBC QN AUTO: 26.5 PG (ref 26.6–33)
MCHC RBC AUTO-ENTMCNC: 31.8 G/DL (ref 31.5–35.7)
MCV RBC AUTO: 83.1 FL (ref 79–97)
MONOCYTES # BLD AUTO: 0.69 10*3/MM3 (ref 0.1–0.9)
MONOCYTES NFR BLD AUTO: 5.6 % (ref 5–12)
NEUTROPHILS NFR BLD AUTO: 65.9 % (ref 42.7–76)
NEUTROPHILS NFR BLD AUTO: 8.17 10*3/MM3 (ref 1.7–7)
NRBC BLD AUTO-RTO: 0 /100 WBC (ref 0–0.2)
PLATELET # BLD AUTO: 402 10*3/MM3 (ref 140–450)
PMV BLD AUTO: 9.6 FL (ref 6–12)
POTASSIUM SERPL-SCNC: 3.8 MMOL/L (ref 3.5–5.2)
PROTHROMBIN TIME: 14.4 SECONDS (ref 11.7–14.2)
RBC # BLD AUTO: 3.44 10*6/MM3 (ref 3.77–5.28)
SODIUM SERPL-SCNC: 141 MMOL/L (ref 136–145)
WBC NRBC COR # BLD: 12.41 10*3/MM3 (ref 3.4–10.8)

## 2023-09-20 PROCEDURE — 85025 COMPLETE CBC W/AUTO DIFF WBC: CPT | Performed by: COLON & RECTAL SURGERY

## 2023-09-20 PROCEDURE — 83735 ASSAY OF MAGNESIUM: CPT | Performed by: COLON & RECTAL SURGERY

## 2023-09-20 PROCEDURE — 85610 PROTHROMBIN TIME: CPT | Performed by: COLON & RECTAL SURGERY

## 2023-09-20 PROCEDURE — 80048 BASIC METABOLIC PNL TOTAL CA: CPT | Performed by: COLON & RECTAL SURGERY

## 2023-09-20 PROCEDURE — 25010000002 HYDROMORPHONE PER 4 MG: Performed by: COLON & RECTAL SURGERY

## 2023-09-20 PROCEDURE — G0378 HOSPITAL OBSERVATION PER HR: HCPCS

## 2023-09-20 RX ORDER — OXYCODONE HYDROCHLORIDE 5 MG/1
5 TABLET ORAL EVERY 4 HOURS PRN
Status: DISCONTINUED | OUTPATIENT
Start: 2023-09-20 | End: 2023-09-21 | Stop reason: HOSPADM

## 2023-09-20 RX ORDER — LABETALOL 100 MG/1
100 TABLET, FILM COATED ORAL EVERY 12 HOURS SCHEDULED
Status: DISCONTINUED | OUTPATIENT
Start: 2023-09-20 | End: 2023-09-21 | Stop reason: HOSPADM

## 2023-09-20 RX ADMIN — CELECOXIB 200 MG: 200 CAPSULE ORAL at 08:03

## 2023-09-20 RX ADMIN — HYDROMORPHONE HYDROCHLORIDE 0.25 MG: 1 INJECTION, SOLUTION INTRAMUSCULAR; INTRAVENOUS; SUBCUTANEOUS at 05:51

## 2023-09-20 RX ADMIN — ACETAMINOPHEN 1000 MG: 500 TABLET ORAL at 21:15

## 2023-09-20 RX ADMIN — OXYCODONE HYDROCHLORIDE 5 MG: 5 TABLET ORAL at 23:07

## 2023-09-20 RX ADMIN — TRAZODONE HYDROCHLORIDE 50 MG: 50 TABLET ORAL at 21:14

## 2023-09-20 RX ADMIN — ACETAMINOPHEN 1000 MG: 500 TABLET ORAL at 01:12

## 2023-09-20 RX ADMIN — GABAPENTIN 400 MG: 400 CAPSULE ORAL at 05:50

## 2023-09-20 RX ADMIN — LABETALOL HYDROCHLORIDE 200 MG: 200 TABLET, FILM COATED ORAL at 10:09

## 2023-09-20 RX ADMIN — GABAPENTIN 400 MG: 400 CAPSULE ORAL at 14:12

## 2023-09-20 RX ADMIN — LABETALOL HYDROCHLORIDE 100 MG: 100 TABLET, FILM COATED ORAL at 21:15

## 2023-09-20 RX ADMIN — ACETAMINOPHEN 1000 MG: 500 TABLET ORAL at 14:12

## 2023-09-20 RX ADMIN — VORTIOXETINE 20 MG: 5 TABLET, FILM COATED ORAL at 21:15

## 2023-09-20 RX ADMIN — HYDROMORPHONE HYDROCHLORIDE 0.25 MG: 1 INJECTION, SOLUTION INTRAMUSCULAR; INTRAVENOUS; SUBCUTANEOUS at 13:20

## 2023-09-20 RX ADMIN — SODIUM CHLORIDE, POTASSIUM CHLORIDE, SODIUM LACTATE AND CALCIUM CHLORIDE 50 ML/HR: 600; 310; 30; 20 INJECTION, SOLUTION INTRAVENOUS at 01:12

## 2023-09-20 RX ADMIN — CELECOXIB 200 MG: 200 CAPSULE ORAL at 21:15

## 2023-09-20 RX ADMIN — GABAPENTIN 400 MG: 400 CAPSULE ORAL at 21:21

## 2023-09-20 RX ADMIN — ACETAMINOPHEN 1000 MG: 500 TABLET ORAL at 08:03

## 2023-09-20 RX ADMIN — OXYCODONE HYDROCHLORIDE 5 MG: 5 TABLET ORAL at 18:37

## 2023-09-20 RX ADMIN — SODIUM CHLORIDE, POTASSIUM CHLORIDE, SODIUM LACTATE AND CALCIUM CHLORIDE 50 ML/HR: 600; 310; 30; 20 INJECTION, SOLUTION INTRAVENOUS at 21:13

## 2023-09-20 NOTE — PLAN OF CARE
Goal Outcome Evaluation:  Plan of Care Reviewed With: patient        Progress: improving  Outcome Evaluation: Pain well controlled with scheduled medications and PRN dilaudid. CARISSA x1 with small amount of bloody output - hooked to continuous LWS. Incision with sutures - gauze in place. Up with assist to bathroom. Encouraged frequent use of incentive spirometer. SCDs on when in bed. Patient still needs to walk this shift. Plan of care ongoing.

## 2023-09-20 NOTE — PLAN OF CARE
Goal Outcome Evaluation:  Plan of Care Reviewed With: patient        Progress: improving  Outcome Evaluation: vss, schedule tylenol and iv dilaudid for pain, rectal incision with sutures dressed with gauze, up with assist to BSC. ambulated in the hallway, tolerating regular diet, encourage to increase fluid intake and to use incentive spirometer, mario attached to maria guadalupe to LWS with bloody drainage, continue to monitor the pt.

## 2023-09-20 NOTE — PROGRESS NOTES
Progress Note    Pod 1    S: Pt experiencing some perineal discomfort, which is controlled with ERAS and PRN Dilaudid. Eating regular diet and denies any concerns at this time.     O:  Temp:  [97.2 °F (36.2 °C)-97.8 °F (36.6 °C)] 97.4 °F (36.3 °C)  Heart Rate:  [59-80] 70  Resp:  [12-16] 16  BP: ()/(58-81) 125/75      Intake/Output Summary (Last 24 hours) at 9/20/2023 0817  Last data filed at 9/20/2023 0551  Gross per 24 hour   Intake 3376 ml   Output 445 ml   Net 2931 ml       Abd:   soft, non-distended  Incision: Clean and intact with some serosanguinous drainage.   CARISSA drain hooked to LWS. Serosanguinous drainage in canister.       Results from last 7 days   Lab Units 09/20/23  0624   WBC 10*3/mm3 12.41*   HEMOGLOBIN g/dL 9.1*   HEMATOCRIT % 28.6*   PLATELETS 10*3/mm3 402     Results from last 7 days   Lab Units 09/20/23  0624   SODIUM mmol/L 141   POTASSIUM mmol/L 3.8   CHLORIDE mmol/L 108*   CO2 mmol/L 25.8   BUN mg/dL 6   CREATININE mg/dL 0.38*   EGFR mL/min/1.73 130.9   GLUCOSE mg/dL 104*   CALCIUM mg/dL 8.4*       Results from last 7 days   Lab Units 09/20/23  0624   MAGNESIUM mg/dL 2.3       A/P: 37 y.o. female with Hx of IBD is s/p Resection of anus and rectal cuff. POD 1.    - Continue CARISSA drain due to high output.   - Morning lab draw with CBC

## 2023-09-20 NOTE — PROGRESS NOTES
Postop day 1 from completion proctectomy and a drainage of perirectal and rectovaginal septum    Patient having some pain.  Has been reluctant to use the IV pain medication.  Afebrile vital signs are stable  Wound right buttock drain in place with sanguinous drainage in the canister and CARISSA    Perineum wound healing    Likely remove drain tomorrow and possibly home .  Added oxycodone for pain relief  We discussed surger lincoln and possible abscess recurrences.  Discussed that she will likely need extra time to heal than what we had previously discussed given the more extensive nature of surgery than we anticipated.

## 2023-09-21 ENCOUNTER — READMISSION MANAGEMENT (OUTPATIENT)
Dept: CALL CENTER | Facility: HOSPITAL | Age: 38
End: 2023-09-21
Payer: COMMERCIAL

## 2023-09-21 VITALS
BODY MASS INDEX: 49.92 KG/M2 | OXYGEN SATURATION: 98 % | SYSTOLIC BLOOD PRESSURE: 111 MMHG | HEART RATE: 68 BPM | DIASTOLIC BLOOD PRESSURE: 67 MMHG | TEMPERATURE: 97.9 F | RESPIRATION RATE: 18 BRPM | HEIGHT: 63 IN

## 2023-09-21 LAB
BASOPHILS # BLD AUTO: 0.07 10*3/MM3 (ref 0–0.2)
BASOPHILS NFR BLD AUTO: 0.5 % (ref 0–1.5)
DEPRECATED RDW RBC AUTO: 40.2 FL (ref 37–54)
EOSINOPHIL # BLD AUTO: 0.65 10*3/MM3 (ref 0–0.4)
EOSINOPHIL NFR BLD AUTO: 5 % (ref 0.3–6.2)
ERYTHROCYTE [DISTWIDTH] IN BLOOD BY AUTOMATED COUNT: 13.2 % (ref 12.3–15.4)
HCT VFR BLD AUTO: 28.8 % (ref 34–46.6)
HGB BLD-MCNC: 9.1 G/DL (ref 12–15.9)
IMM GRANULOCYTES # BLD AUTO: 0.03 10*3/MM3 (ref 0–0.05)
IMM GRANULOCYTES NFR BLD AUTO: 0.2 % (ref 0–0.5)
LYMPHOCYTES # BLD AUTO: 2.98 10*3/MM3 (ref 0.7–3.1)
LYMPHOCYTES NFR BLD AUTO: 23.1 % (ref 19.6–45.3)
MCH RBC QN AUTO: 26.5 PG (ref 26.6–33)
MCHC RBC AUTO-ENTMCNC: 31.6 G/DL (ref 31.5–35.7)
MCV RBC AUTO: 84 FL (ref 79–97)
MONOCYTES # BLD AUTO: 0.69 10*3/MM3 (ref 0.1–0.9)
MONOCYTES NFR BLD AUTO: 5.4 % (ref 5–12)
NEUTROPHILS NFR BLD AUTO: 65.8 % (ref 42.7–76)
NEUTROPHILS NFR BLD AUTO: 8.47 10*3/MM3 (ref 1.7–7)
NRBC BLD AUTO-RTO: 0 /100 WBC (ref 0–0.2)
PLATELET # BLD AUTO: 441 10*3/MM3 (ref 140–450)
PMV BLD AUTO: 9.9 FL (ref 6–12)
RBC # BLD AUTO: 3.43 10*6/MM3 (ref 3.77–5.28)
WBC NRBC COR # BLD: 12.89 10*3/MM3 (ref 3.4–10.8)

## 2023-09-21 PROCEDURE — G0378 HOSPITAL OBSERVATION PER HR: HCPCS

## 2023-09-21 PROCEDURE — 85025 COMPLETE CBC W/AUTO DIFF WBC: CPT | Performed by: PHYSICIAN ASSISTANT

## 2023-09-21 RX ORDER — LABETALOL 100 MG/1
100 TABLET, FILM COATED ORAL EVERY 12 HOURS SCHEDULED
Qty: 60 TABLET | Refills: 0 | Status: SHIPPED | OUTPATIENT
Start: 2023-09-21 | End: 2023-10-21

## 2023-09-21 RX ORDER — OXYCODONE HYDROCHLORIDE AND ACETAMINOPHEN 5; 325 MG/1; MG/1
1-2 TABLET ORAL EVERY 6 HOURS PRN
Qty: 36 TABLET | Refills: 0 | Status: SHIPPED | OUTPATIENT
Start: 2023-09-21

## 2023-09-21 RX ORDER — LOSARTAN POTASSIUM 50 MG/1
50 TABLET ORAL NIGHTLY
Qty: 30 TABLET | Refills: 0 | Status: SHIPPED | OUTPATIENT
Start: 2023-09-21 | End: 2023-10-21

## 2023-09-21 RX ADMIN — LABETALOL HYDROCHLORIDE 100 MG: 100 TABLET, FILM COATED ORAL at 10:32

## 2023-09-21 RX ADMIN — GABAPENTIN 400 MG: 400 CAPSULE ORAL at 05:49

## 2023-09-21 RX ADMIN — ACETAMINOPHEN 1000 MG: 500 TABLET ORAL at 13:50

## 2023-09-21 RX ADMIN — CELECOXIB 200 MG: 200 CAPSULE ORAL at 08:32

## 2023-09-21 RX ADMIN — ACETAMINOPHEN 1000 MG: 500 TABLET ORAL at 08:32

## 2023-09-21 RX ADMIN — ACETAMINOPHEN 1000 MG: 500 TABLET ORAL at 02:46

## 2023-09-21 RX ADMIN — GABAPENTIN 400 MG: 400 CAPSULE ORAL at 13:50

## 2023-09-21 RX ADMIN — OXYCODONE HYDROCHLORIDE 5 MG: 5 TABLET ORAL at 08:38

## 2023-09-21 NOTE — OUTREACH NOTE
Prep Survey      Flowsheet Row Responses   [facility] facility patient discharged from? [place]   Is LACE score < 7 ? Yes   Eligibility [facility]   Date of Admission 09/19/23   Date of Discharge 09/21/23   Discharge Disposition Home or Self Care   Discharge diagnosis resection of anus & rectal cuff for inflammatory bowel disease   Does the patient have one of the following disease processes/diagnoses(primary or secondary)? General Surgery   Does the patient have Home health ordered? No   Is there a DME ordered? No   Prep survey completed? Yes            Ambreen JAIN - Registered Nurse

## 2023-09-21 NOTE — NURSING NOTE
Discharge instructions provided. CARISSA drain removed by PA. Rx sent to [facility] outpatient pharmacy. She has a follow up appt scheduled with Dr. Evans. No questions/concerns.

## 2023-09-21 NOTE — PLAN OF CARE
Goal Outcome Evaluation:  Plan of Care Reviewed With: patient        Progress: improving  Outcome Evaluation: CARISSA x1 w/ small amount of bloody output- connected to continous LWS, ileostomy in place, ivf infusing, all scheduled meds given, Oxycodone given for pain control, denies nausea, ambulated in halls, room air, encouraged use of IS, scds on, voiding freely, morning labs ordered

## 2023-09-21 NOTE — DISCHARGE SUMMARY
Date of Admission: 9/19/2023  Date of Discharge:  9/21/2023    Presenting Problem/History of Present Illness  Inflammatory bowel disease [K52.9]  IBD (inflammatory bowel disease) [K52.9]     Discharge Diagnosis:  Active Hospital Problems    Diagnosis  POA    **IBD (inflammatory bowel disease) [K52.9]  Unknown      Resolved Hospital Problems   No resolved problems to display.        Procedures Performed  Procedure(s):  Resection of anus and rectal cuff    Hospital Course  Patient is a 37 y.o. female with Hx of IBD and is laparoscopic total abdominal colectomy with end ileostomy 11/20/2017. She presented 09/19/2023 for completion proctectomy and drainage of perirectal and rectovaginal septum abscesses with drain placement. Following the procedure, Pt is tolerating a regular diet. Having bladder function and ostomy output. Pain tolerated with ERAS and PO medications. CARISSA drain pulled today. Pt comfortable being D/C home today.     Consults:   Consults       No orders found from 8/21/2023 to 9/20/2023.            Discharge Disposition  Home or Self Care    Discharge Medications     Discharge Medications        New Medications        Instructions Start Date   losartan 50 MG tablet  Commonly known as: COZAAR  Replaces: irbesartan 75 MG tablet   50 mg, Oral, Nightly      oxyCODONE-acetaminophen 5-325 MG per tablet  Commonly known as: Percocet   Take 1-2 tablets by mouth Every 6 (Six) Hours As Needed for pain             Changes to Medications        Instructions Start Date   labetalol 100 MG tablet  Commonly known as: NORMODYNE  What changed:   medication strength  how much to take   100 mg, Oral, Every 12 Hours Scheduled      traZODone 50 MG tablet  Commonly known as: DESYREL  What changed: when to take this   50 mg, Oral, Daily      Trintellix 20 MG tablet  Generic drug: Vortioxetine HBr  What changed:   how much to take  when to take this   20 mg, Oral, Daily             Continue These Medications        Instructions  Start Date   betamethasone (augmented) 0.05 % ointment  Commonly known as: DIPROLENE   Apply to affected area twice a day for 2 weeks      Emgality 120 MG/ML auto-injector pen  Generic drug: galcanezumab-gnlm   120 mg, Subcutaneous, Every 30 Days      ondansetron 4 MG tablet  Commonly known as: ZOFRAN   Take 1 tablet  by mouth 1 to 2 times per day as needed nausea and vomiting      Rexulti 0.25 MG tablet  Generic drug: Brexpiprazole   take 1 tablet by mouth daily      Wegovy 2.4 MG/0.75ML solution auto-injector  Generic drug: Semaglutide-Weight Management   2.4 mg, Subcutaneous, Weekly             Stop These Medications      irbesartan 75 MG tablet  Commonly known as: Avapro  Replaced by: losartan 50 MG tablet              Activity at Discharge:   Activity Instructions       Driving Restrictions      Type of Restriction: Driving    Driving Restrictions: No Driving Until Next Appointment    Gradually Increase Activity Until at Pre-Hospitalization Level      Lifting Restrictions      Type of Restriction: Lifting    Lifting Restrictions: Lifting Restriction (Indicate Limit)    Weight Limit (Pounds): 15    Length of Lifting Restriction: until office appt            Follow-up Appointments  Future Appointments   Date Time Provider Department Center   10/9/2023 11:15 AM Colin Evans MD [facility]  [facility]   10/10/2023 11:00 AM Bobbi Valverde MD [facility]   10/13/2023 11:45 AM Lynette Brooke APRN [facility]   1/12/2024 11:00 AM LAB CHAIR 1 [facility]  [facility] [place]   7/12/2024  9:40 AM LAB CHAIR 5 [facility] [place]  [facility] [place]   7/12/2024 10:00 AM Guadalupe Bass MD PhD [facility] CBC [facility] [place]     Additional Instructions for the Follow-ups that You Need to Schedule       Call MD With Problems / Concerns   As directed      Instructions: temp >101. Drainage from wound. vomitting.    Order Comments: Instructions: temp >101. Drainage from wound. vomitting.         Discharge Follow-up with Specified Provider:  Nathan; 2 Weeks   As directed      To: Nathan   Follow Up: 2 Weeks                Discharge Diet:   As tolerated  Activity at Discharge:   As tolerated  Follow-up Appointments  2 wks

## 2023-09-22 ENCOUNTER — TRANSITIONAL CARE MANAGEMENT TELEPHONE ENCOUNTER (OUTPATIENT)
Dept: CALL CENTER | Facility: HOSPITAL | Age: 38
End: 2023-09-22
Payer: COMMERCIAL

## 2023-09-22 LAB
LAB AP CASE REPORT: NORMAL
PATH REPORT.FINAL DX SPEC: NORMAL
PATH REPORT.GROSS SPEC: NORMAL

## 2023-09-22 NOTE — CASE MANAGEMENT/SOCIAL WORK
Case Management Discharge Note      Final Note: Patient discharged home, no needs.         Selected Continued Care - Discharged on 9/21/2023 Admission date: 9/19/2023 - Discharge disposition: Home or Self Care      Destination    No services have been selected for the patient.                Durable Medical Equipment    No services have been selected for the patient.                Dialysis/Infusion    No services have been selected for the patient.                Home Medical Care    No services have been selected for the patient.                Therapy    No services have been selected for the patient.                Community Resources    No services have been selected for the patient.                Community & DME    No services have been selected for the patient.                    Selected Continued Care - Episodes Includes continued care and service providers with selected services from the active episodes listed below      Rising Risk Care Management Episode start date: 9/8/2023   There are no active outsourced providers for this episode.                      Final Discharge Disposition Code: 01 - home or self-care

## 2023-09-22 NOTE — OUTREACH NOTE
Call Center TCM Note      Flowsheet Row Responses   [facility] patient discharged from? [place]   Does the patient have one of the following disease processes/diagnoses(primary or secondary)? General Surgery   TCM attempt successful? Yes   Call start time 1707   Call end time 1714   Meds reviewed with patient/caregiver? Yes   Is the patient having any side effects they believe may be caused by any medication additions or changes? No   Does the patient have all medications related to this admission filled (includes all antibiotics, pain medications, etc.) Yes   Is the patient taking all medications as directed (includes completed medication regime)? Yes   Comments Surgeon appt 10/09/23. Patient wishes to keep appt scheduled with PCP for 10/13/23.   Does the patient have an appointment with their PCP within 7-14 days of discharge? No   Nursing Interventions Patient desires to follow up with specialty only, Routed TCM call to PCP office   Has home health visited the patient within 72 hours of discharge? N/A   Psychosocial issues? No   Did the patient receive a copy of their discharge instructions? Yes   Nursing interventions Reviewed instructions with patient   What is the patient's perception of their health status since discharge? Same   Nursing interventions Nurse provided patient education   Is the patient /caregiver able to teach back basic post-op care? Practice 'cough and deep breath', Drive as instructed by MD in discharge instructions, Take showers only when approved by MD-sponge bathe until then, No tub bath, swimming, or hot tub until instructed by MD, Keep incision areas clean,dry and protected, Do not remove steri-strips, Lifting as instructed by MD in discharge instructions   Is the patient/caregiver able to teach back signs and symptoms of incisional infection? Increased redness, swelling or pain at the incisonal site, Increased drainage or bleeding, Incisional warmth, Pus or odor from incision,  Fever   Is the patient/caregiver able to teach back steps to recovery at home? Set small, achievable goals for return to baseline health, Rest and rebuild strength, gradually increase activity, Practice good oral hygiene, Eat a well-balance diet, Make a list of questions for surgeon's appointment   If the patient is a current smoker, are they able to teach back resources for cessation? Not a smoker   Is the patient/caregiver able to teach back the hierarchy of who to call/visit for symptoms/problems? PCP, Specialist, Home health nurse, Urgent Care, ED, 911 Yes   TCM call completed? Yes   Wrap up additional comments Patient states is about the same. States still has some soreness and fatigue. States small amount of drainage from abscesses. States aware to monitor for s/s of infection. States ileostomy functioning well. Denies any needs/concerns today. TCM complete.   Call end time 1714   Would this patient benefit from a Referral to Amb Social Work? No   Is the patient interested in additional calls from an ambulatory ? No            Litzy Saleh RN    9/22/2023, 17:15 EDT

## 2023-09-22 NOTE — OUTREACH NOTE
Call Center TCM Note      Flowsheet Row Responses   [facility] patient discharged from? [place]   Does the patient have one of the following disease processes/diagnoses(primary or secondary)? General Surgery   TCM attempt successful? No  [Friend, Melony, on [employer]. Phone number on [employer].]   Unsuccessful attempts Attempt 1   Call Status Left message            Litzy Saleh RN    9/22/2023, 13:07 EDT

## 2023-09-26 ENCOUNTER — TELEPHONE (OUTPATIENT)
Dept: SURGERY | Facility: CLINIC | Age: 38
End: 2023-09-26
Payer: COMMERCIAL

## 2023-09-26 DIAGNOSIS — S31.819A BUTTOCK WOUND, RIGHT, INITIAL ENCOUNTER: ICD-10-CM

## 2023-09-26 PROBLEM — N39.0 ACUTE UTI: Status: ACTIVE | Noted: 2023-09-26

## 2023-09-26 RX ORDER — OXYCODONE HYDROCHLORIDE AND ACETAMINOPHEN 5; 325 MG/1; MG/1
1-2 TABLET ORAL EVERY 6 HOURS PRN
Qty: 36 TABLET | Refills: 0 | Status: SHIPPED | OUTPATIENT
Start: 2023-09-26

## 2023-10-09 ENCOUNTER — OFFICE VISIT (OUTPATIENT)
Dept: SURGERY | Facility: CLINIC | Age: 38
End: 2023-10-09
Payer: COMMERCIAL

## 2023-10-09 VITALS
BODY MASS INDEX: 49.31 KG/M2 | HEART RATE: 84 BPM | OXYGEN SATURATION: 97 % | WEIGHT: 278.3 LBS | SYSTOLIC BLOOD PRESSURE: 110 MMHG | HEIGHT: 63 IN | DIASTOLIC BLOOD PRESSURE: 78 MMHG

## 2023-10-09 DIAGNOSIS — S31.819A BUTTOCK WOUND, RIGHT, INITIAL ENCOUNTER: Primary | ICD-10-CM

## 2023-10-09 PROCEDURE — 99024 POSTOP FOLLOW-UP VISIT: CPT | Performed by: COLON & RECTAL SURGERY

## 2023-10-09 RX ORDER — OXYCODONE HYDROCHLORIDE AND ACETAMINOPHEN 5; 325 MG/1; MG/1
1-2 TABLET ORAL EVERY 6 HOURS PRN
Qty: 42 TABLET | Refills: 0 | Status: SHIPPED | OUTPATIENT
Start: 2023-10-09 | End: 2023-10-16 | Stop reason: SDUPTHER

## 2023-10-09 NOTE — PROGRESS NOTES
"Liz Bagley is a 38 y.o. female in for follow up of Buttock wound, right, initial encounter    The patient presents today for evaluation of rectal pain and buttock wound. She reports increased bleeding and pain in the last few days. The patient states she is unable to bend over or sit comfortably due to the pain. She reports she ran out of pain medication and is requesting a refill.     /78 (BP Location: Left arm, Patient Position: Sitting, Cuff Size: Large Adult)   Pulse 84   Ht 160 cm (63\")   Wt 126 kg (278 lb 4.8 oz)   LMP  (LMP Unknown)   SpO2 97%   Breastfeeding No   BMI 49.30 kg/m²   Body mass index is 49.3 kg/m².      PE:  Physical Exam  Genitourinary:     Comments: Perianal exam: Towards the vagina, she has an open area that is draining. The right buttock area is also draining, but it looks like there is good healthy granulation tissue.          Assessment:   1. Buttock wound, right, initial encounter         Plan:  - We discussed this is going to take a significant amount of time to heal. She is encouraged to get a nozzle for her shower or purchase a Sitz bath to help clean the area well. I recommend she take another 3 to 4 weeks off from work to allow more time for healing. I will refill her pain medication today. She will follow up in 1 week for recheck.     Transcribed from ambient dictation for Colin Evans MD by Bernice Ennis.  10/09/23   12:31 EDT    Patient or patient representative verbalized consent to the visit recording.  I have personally performed the services described in this document as transcribed by the above individual, and it is both accurate and complete.     "

## 2023-10-13 ENCOUNTER — OFFICE VISIT (OUTPATIENT)
Dept: INTERNAL MEDICINE | Facility: CLINIC | Age: 38
End: 2023-10-13
Payer: COMMERCIAL

## 2023-10-13 VITALS
DIASTOLIC BLOOD PRESSURE: 88 MMHG | BODY MASS INDEX: 49.26 KG/M2 | SYSTOLIC BLOOD PRESSURE: 120 MMHG | HEART RATE: 90 BPM | OXYGEN SATURATION: 95 % | WEIGHT: 278 LBS | HEIGHT: 63 IN

## 2023-10-13 DIAGNOSIS — I10 BENIGN ESSENTIAL HYPERTENSION: Primary | ICD-10-CM

## 2023-10-13 DIAGNOSIS — S31.819D BUTTOCK WOUND, RIGHT, SUBSEQUENT ENCOUNTER: ICD-10-CM

## 2023-10-13 DIAGNOSIS — E66.01 CLASS 3 SEVERE OBESITY DUE TO EXCESS CALORIES WITHOUT SERIOUS COMORBIDITY WITH BODY MASS INDEX (BMI) OF 45.0 TO 49.9 IN ADULT: ICD-10-CM

## 2023-10-13 RX ORDER — LOSARTAN POTASSIUM 50 MG/1
50 TABLET ORAL NIGHTLY
Qty: 90 TABLET | Refills: 1 | Status: SHIPPED | OUTPATIENT
Start: 2023-10-13

## 2023-10-13 RX ORDER — LABETALOL 100 MG/1
100 TABLET, FILM COATED ORAL EVERY 12 HOURS SCHEDULED
Qty: 180 TABLET | Refills: 1 | Status: SHIPPED | OUTPATIENT
Start: 2023-10-13

## 2023-10-13 NOTE — ASSESSMENT & PLAN NOTE
Hypertension is improving with treatment.  Continue current treatment regimen.  Blood pressure will be reassessed at the next regular appointment.  Refill medications today

## 2023-10-13 NOTE — ASSESSMENT & PLAN NOTE
Recommended to f/u with Dr. Evans for clearance to restart wegovy-- we discussed switching to possibly mounjaro due to hx of DM2  We discussed the importance of prioritizing protein to help with wound healing  Due for diabetic eye exam-- patient will schedule this  Continue with healthy diet choices  Continue with rest as directed by Dr. Evans

## 2023-10-13 NOTE — PROGRESS NOTES
"Chief Complaint  Weight Loss/HTN    Subjective        Liz Bagley presents to [facility]  Weight Loss      This is a 37 y/o female presenting to office for f/u with weight management. Patient is currently off of wegovy but would like to restart. Patient reports she has been off for 4 weeks. Reports she would like to restart this.     Patient is following with Dr. Evans; had recent surgery on 9/19/23 for completion proctectomy and drainage of perirectal and rectovaginal septum abscesses with drain placement. Patient had drains removed during her hospital visit. Patient reports she is still having a bit of pain; currently on percocet PRN. Patient had f/u with Dr. Evans on 10/9/23; was recommended sitz bath and to continue with rest. Has f/u in 1 week for recheck.     Recently switched to losartan and 100mg lobateolol BID; reports BP well controlled at home; denies CP; needs refill of medications.       Objective   Vital Signs:  /88 (BP Location: Left arm, Patient Position: Sitting, Cuff Size: Large Adult)   Pulse 90   Ht 160 cm (63\")   Wt 126 kg (278 lb)   SpO2 95%   BMI 49.25 kg/mý   Estimated body mass index is 49.25 kg/mý as calculated from the following:    Height as of this encounter: 160 cm (63\").    Weight as of this encounter: 126 kg (278 lb).               Physical Exam  Constitutional:       Appearance: Normal appearance. She is obese.   HENT:      Head: Normocephalic and atraumatic.      Right Ear: External ear normal.      Left Ear: External ear normal.      Nose: Nose normal.      Mouth/Throat:      Mouth: Mucous membranes are moist.      Pharynx: Oropharynx is clear.   Eyes:      Conjunctiva/sclera: Conjunctivae normal.      Pupils: Pupils are equal, round, and reactive to light.   Cardiovascular:      Rate and Rhythm: Normal rate and regular rhythm.      Pulses: Normal pulses.      Heart sounds: Normal heart sounds. No murmur heard.     No friction rub. No " gallop.   Pulmonary:      Effort: Pulmonary effort is normal. No respiratory distress.      Breath sounds: Normal breath sounds. No stridor. No wheezing, rhonchi or rales.   Musculoskeletal:      Cervical back: Normal range of motion and neck supple.   Neurological:      General: No focal deficit present.      Mental Status: She is alert and oriented to person, place, and time. Mental status is at baseline.   Psychiatric:         Mood and Affect: Mood normal.         Thought Content: Thought content normal.         Judgment: Judgment normal.        Result Review :  The following data was reviewed by: ELEN Prasad on 10/13/2023:  Common labs          9/12/2023    12:14 9/20/2023    06:24 9/21/2023    05:06   Common Labs   Glucose 93  104     BUN 6  6     Creatinine 0.53  0.38     Sodium 138  141     Potassium 4.0  3.8     Chloride 106  108     Calcium 8.9  8.4     WBC 11.62  12.41  12.89    Hemoglobin 11.2  9.1  9.1    Hematocrit 34.6  28.6  28.8    Platelets 473  402  441                   Assessment and Plan   Diagnoses and all orders for this visit:    1. Benign essential hypertension (Primary)  Assessment & Plan:  Hypertension is improving with treatment.  Continue current treatment regimen.  Blood pressure will be reassessed at the next regular appointment.  Refill medications today     Orders:  -     losartan (COZAAR) 50 MG tablet; Take 1 tablet by mouth Every Night.  Dispense: 90 tablet; Refill: 1  -     labetalol (NORMODYNE) 100 MG tablet; Take 1 tablet by mouth Every 12 (Twelve) Hours.  Dispense: 180 tablet; Refill: 1    2. Buttock wound, right, subsequent encounter  Assessment & Plan:  Following with Dr. Evans  Continues on percocet PRN for pain control  Recommended sitz bath       3. Class 3 severe obesity due to excess calories without serious comorbidity with body mass index (BMI) of 45.0 to 49.9 in adult  Assessment & Plan:  Recommended to f/u with Dr. Evans for clearance to restart wegovy-- we  discussed switching to possibly mounjaro due to hx of DM2  We discussed the importance of prioritizing protein to help with wound healing  Due for diabetic eye exam-- patient will schedule this  Continue with healthy diet choices  Continue with rest as directed by Dr. Evans                   Follow Up   Return in about 3 months (around 1/13/2024) for Recheck DM2 .  Patient was given instructions and counseling regarding her condition or for health maintenance advice. Please see specific information pulled into the AVS if appropriate.

## 2023-10-16 ENCOUNTER — OFFICE VISIT (OUTPATIENT)
Dept: SURGERY | Facility: CLINIC | Age: 38
End: 2023-10-16
Payer: COMMERCIAL

## 2023-10-16 VITALS
OXYGEN SATURATION: 96 % | WEIGHT: 285.7 LBS | DIASTOLIC BLOOD PRESSURE: 76 MMHG | BODY MASS INDEX: 50.62 KG/M2 | HEIGHT: 63 IN | SYSTOLIC BLOOD PRESSURE: 126 MMHG | HEART RATE: 90 BPM

## 2023-10-16 DIAGNOSIS — S31.819A BUTTOCK WOUND, RIGHT, INITIAL ENCOUNTER: ICD-10-CM

## 2023-10-16 PROCEDURE — 99024 POSTOP FOLLOW-UP VISIT: CPT | Performed by: COLON & RECTAL SURGERY

## 2023-10-16 RX ORDER — OXYCODONE HYDROCHLORIDE AND ACETAMINOPHEN 5; 325 MG/1; MG/1
1-2 TABLET ORAL EVERY 6 HOURS PRN
Qty: 46 TABLET | Refills: 0 | Status: SHIPPED | OUTPATIENT
Start: 2023-10-16 | End: 2023-10-23 | Stop reason: SDUPTHER

## 2023-10-16 NOTE — PROGRESS NOTES
"Liz Bagley is a 38 y.o. female in for follow up of Buttock wound, right, initial encounter    HPI:  The patient reports she is sitting a little bit better. The pain medication is helping with pain management, and she has approximately 10 pills left. She usually takes 1 pill when she gets up, but sometimes she must take 2 pills. A maximum of 4 to 5 pills are taken.        /76 (BP Location: Left arm, Patient Position: Sitting, Cuff Size: Large Adult)   Pulse 90   Ht 160 cm (63\")   Wt 130 kg (285 lb 11.2 oz)   LMP  (LMP Unknown)   SpO2 96%   Breastfeeding No   BMI 50.61 kg/m²   Body mass index is 50.61 kg/m².      PE:  Physical Exam  Constitutional:       General: She is not in acute distress.     Appearance: She is well-developed.   HENT:      Head: Normocephalic and atraumatic.   Abdominal:      General: There is no distension.      Palpations: Abdomen is soft.   Genitourinary:     Comments: Perianal exam: Creamy drainage noted. It is mucoid that extends through most of the perineum. Sutures are pulling and causing openings at the sutures. I removed those and she has a deeper wound towards the vagina side where she had all the abscesses. There is pink granulation tissue. There is just a lot of mucus and some retained sutures.  Musculoskeletal:         General: Normal range of motion.   Neurological:      Mental Status: She is alert.   Psychiatric:         Thought Content: Thought content normal.           Assessment:   1. Buttock wound, right, initial encounter         Plan:  I removed as many sutures as we could with her being fairly uncomfortable, but she tolerated it well. I packed the area towards the vagina with just a gauze. Advised her to get in the shower a couple of times a day, get the area wet and replace the gauze.    Transcribed from ambient dictation for Colin Evans MD by Philomena Jacobson.  10/16/23   16:03 EDT    Patient or patient representative verbalized consent to the visit " recording.  I have personally performed the services described in this document as transcribed by the above individual, and it is both accurate and complete.

## 2023-10-23 ENCOUNTER — OFFICE VISIT (OUTPATIENT)
Dept: SURGERY | Facility: CLINIC | Age: 38
End: 2023-10-23
Payer: COMMERCIAL

## 2023-10-23 ENCOUNTER — TELEPHONE (OUTPATIENT)
Dept: SURGERY | Facility: CLINIC | Age: 38
End: 2023-10-23

## 2023-10-23 VITALS
HEIGHT: 63 IN | DIASTOLIC BLOOD PRESSURE: 68 MMHG | BODY MASS INDEX: 50.82 KG/M2 | OXYGEN SATURATION: 96 % | SYSTOLIC BLOOD PRESSURE: 110 MMHG | WEIGHT: 286.8 LBS | HEART RATE: 82 BPM

## 2023-10-23 DIAGNOSIS — S31.819A BUTTOCK WOUND, RIGHT, INITIAL ENCOUNTER: ICD-10-CM

## 2023-10-23 PROCEDURE — 99024 POSTOP FOLLOW-UP VISIT: CPT | Performed by: COLON & RECTAL SURGERY

## 2023-10-23 RX ORDER — OXYCODONE HYDROCHLORIDE AND ACETAMINOPHEN 5; 325 MG/1; MG/1
1-2 TABLET ORAL EVERY 6 HOURS PRN
Qty: 52 TABLET | Refills: 0 | Status: SHIPPED | OUTPATIENT
Start: 2023-10-23 | End: 2023-11-06 | Stop reason: SDUPTHER

## 2023-10-23 NOTE — TELEPHONE ENCOUNTER
Hub staff attempted to follow warm transfer process and was unsuccessful     Caller: Liz Bagley     Relationship to patient: SELF    Best call back number: [phone]     Patient is needing: PT CALLED AND STATED THAT SHE SAW DR COHEN THIS MORNING AND THAT A PRESCRIPTION FOR PAIN MEDICATION WAS SENT OVER TO HER PHARMACY. SHE IS CALLING BC SHE STATED THAT THE MEDICATION NEEDS PRIOR AUTH AND THAT THE PHARMACY HAS SENT OVER THE NECCESSARY PAPERWORK FOR IT. PLEASE GIVE THE PT A CALL BACK TO LET HER KNOW THAT THE PAPERWORK WAS RECEIVED AND SENT BACK SO THAT HSE CAN GET THE MEDICATION FROM THE PHARMACY. THANKS

## 2023-10-23 NOTE — PROGRESS NOTES
"Liz Bagley is a 38 y.o. female in for follow up of Buttock wound, right, initial encounter    Patient has a history of perianal abscesses, periauricular abscesses, fistula into the vagina, inflammatory bowel disease status post resection, now having wound healing. She reports her drainage has improved. She has been using gauze. She states since the stitches were cut, it is a little easier to move. The patient is urinating well. She has an ostomy bag.    /68 (BP Location: Left arm, Patient Position: Sitting, Cuff Size: Large Adult)   Pulse 82   Ht 160 cm (63\")   Wt 130 kg (286 lb 12.8 oz)   LMP  (LMP Unknown)   SpO2 96%   Breastfeeding No   BMI 50.80 kg/m²   Body mass index is 50.8 kg/m².      PE:  Physical Exam  Genitourinary:     Comments: Perianal exam: Where the sutures were posteriorly it has created a superficial cavity, but it is not draining well.           Assessment:   1. Buttock wound, right, initial encounter         1. Perianal abscesses  2. Periauricular abscesses  3. Fistula into the vagina  4. Inflammatory bowel disease status post resection      Plan:  We opened the area up after cleansing it with Betadine and injecting it with 1% lidocaine with epi and then cauterized anything that was bleeding and used silver nitrate. She has nice pink granulation tissue in the wound. It is healing up well. We just need to keep the drainage from building up and we will see her next week.    Patient had to return that afternoon for skin bleeding.  Was able to inject the area with a 1% lidocaine with epi again and used cautery to control the bleeding.  Transcribed from ambient dictation for Colin Evans MD by Mary Sheikh.  10/23/23   11:13 EDT    Patient or patient representative verbalized consent to the visit recording.  I have personally performed the services described in this document as transcribed by the above individual, and it is both accurate and complete.       "

## 2023-11-03 ENCOUNTER — OFFICE VISIT (OUTPATIENT)
Dept: SURGERY | Facility: CLINIC | Age: 38
End: 2023-11-03
Payer: COMMERCIAL

## 2023-11-03 VITALS
SYSTOLIC BLOOD PRESSURE: 120 MMHG | BODY MASS INDEX: 51.45 KG/M2 | WEIGHT: 290.4 LBS | OXYGEN SATURATION: 97 % | DIASTOLIC BLOOD PRESSURE: 88 MMHG | HEART RATE: 74 BPM | HEIGHT: 63 IN

## 2023-11-03 DIAGNOSIS — S31.819D WOUND OF RIGHT BUTTOCK, SUBSEQUENT ENCOUNTER: ICD-10-CM

## 2023-11-03 DIAGNOSIS — K52.9 INFLAMMATORY BOWEL DISEASE: Primary | ICD-10-CM

## 2023-11-03 PROCEDURE — 99024 POSTOP FOLLOW-UP VISIT: CPT | Performed by: PHYSICIAN ASSISTANT

## 2023-11-03 RX ORDER — ALLANTOIN 5 MG/G
1 GEL TOPICAL AS NEEDED
Qty: 224 G | Refills: 1 | Status: SHIPPED | OUTPATIENT
Start: 2023-11-03 | End: 2023-12-03

## 2023-11-03 NOTE — PROGRESS NOTES
"Liz Bagley is a 38 y.o. female in for follow up of Inflammatory bowel disease    Wound of right buttock, subsequent encounter    Pt with Hx of IBD is S/P laparoscopic total proctocolectomy with end ileostomy 11/20/2017 and S/P completion proctectomy and drainage of perirectal and rectovaginal septum abscesses 09/19/2023.    She presents today for a wound-check.  Still uncomfortable to sit or move, but gradually improving with time.   Mucus drainage.   Wearing gauze.   No fevers.     /88   Pulse 74   Ht 160 cm (62.99\")   Wt 132 kg (290 lb 6.4 oz)   LMP  (LMP Unknown)   SpO2 97%   BMI 51.46 kg/m²   Body mass index is 51.46 kg/m².      PE:  Physical Exam  Exam conducted with a chaperone present.   Constitutional:       General: She is not in acute distress.     Appearance: She is well-developed.   HENT:      Head: Normocephalic and atraumatic.   Abdominal:      General: There is no distension.      Palpations: Abdomen is soft.   Genitourinary:     Comments: Right buttock wound shallow with pink granulation tissue.   Perineal incision with beefy pink granulation tissue and mucus drainage.   Musculoskeletal:         General: Normal range of motion.   Neurological:      Mental Status: She is alert.   Psychiatric:         Thought Content: Thought content normal.         Assessment:   1. Inflammatory bowel disease    2. Wound of right buttock, subsequent encounter      Plan:  - Silver nitrate applied to wounds  - Rx for wound gel provided.  - Follow up in 2 weeks for wound-check     "

## 2023-11-06 DIAGNOSIS — S31.819A BUTTOCK WOUND, RIGHT, INITIAL ENCOUNTER: ICD-10-CM

## 2023-11-06 RX ORDER — OXYCODONE HYDROCHLORIDE AND ACETAMINOPHEN 5; 325 MG/1; MG/1
1-2 TABLET ORAL EVERY 6 HOURS PRN
Qty: 52 TABLET | Refills: 0 | Status: SHIPPED | OUTPATIENT
Start: 2023-11-06

## 2023-11-17 ENCOUNTER — OFFICE VISIT (OUTPATIENT)
Dept: SURGERY | Facility: CLINIC | Age: 38
End: 2023-11-17
Payer: COMMERCIAL

## 2023-11-17 VITALS
BODY MASS INDEX: 50.73 KG/M2 | HEART RATE: 98 BPM | SYSTOLIC BLOOD PRESSURE: 120 MMHG | DIASTOLIC BLOOD PRESSURE: 64 MMHG | OXYGEN SATURATION: 97 % | WEIGHT: 286.3 LBS | HEIGHT: 63 IN | RESPIRATION RATE: 16 BRPM

## 2023-11-17 DIAGNOSIS — S31.819A BUTTOCK WOUND, RIGHT, INITIAL ENCOUNTER: Primary | ICD-10-CM

## 2023-11-17 DIAGNOSIS — K52.9 INFLAMMATORY BOWEL DISEASE: ICD-10-CM

## 2023-11-17 DIAGNOSIS — S31.819D WOUND OF RIGHT BUTTOCK, SUBSEQUENT ENCOUNTER: ICD-10-CM

## 2023-11-17 PROCEDURE — 99024 POSTOP FOLLOW-UP VISIT: CPT | Performed by: COLON & RECTAL SURGERY

## 2023-11-17 NOTE — PROGRESS NOTES
"Liz Bagley is a 38 y.o. female in for follow up.    The patient presents today for a follow-up for wound perineum.    The patient reports she is feeling better. She states she still has some pain. The patient reports she had to go a week without pain medication due to insurance. The patient reports she still has gauze and it is still leaking. She states it is getting better. The patient reports she still hurts to sit for a certain amount of time. She states she will go back to work on 11/27/2023.       /64   Pulse 98   Resp 16   Ht 160 cm (63\")   Wt 130 kg (286 lb 4.8 oz)   LMP  (Approximate) Comment: 2020  SpO2 97%   BMI 50.72 kg/m²   Body mass index is 50.72 kg/m².      PE:  Physical Exam  Genitourinary:     Comments: Perianal exam: Perineum wound is granulated and well healed. The right buttock wound is almost completely epithelialized.      Assessment:   Perineum wound    Plan:  The patient will follow up in 2 weeks. We discussed Lingel and it was prescribed before, but she said it was not ready or somehow insurance did not approve it and she will look into it. We discussed that there is one that's over the counter that she can use.     Transcribed from ambient dictation for Colin Evans MD by Juliette Morataya.  11/17/23   15:27 EST    Patient or patient representative verbalized consent to the visit recording.       "

## 2023-11-29 ENCOUNTER — FLU SHOT (OUTPATIENT)
Dept: INTERNAL MEDICINE | Facility: CLINIC | Age: 38
End: 2023-11-29
Payer: COMMERCIAL

## 2023-11-29 DIAGNOSIS — Z23 NEED FOR INFLUENZA VACCINATION: Primary | ICD-10-CM

## 2023-11-29 PROCEDURE — 90686 IIV4 VACC NO PRSV 0.5 ML IM: CPT | Performed by: NURSE PRACTITIONER

## 2023-11-29 PROCEDURE — 90471 IMMUNIZATION ADMIN: CPT | Performed by: NURSE PRACTITIONER

## 2023-12-01 ENCOUNTER — OFFICE VISIT (OUTPATIENT)
Dept: SURGERY | Facility: CLINIC | Age: 38
End: 2023-12-01
Payer: COMMERCIAL

## 2023-12-01 VITALS
SYSTOLIC BLOOD PRESSURE: 120 MMHG | DIASTOLIC BLOOD PRESSURE: 78 MMHG | BODY MASS INDEX: 51.31 KG/M2 | OXYGEN SATURATION: 97 % | HEART RATE: 79 BPM | HEIGHT: 63 IN | WEIGHT: 289.6 LBS

## 2023-12-01 DIAGNOSIS — T81.30XA DEHISCENCE OF PERINEAL WOUND: ICD-10-CM

## 2023-12-01 DIAGNOSIS — K52.9 INFLAMMATORY BOWEL DISEASE: ICD-10-CM

## 2023-12-01 DIAGNOSIS — S31.819A BUTTOCK WOUND, RIGHT, INITIAL ENCOUNTER: Primary | ICD-10-CM

## 2023-12-01 PROCEDURE — 99024 POSTOP FOLLOW-UP VISIT: CPT | Performed by: COLON & RECTAL SURGERY

## 2023-12-01 NOTE — PROGRESS NOTES
"Liz Bagley is a 38 y.o. female in for follow up of Buttock wound, right, initial encounter    Inflammatory bowel disease    Dehiscence of perineal wound    The patient reports she is still experiencing drainage and a little more blood. She is sitting better. The patient reports she is back at work and is exhausted.     /78 (BP Location: Left arm, Patient Position: Sitting, Cuff Size: Large Adult)   Pulse 79   Ht 160 cm (63\")   Wt 131 kg (289 lb 9.6 oz)   LMP  (LMP Unknown)   SpO2 97%   Breastfeeding No   BMI 51.30 kg/m²   Body mass index is 51.3 kg/m².      PE:  Physical Exam  Genitourinary:     Comments: Perineum: She has pink granulation tissue. It is filling in really well. She still has about a 2 cm depth to it, but it is smaller than last time.          Assessment:   1. Buttock wound, right, initial encounter    2. Inflammatory bowel disease    3. Dehiscence of perineal wound        Plan:  -The patient will follow up the week before [place] for wound check    Transcribed from ambient dictation for Colin Evans MD by tila smith  12/04/23     Patient or patient representative verbalized consent to the visit recording.  I have personally performed the services described in this document as transcribed by the above individual, and it is both accurate and complete.  Patient verbalized consent to the visit recording.     "

## 2023-12-04 ENCOUNTER — TELEPHONE (OUTPATIENT)
Dept: SURGERY | Facility: CLINIC | Age: 38
End: 2023-12-04
Payer: COMMERCIAL

## 2023-12-04 NOTE — TELEPHONE ENCOUNTER
"Pt noticed a \"bump\" in same area of incision on Saturday. It is very painful to touch and the pain has gotten increasingly worse. Pt is afebrile. Says the area is bleeding but denies drainage. Wants to know if she can be seen soon.  "

## 2023-12-05 ENCOUNTER — OFFICE VISIT (OUTPATIENT)
Dept: SURGERY | Facility: CLINIC | Age: 38
End: 2023-12-05
Payer: COMMERCIAL

## 2023-12-05 VITALS
OXYGEN SATURATION: 99 % | BODY MASS INDEX: 51.81 KG/M2 | WEIGHT: 292.4 LBS | DIASTOLIC BLOOD PRESSURE: 68 MMHG | SYSTOLIC BLOOD PRESSURE: 118 MMHG | HEIGHT: 63 IN | HEART RATE: 77 BPM

## 2023-12-05 DIAGNOSIS — K52.9 INFLAMMATORY BOWEL DISEASE: Primary | ICD-10-CM

## 2023-12-05 DIAGNOSIS — S31.819D WOUND OF RIGHT BUTTOCK, SUBSEQUENT ENCOUNTER: ICD-10-CM

## 2023-12-05 DIAGNOSIS — T81.30XA DEHISCENCE OF PERINEAL WOUND: ICD-10-CM

## 2023-12-05 PROCEDURE — 99024 POSTOP FOLLOW-UP VISIT: CPT | Performed by: PHYSICIAN ASSISTANT

## 2023-12-05 NOTE — PROGRESS NOTES
"Liz Bagley is a 38 y.o. female in for follow up of right buttock wound, inflammatory bowel disease, dehiscence of perineal wound status post resection of anus and rectal cuff on 9/19/2023.  The patient called the office yesterday reporting an increase in perineal pain and swelling since 12/2/2023.  An appointment was made for her to be seen this morning.    The patient states that she returned to work on 11/29/2023 and is in the middle of an 8-day stretch of working every day.  She has done a lot more frequent transitions from sitting to standing to walking since returning to work.  She has noted an increase in pain at the superior/cranial aspect of her wound and wants to make sure that there is no developing abscess.  She denies fever or chills.  She is not using any topical anesthetic at this point.    /68 (BP Location: Left arm, Patient Position: Sitting, Cuff Size: Adult)   Pulse 77   Ht 160 cm (63\")   Wt 133 kg (292 lb 6.4 oz)   LMP  (LMP Unknown)   SpO2 99%   Breastfeeding No   BMI 51.80 kg/m²   Body mass index is 51.8 kg/m².  -  Physical Exam  No acute distress  Chaperone present  Perineal exam: Wound measures approximately 6 cm in length from cranial to caudal margins, 2 cm in width, maximum depth is approximately 4 cm at the aspect nearest the vagina.  There is no surrounding erythema, induration, or fluctuance to suggest abscess.  There is a tiny opening just right of the main wound that also has no fluid collection or erythema.  There is appropriate moderate mucus drainage and no active bleeding.    Assessment:   1. Inflammatory bowel disease    2. Wound of right buttock, subsequent encounter    3. Dehiscence of perineal wound       Plan:  I discussed with the patient that I do not see any sign of abscess.  I recommend topical lidocaine for the irritated area of the wound that is likely due to gauze rubbing with increased activity since the patient has returned to work.  I advised her to " continue to monitor her symptoms and notify our office if symptoms worsen.  She can keep her previously scheduled appointment with Dr. Evans for follow-up.        Ashwini Rosario PA-C  Physician Assistant  Colorectal Surgery

## 2023-12-22 ENCOUNTER — OFFICE VISIT (OUTPATIENT)
Dept: SURGERY | Facility: CLINIC | Age: 38
End: 2023-12-22
Payer: COMMERCIAL

## 2023-12-22 VITALS
OXYGEN SATURATION: 97 % | BODY MASS INDEX: 51.91 KG/M2 | DIASTOLIC BLOOD PRESSURE: 76 MMHG | HEIGHT: 63 IN | WEIGHT: 293 LBS | HEART RATE: 73 BPM | SYSTOLIC BLOOD PRESSURE: 118 MMHG

## 2023-12-22 DIAGNOSIS — S31.819D WOUND OF RIGHT BUTTOCK, SUBSEQUENT ENCOUNTER: ICD-10-CM

## 2023-12-22 DIAGNOSIS — K52.9 INFLAMMATORY BOWEL DISEASE: Primary | ICD-10-CM

## 2023-12-22 NOTE — PROGRESS NOTES
"Liz Bagley is a 38 y.o. female in for follow up of Inflammatory bowel disease    Wound of right buttock, subsequent encounter    Dehiscence of wound of perineum    The patient presents today for a follow-up.    The patient reports her buttocks are doing well. She was seen by Ashwini approximately 1 to 2 weeks ago after a long week of working. The patient reports there was a spot that felt like something was happening and it was super painful, but it went away. She states she is still having a decent amount of drainage from her vagina. The patient reports there is a spot next to her vagina that is still pretty sore. She states it still hurts when she sits for a little while.    /76   Pulse 73   Ht 160 cm (62.99\")   Wt 135 kg (298 lb 4.8 oz)   LMP  (LMP Unknown)   SpO2 97%   BMI 52.85 kg/m²   Body mass index is 52.85 kg/m².      PE:  Physical Exam  Constitutional:       General: She is not in acute distress.     Appearance: She is well-developed.   HENT:      Head: Normocephalic and atraumatic.   Abdominal:      General: There is no distension.      Palpations: Abdomen is soft.   Genitourinary:     Comments: Perianal exam: Wound is healing up. There is about 1 cm or so of granulation tissue for a length of 6 cm going towards the vagina, but the depth of it is very minor.    Musculoskeletal:         General: Normal range of motion.   Neurological:      Mental Status: She is alert.   Psychiatric:         Thought Content: Thought content normal.           Assessment:   1. Inflammatory bowel disease    2. Wound of right buttock, subsequent encounter    3. Dehiscence of wound of perineum         Plan:  The patient will return in 1 month.    Transcribed from ambient dictation for Colin Evans MD by Libertad Flores [employer] .  12/22/23   13:12 EST    Patient or patient representative verbalized consent to the visit recording.  I have personally performed the services described in this " document as transcribed by the above individual, and it is both accurate and complete.

## 2024-01-08 ENCOUNTER — TELEPHONE (OUTPATIENT)
Dept: ONCOLOGY | Facility: CLINIC | Age: 39
End: 2024-01-08

## 2024-01-08 ENCOUNTER — OFFICE VISIT (OUTPATIENT)
Dept: OBSTETRICS AND GYNECOLOGY | Age: 39
End: 2024-01-08
Payer: COMMERCIAL

## 2024-01-08 VITALS
HEIGHT: 63 IN | BODY MASS INDEX: 51.91 KG/M2 | WEIGHT: 293 LBS | SYSTOLIC BLOOD PRESSURE: 122 MMHG | DIASTOLIC BLOOD PRESSURE: 74 MMHG

## 2024-01-08 DIAGNOSIS — Z12.31 SCREENING MAMMOGRAM FOR BREAST CANCER: Primary | ICD-10-CM

## 2024-01-08 DIAGNOSIS — N89.8 VAGINAL SORE: ICD-10-CM

## 2024-01-08 PROCEDURE — 99213 OFFICE O/P EST LOW 20 MIN: CPT | Performed by: NURSE PRACTITIONER

## 2024-01-08 RX ORDER — DOXYCYCLINE HYCLATE 100 MG/1
100 CAPSULE ORAL 2 TIMES DAILY
Qty: 28 CAPSULE | Refills: 0 | Status: SHIPPED | OUTPATIENT
Start: 2024-01-08 | End: 2024-01-22

## 2024-01-08 NOTE — PROGRESS NOTES
"Subjective   Liz Bagley is a 38 y.o. female is being seen today for   Chief Complaint   Patient presents with    Gynecologic Exam     New GYN, last pap 01/03/2024   CC:  vaginal boils, was prescribed medication, required a PA, left previous OB/ GYN because PA wasn't done.   .    History of Present Illness    New patient here to establish care and discuss vaginal \"boils\"  States she actually had an AE last week at [facility] but thought they were employed by [employer] and needs a [employer] group  She works at [employer]  At her exam she complained of some new vaginal boils that were painful  She does have a history of these but usually it resolves on it's own and this time that doesn't seem to be happening  She noticed one around jennifer and more have popped up since that time  They are hard, cyst like knots that are painful and eventually rupture and go away  She has had similar lesions in her scalp that she has had removed before but has never been given a \"diagnosis\" for it  Has a history of chrons and recently had her entire rectum removed due to complications  She was told chrons can exhibit with perianal abscesses as well and wasn't sure if it could be related to that  [facility] sent in a cleocin lotion that insurance denied  Area has not worsened since last week but also hasn't improved  Also needs screening mammogram order    The following portions of the patient's history were reviewed and updated as appropriate: allergies, current medications, past family history, past medical history, past social history, past surgical history and problem list.    /74   Ht 160 cm (63\")   Wt (!) 139 kg (306 lb)   BMI 54.21 kg/m²         Review of Systems   Constitutional: Negative.    HENT: Negative.     Eyes: Negative.    Respiratory: Negative.     Cardiovascular: Negative.    Gastrointestinal: Negative.    Endocrine: Negative.    Genitourinary: Negative.  Positive for vaginal pain.   Musculoskeletal: Negative.  "   Skin: Negative.    Allergic/Immunologic: Negative.    Neurological: Negative.    Hematological: Negative.    Psychiatric/Behavioral: Negative.         Objective   Physical Exam  Constitutional:       Appearance: She is well-developed.   Cardiovascular:      Rate and Rhythm: Normal rate and regular rhythm.   Pulmonary:      Effort: Pulmonary effort is normal.   Abdominal:      General: Bowel sounds are normal. There is no distension.      Palpations: Abdomen is soft.      Tenderness: There is no abdominal tenderness.   Genitourinary:         Comments: Multiple deep, some hard areas on left inner thigh and external labia  No drainage  Some skin discoloration over areas  Most lesions appear to be in healing stages and without any hardness  Skin:     General: Skin is warm and dry.   Neurological:      Mental Status: She is alert and oriented to person, place, and time.   Psychiatric:         Behavior: Behavior normal.         Assessment & Plan   Diagnoses and all orders for this visit:    1. Screening mammogram for breast cancer (Primary)  -     Mammo Screening Bilateral With CAD    2. Vaginal sore    Other orders  -     doxycycline (VIBRAMYCIN) 100 MG capsule; Take 1 capsule by mouth 2 (Two) Times a Day for 14 days.  Dispense: 28 capsule; Refill: 0        Vaginal lesions do not appear to be concerning for STDs- I suspect hydradenitis suppurative vs chrons abscesses  Abx therapy sent- she will call if lesions do not improve  Encouraged follow up with dermatology for long term management   Screening mammogram ordered         Total time spent today with Liz  was 30-44 minutes (level 3).  Greater than 50% of the time was spent coordinating care, answering her questions and counseling regarding pathophysiology of her presenting problem along with plans for any diagnositc work-up and treatment.

## 2024-01-08 NOTE — TELEPHONE ENCOUNTER
Caller: Liz Bagley    Relationship to patient: Self    Best call back number: [phone]    Chief complaint: PT CALLED TO RESCHEDULE     Type of visit: LABS     Requested date: AROUND 11 OR 1130 NO THURSDAYS     If rescheduling, when is the original appointment: 1-12-24

## 2024-01-09 ENCOUNTER — PATIENT ROUNDING (BHMG ONLY) (OUTPATIENT)
Dept: OBSTETRICS AND GYNECOLOGY | Age: 39
End: 2024-01-09
Payer: COMMERCIAL

## 2024-01-12 ENCOUNTER — HOSPITAL ENCOUNTER (OUTPATIENT)
Facility: HOSPITAL | Age: 39
Discharge: HOME | End: 2024-01-12
Admitting: NURSE PRACTITIONER
Payer: COMMERCIAL

## 2024-01-12 PROCEDURE — 77067 SCR MAMMO BI INCL CAD: CPT

## 2024-01-15 ENCOUNTER — OFFICE VISIT (OUTPATIENT)
Dept: INTERNAL MEDICINE | Facility: CLINIC | Age: 39
End: 2024-01-15
Payer: COMMERCIAL

## 2024-01-15 VITALS
HEIGHT: 63 IN | BODY MASS INDEX: 51.91 KG/M2 | SYSTOLIC BLOOD PRESSURE: 130 MMHG | DIASTOLIC BLOOD PRESSURE: 80 MMHG | WEIGHT: 293 LBS | HEART RATE: 82 BPM | OXYGEN SATURATION: 98 %

## 2024-01-15 DIAGNOSIS — S31.819D BUTTOCK WOUND, RIGHT, SUBSEQUENT ENCOUNTER: ICD-10-CM

## 2024-01-15 DIAGNOSIS — R11.0 NAUSEA: ICD-10-CM

## 2024-01-15 DIAGNOSIS — I10 BENIGN ESSENTIAL HYPERTENSION: Chronic | ICD-10-CM

## 2024-01-15 DIAGNOSIS — E11.9 TYPE 2 DIABETES MELLITUS WITHOUT COMPLICATION, WITHOUT LONG-TERM CURRENT USE OF INSULIN: Primary | ICD-10-CM

## 2024-01-15 DIAGNOSIS — F51.01 PRIMARY INSOMNIA: Chronic | ICD-10-CM

## 2024-01-15 DIAGNOSIS — F32.A ANXIETY AND DEPRESSION: Chronic | ICD-10-CM

## 2024-01-15 DIAGNOSIS — F41.9 ANXIETY AND DEPRESSION: Chronic | ICD-10-CM

## 2024-01-15 DIAGNOSIS — K52.9 IBD (INFLAMMATORY BOWEL DISEASE): ICD-10-CM

## 2024-01-15 PROBLEM — M00.9: Status: RESOLVED | Noted: 2017-08-04 | Resolved: 2024-01-15

## 2024-01-15 PROBLEM — N39.0 ACUTE UTI: Status: RESOLVED | Noted: 2023-09-26 | Resolved: 2024-01-15

## 2024-01-15 PROBLEM — D72.829 LEUKOCYTOSIS: Chronic | Status: RESOLVED | Noted: 2018-11-08 | Resolved: 2024-01-15

## 2024-01-15 PROCEDURE — 99214 OFFICE O/P EST MOD 30 MIN: CPT | Performed by: NURSE PRACTITIONER

## 2024-01-15 RX ORDER — ONDANSETRON 4 MG/1
4 TABLET, FILM COATED ORAL EVERY 8 HOURS PRN
Qty: 30 TABLET | Refills: 2 | Status: SHIPPED | OUTPATIENT
Start: 2024-01-15

## 2024-01-15 NOTE — PROGRESS NOTES
"Chief Complaint  Diabetes    Subjective        Liz Bagley presents to [facility]  This is a 37 y/o female presenting to office for f/u with DM2. Patient has been off of semaglutide due to not being able to return to her normal dose. She is still healing from her abscess surgery. Has f/u with surgeon at end of month. Patient would be agreeable to start mounjaro. Has eye exam scheduled. Denies any foot issues.     HTN-- continues on labetolol, losartan; denies checking BP at home; denies CP.     Hx depression-- reports mood is stable; talking with therapist; denies SI.   Objective   Vital Signs:  /80 (BP Location: Left arm, Patient Position: Sitting, Cuff Size: Adult)   Pulse 82   Ht 160 cm (63\")   Wt (!) 137 kg (303 lb)   SpO2 98%   BMI 53.67 kg/m²   Estimated body mass index is 53.67 kg/m² as calculated from the following:    Height as of this encounter: 160 cm (63\").    Weight as of this encounter: 137 kg (303 lb).               Physical Exam  Constitutional:       Appearance: Normal appearance. She is obese.   HENT:      Head: Normocephalic and atraumatic.      Right Ear: External ear normal.      Left Ear: External ear normal.      Nose: Nose normal.      Mouth/Throat:      Mouth: Mucous membranes are moist.      Pharynx: Oropharynx is clear.   Eyes:      Conjunctiva/sclera: Conjunctivae normal.      Pupils: Pupils are equal, round, and reactive to light.      Comments: +glasses   Cardiovascular:      Rate and Rhythm: Normal rate and regular rhythm.      Pulses: Normal pulses.      Heart sounds: Normal heart sounds. No murmur heard.     No gallop.   Pulmonary:      Effort: Pulmonary effort is normal. No respiratory distress.      Breath sounds: Normal breath sounds. No stridor. No wheezing, rhonchi or rales.   Skin:     General: Skin is warm and dry.   Neurological:      Mental Status: She is alert and oriented to person, place, and time. Mental status is at baseline. "   Psychiatric:         Mood and Affect: Mood normal.         Thought Content: Thought content normal.         Judgment: Judgment normal.        Result Review :  The following data was reviewed by: ELEN Prasad on 01/15/2024:  Common labs          9/12/2023    12:14 9/20/2023    06:24 9/21/2023    05:06   Common Labs   Glucose 93  104     BUN 6  6     Creatinine 0.53  0.38     Sodium 138  141     Potassium 4.0  3.8     Chloride 106  108     Calcium 8.9  8.4     WBC 11.62  12.41  12.89    Hemoglobin 11.2  9.1  9.1    Hematocrit 34.6  28.6  28.8    Platelets 473  402  441                   Assessment and Plan   Diagnoses and all orders for this visit:    1. Type 2 diabetes mellitus without complication, without long-term current use of insulin (Primary)  Assessment & Plan:  Diabetes is unchanged.   Dietary recommendations for ADA diet.  Regular aerobic exercise.  Discussed ways to avoid symptomatic hypoglycemia.  Discussed sick day management.  Discussed foot care.  Reminded to get yearly retinal exam.  Medication changes per orders.  Diabetes will be reassessed in 1 month.  Start mounjaro 2.5mg weekly injection   Protein goal of 130g daily  Recommended 150-300 minutes weekly exercise       Orders:  -     Tirzepatide (Mounjaro) 2.5 MG/0.5ML solution pen-injector pen; Inject 0.5 mL under the skin into the appropriate area as directed 1 (One) Time Per Week.  Dispense: 2 mL; Refill: 0    2. Nausea  -     ondansetron (Zofran) 4 MG tablet; Take 1 tablet by mouth Every 8 (Eight) Hours As Needed for Nausea or Vomiting.  Dispense: 30 tablet; Refill: 2    3. Primary insomnia  Assessment & Plan:  Continues on trazodone QHS  Following with behavioral health       4. Anxiety and depression  Assessment & Plan:  Stable on trintellix  Following with therapist  Follows with behavioral health  Denies SI       5. IBD (inflammatory bowel disease)  Assessment & Plan:  Following with Dr. Evans       6. Buttock wound, right,  subsequent encounter  Assessment & Plan:  Continues following with Dr. Evans  Using april PRN        7. Benign essential hypertension  Assessment & Plan:  Hypertension is improving with treatment.  Continue current treatment regimen.  Dietary sodium restriction.  Weight loss.  Regular aerobic exercise.  Continue current medications.  Blood pressure will be reassessed at the next regular appointment.               Follow Up   Return in about 4 weeks (around 2/12/2024) for Recheck DM2.  Patient was given instructions and counseling regarding her condition or for health maintenance advice. Please see specific information pulled into the AVS if appropriate.

## 2024-01-15 NOTE — ASSESSMENT & PLAN NOTE
Diabetes is unchanged.   Dietary recommendations for ADA diet.  Regular aerobic exercise.  Discussed ways to avoid symptomatic hypoglycemia.  Discussed sick day management.  Discussed foot care.  Reminded to get yearly retinal exam.  Medication changes per orders.  Diabetes will be reassessed in 1 month.  Start mounjaro 2.5mg weekly injection   Protein goal of 130g daily  Recommended 150-300 minutes weekly exercise

## 2024-01-19 ENCOUNTER — LAB (OUTPATIENT)
Dept: LAB | Facility: HOSPITAL | Age: 39
End: 2024-01-19
Payer: COMMERCIAL

## 2024-01-19 ENCOUNTER — TELEPHONE (OUTPATIENT)
Dept: ONCOLOGY | Facility: CLINIC | Age: 39
End: 2024-01-19
Payer: COMMERCIAL

## 2024-01-19 DIAGNOSIS — K90.89 POOR IRON ABSORPTION: ICD-10-CM

## 2024-01-19 DIAGNOSIS — D50.9 IRON DEFICIENCY ANEMIA, UNSPECIFIED IRON DEFICIENCY ANEMIA TYPE: ICD-10-CM

## 2024-01-19 LAB
BASOPHILS # BLD AUTO: 0.11 10*3/MM3 (ref 0–0.2)
BASOPHILS NFR BLD AUTO: 0.9 % (ref 0–1.5)
DEPRECATED RDW RBC AUTO: 50.4 FL (ref 37–54)
EOSINOPHIL # BLD AUTO: 0.36 10*3/MM3 (ref 0–0.4)
EOSINOPHIL NFR BLD AUTO: 2.8 % (ref 0.3–6.2)
ERYTHROCYTE [DISTWIDTH] IN BLOOD BY AUTOMATED COUNT: 16.5 % (ref 12.3–15.4)
FERRITIN SERPL-MCNC: 56.2 NG/ML (ref 13–150)
HCT VFR BLD AUTO: 41 % (ref 34–46.6)
HGB BLD-MCNC: 12.5 G/DL (ref 12–15.9)
IMM GRANULOCYTES # BLD AUTO: 0.08 10*3/MM3 (ref 0–0.05)
IMM GRANULOCYTES NFR BLD AUTO: 0.6 % (ref 0–0.5)
IRON 24H UR-MRATE: 55 MCG/DL (ref 37–145)
IRON SATN MFR SERPL: 13 % (ref 20–50)
LYMPHOCYTES # BLD AUTO: 2.9 10*3/MM3 (ref 0.7–3.1)
LYMPHOCYTES NFR BLD AUTO: 22.8 % (ref 19.6–45.3)
MCH RBC QN AUTO: 25.6 PG (ref 26.6–33)
MCHC RBC AUTO-ENTMCNC: 30.5 G/DL (ref 31.5–35.7)
MCV RBC AUTO: 83.8 FL (ref 79–97)
MONOCYTES # BLD AUTO: 0.56 10*3/MM3 (ref 0.1–0.9)
MONOCYTES NFR BLD AUTO: 4.4 % (ref 5–12)
NEUTROPHILS NFR BLD AUTO: 68.5 % (ref 42.7–76)
NEUTROPHILS NFR BLD AUTO: 8.7 10*3/MM3 (ref 1.7–7)
NRBC BLD AUTO-RTO: 0 /100 WBC (ref 0–0.2)
PLATELET # BLD AUTO: 490 10*3/MM3 (ref 140–450)
PMV BLD AUTO: 9.6 FL (ref 6–12)
RBC # BLD AUTO: 4.89 10*6/MM3 (ref 3.77–5.28)
TIBC SERPL-MCNC: 437 MCG/DL (ref 298–536)
TRANSFERRIN SERPL-MCNC: 312 MG/DL (ref 200–360)
WBC NRBC COR # BLD AUTO: 12.71 10*3/MM3 (ref 3.4–10.8)

## 2024-01-19 PROCEDURE — 85025 COMPLETE CBC W/AUTO DIFF WBC: CPT

## 2024-01-19 PROCEDURE — 83540 ASSAY OF IRON: CPT

## 2024-01-19 PROCEDURE — 84466 ASSAY OF TRANSFERRIN: CPT

## 2024-01-19 PROCEDURE — 36415 COLL VENOUS BLD VENIPUNCTURE: CPT

## 2024-01-19 PROCEDURE — 82728 ASSAY OF FERRITIN: CPT

## 2024-01-19 NOTE — TELEPHONE ENCOUNTER
Patient called and Ferritin and Iron labs reviewed. Patient is Iron deficient and needs IV Venofer times 3 treatments. Message sent to appointment desk to schedule. Patient v/u    Lab Results   Component Value Date    IRON 55 01/19/2024    TIBC 437 01/19/2024    FERRITIN 56.20 01/19/2024        IRON SAT                                                    13.0                                  01/19/2024    Patient v/u

## 2024-01-26 ENCOUNTER — INFUSION (OUTPATIENT)
Dept: ONCOLOGY | Facility: HOSPITAL | Age: 39
End: 2024-01-26
Payer: COMMERCIAL

## 2024-01-26 ENCOUNTER — OFFICE VISIT (OUTPATIENT)
Dept: SURGERY | Facility: CLINIC | Age: 39
End: 2024-01-26
Payer: COMMERCIAL

## 2024-01-26 VITALS
RESPIRATION RATE: 16 BRPM | DIASTOLIC BLOOD PRESSURE: 78 MMHG | WEIGHT: 293 LBS | HEART RATE: 80 BPM | SYSTOLIC BLOOD PRESSURE: 146 MMHG | OXYGEN SATURATION: 95 % | BODY MASS INDEX: 54.67 KG/M2 | TEMPERATURE: 98.2 F

## 2024-01-26 VITALS
BODY MASS INDEX: 51.91 KG/M2 | OXYGEN SATURATION: 98 % | DIASTOLIC BLOOD PRESSURE: 70 MMHG | HEIGHT: 63 IN | SYSTOLIC BLOOD PRESSURE: 120 MMHG | WEIGHT: 293 LBS | HEART RATE: 79 BPM

## 2024-01-26 DIAGNOSIS — K90.89 POOR IRON ABSORPTION: Primary | ICD-10-CM

## 2024-01-26 DIAGNOSIS — D50.9 IRON DEFICIENCY ANEMIA, UNSPECIFIED IRON DEFICIENCY ANEMIA TYPE: ICD-10-CM

## 2024-01-26 DIAGNOSIS — K52.9 INFLAMMATORY BOWEL DISEASE: Primary | ICD-10-CM

## 2024-01-26 PROCEDURE — 25010000002 IRON SUCROSE PER 1 MG: Performed by: INTERNAL MEDICINE

## 2024-01-26 PROCEDURE — 63710000001 DIPHENHYDRAMINE PER 50 MG: Performed by: INTERNAL MEDICINE

## 2024-01-26 PROCEDURE — 25810000003 SODIUM CHLORIDE 0.9 % SOLUTION: Performed by: INTERNAL MEDICINE

## 2024-01-26 PROCEDURE — 96366 THER/PROPH/DIAG IV INF ADDON: CPT

## 2024-01-26 PROCEDURE — 96365 THER/PROPH/DIAG IV INF INIT: CPT

## 2024-01-26 PROCEDURE — 99212 OFFICE O/P EST SF 10 MIN: CPT | Performed by: COLON & RECTAL SURGERY

## 2024-01-26 RX ORDER — ACETAMINOPHEN 325 MG/1
650 TABLET ORAL ONCE
Status: COMPLETED | OUTPATIENT
Start: 2024-01-26 | End: 2024-01-26

## 2024-01-26 RX ORDER — DIPHENHYDRAMINE HCL 25 MG
25 CAPSULE ORAL ONCE
Status: COMPLETED | OUTPATIENT
Start: 2024-01-26 | End: 2024-01-26

## 2024-01-26 RX ORDER — SODIUM CHLORIDE 9 MG/ML
20 INJECTION, SOLUTION INTRAVENOUS ONCE
Status: COMPLETED | OUTPATIENT
Start: 2024-01-26 | End: 2024-01-26

## 2024-01-26 RX ADMIN — ACETAMINOPHEN 650 MG: 325 TABLET ORAL at 10:04

## 2024-01-26 RX ADMIN — SODIUM CHLORIDE 300 MG: 9 INJECTION, SOLUTION INTRAVENOUS at 10:34

## 2024-01-26 RX ADMIN — DIPHENHYDRAMINE HYDROCHLORIDE 25 MG: 25 CAPSULE ORAL at 10:04

## 2024-01-26 RX ADMIN — SODIUM CHLORIDE 20 ML/HR: 9 INJECTION, SOLUTION INTRAVENOUS at 10:11

## 2024-01-26 NOTE — PROGRESS NOTES
"Liz Bagley is a 38 y.o. female in for follow up of Inflammatory bowel disease    Dehiscence of wound of perineum    The patient still has the 1 area with drainage. She has been trying to apply gauze. She is doing well.     /70   Pulse 79   Ht 160 cm (63\")   Wt (!) 139 kg (306 lb 14.4 oz)   SpO2 98%   BMI 54.36 kg/m²   Body mass index is 54.36 kg/m².      PE:  Physical Exam  Right buttock: There is a very superficial area that drains, but it is healing in well. On the perineum, the wound has contracted and filled in significantly. There is a small 2 cm long x 1 cm wide area of granulation tissue that has very little depth to it.    Assessment:   1. Inflammatory bowel disease    2. Dehiscence of wound of perineum           Plan:    The patient will continue doing her local wound care with the hope that that area will granulate in over the next few weeks and epithelialize.    Transcribed from ambient dictation for Colin Evans MD by Katherine Brooke.  01/26/24   11:36 EST    Patient or patient representative verbalized consent to the visit recording.  I have personally performed the services described in this document as transcribed by the above individual, and it is both accurate and complete.    "

## 2024-02-02 ENCOUNTER — INFUSION (OUTPATIENT)
Dept: ONCOLOGY | Facility: HOSPITAL | Age: 39
End: 2024-02-02
Payer: COMMERCIAL

## 2024-02-02 VITALS
OXYGEN SATURATION: 95 % | DIASTOLIC BLOOD PRESSURE: 76 MMHG | RESPIRATION RATE: 16 BRPM | HEART RATE: 74 BPM | WEIGHT: 293 LBS | TEMPERATURE: 98 F | BODY MASS INDEX: 53.5 KG/M2 | SYSTOLIC BLOOD PRESSURE: 113 MMHG

## 2024-02-02 DIAGNOSIS — D50.9 IRON DEFICIENCY ANEMIA, UNSPECIFIED IRON DEFICIENCY ANEMIA TYPE: Primary | ICD-10-CM

## 2024-02-02 DIAGNOSIS — K90.89 POOR IRON ABSORPTION: ICD-10-CM

## 2024-02-02 PROCEDURE — 96365 THER/PROPH/DIAG IV INF INIT: CPT

## 2024-02-02 PROCEDURE — 25010000002 IRON SUCROSE PER 1 MG: Performed by: INTERNAL MEDICINE

## 2024-02-02 PROCEDURE — 25810000003 SODIUM CHLORIDE 0.9 % SOLUTION: Performed by: INTERNAL MEDICINE

## 2024-02-02 PROCEDURE — 63710000001 DIPHENHYDRAMINE PER 50 MG: Performed by: INTERNAL MEDICINE

## 2024-02-02 RX ORDER — SODIUM CHLORIDE 9 MG/ML
20 INJECTION, SOLUTION INTRAVENOUS ONCE
Status: COMPLETED | OUTPATIENT
Start: 2024-02-02 | End: 2024-02-02

## 2024-02-02 RX ORDER — DIPHENHYDRAMINE HCL 25 MG
25 CAPSULE ORAL ONCE
Status: COMPLETED | OUTPATIENT
Start: 2024-02-02 | End: 2024-02-02

## 2024-02-02 RX ORDER — ACETAMINOPHEN 325 MG/1
650 TABLET ORAL ONCE
Status: COMPLETED | OUTPATIENT
Start: 2024-02-02 | End: 2024-02-02

## 2024-02-02 RX ADMIN — SODIUM CHLORIDE 20 ML/HR: 9 INJECTION, SOLUTION INTRAVENOUS at 10:43

## 2024-02-02 RX ADMIN — SODIUM CHLORIDE 300 MG: 900 INJECTION, SOLUTION INTRAVENOUS at 10:43

## 2024-02-02 RX ADMIN — ACETAMINOPHEN 650 MG: 325 TABLET ORAL at 10:18

## 2024-02-02 RX ADMIN — DIPHENHYDRAMINE HYDROCHLORIDE 25 MG: 25 CAPSULE ORAL at 10:18

## 2024-02-09 ENCOUNTER — INFUSION (OUTPATIENT)
Dept: ONCOLOGY | Facility: HOSPITAL | Age: 39
End: 2024-02-09
Payer: COMMERCIAL

## 2024-02-09 VITALS
RESPIRATION RATE: 16 BRPM | HEART RATE: 74 BPM | OXYGEN SATURATION: 95 % | BODY MASS INDEX: 54.03 KG/M2 | DIASTOLIC BLOOD PRESSURE: 75 MMHG | SYSTOLIC BLOOD PRESSURE: 114 MMHG | TEMPERATURE: 98 F | WEIGHT: 293 LBS

## 2024-02-09 DIAGNOSIS — K90.89 POOR IRON ABSORPTION: ICD-10-CM

## 2024-02-09 DIAGNOSIS — D50.9 IRON DEFICIENCY ANEMIA, UNSPECIFIED IRON DEFICIENCY ANEMIA TYPE: Primary | ICD-10-CM

## 2024-02-09 PROCEDURE — 63710000001 DIPHENHYDRAMINE PER 50 MG: Performed by: INTERNAL MEDICINE

## 2024-02-09 PROCEDURE — 25810000003 SODIUM CHLORIDE 0.9 % SOLUTION: Performed by: INTERNAL MEDICINE

## 2024-02-09 PROCEDURE — 96365 THER/PROPH/DIAG IV INF INIT: CPT

## 2024-02-09 PROCEDURE — 25010000002 IRON SUCROSE PER 1 MG: Performed by: INTERNAL MEDICINE

## 2024-02-09 RX ORDER — SODIUM CHLORIDE 9 MG/ML
20 INJECTION, SOLUTION INTRAVENOUS ONCE
Status: COMPLETED | OUTPATIENT
Start: 2024-02-09 | End: 2024-02-09

## 2024-02-09 RX ORDER — ACETAMINOPHEN 325 MG/1
650 TABLET ORAL ONCE
Status: COMPLETED | OUTPATIENT
Start: 2024-02-09 | End: 2024-02-09

## 2024-02-09 RX ORDER — DIPHENHYDRAMINE HCL 25 MG
25 CAPSULE ORAL ONCE
Status: COMPLETED | OUTPATIENT
Start: 2024-02-09 | End: 2024-02-09

## 2024-02-09 RX ADMIN — SODIUM CHLORIDE 300 MG: 900 INJECTION, SOLUTION INTRAVENOUS at 10:29

## 2024-02-09 RX ADMIN — SODIUM CHLORIDE 20 ML/HR: 9 INJECTION, SOLUTION INTRAVENOUS at 10:29

## 2024-02-09 RX ADMIN — DIPHENHYDRAMINE HYDROCHLORIDE 25 MG: 25 CAPSULE ORAL at 10:00

## 2024-02-09 RX ADMIN — ACETAMINOPHEN 650 MG: 325 TABLET ORAL at 10:00

## 2024-02-12 ENCOUNTER — OFFICE VISIT (OUTPATIENT)
Dept: INTERNAL MEDICINE | Facility: CLINIC | Age: 39
End: 2024-02-12
Payer: COMMERCIAL

## 2024-02-12 VITALS
BODY MASS INDEX: 51.91 KG/M2 | DIASTOLIC BLOOD PRESSURE: 80 MMHG | HEIGHT: 63 IN | WEIGHT: 293 LBS | OXYGEN SATURATION: 97 % | HEART RATE: 74 BPM | SYSTOLIC BLOOD PRESSURE: 130 MMHG

## 2024-02-12 DIAGNOSIS — E11.9 TYPE 2 DIABETES MELLITUS WITHOUT COMPLICATION, WITHOUT LONG-TERM CURRENT USE OF INSULIN: Primary | Chronic | ICD-10-CM

## 2024-02-12 DIAGNOSIS — I10 BENIGN ESSENTIAL HYPERTENSION: Chronic | ICD-10-CM

## 2024-02-12 PROCEDURE — 99214 OFFICE O/P EST MOD 30 MIN: CPT | Performed by: NURSE PRACTITIONER

## 2024-02-23 ENCOUNTER — OFFICE VISIT (OUTPATIENT)
Dept: SURGERY | Facility: CLINIC | Age: 39
End: 2024-02-23
Payer: COMMERCIAL

## 2024-02-23 VITALS
HEART RATE: 83 BPM | OXYGEN SATURATION: 97 % | SYSTOLIC BLOOD PRESSURE: 132 MMHG | WEIGHT: 293 LBS | DIASTOLIC BLOOD PRESSURE: 86 MMHG | BODY MASS INDEX: 51.91 KG/M2 | HEIGHT: 63 IN

## 2024-02-23 DIAGNOSIS — K52.9 INFLAMMATORY BOWEL DISEASE: Primary | ICD-10-CM

## 2024-02-23 DIAGNOSIS — S31.819D WOUND OF RIGHT BUTTOCK, SUBSEQUENT ENCOUNTER: ICD-10-CM

## 2024-02-23 PROCEDURE — 99024 POSTOP FOLLOW-UP VISIT: CPT | Performed by: COLON & RECTAL SURGERY

## 2024-02-23 NOTE — PROGRESS NOTES
"Liz Bagley is a 38 y.o. female in for follow up of Inflammatory bowel disease    Wound of right buttock, subsequent encounter    The patient presents for evaluation of granulation tissue.    She noticed slight bleeding on her buttocks.    /86 (BP Location: Left arm, Patient Position: Sitting, Cuff Size: Large Adult)   Pulse 83   Ht 160 cm (63\")   Wt 136 kg (299 lb 8 oz)   SpO2 97%   BMI 53.05 kg/m²   Body mass index is 53.05 kg/m².      PE:  Physical Exam  Genitourinary:     Comments: Perineal exam : Anterior towards the vagina, there is a 2 cm x 1 cm area of granulation tissue          Assessment:   1. Inflammatory bowel disease    2. Wound of right buttock, subsequent encounter           Plan:  Wound is healing really well. Silver nitrate treatment was performed.    The patient will follow up in 6 weeks.    Transcribed from ambient dictation for Colin Evans MD by Bethany Mcdowell.  02/23/24   13:39 EST    Patient or patient representative verbalized consent to the visit recording.  I have personally performed the services described in this document as transcribed by the above individual, and it is both accurate and complete.      "

## 2024-02-28 ENCOUNTER — OFFICE VISIT (OUTPATIENT)
Dept: INTERNAL MEDICINE | Facility: CLINIC | Age: 39
End: 2024-02-28
Payer: COMMERCIAL

## 2024-02-28 VITALS
WEIGHT: 293 LBS | HEIGHT: 63 IN | BODY MASS INDEX: 51.91 KG/M2 | HEART RATE: 70 BPM | DIASTOLIC BLOOD PRESSURE: 80 MMHG | OXYGEN SATURATION: 98 % | SYSTOLIC BLOOD PRESSURE: 122 MMHG

## 2024-02-28 DIAGNOSIS — I10 BENIGN ESSENTIAL HYPERTENSION: Primary | Chronic | ICD-10-CM

## 2024-02-28 PROCEDURE — 99214 OFFICE O/P EST MOD 30 MIN: CPT | Performed by: NURSE PRACTITIONER

## 2024-02-28 RX ORDER — LABETALOL 100 MG/1
200 TABLET, FILM COATED ORAL 2 TIMES DAILY
COMMUNITY

## 2024-02-28 NOTE — PROGRESS NOTES
"Chief Complaint  Hypertension (Last 2 weeks)    Subjective        Liz Bgaley presents to [facility]  This is a 37 y/o female presenting to office for f/u with HTN. Reports she had been noticing increased BP readings when checking BP at home-- avg 140/90's; denies any CP or red flag symptoms. Had started taking 200 labetolol twice daily. Also on 50mg losartan daily. Reports she has not been checking BP since switching to 200mg labetolol BID. Reports she started this about 3 days ago. Patient used to be on 200mg labetolol BID before her recent surgery.     Objective   Vital Signs:  /80 (BP Location: Left arm, Patient Position: Sitting, Cuff Size: Large Adult)   Pulse 70   Ht 160 cm (63\")   Wt 136 kg (300 lb)   SpO2 98%   BMI 53.14 kg/m²   Estimated body mass index is 53.14 kg/m² as calculated from the following:    Height as of this encounter: 160 cm (63\").    Weight as of this encounter: 136 kg (300 lb).               Physical Exam  Constitutional:       Appearance: Normal appearance. She is obese.   HENT:      Head: Normocephalic and atraumatic.   Eyes:      Conjunctiva/sclera: Conjunctivae normal.      Pupils: Pupils are equal, round, and reactive to light.   Cardiovascular:      Rate and Rhythm: Normal rate and regular rhythm.      Pulses: Normal pulses.      Heart sounds: Normal heart sounds. No murmur heard.     No gallop.   Pulmonary:      Effort: Pulmonary effort is normal. No respiratory distress.      Breath sounds: Normal breath sounds. No stridor. No wheezing, rhonchi or rales.   Skin:     General: Skin is warm and dry.   Neurological:      Mental Status: She is alert and oriented to person, place, and time. Mental status is at baseline.   Psychiatric:         Mood and Affect: Mood normal.         Thought Content: Thought content normal.         Judgment: Judgment normal.        Result Review :    The following data was reviewed by: ELEN Prasad on " 02/28/2024:  Common labs          9/20/2023    06:24 9/21/2023    05:06 1/19/2024    10:47   Common Labs   Glucose 104      BUN 6      Creatinine 0.38      Sodium 141      Potassium 3.8      Chloride 108      Calcium 8.4      WBC 12.41  12.89  12.71    Hemoglobin 9.1  9.1  12.5    Hematocrit 28.6  28.8  41.0    Platelets 402  441  490      Tobacco Use: Low Risk  (2/28/2024)    Patient History     Smoking Tobacco Use: Never     Smokeless Tobacco Use: Never     Passive Exposure: Never     Social History     Substance and Sexual Activity   Alcohol Use Yes    Comment: I drink occasionally     Family History   Problem Relation Age of Onset    Heart disease Mother     Hypertension Mother     Depression Mother     Diabetes Mother     Mental illness Mother     Hypertension Father     Diabetes Father     Skin cancer Father     Cancer Father     Hypertension Brother     Heart disease Maternal Grandmother     Heart attack Maternal Grandmother     Diabetes Maternal Grandmother     Hypertension Maternal Grandmother     Stroke Maternal Grandmother     Hypertension Brother     Diabetes Maternal Grandfather     Heart disease Maternal Grandfather     Malig Hyperthermia Neg Hx                   Assessment and Plan     Diagnoses and all orders for this visit:    1. Benign essential hypertension (Primary)  Assessment & Plan:  Continue to monitor BP at home-- has AHA hand out  Continue on labetalol 200mg BID   Continue on losartan 50mg daily   Message provider in 1 week via [employer] with updated BP readings from home  BP goal 120/80                 Follow Up     Return for Next scheduled follow up 4/12/24.  Patient was given instructions and counseling regarding her condition or for health maintenance advice. Please see specific information pulled into the AVS if appropriate.

## 2024-02-28 NOTE — ASSESSMENT & PLAN NOTE
Continue to monitor BP at home-- has AHA hand out  Continue on labetalol 200mg BID   Continue on losartan 50mg daily   Message provider in 1 week via [employer] with updated BP readings from home  BP goal 120/80

## 2024-03-13 ENCOUNTER — PATIENT MESSAGE (OUTPATIENT)
Dept: INTERNAL MEDICINE | Facility: CLINIC | Age: 39
End: 2024-03-13
Payer: COMMERCIAL

## 2024-03-13 DIAGNOSIS — E11.9 TYPE 2 DIABETES MELLITUS WITHOUT COMPLICATION, WITHOUT LONG-TERM CURRENT USE OF INSULIN: Primary | ICD-10-CM

## 2024-03-13 NOTE — TELEPHONE ENCOUNTER
From: Liz Bagley  To: Lynette Brooke  Sent: 3/13/2024 10:50 AM EDT  Subject: Blood pressure    Tal Ojeda,     I’ve been taking my blood pressure and here are my readings  159/93  146/95  149/92  146/89  149/89  137/90  159/94    Also, can you send in the prescription for the next dose of Mounjaro?     Thanks,  Liz

## 2024-03-18 ENCOUNTER — PATIENT MESSAGE (OUTPATIENT)
Dept: INTERNAL MEDICINE | Facility: CLINIC | Age: 39
End: 2024-03-18
Payer: COMMERCIAL

## 2024-03-18 DIAGNOSIS — E11.9 TYPE 2 DIABETES MELLITUS WITHOUT COMPLICATION, WITHOUT LONG-TERM CURRENT USE OF INSULIN: Primary | ICD-10-CM

## 2024-03-18 NOTE — TELEPHONE ENCOUNTER
From: Liz Bagley  To: Lynette Brooke  Sent: 3/18/2024 3:46 PM EDT  Subject: Mounjaro    Tal Ojeda,    The [facility] pharmacy isn’t able to fill the 7.5 mounjaro because it is on back order and has been for a few months. They said they can get the 5 mounjaro. Do you want me to continue with the 5 since that’s all they can get? If so, can you send in a prescription for that? If not, what should we do?    Thanks,  Liz

## 2024-03-28 ENCOUNTER — PATIENT MESSAGE (OUTPATIENT)
Dept: INTERNAL MEDICINE | Facility: CLINIC | Age: 39
End: 2024-03-28
Payer: COMMERCIAL

## 2024-03-28 DIAGNOSIS — I10 BENIGN ESSENTIAL HYPERTENSION: ICD-10-CM

## 2024-03-28 RX ORDER — LOSARTAN POTASSIUM 50 MG/1
50 TABLET ORAL 2 TIMES DAILY
Qty: 180 TABLET | Refills: 1 | Status: SHIPPED | OUTPATIENT
Start: 2024-03-28

## 2024-03-28 NOTE — TELEPHONE ENCOUNTER
From: Liz Bagley  To: Lynette Brooke  Sent: 3/28/2024 11:51 AM EDT  Subject: Blood Pressure    Tal Ojeda,    Here are my blood pressure readings over the last week:    141/86  137/84  133/90  114/80  112/77  124/83    Thanks,  Liz

## 2024-04-05 RX ORDER — LABETALOL 100 MG/1
200 TABLET, FILM COATED ORAL 2 TIMES DAILY
Qty: 180 TABLET | Refills: 0 | Status: SHIPPED | OUTPATIENT
Start: 2024-04-05

## 2024-04-12 ENCOUNTER — OFFICE VISIT (OUTPATIENT)
Dept: INTERNAL MEDICINE | Facility: CLINIC | Age: 39
End: 2024-04-12
Payer: COMMERCIAL

## 2024-04-12 VITALS
OXYGEN SATURATION: 97 % | DIASTOLIC BLOOD PRESSURE: 82 MMHG | WEIGHT: 293 LBS | SYSTOLIC BLOOD PRESSURE: 120 MMHG | HEART RATE: 69 BPM | BODY MASS INDEX: 52.43 KG/M2

## 2024-04-12 DIAGNOSIS — Z00.00 ROUTINE HEALTH MAINTENANCE: ICD-10-CM

## 2024-04-12 DIAGNOSIS — E11.9 TYPE 2 DIABETES MELLITUS WITHOUT COMPLICATION, WITHOUT LONG-TERM CURRENT USE OF INSULIN: Primary | ICD-10-CM

## 2024-04-12 NOTE — ASSESSMENT & PLAN NOTE
Diabetes is improving with treatment.   Continue current treatment regimen.  Recommended an ADA diet.  Regular aerobic exercise.  Discussed ways to avoid symptomatic hypoglycemia.  Discussed sick day management.  Discussed foot care.  Diabetes will be reassessed in 3 months

## 2024-04-12 NOTE — PROGRESS NOTES
"Chief Complaint  Diabetes    Subjective        Liz Bagley presents to [facility]  History of Present Illness  This is a 39 y/o female presenting to office for f/u with diabetes. Tolerating 5mg weekly injection; has had difficulty finding due to backorder; reports she is tolerating medication; denies n/v/d. Reports she did get her diabetic eye exam completed at eye care center. Denies any current issues.     Objective   Vital Signs:  /82 (BP Location: Left arm, Patient Position: Sitting, Cuff Size: Large Adult)   Pulse 69   Wt 134 kg (296 lb)   SpO2 97%   BMI 52.43 kg/m²   Estimated body mass index is 52.43 kg/m² as calculated from the following:    Height as of 2/28/24: 160 cm (63\").    Weight as of this encounter: 134 kg (296 lb).               Physical Exam  Constitutional:       Appearance: Normal appearance. She is obese.   HENT:      Head: Normocephalic and atraumatic.      Right Ear: External ear normal.      Left Ear: External ear normal.      Nose: Nose normal.      Mouth/Throat:      Mouth: Mucous membranes are moist.      Pharynx: Oropharynx is clear.   Eyes:      Conjunctiva/sclera: Conjunctivae normal.      Pupils: Pupils are equal, round, and reactive to light.   Cardiovascular:      Rate and Rhythm: Normal rate and regular rhythm.      Pulses: Normal pulses.      Heart sounds: Normal heart sounds. No murmur heard.     No gallop.   Pulmonary:      Effort: Pulmonary effort is normal. No respiratory distress.      Breath sounds: Normal breath sounds. No wheezing or rales.   Musculoskeletal:      Cervical back: Normal range of motion and neck supple.   Skin:     General: Skin is warm and dry.      Capillary Refill: Capillary refill takes less than 2 seconds.   Neurological:      General: No focal deficit present.      Mental Status: She is alert and oriented to person, place, and time. Mental status is at baseline.   Psychiatric:         Mood and Affect: Mood " normal.         Thought Content: Thought content normal.         Judgment: Judgment normal.        Result Review :    The following data was reviewed by: ELEN Prasad on 04/12/2024:  Common labs          9/20/2023    06:24 9/21/2023    05:06 1/19/2024    10:47   Common Labs   Glucose 104      BUN 6      Creatinine 0.38      Sodium 141      Potassium 3.8      Chloride 108      Calcium 8.4      WBC 12.41  12.89  12.71    Hemoglobin 9.1  9.1  12.5    Hematocrit 28.6  28.8  41.0    Platelets 402  441  490                   Assessment and Plan     Diagnoses and all orders for this visit:    1. Type 2 diabetes mellitus without complication, without long-term current use of insulin (Primary)  Assessment & Plan:  Diabetes is improving with treatment.   Continue current treatment regimen.  Recommended an ADA diet.  Regular aerobic exercise.  Discussed ways to avoid symptomatic hypoglycemia.  Discussed sick day management.  Discussed foot care.  Diabetes will be reassessed in 3 months    Orders:  -     Tirzepatide (MOUNJARO) 5 MG/0.5ML solution pen-injector pen; Inject 0.5 mL under the skin into the appropriate area as directed 1 (One) Time Per Week.  Dispense: 2 mL; Refill: 2  -     Cancel: Hemoglobin A1c  -     Cancel: Basic metabolic panel  -     Microalbumin / Creatinine Urine Ratio - Urine, Clean Catch  -     Basic metabolic panel  -     Hemoglobin A1c    2. Routine health maintenance  -     Cancel: Lipid panel  -     Lipid panel             Follow Up     Return in about 4 months (around 8/12/2024) for Annual physical.  Patient was given instructions and counseling regarding her condition or for health maintenance advice. Please see specific information pulled into the AVS if appropriate.         Answers submitted by the patient for this visit:  Other (Submitted on 4/10/2024)  Please describe your symptoms.: Follow up  Have you had these symptoms before?: No  How long have you been having these symptoms?: 1-4  days  Primary Reason for Visit (Submitted on 4/10/2024)  What is the primary reason for your visit?: Other

## 2024-04-13 LAB
ALBUMIN/CREAT UR: 48 MG/G CREAT (ref 0–29)
BUN SERPL-MCNC: 13 MG/DL (ref 6–24)
BUN/CREAT SERPL: 25 (ref 9–23)
CALCIUM SERPL-MCNC: 9.6 MG/DL (ref 8.7–10.2)
CHLORIDE SERPL-SCNC: 101 MMOL/L (ref 96–106)
CHOLEST SERPL-MCNC: 152 MG/DL (ref 100–199)
CO2 SERPL-SCNC: 26 MMOL/L (ref 20–29)
CREAT SERPL-MCNC: 0.52 MG/DL (ref 0.57–1)
CREAT UR-MCNC: 161.3 MG/DL
EGFRCR SERPLBLD CKD-EPI 2021: 120 ML/MIN/1.73
GLUCOSE SERPL-MCNC: 92 MG/DL (ref 70–99)
HBA1C MFR BLD: 6.2 % (ref 4.8–5.6)
HDLC SERPL-MCNC: 46 MG/DL
LDLC SERPL CALC-MCNC: 86 MG/DL (ref 0–99)
MICROALBUMIN UR-MCNC: 77.3 UG/ML
POTASSIUM SERPL-SCNC: 4.6 MMOL/L (ref 3.5–5.2)
SODIUM SERPL-SCNC: 138 MMOL/L (ref 134–144)
TRIGL SERPL-MCNC: 112 MG/DL (ref 0–149)
VLDLC SERPL CALC-MCNC: 20 MG/DL (ref 5–40)

## 2024-04-15 ENCOUNTER — PATIENT MESSAGE (OUTPATIENT)
Dept: INTERNAL MEDICINE | Facility: CLINIC | Age: 39
End: 2024-04-15
Payer: COMMERCIAL

## 2024-04-15 DIAGNOSIS — R80.9 MICROALBUMINURIA: Primary | ICD-10-CM

## 2024-04-15 DIAGNOSIS — E11.9 TYPE 2 DIABETES MELLITUS WITHOUT COMPLICATION, WITHOUT LONG-TERM CURRENT USE OF INSULIN: ICD-10-CM

## 2024-04-15 RX ORDER — DAPAGLIFLOZIN 5 MG/1
5 TABLET, FILM COATED ORAL DAILY
Qty: 90 TABLET | Refills: 1 | Status: SHIPPED | OUTPATIENT
Start: 2024-04-15

## 2024-04-15 NOTE — TELEPHONE ENCOUNTER
From: Liz Bagley  To: Lynette Brooke  Sent: 4/15/2024 9:48 AM EDT  Subject: Medication    Hi Lynette,     I am fine with starting the new med Farxiga.    Thanks,  Liz

## 2024-04-16 ENCOUNTER — TELEPHONE (OUTPATIENT)
Dept: INTERNAL MEDICINE | Facility: CLINIC | Age: 39
End: 2024-04-16
Payer: COMMERCIAL

## 2024-04-16 NOTE — TELEPHONE ENCOUNTER
From: Liz Bagley  To: Lynette Brooke  Sent: 4/15/2024 3:29 PM EDT  Subject: Mounjaro    I can’t find any pharmacy with the 5mg mojito but the [facility] pharmacy has the 10mg in stock right now. Is it ok to jump to that strength? If so, can you send in a prescription for it?    Thanks,  Liz

## 2024-04-16 NOTE — TELEPHONE ENCOUNTER
The  called form [facility]-they received a request for the pt's diabetic eye exam, the pt had a normal eye exam not a diabetic eye exam.

## 2024-04-17 NOTE — TELEPHONE ENCOUNTER
Can you let patient know--  Her eye doc office called us to let us know she only had a regular eye exam. Next time she is due for her eye exam, she needs to ask for a diabetic eye exam. She can call her eye insurance to see where they offer this.

## 2024-04-18 NOTE — TELEPHONE ENCOUNTER
Yara okay to relay   Her eye doctor office called us to let us know she only had a regular eye exam. Next time she is due for her eye exam, she needs to ask for a diabetic eye exam. She can call her eye insurance to see where they offer this.

## 2024-04-26 ENCOUNTER — OFFICE VISIT (OUTPATIENT)
Dept: SURGERY | Facility: CLINIC | Age: 39
End: 2024-04-26
Payer: COMMERCIAL

## 2024-04-26 VITALS
OXYGEN SATURATION: 96 % | SYSTOLIC BLOOD PRESSURE: 130 MMHG | BODY MASS INDEX: 51.33 KG/M2 | HEIGHT: 63 IN | WEIGHT: 289.7 LBS | DIASTOLIC BLOOD PRESSURE: 80 MMHG | HEART RATE: 81 BPM

## 2024-04-26 DIAGNOSIS — K52.9 INFLAMMATORY BOWEL DISEASE: Primary | ICD-10-CM

## 2024-04-26 DIAGNOSIS — S31.819A BUTTOCK WOUND, RIGHT, INITIAL ENCOUNTER: ICD-10-CM

## 2024-04-26 PROCEDURE — 99212 OFFICE O/P EST SF 10 MIN: CPT | Performed by: COLON & RECTAL SURGERY

## 2024-05-07 ENCOUNTER — PATIENT MESSAGE (OUTPATIENT)
Dept: INTERNAL MEDICINE | Facility: CLINIC | Age: 39
End: 2024-05-07
Payer: COMMERCIAL

## 2024-05-21 ENCOUNTER — OFFICE VISIT (OUTPATIENT)
Dept: INTERNAL MEDICINE | Facility: CLINIC | Age: 39
End: 2024-05-21
Payer: COMMERCIAL

## 2024-05-21 VITALS
DIASTOLIC BLOOD PRESSURE: 80 MMHG | HEIGHT: 63 IN | HEART RATE: 73 BPM | WEIGHT: 293 LBS | SYSTOLIC BLOOD PRESSURE: 126 MMHG | OXYGEN SATURATION: 96 % | BODY MASS INDEX: 51.91 KG/M2

## 2024-05-21 DIAGNOSIS — H65.191 ACUTE EFFUSION OF RIGHT EAR: Primary | ICD-10-CM

## 2024-05-21 DIAGNOSIS — L50.9 HIVES: ICD-10-CM

## 2024-05-21 PROCEDURE — 99213 OFFICE O/P EST LOW 20 MIN: CPT | Performed by: NURSE PRACTITIONER

## 2024-05-21 RX ORDER — METHYLPREDNISOLONE 4 MG/1
TABLET ORAL
Qty: 21 EACH | Refills: 0 | Status: SHIPPED | OUTPATIENT
Start: 2024-05-21

## 2024-05-21 NOTE — ASSESSMENT & PLAN NOTE
Medrol pack as ordered.   Continue flonase, antihistamine  Add on pepcid BID for hive relief  Saline rinses PRN  Can trial astelin spray PRN  RTO if symptoms worsen or do not improve

## 2024-05-21 NOTE — PROGRESS NOTES
"Chief Complaint  Ear Infection, Hives, and Tinnitus    Subjective        Liz Bagley presents to [facility]  Tinnitus      This is a 37 y/o female presenting to office for f/u with ear infection with tinnitus. Reports recently being dx with otitis media and was given amoxicillin. Reports she developed hives within 48 hours and was stopped on this medication. Switched to cefdinir but still having symptoms that are bothersome. Denies fever. Reports sinus pain, pressure, and ongoing ringing in ear in right ear. She is using flonase and loratadine.        Objective   Vital Signs:  /80 (BP Location: Left arm, Patient Position: Sitting, Cuff Size: Large Adult)   Pulse 73   Ht 160 cm (62.99\")   Wt 133 kg (293 lb)   SpO2 96%   BMI 51.92 kg/m²   Estimated body mass index is 51.92 kg/m² as calculated from the following:    Height as of this encounter: 160 cm (62.99\").    Weight as of this encounter: 133 kg (293 lb).               Physical Exam  Constitutional:       Appearance: Normal appearance.   HENT:      Right Ear: Ear canal and external ear normal. A middle ear effusion is present.      Left Ear: Tympanic membrane, ear canal and external ear normal.      Nose: Congestion present.      Mouth/Throat:      Mouth: Mucous membranes are moist.      Pharynx: Oropharynx is clear.   Eyes:      Conjunctiva/sclera: Conjunctivae normal.      Pupils: Pupils are equal, round, and reactive to light.   Cardiovascular:      Rate and Rhythm: Normal rate and regular rhythm.      Pulses: Normal pulses.      Heart sounds: Normal heart sounds. No murmur heard.     No gallop.   Pulmonary:      Effort: Pulmonary effort is normal. No respiratory distress.      Breath sounds: Normal breath sounds. No stridor. No wheezing, rhonchi or rales.   Musculoskeletal:      Cervical back: Normal range of motion and neck supple.   Skin:     Findings: Rash present.      Comments: Scattered erythematic patches to " facial region; no throat swelling; reports improvement with hives to facial region    Neurological:      Mental Status: She is alert and oriented to person, place, and time. Mental status is at baseline.   Psychiatric:         Mood and Affect: Mood normal.         Thought Content: Thought content normal.         Judgment: Judgment normal.        Result Review :    The following data was reviewed by: ELEN Prasad on 05/21/2024:  Common labs          9/21/2023    05:06 1/19/2024    10:47 4/12/2024    12:30   Common Labs   Glucose   92    BUN   13    Creatinine   0.52    Sodium   138    Potassium   4.6    Chloride   101    Calcium   9.6    WBC 12.89  12.71     Hemoglobin 9.1  12.5     Hematocrit 28.8  41.0     Platelets 441  490     Total Cholesterol   152    Triglycerides   112    HDL Cholesterol   46    LDL Cholesterol    86    Hemoglobin A1C   6.2    Microalbumin, Urine   77.3      Tobacco Use: Low Risk  (5/21/2024)    Patient History     Smoking Tobacco Use: Never     Smokeless Tobacco Use: Never     Passive Exposure: Never     Social History     Substance and Sexual Activity   Alcohol Use Yes    Comment: I drink occasionally     Family History   Problem Relation Age of Onset    Heart disease Mother     Hypertension Mother     Depression Mother     Diabetes Mother     Mental illness Mother     Hypertension Father     Diabetes Father     Skin cancer Father     Cancer Father     Hypertension Brother     Heart disease Maternal Grandmother     Heart attack Maternal Grandmother     Diabetes Maternal Grandmother     Hypertension Maternal Grandmother     Stroke Maternal Grandmother     Hypertension Brother     Diabetes Maternal Grandfather     Heart disease Maternal Grandfather     Malig Hyperthermia Neg Hx      UC with Janet Dia PA-C (05/15/2024)              Assessment and Plan     Diagnoses and all orders for this visit:    1. Acute effusion of right ear (Primary)  Assessment & Plan:  Medrol pack as  ordered.   Continue flonase, antihistamine  Add on pepcid BID for hive relief  Saline rinses PRN  Can trial astelin spray PRN  RTO if symptoms worsen or do not improve    Orders:  -     methylPREDNISolone (MEDROL) 4 MG dose pack; Take as directed on package instructions.  Dispense: 21 each; Refill: 0    2. Hives  Assessment & Plan:  Pepcid 20mg BID   Medrol pack as ordered  Cont on loratadine     Orders:  -     methylPREDNISolone (MEDROL) 4 MG dose pack; Take as directed on package instructions.  Dispense: 21 each; Refill: 0             Follow Up     Return if symptoms worsen or fail to improve.  Patient was given instructions and counseling regarding her condition or for health maintenance advice. Please see specific information pulled into the AVS if appropriate.         Answers submitted by the patient for this visit:  Primary Reason for Visit (Submitted on 5/20/2024)  What is the primary reason for your visit?: Ear Pain  Ear Pain Questionnaire (Submitted on 5/20/2024)  Chief Complaint: Ear pain  dizziness: No  hoarse voice: No  congestion: Yes  jaw pain: Yes  adenopathy: Yes  tinnitus: Yes

## 2024-05-28 RX ORDER — LABETALOL 100 MG/1
200 TABLET, FILM COATED ORAL 2 TIMES DAILY
Qty: 180 TABLET | Refills: 0 | Status: SHIPPED | OUTPATIENT
Start: 2024-05-28

## 2024-05-30 ENCOUNTER — PATIENT MESSAGE (OUTPATIENT)
Dept: INTERNAL MEDICINE | Facility: CLINIC | Age: 39
End: 2024-05-30
Payer: COMMERCIAL

## 2024-05-30 DIAGNOSIS — H65.00 ACUTE SEROUS OTITIS MEDIA, RECURRENCE NOT SPECIFIED, UNSPECIFIED LATERALITY: Primary | ICD-10-CM

## 2024-05-31 RX ORDER — CLINDAMYCIN HYDROCHLORIDE 300 MG/1
300 CAPSULE ORAL 3 TIMES DAILY
Qty: 21 CAPSULE | Refills: 0 | Status: SHIPPED | OUTPATIENT
Start: 2024-05-31 | End: 2024-06-07

## 2024-05-31 NOTE — TELEPHONE ENCOUNTER
From: Liz Bagley  To: Lynette Brooke  Sent: 5/30/2024 5:05 PM EDT  Subject: Eat infection    Hi Lynette,    I think my ear infection is coming back. I took all the antibiotics and all the steroid and it was getting so much better. But now it’s beginning to hurt again, I’m having some hearing loss, and it feels like it needs to pop. Should I make an appointment to come see you or can you send me another antibiotic or steroid?    Thanks,   Liz

## 2024-06-11 ENCOUNTER — PATIENT MESSAGE (OUTPATIENT)
Dept: INTERNAL MEDICINE | Facility: CLINIC | Age: 39
End: 2024-06-11
Payer: COMMERCIAL

## 2024-06-11 DIAGNOSIS — E11.9 TYPE 2 DIABETES MELLITUS WITHOUT COMPLICATION, WITHOUT LONG-TERM CURRENT USE OF INSULIN: Primary | ICD-10-CM

## 2024-06-11 NOTE — TELEPHONE ENCOUNTER
From: Liz Bagley  To: Lynette Brooke  Sent: 6/11/2024 10:41 AM EDT  Subject: [place],    My [facility] pharmacy has mounjaro 2.5, 5, and 7.5. Can you send in a prescription for whichever dose you want me on?    Thanks,  Liz

## 2024-06-19 DIAGNOSIS — K90.89 POOR IRON ABSORPTION: ICD-10-CM

## 2024-06-19 DIAGNOSIS — D50.9 IRON DEFICIENCY ANEMIA, UNSPECIFIED IRON DEFICIENCY ANEMIA TYPE: Primary | ICD-10-CM

## 2024-07-11 ENCOUNTER — PATIENT MESSAGE (OUTPATIENT)
Dept: INTERNAL MEDICINE | Facility: CLINIC | Age: 39
End: 2024-07-11
Payer: COMMERCIAL

## 2024-07-11 DIAGNOSIS — E11.9 TYPE 2 DIABETES MELLITUS WITHOUT COMPLICATION, WITHOUT LONG-TERM CURRENT USE OF INSULIN: Primary | ICD-10-CM

## 2024-07-11 NOTE — TELEPHONE ENCOUNTER
From: Liz Bagley  To: Lynette Brooke  Sent: 7/11/2024 1:21 PM EDT  Subject: [place] O[place]eda,     The [facility] pharmacy I use does not have any of the 5mg mounjaro, but they do have 2.5 and 7.5. Can you send in a prescription for whichever dose you’d like me on? I have not had any issues with nausea so I’d be comfortable going up to the 7.5.    Thanks,   Liz

## 2024-07-16 RX ORDER — LABETALOL 100 MG/1
200 TABLET, FILM COATED ORAL 2 TIMES DAILY
Qty: 180 TABLET | Refills: 0 | Status: SHIPPED | OUTPATIENT
Start: 2024-07-16

## 2024-07-26 ENCOUNTER — TELEPHONE (OUTPATIENT)
Dept: INTERNAL MEDICINE | Facility: CLINIC | Age: 39
End: 2024-07-26
Payer: COMMERCIAL

## 2024-07-26 NOTE — TELEPHONE ENCOUNTER
Yara okay to relay  Lynette will be out of office on August 12 so your appointment for a physical needs to be rescheduled     HUB PLEASE RESCHEDULE APPOINTMENT

## 2024-08-12 ENCOUNTER — OFFICE VISIT (OUTPATIENT)
Dept: ONCOLOGY | Facility: CLINIC | Age: 39
End: 2024-08-12
Payer: COMMERCIAL

## 2024-08-12 ENCOUNTER — LAB (OUTPATIENT)
Dept: LAB | Facility: HOSPITAL | Age: 39
End: 2024-08-12
Payer: COMMERCIAL

## 2024-08-12 VITALS
DIASTOLIC BLOOD PRESSURE: 71 MMHG | OXYGEN SATURATION: 96 % | SYSTOLIC BLOOD PRESSURE: 106 MMHG | RESPIRATION RATE: 17 BRPM | HEART RATE: 70 BPM | WEIGHT: 286.9 LBS | TEMPERATURE: 98.3 F | BODY MASS INDEX: 50.84 KG/M2 | HEIGHT: 63 IN

## 2024-08-12 DIAGNOSIS — D72.823 LEUKEMOID REACTION: ICD-10-CM

## 2024-08-12 DIAGNOSIS — K90.89 POOR IRON ABSORPTION: ICD-10-CM

## 2024-08-12 DIAGNOSIS — D50.9 IRON DEFICIENCY ANEMIA, UNSPECIFIED IRON DEFICIENCY ANEMIA TYPE: Primary | ICD-10-CM

## 2024-08-12 DIAGNOSIS — D50.9 IRON DEFICIENCY ANEMIA, UNSPECIFIED IRON DEFICIENCY ANEMIA TYPE: ICD-10-CM

## 2024-08-12 LAB
BASOPHILS # BLD AUTO: 0.07 10*3/MM3 (ref 0–0.2)
BASOPHILS NFR BLD AUTO: 0.6 % (ref 0–1.5)
DEPRECATED RDW RBC AUTO: 44.3 FL (ref 37–54)
EOSINOPHIL # BLD AUTO: 0.26 10*3/MM3 (ref 0–0.4)
EOSINOPHIL NFR BLD AUTO: 2.2 % (ref 0.3–6.2)
ERYTHROCYTE [DISTWIDTH] IN BLOOD BY AUTOMATED COUNT: 13.2 % (ref 12.3–15.4)
FERRITIN SERPL-MCNC: 181 NG/ML (ref 13–150)
HCT VFR BLD AUTO: 42.1 % (ref 34–46.6)
HGB BLD-MCNC: 13.4 G/DL (ref 12–15.9)
IMM GRANULOCYTES # BLD AUTO: 0.03 10*3/MM3 (ref 0–0.05)
IMM GRANULOCYTES NFR BLD AUTO: 0.3 % (ref 0–0.5)
IRON 24H UR-MRATE: 51 MCG/DL (ref 37–145)
IRON SATN MFR SERPL: 12 % (ref 20–50)
LYMPHOCYTES # BLD AUTO: 3.3 10*3/MM3 (ref 0.7–3.1)
LYMPHOCYTES NFR BLD AUTO: 28.5 % (ref 19.6–45.3)
MCH RBC QN AUTO: 29.2 PG (ref 26.6–33)
MCHC RBC AUTO-ENTMCNC: 31.8 G/DL (ref 31.5–35.7)
MCV RBC AUTO: 91.7 FL (ref 79–97)
MONOCYTES # BLD AUTO: 0.63 10*3/MM3 (ref 0.1–0.9)
MONOCYTES NFR BLD AUTO: 5.4 % (ref 5–12)
NEUTROPHILS NFR BLD AUTO: 63 % (ref 42.7–76)
NEUTROPHILS NFR BLD AUTO: 7.28 10*3/MM3 (ref 1.7–7)
NRBC BLD AUTO-RTO: 0 /100 WBC (ref 0–0.2)
PLATELET # BLD AUTO: 432 10*3/MM3 (ref 140–450)
PMV BLD AUTO: 9.7 FL (ref 6–12)
RBC # BLD AUTO: 4.59 10*6/MM3 (ref 3.77–5.28)
TIBC SERPL-MCNC: 431 MCG/DL (ref 298–536)
TRANSFERRIN SERPL-MCNC: 289 MG/DL (ref 200–360)
WBC NRBC COR # BLD AUTO: 11.57 10*3/MM3 (ref 3.4–10.8)

## 2024-08-12 PROCEDURE — 99214 OFFICE O/P EST MOD 30 MIN: CPT | Performed by: INTERNAL MEDICINE

## 2024-08-12 PROCEDURE — 36415 COLL VENOUS BLD VENIPUNCTURE: CPT

## 2024-08-12 PROCEDURE — 85025 COMPLETE CBC W/AUTO DIFF WBC: CPT

## 2024-08-12 PROCEDURE — 84466 ASSAY OF TRANSFERRIN: CPT

## 2024-08-12 PROCEDURE — 82728 ASSAY OF FERRITIN: CPT

## 2024-08-12 PROCEDURE — 83540 ASSAY OF IRON: CPT

## 2024-08-12 NOTE — PROGRESS NOTES
REASON FOR FOLLOWUP:     1.  Chronic mild leukocytosis.  Likely inflammatory in nature.  2.  Iron deficiency anemia secondary to poor iron absorption and menorrhagia.    Patient was given Injectafer in November 2018.  Recurrent iron deficiency anemia, Injectafer infusion 2 doses in July 2019.                               HISTORY OF PRESENT ILLNESS:  The patient is a 38 y.o. year old female who presented today follow-up evaluation.    History of Present Illness  The patient presents for annual evaluation.    She underwent a pancolectomy in 2017 due to ulcerative colitis/Crohn's disease. She reports no instances of bleeding from the ileostomy bag encuraching the ileostomy. She was hospitalized in 09/2023 and underwent surgical resection for complete protection and drainage of perirectal and retrovaginal septum abscess. At that time, her anemia had worsened, with a hemoglobin level of 9.1 on 09/21/2023. She is currently taking a chewable multivitamin and liquid vitamin D drops.     Patient reports that her baseline condition.  Headaches no dizziness lightheadedness.  No evidence for bleeding.    This patient has recurrent iron deficient in January 2024.  She was given intravenous weekly for 3 doses in late January and early February 2024.    Supplemental Information  She had an ablation in 2020.    Results  Laboratory Studies 8/12/2024  Hemoglobin 13.4, MCV 91.7, platelets 432,000, WBC 11,570, neutrophils 7280, lymphocytes 3300, monocytes 630.  Ferritin 181, free iron 51 iron saturation 12%.    1/19/2024 ferritin 56.2, iron saturation 13%, free iron 55, hemoglobin 12.5, MCV 83.8, WBC 12,700, platelets 490,000.      Past Medical History:   Diagnosis Date    Allergic rhinitis     Anemia     Anxiety     Arthritis     Chronic fatigue 08/04/2017    Colon polyps     COVID-19 virus infection 12/09/2020    Crohn's disease     Depression     Eczema     LEG, ABD, HIPS    Fistula involving buttock 10/2023    SUPERFICIAL,  S/P EXCISION    H/O transfusion of packed red blood cells     1 UNIT, NO REACTIONS    Headache syndrome 06/25/2017    SPINAL PERFORMED, SEEN AT [facility] ER    History of Clostridium difficile infection     History of steroid therapy     LONG TERM USE    HL (hearing loss)     Hypertension     Hypovitaminosis D 10/10/2017    IBS (irritable bowel syndrome)     Ileostomy present     Insomnia     Leukocytosis 11/08/2018    Migraines     Myopia     BILATERAL    Non-neoplastic nevus of skin     Obesity     Open wound     right buttock area   pt cleaning with soap & water and applying bactroban    Pancolitis     Poor iron absorption 11/08/2018    Spinal headache 06/28/2015    SEEN AT [facility] ER    Tattoos     Type 2 diabetes mellitus without complication, without long-term current use of insulin 11/29/2022    Ulcerative colitis     Vitamin B 12 deficiency      Past Surgical History:   Procedure Laterality Date    APPENDECTOMY N/A 06/02/2014     AT [facility]    BLOOD PATCH N/A 06/28/2015    EPIDURAL BLOOD PATCH, DR.DONAL ARMSTRONG AT [facility]    COLON RESECTION N/A 11/20/2017    Procedure: LAPAROSCOPIC TOTAL PROCTOCOLECTOMY WITH END ILEOSTOMY;  Surgeon: Colin Evans MD;  Location: [facility] OR;  Service:     COLON RESECTION      illeostomy    COLONOSCOPY N/A 11/11/2016    Procedure: COLONOSCOPY TO CECUM AND TERMINAL ILEUM WITH BIOPSIES;  Surgeon: Yao Uribe MD;  Location: [facility] ENDOSCOPY;  Service:     COLONOSCOPY N/A 07/2012    DR. MILLER JOSEPH IN [place]    D & C HYSTEROSCOPY ENDOMETRIAL ABLATION N/A 05/04/2020    Procedure: DILATATION AND CURETTAGE HYSTEROSCOPY NOVASURE ENDOMETRIAL ABLATION;  Surgeon: Hellen Alaniz MD;  Location: [facility] OR;  Service: Gynecology;  Laterality: N/A;    ENDOMETRIAL ABLATION  May 2020    EPIDURAL BLOOD PATCH N/A 07/02/2015    DR. MILLER LOPEZ AT [facility]    FINGER SURGERY Right 12/18/2015    MIDDLE FINGER, EXCISION OF FOREIGN BODYX3, DRTSJENNI-DANIELLA FAIR    FOREARM SURGERY Left 2000    w/  internal device, hardware    INCISION AND DRAINAGE TRUNK Right 08/21/2023    Procedure: sharp incisional DEBRIDEMENT OFright BUTTOCKS WOUND;  Surgeon: Bobbi Valverde MD;  Location: [facility] OR;  Service: General;  Laterality: Right;    LUMBAR PUNCTURE N/A 06/25/2015    [facility]     NOSE SURGERY Bilateral 02/10/2017    NASAL SCOPE, BILATERAL EDEMA, MIDLINE SEPTUM, NO POLYPS, DR. GHADA LEGGETT    ORIF ANKLE FRACTURE Left 2011    w/ internal devices, DR. OLIVIER mendoza    RECTAL MASS EXCISION N/A 09/19/2023    Procedure: Resection of anus and rectal cuff;  Surgeon: Colin Evans MD;  Location: [facility] OR;  Service: General;  Laterality: N/A;    TONSILLECTOMY Bilateral 2000       HEMATOLOGIC/ONCOLOGIC HISTORY:  The patient is a 33 y.o. year old female who is here for initial evaluation because of chronic mild leukocytosis referred by her surgeon Dr. Evans. This patient had history of ulcerative colitis diagnosed when she was 13 years old. She reports that she failed all of the therapy except steroids but she was not tolerating steroids well. So eventually the patient had pancolectomy by Dr. Evans in November 2017. She has ileostomy in place. She is doing well, without fever, sweating or chills. She reports no recurrent infection. She has no weight loss, as a matter of fact she is overweight.     The patient also has long history of anemia, however has no formal workup for iron deficiency. She also reports heavy menses.  Patient also reports that she was previously taking oral iron treatment of for 1 year when she was living in [place] many years ago, there was no improvement of her anemia.  As a matter of fact, patient reports her oral iron tablet would came out in her stool intact.  She was given vitamin B12 injection in August at her primary care physician Dr. Maldonado's office.    Reviewed her laboratory studies dating back to 06/02/2014 within the [employer] EMR system. This patient had low normal  hemoglobin 12.2, and MCV 80.7. Had elevated platelets 571,000 with WBC 12,470 including neutrophils 8500, lymphocytes 2800, monocytes 600 and eosinophils 200. Her chemistry that day reported unremarkable CMP. There were no lab results from that time until 11/15/2017 when she had hemoglobin 11.3, MCV 78.6, and platelets 669,000 and WBC 13,970. That is when the patient had pancolectomy on 11/20/2017 with postsurgery labs reported hemoglobin 8.8 and platelets 525,000, MCV 80.6 and WBC 14,400 including neutrophils 11,350, lymphocytes 1900, monocytes 1100 on 11/21/2017 one day after surgery. Her hemoglobin was below 9 grams in the following several days. On 04/02/2018, the patient had hemoglobin 10.3, MCV 77.1, platelets 632,000, WBC 15,400, including neutrophils 8950, lymphocytes 3340, and monocytes 600, eosinophils 440. On 05/21/2018 patient had chemistry lab reporting normal thyroid profile, folic acid 11.1 ng/mL, and B12 at 363 pg/mL. Her hemoglobin was 10.8, MCV 79.3, platelets 608,000, WBC 14,100 including neutrophils 9600, lymphocytes 3500, monocytes 720 and eosinophils 240. CMP was unremarkable. There was no iron study.    Laboratory study on 11/8/2018 reported hemoglobin 10.2, MCV 76.4, platelets 497,000, WBC 12,680 including neutrophils 6990, lymphocytes 4230, monocytes 720, eosinophils 580.  Serum iron study reported a ferritin 5.8 ng/mL, free iron 16 TIBC 582, iron saturation 3%, folic acid and 10.5 ng/mL, vitamin B12 at 425 pg/mL, haptoglobin 177, , C-reactive protein 1.18 mg/dL, ESR 18, negative for direct MARIA DOLORES, rheumatoid factor, and negative for comprehensive MARIA DOLORES panel.  Chemistry lab reported normal renal function, normal electrolytes and normal liver function panel.  Total protein 7.5 and albumin 3.9.     Peripheral blood smear reviewed under microscope. There are hypochromia and microcytosis, poikilocytosis, tong-shaped RBCs and some target cells. The morphologies of WBCs and platelets are normal.      Patient was given Injectafer in November 2019, 2 doses total 1500 mg.    Patient was given IV Injectafer in November 2018 due to anemia not responding to oral iron treatment.  She reports resolution of pica for ice chips.     Patient notes she does not currently take Vitamin B-12 but is taking Vitamin D. I advised her to start taking over the counter Vitamin B-12 1000 mcg and Folic acid 1 mg. She reports no difficulties with her colitis today which is not currently managed with any medications. She denies any other complaints at this time. Patient denies any craving for ice chips at this time.     On 2/21/2019, her iron study reports ferritin 104.6, and iron saturation 19% and vitamin B12 at 404, normalized to Hb 12.2 MCV 89.9.    Patient was seen at ED on 05/06/2019 for diarrhea, abdominal pain, vomiting, and nausea. She was found to have Rotavirus infection with diarrhea of infectious origin.     Laboratory study on 7/9/2019 reported total WBC of 12,660 including neutrophils 7800, lymphocytes 3800, monocytes 560 and eosinophil 400.  Iron study reported ferritin 16.2, iron saturation 12%, 4950 and TIBC 434.  Patient was given 2 more doses of Injectafer.    On 11/23/2022 she comes back for reevaluation of leukocytosis.  The patient denies any recent infections. Although patient feels like she has a cold currently with nasal congestion, and sinus pressure which is worse in the morning and resolves during the day. She denies any fever, sweating, or chills.     I reviewed her laboratory results.  Recently on 11/1/2022 she had elevated WBC 13,670 without differentiation, elevated platelets 511,000 and normal hemoglobin 12.1, MCV 84.0 and MCHC 31.5.  A few months back, on 9/14/2021 she had a WBC 14,200 without differentiation, platelets 496,000 and hemoglobin 12.5 with MCV 84.8 MCHC 33.1.    Further back, laboratory study on 12/09/2020 showed a normal total WBC of 7,600, including ANC 6050 lymphocytes 1160 and  monocytes 350.  Hemoglobin of 14.1, and platelets of 490,000.     Laboratory study done today 11/23/2022 shows platelets of 407,000, WBC of 12,610, neutrophils of 7,790, lymphocytes of 3,240, eosinophils of 770, and hemoglobin of 12.1. Patient states that her platelet count has always been elevated.     The patient is taking Emgality (monoclonal antibody) for her migraines once a month which provides relief. She also takes Rexulti and trazodone for her depression and anxiety. Patient does not have any medications for her allergies.       Patient reports since her pancolectomy in 2017, she has ileostomy and not always having liquid type of stool, but she denies any blood. She is not on any treatment for Crohn's disease.  Patient reports chronic fatigue.  She has no dizziness or fainting.  Denies fever sweating or chills.    Laboratory study 07/07/2023 reported normal hemoglobin 12.3 g/dL, MCV 87.5 fl, MCHC 30.9 g/dL. Iron studies showed ferritin 229 ng/mL, however, low iron saturation 7% with free iron 27 mcg/dL, TIBC 382 mcg/dL. Patient also has elevated WBC at 13,250/mm3 including neutrophils at 10,500/mm3 and mildly elevated platelets 526,000/mm3.      MEDICATIONS    Current Outpatient Medications:     betamethasone, augmented, (DIPROLENE) 0.05 % ointment, Apply to affected area twice a day for 2 weeks, Disp: 50 g, Rfl: 11    Brexpiprazole (Rexulti) 0.25 MG tablet, take 1 tablet by mouth daily, Disp: 90 tablet, Rfl: 0    dapagliflozin (Farxiga) 5 MG tablet tablet, Take 1 tablet by mouth Daily., Disp: 90 tablet, Rfl: 1    labetalol (NORMODYNE) 100 MG tablet, Take 2 tablets by mouth 2 (Two) Times a Day., Disp: 180 tablet, Rfl: 0    losartan (COZAAR) 50 MG tablet, Take 1 tablet by mouth 2 (Two) Times a Day., Disp: 180 tablet, Rfl: 1    ondansetron (Zofran) 4 MG tablet, Take 1 tablet by mouth Every 8 (Eight) Hours As Needed for Nausea or Vomiting., Disp: 30 tablet, Rfl: 2    Tirzepatide (MOUNJARO) 7.5 MG/0.5ML  solution pen-injector pen, Inject 0.5 mL under the skin into the appropriate area as directed 1 (One) Time Per Week., Disp: 2 mL, Rfl: 3    traZODone (DESYREL) 50 MG tablet, Take 1-2 tablets by mouth At Night As Needed for sleep., Disp: 180 tablet, Rfl: 0    Vortioxetine HBr (Trintellix) 20 MG tablet, Take 1 tablet by mouth Daily., Disp: 90 tablet, Rfl: 0    fluticasone (FLONASE) 50 MCG/ACT nasal spray, Instill 2 sprays into the nostril(s) as directed by provider Daily., Disp: 16 g, Rfl: 0    loratadine (CLARITIN) 10 MG tablet, Take 1 tablet by mouth Daily., Disp: 30 tablet, Rfl: 0    methylPREDNISolone (MEDROL) 4 MG dose pack, Take as directed on package instructions., Disp: 21 each, Rfl: 0    traZODone (DESYREL) 50 MG tablet, Take 1-2 tablets by mouth Every Night for sleep., Disp: 60 tablet, Rfl: 3    Vortioxetine HBr (Trintellix) 20 MG tablet, Take 1 tablet by mouth Daily., Disp: 90 tablet, Rfl: 0    ALLERGIES:     Allergies   Allergen Reactions    Amoxicillin Hives       SOCIAL HISTORY:       Social History     Social History    Marital status: Single     Spouse name: N/A    Number of children: 0    Years of education: College education      Occupational History     [employer]     Social History Main Topics    Smoking status: Never Smoker    Smokeless tobacco: Never Used    Alcohol use Yes      Comment: 1-2 monthly    Drug use: No    Sexual activity: Defer         FAMILY HISTORY:   Father had a skin cancer in his 40s, currently in late 60s with poor health condition including diabetes hypertension.    Mother in late 60s with poor health condition also diabetes and hypertension together with mental illness.  Patient has 2 brothers in their 40s in good health condition, they both have hypertension.  Maternal grandfather has  hemochromatosis.      REVIEW OF SYSTEMS:  See HPI.           Vitals:    08/12/24 0838   BP: 106/71   Pulse: 70   Resp: 17   Temp: 98.3 °F (36.8 °C)   TempSrc: Skin   SpO2: 96%  "  Weight: 130 kg (286 lb 14.4 oz)   Height: 160 cm (62.99\")   PainSc: 0-No pain         8/12/2024     8:39 AM   Current Status   ECOG score 0     Physical Exam  GENERAL:  Well-developed, well-nourished female in no acute distress.    SKIN:  Warm, dry without rashes, purpura or petechiae.  HEENT:  Normocephalic.   LYMPHATICS:  No cervical, supraclavicular or axillary adenopathy.  CHEST: Normal respiratory effort.  Lungs clear to auscultation. Good airflow.  CARDIAC:  Regular rate and rhythm. Normal S1,S2.  ABDOMEN:  Soft, no tender.  Bowel sounds normal.  EXTREMITIES:  No lower extremity edema.      RECENT LABS:    Lab Results   Component Value Date    WBC 11.57 (H) 08/12/2024    HGB 13.4 08/12/2024    HCT 42.1 08/12/2024    MCV 91.7 08/12/2024     08/12/2024     Lab Results   Component Value Date    NEUTROABS 7.28 (H) 08/12/2024     Lab Results   Component Value Date    GLUCOSE 92 04/12/2024    BUN 13 04/12/2024    CREATININE 0.52 (L) 04/12/2024    EGFRIFNONA 117 09/14/2021    EGFRIFAFRI >150 09/18/2019    BCR 25 (H) 04/12/2024    K 4.6 04/12/2024    CO2 26 04/12/2024    CALCIUM 9.6 04/12/2024    PROTENTOTREF 7.0 03/03/2023    ALBUMIN 4.0 03/03/2023    LABIL2 1.3 03/03/2023    AST 8 03/03/2023    ALT 15 03/03/2023     Sodium   Date Value Ref Range Status   04/12/2024 138 134 - 144 mmol/L Final   09/20/2023 141 136 - 145 mmol/L Final     Potassium   Date Value Ref Range Status   04/12/2024 4.6 3.5 - 5.2 mmol/L Final   09/20/2023 3.8 3.5 - 5.2 mmol/L Final     Total Bilirubin   Date Value Ref Range Status   03/03/2023 0.3 0.0 - 1.2 mg/dL Final   11/01/2022 <0.2 0.0 - 1.2 mg/dL Final     Alkaline Phosphatase   Date Value Ref Range Status   03/03/2023 83 39 - 117 U/L Final   11/01/2022 82 39 - 117 U/L Final   ]  Lab Results   Component Value Date    BSOOTDOD42 307 03/03/2023     Lab Results   Component Value Date    FOLATE 6.62 01/10/2023     Lab Results   Component Value Date    IRON 55 01/19/2024    TIBC 437 " 01/19/2024    FERRITIN 56.20 01/19/2024   Iron saturation 13% on 1/19/2024.      Lab Results   Component Value Date    IRON 51 08/12/2024    LABIRON 12 (L) 08/12/2024    TRANSFERRIN 289 08/12/2024    TIBC 431 08/12/2024    FERRITIN 181.00 (H) 08/12/2024   Iron saturation 12% on 8/12/2024       Assessment & Plan      Assessment & Plan      1.  Chronic mild leukocytosis.  The patient is a 33-year-old female with previous lab results mostly with increased neutrophils, she typically had normal lymphocytes and monocytes. She occasionally has elevated eosinophils.   Laboratory study on 11/8/2018 showed only mildly elevated C-reactive protein, however was negative for the rheumatoid disease panel and review of peripheral blood smear has no signs of malignancy.    11/13/2018 she had slightly worsened leukocytosis, together with elevated neutrophils, slightly increased lymphocytes in the significant increased eosinophils.  This patient also had seasonal allergies, she had nasal congestion.  I think her leukocytosis was at least partially contributed by her seasonal allergy with nasal congestion.  I advised the patient to take over-the-counter antiallergy medicine.  Laboratory study today 07/09/2019 reported total WBC of 12,660 including neutrophils 7800, lymphocytes 3800, monocytes 560 and eosinophil 400.  This is relatively stable.  Continue to monitor.  Her WBC on 11/23/2022 is slightly better compared to the numbers in 09/2022 and 11/2022. Today on 11/23/2022 she has some sinus infection. Otherwise, there are no major issues going on. Patient does not need to do further laboratory studies at this point.  Laboratory studies on 03/03/2023 reported WBC 12,150 including ANC 7,820, lymphocytes 3130, and monocytes 700.   on 07/07/2023, WBC 13,250 including ANC 10,530, lymphocytes 1660, and monocytes 600. Patient reports no fever, sweating, or chills.    Laboratory study today 8/12/2024 reported WBC 11,570 including neutrophils  7280, lymphocytes 3300, monocytes 630 and normal eosinophil, basophil and immature granulocytes.  Continue oral multivitamin which contains B12 and folic acid.      2.  Iron deficiency anemia, microcytic hypochromic.  This is secondary to her menorrhagia and they have poor iron absorption because of her GI disease with ulcerative colitis/Crohn's disease. I recommended intravenous iron therapy using Injectafer weekly for 2 doses started on 11/13/2018.   This patient has low normal vitamin B12 level.  I advised her to start over-the-counter B12 to help with erythropoiesis and folic acid 1 mg.   On 2/21/2019, her iron study reports ferritin 104.6, and iron saturation 19% and vitamin B12 at 404, normalized to Hb 12.2 MCV 89.9.   Iron saturation 07/09/2019 is 12%.  Patient has deteriorating iron profile with recurrent iron deficiency.  We will initiate IV iron therapy again.  Patient was given 2 doses of Injectafer.     The patient has a normal hemoglobin of 12.1.   Laboratory study on 11/23/2023 reported that hemoglobin 12.1 and ferritin 54.3, iron saturation 8% with a free iron 38, TIBC 470. Patient was given intravenous Venofer 300 mg weekly for three doses.  Laboratory study on 01/10/2023 reported B12 level 353 and folate 6.62 ng/mL. Normal TSH.   On 07/07/2023, patient maintains normal hemoglobin 12.3 with MCV 87.5, iron studies showed ferritin 229, however, free iron 27, TIBC 382, and iron saturation 7%. This fits with the inflammatory condition. No need for intravenous iron therapy at this point.  Nevertheless because of low normal B12 and folate.  We asked patient to start supplements for both B12 and folic acid to help with erythropoiesis.    On 1/19/2024 ferritin 56.2, iron saturation 13%, free iron 55, hemoglobin 12.5, MCV 83.8.  Patient was given Venofer 300 mg weekly for 3 doses.   Laboratory study today on 08/12/2024 reported improved hemoglobin 13.4 and MCV 91.7. Iron study reported much improved ferritin  181 and free iron 51. Iron saturation 12%. The patient reports she has been taking multivitamin which I looked at the label and contains vitamin B12 at 1000 mcg and the folic acid of 400 mcg. Considering the patient had a significant improvement of anemia Hb 9.1 from postop in 09/2023 to current 13.4, I recommended no intravenous iron therapy for now. She has a significant improvement of ferritin level.       PLAN:   No need for intravenous iron therapy.   Patient will continue oral multivitamin once a day, which has both vitamin B12 and folic acid 1 mg.    I will continue to monitor laboratory study every 6 months with CBC, ferritin, iron profile.   I will also check B12 and folate level in 6 months.   I will see her in 12 months for reevaluation, we will check CBC, ferritin, iron profile.      I discussed with the patient about laboratory results and further management plan.  Patient voiced understanding and agreeable.         Guadalupe Bass MD PhD        CC:   Pema Craft APRN Nechol L Allen, MD

## 2024-08-13 ENCOUNTER — PATIENT MESSAGE (OUTPATIENT)
Dept: INTERNAL MEDICINE | Facility: CLINIC | Age: 39
End: 2024-08-13
Payer: COMMERCIAL

## 2024-08-13 DIAGNOSIS — E11.9 TYPE 2 DIABETES MELLITUS WITHOUT COMPLICATION, WITHOUT LONG-TERM CURRENT USE OF INSULIN: ICD-10-CM

## 2024-08-28 ENCOUNTER — OFFICE VISIT (OUTPATIENT)
Dept: INTERNAL MEDICINE | Facility: CLINIC | Age: 39
End: 2024-08-28
Payer: COMMERCIAL

## 2024-08-28 VITALS
SYSTOLIC BLOOD PRESSURE: 120 MMHG | DIASTOLIC BLOOD PRESSURE: 80 MMHG | BODY MASS INDEX: 50.68 KG/M2 | HEIGHT: 63 IN | OXYGEN SATURATION: 94 % | HEART RATE: 74 BPM | WEIGHT: 286 LBS

## 2024-08-28 DIAGNOSIS — F32.A ANXIETY AND DEPRESSION: Chronic | ICD-10-CM

## 2024-08-28 DIAGNOSIS — E78.2 MIXED HYPERLIPIDEMIA: Chronic | ICD-10-CM

## 2024-08-28 DIAGNOSIS — R80.9 MICROALBUMINURIA: Chronic | ICD-10-CM

## 2024-08-28 DIAGNOSIS — E11.9 TYPE 2 DIABETES MELLITUS WITHOUT COMPLICATION, WITHOUT LONG-TERM CURRENT USE OF INSULIN: Chronic | ICD-10-CM

## 2024-08-28 DIAGNOSIS — I10 BENIGN ESSENTIAL HYPERTENSION: Chronic | ICD-10-CM

## 2024-08-28 DIAGNOSIS — Z00.00 ANNUAL PHYSICAL EXAM: Primary | ICD-10-CM

## 2024-08-28 DIAGNOSIS — F41.9 ANXIETY AND DEPRESSION: Chronic | ICD-10-CM

## 2024-08-28 DIAGNOSIS — D50.9 IRON DEFICIENCY ANEMIA, UNSPECIFIED IRON DEFICIENCY ANEMIA TYPE: Chronic | ICD-10-CM

## 2024-08-28 DIAGNOSIS — E66.01 CLASS 3 SEVERE OBESITY DUE TO EXCESS CALORIES WITHOUT SERIOUS COMORBIDITY WITH BODY MASS INDEX (BMI) OF 50.0 TO 59.9 IN ADULT: ICD-10-CM

## 2024-08-28 DIAGNOSIS — K52.9 IBD (INFLAMMATORY BOWEL DISEASE): Chronic | ICD-10-CM

## 2024-08-28 LAB
ALBUMIN SERPL-MCNC: 4.1 G/DL (ref 3.5–5.2)
ALBUMIN/GLOB SERPL: 1.4 G/DL
ALP SERPL-CCNC: 75 U/L (ref 39–117)
ALT SERPL-CCNC: 23 U/L (ref 1–33)
AST SERPL-CCNC: 12 U/L (ref 1–32)
BILIRUB SERPL-MCNC: 0.3 MG/DL (ref 0–1.2)
BUN SERPL-MCNC: 10 MG/DL (ref 6–20)
BUN/CREAT SERPL: 17.9 (ref 7–25)
CALCIUM SERPL-MCNC: 9.6 MG/DL (ref 8.6–10.5)
CHLORIDE SERPL-SCNC: 103 MMOL/L (ref 98–107)
CHOLEST SERPL-MCNC: 153 MG/DL (ref 0–200)
CO2 SERPL-SCNC: 27.8 MMOL/L (ref 22–29)
CREAT SERPL-MCNC: 0.56 MG/DL (ref 0.57–1)
EGFRCR SERPLBLD CKD-EPI 2021: 118.5 ML/MIN/1.73
GLOBULIN SER CALC-MCNC: 2.9 GM/DL
GLUCOSE SERPL-MCNC: 82 MG/DL (ref 65–99)
HBA1C MFR BLD: 5.7 % (ref 4.8–5.6)
HDLC SERPL-MCNC: 46 MG/DL (ref 40–60)
LDLC SERPL CALC-MCNC: 87 MG/DL (ref 0–100)
POTASSIUM SERPL-SCNC: 4.4 MMOL/L (ref 3.5–5.2)
PROT SERPL-MCNC: 7 G/DL (ref 6–8.5)
SODIUM SERPL-SCNC: 140 MMOL/L (ref 136–145)
TRIGL SERPL-MCNC: 109 MG/DL (ref 0–150)
TSH SERPL DL<=0.005 MIU/L-ACNC: 1.37 UIU/ML (ref 0.27–4.2)
VLDLC SERPL CALC-MCNC: 20 MG/DL (ref 5–40)

## 2024-08-28 PROCEDURE — 99214 OFFICE O/P EST MOD 30 MIN: CPT | Performed by: NURSE PRACTITIONER

## 2024-08-28 PROCEDURE — 99396 PREV VISIT EST AGE 40-64: CPT | Performed by: NURSE PRACTITIONER

## 2024-08-28 RX ORDER — LABETALOL 100 MG/1
200 TABLET, FILM COATED ORAL 2 TIMES DAILY
Qty: 360 TABLET | Refills: 1 | Status: SHIPPED | OUTPATIENT
Start: 2024-08-28

## 2024-08-28 NOTE — ASSESSMENT & PLAN NOTE
Recommended 150-300 minutes weekly exercise.   Continue with heart healthy diabetic diet choices.   Labs ordered.   Continue with monthly self breast examinations.   Pap smear 2023; will get last record; deferred to gynecology  Mammo UTD   Anticipatory guidance given regarding health prevention/wellness, diet/exercise, tobacco/alcohol/drug education, exercise and wellbeing, covid 19 guidance, vaccination recommendations, and sexual health/STD education.   Recommended bi-yearly dental exams and regular vision examinations.

## 2024-08-28 NOTE — ASSESSMENT & PLAN NOTE
Hypertension is stable and controlled  Continue current treatment regimen.  Dietary sodium restriction.  Regular aerobic exercise.  Blood pressure will be reassessed  at next appt .

## 2024-08-28 NOTE — ASSESSMENT & PLAN NOTE
Diabetes is improving with treatment.   Continue current treatment regimen.  Recommended an ADA diet.  Regular aerobic exercise.  Discussed ways to avoid symptomatic hypoglycemia.  Discussed sick day management.  Discussed foot care.  Reminded to get yearly retinal exam.  Diabetes will be reassessed in 4 months

## 2024-08-28 NOTE — PROGRESS NOTES
"Chief Complaint  Annual Exam    Subjective        Liz Bagley presents to [facility]  History of Present Illness  This is a 39 y/o female presenting to office for CPE and chronic health mgmt.     Reports exercising regularly; reports following healthy diet choices.     Denies tobacco use.   Reports occasional alcohol use.     Seeing [facility] gynecology;  [facility]  Mammogram UTD    DM2-- continues on farxiga, mounjaro 7.5mg; reports tolerating medications; continues on low carb diet; UTD diabetic eye exam; denies any foot issues.     HTN-- continues on labetolol, losartan; denies CP or SOA; denies checking BP at home.     Continues on rexulti, trintellix, and trazodone; reports mood is stable; denies SI.     Due for dental exam.     Objective   Vital Signs:  /80 (BP Location: Left arm, Patient Position: Sitting, Cuff Size: Adult)   Pulse 74   Ht 160 cm (62.99\")   Wt 130 kg (286 lb)   SpO2 94%   BMI 50.68 kg/m²   Estimated body mass index is 50.68 kg/m² as calculated from the following:    Height as of this encounter: 160 cm (62.99\").    Weight as of this encounter: 130 kg (286 lb).    Class 3 Severe Obesity (BMI >=40). Obesity-related health conditions include the following: hypertension, diabetes mellitus, and dyslipidemias. Obesity is improving with treatment. BMI is is above average; BMI management plan is completed. We discussed portion control and increasing exercise.      Physical Exam  Constitutional:       General: She is awake.      Appearance: Normal appearance.   HENT:      Head: Normocephalic and atraumatic.      Right Ear: Hearing, tympanic membrane, ear canal and external ear normal.      Left Ear: Hearing, tympanic membrane, ear canal and external ear normal.      Nose: Nose normal.      Mouth/Throat:      Lips: Pink.      Mouth: Mucous membranes are moist.      Pharynx: Oropharynx is clear.   Eyes:      Extraocular " Movements: Extraocular movements intact.      Conjunctiva/sclera: Conjunctivae normal.      Pupils: Pupils are equal, round, and reactive to light.   Neck:      Vascular: No carotid bruit.   Cardiovascular:      Rate and Rhythm: Normal rate and regular rhythm.      Pulses: Normal pulses.      Heart sounds: Normal heart sounds. No murmur heard.     No gallop.   Pulmonary:      Effort: Pulmonary effort is normal. No respiratory distress.      Breath sounds: Normal breath sounds. No stridor. No wheezing, rhonchi or rales.   Abdominal:      General: Abdomen is flat. Bowel sounds are normal. There is no distension.      Palpations: Abdomen is soft.      Tenderness: There is no abdominal tenderness.      Comments: +ostomy in place; CDI   Musculoskeletal:         General: No swelling. Normal range of motion.      Cervical back: Normal range of motion and neck supple.   Skin:     General: Skin is warm and dry.      Capillary Refill: Capillary refill takes less than 2 seconds.      Coloration: Skin is not jaundiced.   Neurological:      General: No focal deficit present.      Mental Status: She is alert and oriented to person, place, and time. Mental status is at baseline.      Cranial Nerves: No cranial nerve deficit.      Motor: Motor function is intact.      Coordination: Coordination is intact.      Gait: Gait is intact.      Deep Tendon Reflexes: Reflexes are normal and symmetric.   Psychiatric:         Attention and Perception: Attention normal.         Mood and Affect: Mood normal.         Speech: Speech normal.         Behavior: Behavior normal. Behavior is cooperative.         Thought Content: Thought content normal.         Cognition and Memory: Cognition normal.         Judgment: Judgment normal.        Result Review :  The following data was reviewed by: ELEN Prasad on 08/28/2024:  Common labs          1/19/2024    10:47 4/12/2024    12:30 8/12/2024    08:25   Common Labs   Glucose  92     BUN  13      Creatinine  0.52     Sodium  138     Potassium  4.6     Chloride  101     Calcium  9.6     WBC 12.71   11.57    Hemoglobin 12.5   13.4    Hematocrit 41.0   42.1    Platelets 490   432    Total Cholesterol  152     Triglycerides  112     HDL Cholesterol  46     LDL Cholesterol   86     Hemoglobin A1C  6.2     Microalbumin, Urine  77.3       Tobacco Use: Low Risk  (8/28/2024)    Patient History     Smoking Tobacco Use: Never     Smokeless Tobacco Use: Never     Passive Exposure: Never     Social History     Substance and Sexual Activity   Alcohol Use Yes    Comment: I drink occasionally     Family History   Problem Relation Age of Onset    Heart disease Mother     Hypertension Mother     Depression Mother     Diabetes Mother     Mental illness Mother     Hypertension Father     Diabetes Father     Skin cancer Father     Cancer Father     Hypertension Brother     Heart disease Maternal Grandmother     Heart attack Maternal Grandmother     Diabetes Maternal Grandmother     Hypertension Maternal Grandmother     Stroke Maternal Grandmother     Hypertension Brother     Diabetes Maternal Grandfather     Heart disease Maternal Grandfather     Malig Hyperthermia Neg Hx                Assessment and Plan   Diagnoses and all orders for this visit:    1. Annual physical exam (Primary)  Assessment & Plan:  Recommended 150-300 minutes weekly exercise.   Continue with heart healthy diabetic diet choices.   Labs ordered.   Continue with monthly self breast examinations.   Pap smear 2023; will get last record; deferred to gynecology  Mammo UTD   Anticipatory guidance given regarding health prevention/wellness, diet/exercise, tobacco/alcohol/drug education, exercise and wellbeing, covid 19 guidance, vaccination recommendations, and sexual health/STD education.   Recommended bi-yearly dental exams and regular vision examinations.          2. Benign essential hypertension  Assessment & Plan:  Hypertension is stable and  controlled  Continue current treatment regimen.  Dietary sodium restriction.  Regular aerobic exercise.  Blood pressure will be reassessed  at next appt .    Orders:  -     labetalol (NORMODYNE) 100 MG tablet; Take 2 tablets by mouth 2 (Two) Times a Day.  Dispense: 360 tablet; Refill: 1  -     Comprehensive metabolic panel    3. Type 2 diabetes mellitus without complication, without long-term current use of insulin  Assessment & Plan:  Diabetes is improving with treatment.   Continue current treatment regimen.  Recommended an ADA diet.  Regular aerobic exercise.  Discussed ways to avoid symptomatic hypoglycemia.  Discussed sick day management.  Discussed foot care.  Reminded to get yearly retinal exam.  Diabetes will be reassessed in 4 months    Orders:  -     Hemoglobin A1c    4. Microalbuminuria  Assessment & Plan:  Continues on farxiga, losartan     Orders:  -     Comprehensive metabolic panel    5. Mixed hyperlipidemia  Assessment & Plan:  Ldl remains at goal   Less than 70    Orders:  -     Lipid panel  -     TSH Rfx On Abnormal To Free T4    6. Iron deficiency anemia, unspecified iron deficiency anemia type  Assessment & Plan:  Following with CBC        7. Anxiety and depression  Assessment & Plan:  Following with behav health  Denies SI  Cont on current medications      8. IBD (inflammatory bowel disease)  Assessment & Plan:  Following with Dr. Nathan GIPSON      9. Class 3 severe obesity due to excess calories without serious comorbidity with body mass index (BMI) of 50.0 to 59.9 in adult  Assessment & Plan:  Class 3 Severe Obesity (BMI >=40). Obesity-related health conditions include the following: hypertension, diabetes mellitus, and dyslipidemias. Obesity is improving with treatment. BMI is is above average; BMI management plan is completed. We discussed portion control and increasing exercise.               Follow Up   Return in about 4 months (around 12/28/2024) for Recheck.  Patient was given instructions  and counseling regarding her condition or for health maintenance advice. Please see specific information pulled into the AVS if appropriate.

## 2024-08-28 NOTE — ASSESSMENT & PLAN NOTE
Class 3 Severe Obesity (BMI >=40). Obesity-related health conditions include the following: hypertension, diabetes mellitus, and dyslipidemias. Obesity is improving with treatment. BMI is is above average; BMI management plan is completed. We discussed portion control and increasing exercise.

## 2024-09-10 ENCOUNTER — PATIENT MESSAGE (OUTPATIENT)
Dept: INTERNAL MEDICINE | Facility: CLINIC | Age: 39
End: 2024-09-10
Payer: COMMERCIAL

## 2024-09-10 DIAGNOSIS — E11.9 TYPE 2 DIABETES MELLITUS WITHOUT COMPLICATION, WITHOUT LONG-TERM CURRENT USE OF INSULIN: ICD-10-CM

## 2024-09-10 PROBLEM — D72.823 LEUKEMOID REACTION: Status: ACTIVE | Noted: 2024-09-10

## 2024-09-10 NOTE — TELEPHONE ENCOUNTER
From: Liz Bagley  To: Lynette Brooke  Sent: 9/10/2024 11:58 AM EDT  Subject: Mounjaro     Tal Ojeda,    Can you send in a new prescription for the 7.5 mounjaro to my [facility] pharmacy?     Thanks,  Liz

## 2024-09-19 DIAGNOSIS — I10 BENIGN ESSENTIAL HYPERTENSION: ICD-10-CM

## 2024-09-20 RX ORDER — LOSARTAN POTASSIUM 50 MG/1
50 TABLET ORAL 2 TIMES DAILY
Qty: 180 TABLET | Refills: 1 | Status: SHIPPED | OUTPATIENT
Start: 2024-09-20

## 2024-10-22 DIAGNOSIS — E11.9 TYPE 2 DIABETES MELLITUS WITHOUT COMPLICATION, WITHOUT LONG-TERM CURRENT USE OF INSULIN: ICD-10-CM

## 2024-11-25 DIAGNOSIS — E11.9 TYPE 2 DIABETES MELLITUS WITHOUT COMPLICATION, WITHOUT LONG-TERM CURRENT USE OF INSULIN: ICD-10-CM

## 2024-11-25 RX ORDER — DAPAGLIFLOZIN 5 MG/1
5 TABLET, FILM COATED ORAL DAILY
Qty: 90 TABLET | Refills: 0 | Status: SHIPPED | OUTPATIENT
Start: 2024-11-25

## 2024-12-12 DIAGNOSIS — E11.9 TYPE 2 DIABETES MELLITUS WITHOUT COMPLICATION, WITHOUT LONG-TERM CURRENT USE OF INSULIN: ICD-10-CM

## 2025-01-08 ENCOUNTER — OFFICE VISIT (OUTPATIENT)
Dept: INTERNAL MEDICINE | Facility: CLINIC | Age: 40
End: 2025-01-08
Payer: COMMERCIAL

## 2025-01-08 VITALS
SYSTOLIC BLOOD PRESSURE: 132 MMHG | DIASTOLIC BLOOD PRESSURE: 74 MMHG | OXYGEN SATURATION: 97 % | HEART RATE: 65 BPM | BODY MASS INDEX: 50.68 KG/M2 | WEIGHT: 286 LBS | HEIGHT: 63 IN

## 2025-01-08 DIAGNOSIS — E11.9 TYPE 2 DIABETES MELLITUS WITHOUT COMPLICATION, WITHOUT LONG-TERM CURRENT USE OF INSULIN: Chronic | ICD-10-CM

## 2025-01-08 DIAGNOSIS — I10 BENIGN ESSENTIAL HYPERTENSION: Primary | Chronic | ICD-10-CM

## 2025-01-08 DIAGNOSIS — R80.9 MICROALBUMINURIA: Chronic | ICD-10-CM

## 2025-01-08 DIAGNOSIS — E78.2 MIXED HYPERLIPIDEMIA: Chronic | ICD-10-CM

## 2025-01-08 DIAGNOSIS — D50.9 IRON DEFICIENCY ANEMIA, UNSPECIFIED IRON DEFICIENCY ANEMIA TYPE: Chronic | ICD-10-CM

## 2025-01-08 PROCEDURE — 99214 OFFICE O/P EST MOD 30 MIN: CPT | Performed by: NURSE PRACTITIONER

## 2025-01-08 NOTE — ASSESSMENT & PLAN NOTE
Recommend following a low saturated fat, low sugar diet and getting 150 minutes of weekly exercise.

## 2025-01-08 NOTE — PROGRESS NOTES
"Chief Complaint  Hypertension and Diabetes (Type 2)    Subjective        Liz Bagley presents to [facility]  History of Present Illness  This is a 38 y/o female presenting to office     DM2-- continues on mounjaro, farxiga; reports she is doing okay on this; UTD diabetic eye exam 3/2025; denies any foot issues.     HTN-- continues on losartan, labetalol; denies CP or SOA.     HLD-- reports trying to follow healthy diet choices; denies any current exercise.     Objective   Vital Signs:  /74 (BP Location: Left arm, Patient Position: Sitting, Cuff Size: Large Adult)   Pulse 65   Ht 160 cm (62.99\")   Wt 130 kg (286 lb)   SpO2 97%   BMI 50.68 kg/m²   Estimated body mass index is 50.68 kg/m² as calculated from the following:    Height as of this encounter: 160 cm (62.99\").    Weight as of this encounter: 130 kg (286 lb).            Physical Exam  Constitutional:       Appearance: Normal appearance. She is obese.   HENT:      Head: Normocephalic and atraumatic.      Right Ear: External ear normal.      Left Ear: External ear normal.      Nose: Nose normal.      Mouth/Throat:      Mouth: Mucous membranes are moist.      Pharynx: Oropharynx is clear.   Eyes:      Conjunctiva/sclera: Conjunctivae normal.      Pupils: Pupils are equal, round, and reactive to light.      Comments: +glasses   Cardiovascular:      Rate and Rhythm: Normal rate and regular rhythm.      Pulses: Normal pulses.      Heart sounds: Normal heart sounds. No murmur heard.     No gallop.   Pulmonary:      Effort: Pulmonary effort is normal. No respiratory distress.      Breath sounds: Normal breath sounds. No stridor. No wheezing, rhonchi or rales.   Musculoskeletal:      Cervical back: Normal range of motion and neck supple.   Skin:     General: Skin is warm and dry.      Capillary Refill: Capillary refill takes less than 2 seconds.   Neurological:      Mental Status: She is alert and oriented to person, place, " and time. Mental status is at baseline.   Psychiatric:         Mood and Affect: Mood normal.         Thought Content: Thought content normal.         Judgment: Judgment normal.        Result Review :  The following data was reviewed by: ELEN Prasad on 01/08/2025:  Common labs          4/12/2024    12:30 8/12/2024    08:25 8/28/2024    10:57   Common Labs   Glucose 92   82    BUN 13   10    Creatinine 0.52   0.56    Sodium 138   140    Potassium 4.6   4.4    Chloride 101   103    Calcium 9.6   9.6    Total Protein   7.0    Albumin   4.1    Total Bilirubin   0.3    Alkaline Phosphatase   75    AST (SGOT)   12    ALT (SGPT)   23    WBC  11.57     Hemoglobin  13.4     Hematocrit  42.1     Platelets  432     Total Cholesterol 152   153    Triglycerides 112   109    HDL Cholesterol 46   46    LDL Cholesterol  86   87    Hemoglobin A1C 6.2   5.70    Microalbumin, Urine 77.3        Tobacco Use: Low Risk  (1/8/2025)    Patient History     Smoking Tobacco Use: Never     Smokeless Tobacco Use: Never     Passive Exposure: Never     Social History     Substance and Sexual Activity   Alcohol Use Yes    Comment: I drink occasionally     Family History   Problem Relation Age of Onset    Heart disease Mother     Hypertension Mother     Depression Mother     Diabetes Mother     Mental illness Mother     Hypertension Father     Diabetes Father     Skin cancer Father     Cancer Father     Hypertension Brother     Heart disease Maternal Grandmother     Heart attack Maternal Grandmother     Diabetes Maternal Grandmother     Hypertension Maternal Grandmother     Stroke Maternal Grandmother     Hypertension Brother     Diabetes Maternal Grandfather     Heart disease Maternal Grandfather     Malig Hyperthermia Neg Hx                Assessment and Plan   Diagnoses and all orders for this visit:    1. Benign essential hypertension (Primary)  Assessment & Plan:  Hypertension is stable and controlled  Continue current treatment  regimen.  Dietary sodium restriction.  Weight loss.  Regular aerobic exercise.  Blood pressure will be reassessed in 6 months.  Continues on labetalol, losartan      Orders:  -     Cancel: Comprehensive Metabolic Panel  -     Cancel: Comprehensive Metabolic Panel  -     Cancel: CBC & Differential  -     CBC & Differential  -     Comprehensive Metabolic Panel    2. Mixed hyperlipidemia  Assessment & Plan:  Recommend following a low saturated fat, low sugar diet and getting 150 minutes of weekly exercise.       Orders:  -     Cancel: Lipid panel  -     Cancel: Lipid panel  -     Lipid panel    3. Type 2 diabetes mellitus without complication, without long-term current use of insulin  Assessment & Plan:  Diabetes is improving with treatment.   Continue current treatment regimen.  Recommended an ADA diet.  Regular aerobic exercise.  Discussed sick day management.  Discussed foot care.  Reminded to get yearly retinal exam.  Diabetes will be reassessed in 6 months  Continues on mounjaro 7.5mg weekly, farxiga 5mg daily     Orders:  -     Cancel: Microalbumin / Creatinine Urine Ratio - Urine, Clean Catch  -     Cancel: Hemoglobin A1c  -     Cancel: Microalbumin / Creatinine Urine Ratio - Urine, Clean Catch  -     Cancel: Hemoglobin A1c  -     Hemoglobin A1c  -     Microalbumin / Creatinine Urine Ratio - Urine, Clean Catch    4. Microalbuminuria  Assessment & Plan:  Continues on losartan, farxiga      5. Iron deficiency anemia, unspecified iron deficiency anemia type  Assessment & Plan:  Continues following with CBC               Follow Up   Return in about 7 months (around 8/11/2025) for Annual physical.  Patient was given instructions and counseling regarding her condition or for health maintenance advice. Please see specific information pulled into the AVS if appropriate.         Answers submitted by the patient for this visit:  Primary Reason for Visit (Submitted on 1/7/2025)  What is the primary reason for your visit?:  Problem Not Listed

## 2025-01-08 NOTE — ASSESSMENT & PLAN NOTE
Hypertension is stable and controlled  Continue current treatment regimen.  Dietary sodium restriction.  Weight loss.  Regular aerobic exercise.  Blood pressure will be reassessed in 6 months.  Continues on labetalol, losartan

## 2025-01-08 NOTE — ASSESSMENT & PLAN NOTE
Diabetes is improving with treatment.   Continue current treatment regimen.  Recommended an ADA diet.  Regular aerobic exercise.  Discussed sick day management.  Discussed foot care.  Reminded to get yearly retinal exam.  Diabetes will be reassessed in 6 months  Continues on mounjaro 7.5mg weekly, farxiga 5mg daily

## 2025-01-09 LAB
ALBUMIN SERPL-MCNC: 4.3 G/DL (ref 3.9–4.9)
ALBUMIN/CREAT UR: 9 MG/G CREAT (ref 0–29)
ALP SERPL-CCNC: 79 IU/L (ref 44–121)
ALT SERPL-CCNC: 15 IU/L (ref 0–32)
AST SERPL-CCNC: 12 IU/L (ref 0–40)
BASOPHILS # BLD AUTO: 0.1 X10E3/UL (ref 0–0.2)
BASOPHILS NFR BLD AUTO: 1 %
BILIRUB SERPL-MCNC: 0.4 MG/DL (ref 0–1.2)
BUN SERPL-MCNC: 10 MG/DL (ref 6–24)
BUN/CREAT SERPL: 18 (ref 9–23)
CALCIUM SERPL-MCNC: 9.5 MG/DL (ref 8.7–10.2)
CHLORIDE SERPL-SCNC: 103 MMOL/L (ref 96–106)
CHOLEST SERPL-MCNC: 158 MG/DL (ref 100–199)
CO2 SERPL-SCNC: 25 MMOL/L (ref 20–29)
CREAT SERPL-MCNC: 0.55 MG/DL (ref 0.57–1)
CREAT UR-MCNC: 202.2 MG/DL
EGFRCR SERPLBLD CKD-EPI 2021: 118 ML/MIN/1.73
EOSINOPHIL # BLD AUTO: 0.2 X10E3/UL (ref 0–0.4)
EOSINOPHIL NFR BLD AUTO: 2 %
ERYTHROCYTE [DISTWIDTH] IN BLOOD BY AUTOMATED COUNT: 12.1 % (ref 11.7–15.4)
GLOBULIN SER CALC-MCNC: 2.7 G/DL (ref 1.5–4.5)
GLUCOSE SERPL-MCNC: 101 MG/DL (ref 70–99)
HBA1C MFR BLD: 6.2 % (ref 4.8–5.6)
HCT VFR BLD AUTO: 42.2 % (ref 34–46.6)
HDLC SERPL-MCNC: 49 MG/DL
HGB BLD-MCNC: 13.4 G/DL (ref 11.1–15.9)
IMM GRANULOCYTES # BLD AUTO: 0 X10E3/UL (ref 0–0.1)
IMM GRANULOCYTES NFR BLD AUTO: 0 %
LDLC SERPL CALC-MCNC: 91 MG/DL (ref 0–99)
LYMPHOCYTES # BLD AUTO: 2.3 X10E3/UL (ref 0.7–3.1)
LYMPHOCYTES NFR BLD AUTO: 24 %
MCH RBC QN AUTO: 28.4 PG (ref 26.6–33)
MCHC RBC AUTO-ENTMCNC: 31.8 G/DL (ref 31.5–35.7)
MCV RBC AUTO: 89 FL (ref 79–97)
MICROALBUMIN UR-MCNC: 17.2 UG/ML
MONOCYTES # BLD AUTO: 0.6 X10E3/UL (ref 0.1–0.9)
MONOCYTES NFR BLD AUTO: 6 %
NEUTROPHILS # BLD AUTO: 6.6 X10E3/UL (ref 1.4–7)
NEUTROPHILS NFR BLD AUTO: 67 %
PLATELET # BLD AUTO: 449 X10E3/UL (ref 150–450)
POTASSIUM SERPL-SCNC: 4.4 MMOL/L (ref 3.5–5.2)
PROT SERPL-MCNC: 7 G/DL (ref 6–8.5)
RBC # BLD AUTO: 4.72 X10E6/UL (ref 3.77–5.28)
SODIUM SERPL-SCNC: 139 MMOL/L (ref 134–144)
TRIGL SERPL-MCNC: 96 MG/DL (ref 0–149)
VLDLC SERPL CALC-MCNC: 18 MG/DL (ref 5–40)
WBC # BLD AUTO: 9.8 X10E3/UL (ref 3.4–10.8)

## 2025-01-13 ENCOUNTER — OFFICE VISIT (OUTPATIENT)
Dept: OBSTETRICS AND GYNECOLOGY | Age: 40
End: 2025-01-13
Payer: COMMERCIAL

## 2025-01-13 VITALS
WEIGHT: 283.2 LBS | SYSTOLIC BLOOD PRESSURE: 124 MMHG | BODY MASS INDEX: 50.18 KG/M2 | DIASTOLIC BLOOD PRESSURE: 72 MMHG | HEIGHT: 63 IN

## 2025-01-13 DIAGNOSIS — Z01.419 ENCOUNTER FOR GYNECOLOGICAL EXAMINATION: Primary | ICD-10-CM

## 2025-01-13 DIAGNOSIS — Z12.31 SCREENING MAMMOGRAM FOR BREAST CANCER: ICD-10-CM

## 2025-01-13 NOTE — PROGRESS NOTES
"Subjective       History of Present Illness    Chief Complaint   Patient presents with    Gynecologic Exam     Ae last pap (-) Hpv (-) 1/3/24 Mg (-) 24 Scope 16  C: no complaints today        Liz Bagley is a 39 y.o. female who presents for annual exam.  I saw her last year as a new patient for a problem visit  Hx of chrons dz- had colon and large intestine removed and is doing well  Not currently sexually active- female partners when she is  Works at the hospital in the lab  History of HS, took one round of Doxy last bad recurrence and it took care of it  Hx of ablation  Patient reports that she is not currently experiencing any symptoms of urinary incontinence.     OB History    Para Term  AB Living   0 0 0 0 0 0   SAB IAB Ectopic Molar Multiple Live Births   0 0 0 0 0 0   /72   Ht 160 cm (62.99\")   Wt 128 kg (283 lb 3.2 oz)   BMI 50.18 kg/m²       The following portions of the patient's history were reviewed and updated as appropriate: allergies, current medications, past family history, past medical history, past social history, past surgical history and problem list.      Current contraception: abstinence  History of abnormal Pap smear: no  Perform regular self breast exam: yes - occasional  Family history of uterine or ovarian cancer: no  Family history of breast cancer: no    Mammogram: ordered.  Colonoscopy: not indicated.  DEXA: not indicated.  Last Pap:    Social History    Tobacco Use      Smoking status: Never        Passive exposure: Never      Smokeless tobacco: Never    Exercise: not active  Calcium/Vitamin D: uses supplements    The following portions of the patient's history were reviewed and updated as appropriate: allergies, current medications, past family history, past medical history, past social history, past surgical history, and problem list.    Review of Systems   Constitutional: Negative.    HENT: Negative.     Eyes: Negative.    Respiratory: " "Negative.     Cardiovascular: Negative.    Gastrointestinal: Negative.    Endocrine: Negative.    Genitourinary: Negative.    Musculoskeletal: Negative.    Skin: Negative.    Allergic/Immunologic: Negative.    Neurological: Negative.    Hematological: Negative.    Psychiatric/Behavioral: Negative.           Objective   Physical Exam  Vitals reviewed.   Constitutional:       Appearance: She is well-developed.   Neck:      Thyroid: No thyroid mass.   Cardiovascular:      Rate and Rhythm: Normal rate and regular rhythm.      Heart sounds: Normal heart sounds.   Pulmonary:      Effort: Pulmonary effort is normal.      Breath sounds: Normal breath sounds.   Chest:   Breasts:     Right: No mass, nipple discharge, skin change or tenderness.      Left: No mass, nipple discharge, skin change or tenderness.   Abdominal:      Palpations: Abdomen is soft.      Tenderness: There is no abdominal tenderness.   Genitourinary:     Labia:         Right: No rash or lesion.         Left: No rash or lesion.       Vagina: Normal.      Cervix: No cervical motion tenderness, discharge or friability.      Adnexa:         Right: No mass or tenderness.          Left: No mass or tenderness.     Neurological:      Mental Status: She is alert and oriented to person, place, and time.   Psychiatric:         Behavior: Behavior normal.         /72   Ht 160 cm (62.99\")   Wt 128 kg (283 lb 3.2 oz)   BMI 50.18 kg/m²     Assessment & Plan   Diagnoses and all orders for this visit:    1. Encounter for gynecological examination (Primary)  -     IGP, Apt HPV,rfx 16 / 18,45  -     Mammo Screening Digital Tomosynthesis Bilateral With CAD    2. Screening mammogram for breast cancer          Breast self exam technique reviewed and patient encouraged to perform self-exam monthly.  Discussed healthy lifestyle modifications.  Pap smear done with HPV  Recommended 30 minutes of aerobic exercise five times per week.  Discussed calcium needs to prevent " osteoporosis

## 2025-01-16 LAB
CYTOLOGIST CVX/VAG CYTO: NORMAL
CYTOLOGY CVX/VAG DOC CYTO: NORMAL
CYTOLOGY CVX/VAG DOC THIN PREP: NORMAL
DX ICD CODE: NORMAL
HPV I/H RISK 4 DNA CVX QL PROBE+SIG AMP: NEGATIVE
Lab: NORMAL
OTHER STN SPEC: NORMAL
STAT OF ADQ CVX/VAG CYTO-IMP: NORMAL

## 2025-02-10 ENCOUNTER — TELEPHONE (OUTPATIENT)
Dept: OBSTETRICS AND GYNECOLOGY | Age: 40
End: 2025-02-10

## 2025-02-10 NOTE — TELEPHONE ENCOUNTER
Caller: Liz Bagley    Relationship to patient: Self    Best call back number: [phone]      Type of visit: MAMMOGRAM     Requested date: ANY DAY AT 11AM      If rescheduling, when is the original appointment: 2/12/25     Additional notes:PT NEEDS TO R/S HER MAMMOGRAM APPT PLEASE CALL

## 2025-02-11 ENCOUNTER — TELEPHONE (OUTPATIENT)
Dept: ONCOLOGY | Facility: CLINIC | Age: 40
End: 2025-02-11
Payer: COMMERCIAL

## 2025-02-11 ENCOUNTER — PRIOR AUTHORIZATION (OUTPATIENT)
Dept: INTERNAL MEDICINE | Facility: CLINIC | Age: 40
End: 2025-02-11
Payer: COMMERCIAL

## 2025-02-11 NOTE — TELEPHONE ENCOUNTER
Caller: Liz Bagley    Relationship to patient: Self    Best call back number:   Telephone Information:   Mobile [phone]     Chief complaint: OVERSLEPT    Type of visit: LAB    Requested date: CALL TO R/S     If rescheduling, when is the original appointment: 2/11/2025

## 2025-02-18 ENCOUNTER — PATIENT ROUNDING (BHMG ONLY) (OUTPATIENT)
Dept: URGENT CARE | Facility: CLINIC | Age: 40
End: 2025-02-18
Payer: COMMERCIAL

## 2025-02-18 NOTE — ED NOTES
Thank you for letting us care for you during your recent visit at [facility]. We would love to hear about your experience with us.         We’re always looking for ways to make our patients’ experiences even better. Do you have any recommendations on ways we may improve?         I appreciate you taking the time to respond. Please be on the lookout for a survey about your recent visit from [facility] via text or email. We would greatly appreciate if you could fill that out and turn it back in. We want your voice to be heard and we value your feedback.         Thank you for choosing [facility] for your healthcare needs.

## 2025-03-04 DIAGNOSIS — E11.9 TYPE 2 DIABETES MELLITUS WITHOUT COMPLICATION, WITHOUT LONG-TERM CURRENT USE OF INSULIN: ICD-10-CM

## 2025-03-04 DIAGNOSIS — I10 BENIGN ESSENTIAL HYPERTENSION: Chronic | ICD-10-CM

## 2025-03-05 RX ORDER — DAPAGLIFLOZIN 5 MG/1
5 TABLET, FILM COATED ORAL DAILY
Qty: 90 TABLET | Refills: 1 | Status: SHIPPED | OUTPATIENT
Start: 2025-03-05

## 2025-03-05 RX ORDER — LABETALOL 100 MG/1
200 TABLET, FILM COATED ORAL 2 TIMES DAILY
Qty: 360 TABLET | Refills: 1 | Status: SHIPPED | OUTPATIENT
Start: 2025-03-05

## 2025-03-12 ENCOUNTER — HOSPITAL ENCOUNTER (OUTPATIENT)
Facility: HOSPITAL | Age: 40
Discharge: HOME | End: 2025-03-12
Payer: COMMERCIAL

## 2025-04-21 DIAGNOSIS — R11.0 NAUSEA: ICD-10-CM

## 2025-04-21 DIAGNOSIS — I10 BENIGN ESSENTIAL HYPERTENSION: ICD-10-CM

## 2025-04-22 RX ORDER — LOSARTAN POTASSIUM 50 MG/1
50 TABLET ORAL 2 TIMES DAILY
Qty: 180 TABLET | Refills: 1 | Status: SHIPPED | OUTPATIENT
Start: 2025-04-22

## 2025-04-22 RX ORDER — ONDANSETRON 4 MG/1
4 TABLET, FILM COATED ORAL EVERY 8 HOURS PRN
Qty: 30 TABLET | Refills: 2 | Status: SHIPPED | OUTPATIENT
Start: 2025-04-22

## 2025-05-12 DIAGNOSIS — E11.9 TYPE 2 DIABETES MELLITUS WITHOUT COMPLICATION, WITHOUT LONG-TERM CURRENT USE OF INSULIN: ICD-10-CM

## 2025-05-13 RX ORDER — TIRZEPATIDE 7.5 MG/.5ML
7.5 INJECTION, SOLUTION SUBCUTANEOUS WEEKLY
Qty: 2 ML | Refills: 3 | Status: SHIPPED | OUTPATIENT
Start: 2025-05-13 | End: 2025-05-19

## 2025-05-13 NOTE — TELEPHONE ENCOUNTER
"Relay     \"Are you still taking the Mounjaro? If so, do you want to increase to the next dose? Or stay on the 7.5mg?\"  "

## 2025-05-13 NOTE — TELEPHONE ENCOUNTER
Name: Liz Bagley      Relationship: Self      Best Callback Number:     [phone]         HUB PROVIDED THE RELAY MESSAGE FROM THE OFFICE      PATIENT: VOICED UNDERSTANDING AND HAS NO FURTHER QUESTIONS AT THIS TIME  PATIENT STATES SHE WOULD LIKE TO GO TO THE NEXT DOSE FOLLOWING THE 7.5 MG.    PLEASE ADVISE.

## 2025-05-18 ENCOUNTER — PATIENT MESSAGE (OUTPATIENT)
Dept: INTERNAL MEDICINE | Facility: CLINIC | Age: 40
End: 2025-05-18
Payer: COMMERCIAL

## 2025-05-18 DIAGNOSIS — E11.9 TYPE 2 DIABETES MELLITUS WITHOUT COMPLICATION, WITHOUT LONG-TERM CURRENT USE OF INSULIN: Primary | ICD-10-CM

## 2025-06-25 ENCOUNTER — TELEPHONE (OUTPATIENT)
Dept: OBSTETRICS AND GYNECOLOGY | Age: 40
End: 2025-06-25
Payer: COMMERCIAL

## 2025-07-09 ENCOUNTER — HOSPITAL ENCOUNTER (OUTPATIENT)
Facility: HOSPITAL | Age: 40
Discharge: HOME | End: 2025-07-09
Payer: COMMERCIAL

## 2025-07-11 ENCOUNTER — TELEPHONE (OUTPATIENT)
Dept: OBSTETRICS AND GYNECOLOGY | Age: 40
End: 2025-07-11

## 2025-08-12 ENCOUNTER — LAB (OUTPATIENT)
Dept: LAB | Facility: HOSPITAL | Age: 40
End: 2025-08-12
Payer: COMMERCIAL

## 2025-08-12 ENCOUNTER — OFFICE VISIT (OUTPATIENT)
Dept: ONCOLOGY | Facility: CLINIC | Age: 40
End: 2025-08-12
Payer: COMMERCIAL

## 2025-08-12 VITALS
BODY MASS INDEX: 49.96 KG/M2 | SYSTOLIC BLOOD PRESSURE: 102 MMHG | HEIGHT: 63 IN | TEMPERATURE: 98.3 F | WEIGHT: 282 LBS | DIASTOLIC BLOOD PRESSURE: 69 MMHG | HEART RATE: 80 BPM | OXYGEN SATURATION: 94 %

## 2025-08-12 DIAGNOSIS — K90.89 POOR IRON ABSORPTION: Primary | ICD-10-CM

## 2025-08-12 DIAGNOSIS — D50.9 IRON DEFICIENCY ANEMIA, UNSPECIFIED IRON DEFICIENCY ANEMIA TYPE: ICD-10-CM

## 2025-08-12 DIAGNOSIS — D50.8 OTHER IRON DEFICIENCY ANEMIA: ICD-10-CM

## 2025-08-12 DIAGNOSIS — K90.89 POOR IRON ABSORPTION: ICD-10-CM

## 2025-08-12 LAB
BASOPHILS # BLD AUTO: 0.09 10*3/MM3 (ref 0–0.2)
BASOPHILS NFR BLD AUTO: 0.8 % (ref 0–1.5)
DEPRECATED RDW RBC AUTO: 43.3 FL (ref 37–54)
EOSINOPHIL # BLD AUTO: 0.23 10*3/MM3 (ref 0–0.4)
EOSINOPHIL NFR BLD AUTO: 1.9 % (ref 0.3–6.2)
ERYTHROCYTE [DISTWIDTH] IN BLOOD BY AUTOMATED COUNT: 13.2 % (ref 12.3–15.4)
FERRITIN SERPL-MCNC: 185 NG/ML (ref 13–150)
FOLATE SERPL-MCNC: 8.34 NG/ML (ref 4.78–24.2)
HCT VFR BLD AUTO: 41.1 % (ref 34–46.6)
HGB BLD-MCNC: 13.2 G/DL (ref 12–15.9)
IMM GRANULOCYTES # BLD AUTO: 0.03 10*3/MM3 (ref 0–0.05)
IMM GRANULOCYTES NFR BLD AUTO: 0.3 % (ref 0–0.5)
IRON 24H UR-MRATE: 49 MCG/DL (ref 37–145)
IRON SATN MFR SERPL: 11 % (ref 20–50)
LYMPHOCYTES # BLD AUTO: 2.83 10*3/MM3 (ref 0.7–3.1)
LYMPHOCYTES NFR BLD AUTO: 23.8 % (ref 19.6–45.3)
MCH RBC QN AUTO: 28.8 PG (ref 26.6–33)
MCHC RBC AUTO-ENTMCNC: 32.1 G/DL (ref 31.5–35.7)
MCV RBC AUTO: 89.5 FL (ref 79–97)
MONOCYTES # BLD AUTO: 0.62 10*3/MM3 (ref 0.1–0.9)
MONOCYTES NFR BLD AUTO: 5.2 % (ref 5–12)
NEUTROPHILS NFR BLD AUTO: 68 % (ref 42.7–76)
NEUTROPHILS NFR BLD AUTO: 8.07 10*3/MM3 (ref 1.7–7)
NRBC BLD AUTO-RTO: 0 /100 WBC (ref 0–0.2)
PLATELET # BLD AUTO: 446 10*3/MM3 (ref 140–450)
PMV BLD AUTO: 9.7 FL (ref 6–12)
RBC # BLD AUTO: 4.59 10*6/MM3 (ref 3.77–5.28)
TIBC SERPL-MCNC: 451 MCG/DL (ref 298–536)
TRANSFERRIN SERPL-MCNC: 303 MG/DL (ref 200–360)
VIT B12 BLD-MCNC: 378 PG/ML (ref 211–946)
WBC NRBC COR # BLD AUTO: 11.87 10*3/MM3 (ref 3.4–10.8)

## 2025-08-12 PROCEDURE — 85025 COMPLETE CBC W/AUTO DIFF WBC: CPT

## 2025-08-12 PROCEDURE — 83540 ASSAY OF IRON: CPT

## 2025-08-12 PROCEDURE — 82746 ASSAY OF FOLIC ACID SERUM: CPT | Performed by: INTERNAL MEDICINE

## 2025-08-12 PROCEDURE — 84466 ASSAY OF TRANSFERRIN: CPT

## 2025-08-12 PROCEDURE — 82728 ASSAY OF FERRITIN: CPT

## 2025-08-12 PROCEDURE — 82607 VITAMIN B-12: CPT | Performed by: INTERNAL MEDICINE

## (undated) DEVICE — SUT VIC 2/0 TIES 18IN J111T

## (undated) DEVICE — SUT ETHLN 2/0 30IN 628H

## (undated) DEVICE — MARKR SKIN W/RULR AND LBL

## (undated) DEVICE — COVER,TABLE,44X90,STERILE: Brand: MEDLINE

## (undated) DEVICE — ENDOPATH XCEL UNIVERSAL TROCAR STABLILITY SLEEVES: Brand: ENDOPATH XCEL

## (undated) DEVICE — DEV SUT GRSPR CLOSUR 15CM 14G

## (undated) DEVICE — APPL CHLORAPREP HI/LITE 26ML ORNG

## (undated) DEVICE — GOWN SURG AERO CHROME XL

## (undated) DEVICE — LOU LAP SIGMOID COLON: Brand: MEDLINE INDUSTRIES, INC.

## (undated) DEVICE — DRAPE,UNDERBUTTOCKS,PCH,STERILE: Brand: MEDLINE

## (undated) DEVICE — ENDOCUT SCISSOR TIP, DISPOSABLE: Brand: RENEW

## (undated) DEVICE — COLOSTOMY/ILEOSTOMY KIT, FLEXWEAR: Brand: NEW IMAGE

## (undated) DEVICE — ANTIBACTERIAL UNDYED BRAIDED (POLYGLACTIN 910), SYNTHETIC ABSORBABLE SUTURE: Brand: COATED VICRYL

## (undated) DEVICE — PROB ABL ENDOMTRL NOVASURE/G4 W/SURESND

## (undated) DEVICE — Device

## (undated) DEVICE — GLV SURG SENSICARE PI LF PF 7.5 GRN STRL

## (undated) DEVICE — GOWN,PREVENTION PLUS,XLARGE,STERILE: Brand: MEDLINE

## (undated) DEVICE — ENSEAL TRIO TEMPERATURE CONTOLLED TISSUE SEALING TECHNOLOGY DISPOSABLE TISSUE SEALING DEVICE TAPTRONIC TRIGGER ACTIVATED POWER 3MM CURVED JAW: Brand: ENSEAL

## (undated) DEVICE — SUT VIC 2/0 UR6 27IN J602H

## (undated) DEVICE — PENCL ES MEGADINE EZ/CLEAN BUTN W/HOLSTR 10FT

## (undated) DEVICE — LAPAROSCOPIC GAS CONDITIONING DEVICE.: Brand: INSUFLOW

## (undated) DEVICE — DRAPE,REIN 53X77,STERILE: Brand: MEDLINE

## (undated) DEVICE — TOTAL TRAY, 16FR 10ML SIL FOLEY, URN: Brand: MEDLINE

## (undated) DEVICE — GLV SURG BIOGEL LTX PF 7

## (undated) DEVICE — ENDOPATH XCEL BLADELESS TROCARS WITH STABILITY SLEEVES: Brand: ENDOPATH XCEL

## (undated) DEVICE — ST IRR CYSTO W/SPK 77IN LF

## (undated) DEVICE — MEDI-VAC YANKAUER SUCTION HANDLE W/BULBOUS TIP: Brand: CARDINAL HEALTH

## (undated) DEVICE — STPLR SKIN VISISTAT WD 35CT

## (undated) DEVICE — DRAPE,UTILITY,TAPE,15X26,STERILE: Brand: MEDLINE

## (undated) DEVICE — TRAP FLD MINIVAC MEGADYNE 100ML

## (undated) DEVICE — NDL HYPO ECLPS SFTY 22G 1 1/2IN

## (undated) DEVICE — GLV SURG SENSICARE PI MIC PF SZ6.5 LF STRL

## (undated) DEVICE — PREP SOL POVIDONE/IODINE BT 4OZ

## (undated) DEVICE — SMOKE EVACUATION TUBING WITH 8 IN INTEGRAL WAND AND SPONGE GUARD: Brand: BUFFALO FILTER

## (undated) DEVICE — TUBING, SUCTION, 1/4" X 20', STRAIGHT: Brand: MEDLINE INDUSTRIES, INC.

## (undated) DEVICE — SOL NACL 0.9PCT 1000ML

## (undated) DEVICE — GLV SURG SENSICARE ALOE LF PF SZ7.5 GRN

## (undated) DEVICE — PATIENT RETURN ELECTRODE, SINGLE-USE, CONTACT QUALITY MONITORING, ADULT, WITH 9FT CORD, FOR PATIENTS WEIGING OVER 33LBS. (15KG): Brand: MEGADYNE

## (undated) DEVICE — VISUALIZATION SYSTEM: Brand: CLEARIFY

## (undated) DEVICE — PREMIUM WET SKIN PREP TRAY: Brand: MEDLINE INDUSTRIES, INC.

## (undated) DEVICE — LOU MINOR PROCEDURE: Brand: MEDLINE INDUSTRIES, INC.

## (undated) DEVICE — ADHS SKIN DERMABOND TOP ADVANCED

## (undated) DEVICE — SUT MNCRYL PLS ANTIB UD 4/0 PS2 18IN

## (undated) DEVICE — ELECTRD BLD EZ CLN MOD XLNG 2.75IN

## (undated) DEVICE — 1842 FOAM BLOCK NEEDLE COUNTER: Brand: DEVON

## (undated) DEVICE — ELECTRD BLD EXT EDGE 1P COAT 6.5IN STRL

## (undated) DEVICE — STERILE LATEX POWDER-FREE SURGICAL GLOVESWITH NITRILE COATING: Brand: PROTEXIS

## (undated) DEVICE — RETR STAY HK ELAS SHRP 5MM PK/8

## (undated) DEVICE — VIOLET BRAIDED (POLYGLACTIN 910), SYNTHETIC ABSORBABLE SUTURE: Brand: COATED VICRYL

## (undated) DEVICE — LOU D & C HYSTEROSCOPY: Brand: MEDLINE INDUSTRIES, INC.

## (undated) DEVICE — PENCL E/S HNDSWCH ROCKR CB

## (undated) DEVICE — RETR RNG GEN2 14.1CMX14.1CM

## (undated) DEVICE — SPNG LAP 18X18IN LF STRL PK/5

## (undated) DEVICE — GLV SURG SENSICARE GREEN W/ALOE PF LF 7 STRL

## (undated) DEVICE — JACKSON-PRATT 100CC BULB RESERVOIR: Brand: CARDINAL HEALTH

## (undated) DEVICE — GOWN,SIRUS,NON REINFRCD,LARGE,SET IN SL: Brand: MEDLINE

## (undated) DEVICE — APPL CHLORAPREP W/TINT 26ML ORNG

## (undated) DEVICE — TROCAR: Brand: KII SLEEVE

## (undated) DEVICE — ENDOPATH PNEUMONEEDLE INSUFFLATION NEEDLES WITH LUER LOCK CONNECTORS 120MM: Brand: ENDOPATH

## (undated) DEVICE — SUT VIC 0 TIES 18IN J912G

## (undated) DEVICE — IRRIGATOR BULB ASEPTO 60CC STRL

## (undated) DEVICE — TP UMB COTN 1/8X36 U12T

## (undated) DEVICE — STRAP STIRUP WO/ RNG

## (undated) DEVICE — ECHELON FLEX 60 ARTICULATING ENDOSCOPIC LINEAR CUTTER (NO CARTRIDGE): Brand: ECHELON FLEX ENDOPATH

## (undated) DEVICE — 3M™ IOBAN™ 2 ANTIMICROBIAL INCISE DRAPE 6648EZ: Brand: IOBAN™ 2

## (undated) DEVICE — SUT PDS 3/0 SH 27IN DYED Z316H

## (undated) DEVICE — DISPOSABLE GRASPER CARTRIDGE: Brand: DIRECT DRIVE REPOSABLE GRASPERS

## (undated) DEVICE — TROCAR: Brand: KII OPTICAL ACCESS SYSTEM